# Patient Record
Sex: FEMALE | Race: WHITE | NOT HISPANIC OR LATINO | Employment: OTHER | URBAN - METROPOLITAN AREA
[De-identification: names, ages, dates, MRNs, and addresses within clinical notes are randomized per-mention and may not be internally consistent; named-entity substitution may affect disease eponyms.]

---

## 2017-02-16 ENCOUNTER — OFFICE VISIT (OUTPATIENT)
Dept: FAMILY MEDICINE | Facility: CLINIC | Age: 62
End: 2017-02-16
Payer: COMMERCIAL

## 2017-02-16 VITALS
TEMPERATURE: 97.3 F | DIASTOLIC BLOOD PRESSURE: 86 MMHG | SYSTOLIC BLOOD PRESSURE: 122 MMHG | HEIGHT: 66 IN | WEIGHT: 207 LBS | HEART RATE: 80 BPM | BODY MASS INDEX: 33.27 KG/M2 | OXYGEN SATURATION: 92 %

## 2017-02-16 DIAGNOSIS — I10 BENIGN ESSENTIAL HYPERTENSION: ICD-10-CM

## 2017-02-16 DIAGNOSIS — J20.9 ACUTE BRONCHITIS, UNSPECIFIED ORGANISM: Primary | ICD-10-CM

## 2017-02-16 PROCEDURE — 99213 OFFICE O/P EST LOW 20 MIN: CPT | Performed by: NURSE PRACTITIONER

## 2017-02-16 RX ORDER — LISINOPRIL 10 MG/1
10 TABLET ORAL DAILY
Qty: 90 TABLET | Refills: 3 | Status: CANCELLED | OUTPATIENT
Start: 2017-02-16

## 2017-02-16 RX ORDER — OXYBUTYNIN CHLORIDE 10 MG/1
10 TABLET, EXTENDED RELEASE ORAL DAILY
Qty: 90 TABLET | Refills: 3 | Status: CANCELLED | OUTPATIENT
Start: 2017-02-16

## 2017-02-16 NOTE — NURSING NOTE
"Chief Complaint   Patient presents with     Cough     x 7 days       Initial /80 (BP Location: Right arm, Cuff Size: Adult Large)  Pulse 80  Temp 97.3  F (36.3  C) (Oral)  Ht 5' 6.42\" (1.687 m)  Wt 207 lb (93.9 kg)  SpO2 92%  BMI 32.99 kg/m2 Estimated body mass index is 32.99 kg/(m^2) as calculated from the following:    Height as of this encounter: 5' 6.42\" (1.687 m).    Weight as of this encounter: 207 lb (93.9 kg).  Medication Reconciliation: complete     An YANELI Bunn    "

## 2017-02-16 NOTE — PROGRESS NOTES
"  SUBJECTIVE:                                                    Georgie Patino is a 61 year old female who presents to clinic today for the following health issues:      RESPIRATORY SYMPTOMS      Duration: 7 days    Description  nasal congestion, cough, wheezing, ear pain right, headache and hoarse voice    Severity: moderate    Accompanying signs and symptoms: None    History (predisposing factors):  none    Precipitating or alleviating factors: None    Therapies tried and outcome:  rest and fluids     Had a cough late December, resolved for 2 weeks.  Afebrile but had some achyness the first few days, now resolved.  Right ear pain.  Taking Delsym- BP elevated today.      Problem list and histories reviewed & adjusted, as indicated.  Additional history: as documented    Problem list, Medication list, Allergies, and Medical/Social/Surgical histories reviewed in EPIC and updated as appropriate.    ROS:  Constitutional, HEENT, cardiovascular, pulmonary systems are negative, except as otherwise noted.    OBJECTIVE:                                                    /86  Pulse 80  Temp 97.3  F (36.3  C) (Oral)  Ht 5' 6.42\" (1.687 m)  Wt 207 lb (93.9 kg)  SpO2 92%  BMI 32.99 kg/m2  Body mass index is 32.99 kg/(m^2).  GENERAL: healthy, alert and no distress  EYES: Eyes grossly normal to inspection, PERRL and conjunctivae and sclerae normal  HENT: ear canals and TM's normal, nose and mouth without ulcers or lesions  NECK: no adenopathy, no asymmetry, masses, or scars and thyroid normal to palpation  RESP: lungs clear to auscultation - no rales, rhonchi or wheezes  CV: regular rates and rhythm, normal S1 S2, no S3 or S4 and no murmur, click or rub    Diagnostic Test Results:  none      ASSESSMENT/PLAN:                                                        1. Acute bronchitis, unspecified organism  Supportive cares- push fluids, rest, Corticidin HBP if desired for cough. Avoid other over the counter decongestants as " this will raise BP.     2. Benign essential hypertension  As above      Follow up as needed    TESHA Ann CNP  [facility]

## 2017-02-16 NOTE — PATIENT INSTRUCTIONS
Coricidin HBP is safe to take for a cough or congestion with your high blood pressure.    [facility]    If you have any questions regarding to your visit please contact your care team:       Team Red:   Clinic Hours Telephone Number   Dr. Kimberly Bustillo, NP   7am-7pm  Monday - Thursday   7am-5pm  Fridays  [phone]  (Appointment scheduling available 24/7)    Questions about your visit?   Team Line  [phone]   Urgent Care - [place] - 11am-9pm Monday-Friday Saturday-Sunday- 9am-5pm   [place] - 5pm-9pm Monday-Friday Saturday-Sunday- 9am-5pm  [phone] - [facility]  [phone] - [place]       What options do I have for visits at the clinic other than the traditional office visit?  To expand how we care for you, many of our providers are utilizing electronic visits (e-visits) and telephone visits, when medically appropriate, for interactions with their patients rather than a visit in the clinic.   We also offer nurse visits for many medical concerns. Just like any other service, we will bill your insurance company for this type of visit based on time spent on the phone with your provider. Not all insurance companies cover these visits. Please check with your medical insurance if this type of visit is covered. You will be responsible for any charges that are not paid by your insurance.      E-visits via [employer]:  generally incur a $35.00 fee.  Telephone visits:  Time spent on the phone: *charged based on time that is spent on the phone in increments of 10 minutes. Estimated cost:   5-10 mins $30.00   11-20 mins. $59.00   21-30 mins. $85.00     Use [employer] (secure email communication and access to your chart) to send your primary care provider a message or make an appointment. Ask someone on your Team how to sign up for [employer].  For a Price Quote for your services, please call our Consumer Price Line at  [phone].      As always, Thank you for trusting us with your health care needs!  Nely BLACK MA

## 2017-02-16 NOTE — MR AVS SNAPSHOT
After Visit Summary   2/16/2017    Georgie Patino    MRN: [id number]           Patient Information     Date Of Birth          1955        Visit Information        Provider Department      2/16/2017 8:40 AM Ginger Bustillo APRN [facility]        Today's Diagnoses     Acute bronchitis, unspecified organism    -  1    Benign essential hypertension          Care Instructions    Coricidin HBP is safe to take for a cough or congestion with your high blood pressure.    [facility]    If you have any questions regarding to your visit please contact your care team:       Team Red:   Clinic Hours Telephone Number   Dr. Kimberly Bustillo, NP   7am-7pm  Monday - Thursday   7am-5pm  Fridays  [phone]  (Appointment scheduling available 24/7)    Questions about your visit?   Team Line  [phone]   Urgent Care - [place] - 11am-9pm Monday-Friday Saturday-Sunday- 9am-5pm   [place] - 5pm-9pm Monday-Friday Saturday-Sunday- 9am-5pm  [phone] - [facility]  [phone] - [place]       What options do I have for visits at the clinic other than the traditional office visit?  To expand how we care for you, many of our providers are utilizing electronic visits (e-visits) and telephone visits, when medically appropriate, for interactions with their patients rather than a visit in the clinic.   We also offer nurse visits for many medical concerns. Just like any other service, we will bill your insurance company for this type of visit based on time spent on the phone with your provider. Not all insurance companies cover these visits. Please check with your medical insurance if this type of visit is covered. You will be responsible for any charges that are not paid by your insurance.      E-visits via [employer]:  generally incur a $35.00 fee.  Telephone visits:  Time spent on the phone: *charged  "based on time that is spent on the phone in increments of 10 minutes. Estimated cost:   5-10 mins $30.00   11-20 mins. $59.00   21-30 mins. $85.00     Use [employer] (secure email communication and access to your chart) to send your primary care provider a message or make an appointment. Ask someone on your Team how to sign up for [employer].  For a Price Quote for your services, please call our [employer] Price Line at [phone].      As always, Thank you for trusting us with your health care needs!  Nely BLACK MA          Follow-ups after your visit        Your next 10 appointments already scheduled     Feb 24, 2017   Procedure with William Charles Duane, MD   [facility] (--)    [address]  [facility] [address] [phone]              Who to contact     If you have questions or need follow up information about today's clinic visit or your schedule please contact [facility] directly at [phone].  Normal or non-critical lab and imaging results will be communicated to you by [employer], letter or phone within 4 business days after the clinic has received the results. If you do not hear from us within 7 days, please contact the clinic through [employer] or phone. If you have a critical or abnormal lab result, we will notify you by phone as soon as possible.  Submit refill requests through [employer] or call your pharmacy and they will forward the refill request to us. Please allow 3 business days for your refill to be completed.          Additional Information About Your Visit        [employer] Information     [employer] lets you send messages to your doctor, view your test results, renew your prescriptions, schedule appointments and more. To sign up, go to [email or web address] . Click on \"Log in\" on the left side of the screen, which will take you to the Welcome page. Then click on \"Sign up Now\" on the right side of the page.     You will be asked to enter the access code listed " "below, as well as some personal information. Please follow the directions to create your username and password.     Your access code is: [email or web address]  Expires: 2017  9:10 AM     Your access code will  in 90 days. If you need help or a new code, please call your [facility] or [phone].        Care EveryWhere ID     This is your Care EveryWhere ID. This could be used by other organizations to access your [place] medical records  [id number]        Your Vitals Were     Pulse Temperature Height Pulse Oximetry BMI (Body Mass Index)       80 97.3  F (36.3  C) (Oral) 5' 6.42\" (1.687 m) 92% 32.99 kg/m2        Blood Pressure from Last 3 Encounters:   17 122/86   16 126/84   16 144/84    Weight from Last 3 Encounters:   17 207 lb (93.9 kg)   16 203 lb (92.1 kg)   16 204 lb (92.5 kg)              Today, you had the following     No orders found for display       Primary Care Provider Office Phone # Fax #    Marlys TESHA Nguyen [facility] [phone]       [address] [address]        Thank you!     Thank you for choosing [facility]  for your care. Our goal is always to provide you with excellent care. Hearing back from our patients is one way we can continue to improve our services. Please take a few minutes to complete the written survey that you may receive in the mail after your visit with us. Thank you!             Your Updated Medication List - Protect others around you: Learn how to safely use, store and throw away your medicines at www.disposemymeds.org.          This list is accurate as of: 17  9:10 AM.  Always use your most recent med list.                   Brand Name Dispense Instructions for use    lisinopril 10 MG tablet    PRINIVIL/ZESTRIL    90 tablet    Take 1 tablet (10 mg) by mouth daily       nicotine polacrilex 2 MG gum    NICORETTE    100 tablet    Place 1 each (2 mg) " inside cheek as needed for smoking cessation Place 1 each inside cheek as needed for smoking cessation       oxybutynin 10 MG 24 hr tablet    DITROPAN-XL    90 tablet    Take 1 tablet (10 mg) by mouth daily       ZANTAC PO      Take 150 mg by mouth daily

## 2017-03-15 ENCOUNTER — HOSPITAL ENCOUNTER (OUTPATIENT)
Facility: AMBULATORY SURGERY CENTER | Age: 62
Discharge: HOME | End: 2017-03-15
Attending: INTERNAL MEDICINE | Admitting: INTERNAL MEDICINE
Payer: COMMERCIAL

## 2017-03-15 ENCOUNTER — SURGERY (OUTPATIENT)
Age: 62
End: 2017-03-15

## 2017-03-15 VITALS
RESPIRATION RATE: 16 BRPM | WEIGHT: 200 LBS | BODY MASS INDEX: 32.14 KG/M2 | TEMPERATURE: 98.3 F | SYSTOLIC BLOOD PRESSURE: 161 MMHG | DIASTOLIC BLOOD PRESSURE: 77 MMHG | HEIGHT: 66 IN | OXYGEN SATURATION: 97 %

## 2017-03-15 LAB — COLONOSCOPY: NORMAL

## 2017-03-15 PROCEDURE — G8918 PT W/O PREOP ORDER IV AB PRO: HCPCS

## 2017-03-15 PROCEDURE — G8907 PT DOC NO EVENTS ON DISCHARG: HCPCS

## 2017-03-15 PROCEDURE — 45385 COLONOSCOPY W/LESION REMOVAL: CPT

## 2017-03-15 PROCEDURE — 88305 TISSUE EXAM BY PATHOLOGIST: CPT | Performed by: INTERNAL MEDICINE

## 2017-03-15 RX ORDER — LIDOCAINE 40 MG/G
CREAM TOPICAL
Status: DISCONTINUED | OUTPATIENT
Start: 2017-03-15 | End: 2017-03-16 | Stop reason: HOSPADM

## 2017-03-15 RX ORDER — ONDANSETRON 2 MG/ML
4 INJECTION INTRAMUSCULAR; INTRAVENOUS
Status: DISCONTINUED | OUTPATIENT
Start: 2017-03-15 | End: 2017-03-16 | Stop reason: HOSPADM

## 2017-03-15 RX ORDER — FENTANYL CITRATE 50 UG/ML
INJECTION, SOLUTION INTRAMUSCULAR; INTRAVENOUS PRN
Status: DISCONTINUED | OUTPATIENT
Start: 2017-03-15 | End: 2017-03-15 | Stop reason: HOSPADM

## 2017-03-15 RX ADMIN — FENTANYL CITRATE 100 MCG: 50 INJECTION, SOLUTION INTRAMUSCULAR; INTRAVENOUS at 09:10

## 2017-03-16 DIAGNOSIS — I10 BENIGN ESSENTIAL HYPERTENSION: ICD-10-CM

## 2017-03-16 LAB — COPATH REPORT: NORMAL

## 2017-03-16 NOTE — TELEPHONE ENCOUNTER
lisinopril (PRINIVIL,ZESTRIL) 10 MG tablet      Last Written Prescription Date: 3/14/16  Last Fill Quantity: 90, # refills: 3  Last Office Visit with G, P or [facility] prescribing provider: 2/16/17       Potassium   Date Value Ref Range Status   01/27/2016 4.4 3.4 - 5.3 mmol/L Final     Creatinine   Date Value Ref Range Status   01/27/2016 0.55 0.52 - 1.04 mg/dL Final     BP Readings from Last 3 Encounters:   03/15/17 161/77   02/16/17 122/86   03/14/16 126/84

## 2017-03-17 RX ORDER — LISINOPRIL 10 MG/1
TABLET ORAL
Qty: 30 TABLET | Refills: 2 | Status: SHIPPED | OUTPATIENT
Start: 2017-03-17 | End: 2017-06-13

## 2017-03-17 NOTE — TELEPHONE ENCOUNTER
Prescription approved per [facility] Refill Protocol.    Christie Dean, RN - BC

## 2017-06-13 DIAGNOSIS — I10 BENIGN ESSENTIAL HYPERTENSION: ICD-10-CM

## 2017-06-13 RX ORDER — LISINOPRIL 10 MG/1
TABLET ORAL
Qty: 30 TABLET | Refills: 0 | Status: SHIPPED | OUTPATIENT
Start: 2017-06-13 | End: 2017-08-04

## 2017-06-13 NOTE — TELEPHONE ENCOUNTER
lisinopril (PRINIVIL/ZESTRIL) 10 MG tablet      Last Written Prescription Date: 3/17/17  Last Fill Quantity: 30, # refills: 2  Last Office Visit with G, P or [facility] prescribing provider: 2/16/17       Potassium   Date Value Ref Range Status   01/27/2016 4.4 3.4 - 5.3 mmol/L Final     Creatinine   Date Value Ref Range Status   01/27/2016 0.55 0.52 - 1.04 mg/dL Final     BP Readings from Last 3 Encounters:   03/15/17 161/77   02/16/17 122/86   03/14/16 126/84

## 2017-06-13 NOTE — TELEPHONE ENCOUNTER
Prescription approved per [facility] Refill Protocol.  Patient due for labs for further refills.  Christie Dean RN - BC

## 2017-07-30 ENCOUNTER — TELEPHONE (OUTPATIENT)
Dept: FAMILY MEDICINE | Facility: CLINIC | Age: 62
End: 2017-07-30

## 2017-07-30 DIAGNOSIS — I10 BENIGN ESSENTIAL HYPERTENSION: ICD-10-CM

## 2017-07-31 RX ORDER — LISINOPRIL 10 MG/1
TABLET ORAL
Qty: 30 TABLET | Refills: 0
Start: 2017-07-31

## 2017-07-31 NOTE — TELEPHONE ENCOUNTER
I have not seen patient in over a year.  Please schedule patient for follow-up and give a refill once follow-up scheduled.  She will need labs at her appointment.    Marlys Wade, CNP

## 2017-07-31 NOTE — TELEPHONE ENCOUNTER
lisinopril (PRINIVIL/ZESTRIL) 10 MG tablet      Last Written Prescription Date: 6/13/2017  Last Fill Quantity: 30, # refills: 0  Last Office Visit with G, P or [facility] prescribing provider: 2/16/2017       Potassium   Date Value Ref Range Status   01/27/2016 4.4 3.4 - 5.3 mmol/L Final     Creatinine   Date Value Ref Range Status   01/27/2016 0.55 0.52 - 1.04 mg/dL Final     BP Readings from Last 3 Encounters:   03/15/17 161/77   02/16/17 122/86   03/14/16 126/84

## 2017-07-31 NOTE — TELEPHONE ENCOUNTER
Routing refill request to provider for review/approval because:  Labs not current:  Amador DICKEY RN

## 2017-07-31 NOTE — TELEPHONE ENCOUNTER
Please notify patient of the below and schedule.  Route back to RN triage pool if temporary supply of med is needed     Olga Lidia Marquez RN

## 2017-08-04 RX ORDER — LISINOPRIL 10 MG/1
10 TABLET ORAL DAILY
Qty: 30 TABLET | Refills: 0 | Status: SHIPPED | OUTPATIENT
Start: 2017-08-04 | End: 2017-08-15

## 2017-08-15 ENCOUNTER — OFFICE VISIT (OUTPATIENT)
Dept: INTERNAL MEDICINE | Facility: CLINIC | Age: 62
End: 2017-08-15
Payer: COMMERCIAL

## 2017-08-15 VITALS
HEART RATE: 80 BPM | WEIGHT: 205 LBS | BODY MASS INDEX: 35 KG/M2 | DIASTOLIC BLOOD PRESSURE: 90 MMHG | OXYGEN SATURATION: 97 % | TEMPERATURE: 97 F | HEIGHT: 64 IN | SYSTOLIC BLOOD PRESSURE: 140 MMHG

## 2017-08-15 DIAGNOSIS — Z11.59 NEED FOR HEPATITIS C SCREENING TEST: ICD-10-CM

## 2017-08-15 DIAGNOSIS — Z82.41 FAMILY HISTORY OF SUDDEN CARDIAC DEATH: ICD-10-CM

## 2017-08-15 DIAGNOSIS — Z12.31 ENCOUNTER FOR SCREENING MAMMOGRAM FOR BREAST CANCER: ICD-10-CM

## 2017-08-15 DIAGNOSIS — R00.2 PALPITATIONS: ICD-10-CM

## 2017-08-15 DIAGNOSIS — I10 BENIGN ESSENTIAL HYPERTENSION: ICD-10-CM

## 2017-08-15 DIAGNOSIS — Z00.00 ROUTINE HISTORY AND PHYSICAL EXAMINATION OF ADULT: Primary | ICD-10-CM

## 2017-08-15 DIAGNOSIS — D58.2 ELEVATED HEMOGLOBIN (H): ICD-10-CM

## 2017-08-15 DIAGNOSIS — R94.31 ABNORMAL ELECTROCARDIOGRAM: ICD-10-CM

## 2017-08-15 DIAGNOSIS — Z13.6 CARDIOVASCULAR SCREENING; LDL GOAL LESS THAN 160: ICD-10-CM

## 2017-08-15 DIAGNOSIS — Z72.0 TOBACCO ABUSE: ICD-10-CM

## 2017-08-15 LAB
BASOPHILS # BLD AUTO: 0 10E9/L (ref 0–0.2)
BASOPHILS NFR BLD AUTO: 0.6 %
DIFFERENTIAL METHOD BLD: ABNORMAL
EOSINOPHIL # BLD AUTO: 0.2 10E9/L (ref 0–0.7)
EOSINOPHIL NFR BLD AUTO: 2.9 %
ERYTHROCYTE [DISTWIDTH] IN BLOOD BY AUTOMATED COUNT: 13.7 % (ref 10–15)
HCT VFR BLD AUTO: 47.8 % (ref 35–47)
HGB BLD-MCNC: 15.9 G/DL (ref 11.7–15.7)
LYMPHOCYTES # BLD AUTO: 3.2 10E9/L (ref 0.8–5.3)
LYMPHOCYTES NFR BLD AUTO: 45.4 %
MCH RBC QN AUTO: 29.1 PG (ref 26.5–33)
MCHC RBC AUTO-ENTMCNC: 33.3 G/DL (ref 31.5–36.5)
MCV RBC AUTO: 88 FL (ref 78–100)
MONOCYTES # BLD AUTO: 0.4 10E9/L (ref 0–1.3)
MONOCYTES NFR BLD AUTO: 6 %
NEUTROPHILS # BLD AUTO: 3.1 10E9/L (ref 1.6–8.3)
NEUTROPHILS NFR BLD AUTO: 45.1 %
PLATELET # BLD AUTO: 188 10E9/L (ref 150–450)
RBC # BLD AUTO: 5.46 10E12/L (ref 3.8–5.2)
WBC # BLD AUTO: 7 10E9/L (ref 4–11)

## 2017-08-15 PROCEDURE — 85025 COMPLETE CBC W/AUTO DIFF WBC: CPT | Performed by: NURSE PRACTITIONER

## 2017-08-15 PROCEDURE — 99396 PREV VISIT EST AGE 40-64: CPT | Performed by: NURSE PRACTITIONER

## 2017-08-15 PROCEDURE — 0298T ZZC EXT ECG > 48HR TO 21 DAY REVIEW AND INTERPRETATN: CPT | Performed by: INTERNAL MEDICINE

## 2017-08-15 PROCEDURE — 84443 ASSAY THYROID STIM HORMONE: CPT | Performed by: NURSE PRACTITIONER

## 2017-08-15 PROCEDURE — 93000 ELECTROCARDIOGRAM COMPLETE: CPT | Performed by: NURSE PRACTITIONER

## 2017-08-15 PROCEDURE — 80048 BASIC METABOLIC PNL TOTAL CA: CPT | Performed by: NURSE PRACTITIONER

## 2017-08-15 PROCEDURE — 86803 HEPATITIS C AB TEST: CPT | Performed by: NURSE PRACTITIONER

## 2017-08-15 PROCEDURE — 99213 OFFICE O/P EST LOW 20 MIN: CPT | Mod: 25 | Performed by: NURSE PRACTITIONER

## 2017-08-15 PROCEDURE — 36415 COLL VENOUS BLD VENIPUNCTURE: CPT | Performed by: NURSE PRACTITIONER

## 2017-08-15 PROCEDURE — 80061 LIPID PANEL: CPT | Performed by: NURSE PRACTITIONER

## 2017-08-15 RX ORDER — LISINOPRIL 10 MG/1
10 TABLET ORAL DAILY
Qty: 90 TABLET | Refills: 3 | Status: SHIPPED | OUTPATIENT
Start: 2017-08-15 | End: 2017-09-01

## 2017-08-15 ASSESSMENT — PAIN SCALES - GENERAL: PAINLEVEL: NO PAIN (0)

## 2017-08-15 NOTE — PROGRESS NOTES
SUBJECTIVE:   CC: Georgie Patino is an 62 year old woman who presents for preventive health visit.     Healthy Habits:    Do you get at least three servings of calcium containing foods daily (dairy, green leafy vegetables, etc.)? yes    Amount of exercise or daily activities, outside of work: 3 day(s) per week    Problems taking medications regularly No    Medication side effects: Yes Cough    Have you had an eye exam in the past two years? yes    Do you see a dentist twice per year? yes    Do you have sleep apnea, excessive snoring or daytime drowsiness?no          Hypertension Follow-up      Outpatient blood pressures are not being checked.    Low Salt Diet: low salt    Patient notes occasional racing heart beat.  Patient notes shortness of breath, dizziness with palpitations.  Patient denies chest pain with palpitations.  She notes them more at night and after dinner.  Patient's brother just  from a sudden heart attack at age 54.        Today's PHQ-2 Score: PHQ-2 (  [employer]) 8/15/2017 3/14/2016   Q1: Little interest or pleasure in doing things 0 0   Q2: Feeling down, depressed or hopeless 0 0   PHQ-2 Score 0 0         Abuse: Current or Past(Physical, Sexual or Emotional)- No  Do you feel safe in your environment - Yes  Social History   Substance Use Topics     Smoking status: Current Every Day Smoker     Packs/day: 0.05     Years: 40.00     Types: Cigarettes     Smokeless tobacco: Current User     Alcohol use 0.5 - 1.0 oz/week     1 - 2 Standard drinks or equivalent per week      Comment: 3-4 drinks per week      The patient does not drink >3 drinks per day nor >7 drinks per week.  ALAINA/MA    Reviewed orders with patient.  Reviewed health maintenance and updated orders accordingly - Yes  Labs reviewed in EPIC  BP Readings from Last 3 Encounters:   08/15/17 140/90   03/15/17 161/77   17 122/86    Wt Readings from Last 3 Encounters:   08/15/17 205 lb (93 kg)   17 200 lb (90.7 kg)    02/16/17 207 lb (93.9 kg)                  Patient Active Problem List   Diagnosis     CARDIOVASCULAR SCREENING; LDL GOAL LESS THAN 160     Smoker     Obesity     Benign essential hypertension     Past Surgical History:   Procedure Laterality Date     COLONOSCOPY WITH CO2 INSUFFLATION N/A 3/15/2017    Procedure: COLONOSCOPY WITH CO2 INSUFFLATION;  Surgeon: Duane, William Charles, MD;  Location:  OR       Social History   Substance Use Topics     Smoking status: Current Every Day Smoker     Packs/day: 0.05     Years: 40.00     Types: Cigarettes     Smokeless tobacco: Current User     Alcohol use 0.5 - 1.0 oz/week     1 - 2 Standard drinks or equivalent per week      Comment: 3-4 drinks per week      Family History   Problem Relation Age of Onset     Respiratory Mother      copd     CANCER Maternal Grandmother      Leg     Alzheimer Disease Maternal Grandfather      HEART DISEASE Paternal Grandfather      HEART DISEASE Brother 54     Heart Attack      CANCER Sister      Lung cancer age 55-smoker d age 57         Current Outpatient Prescriptions   Medication Sig Dispense Refill     lisinopril (PRINIVIL/ZESTRIL) 10 MG tablet Take 1 tablet (10 mg) by mouth daily 30 tablet 0     nicotine polacrilex (NICORETTE) 2 MG gum Place 1 each (2 mg) inside cheek as needed for smoking cessation Place 1 each inside cheek as needed for smoking cessation 100 tablet 5     No Known Allergies  Recent Labs   Lab Test  01/27/16   1057  01/11/16   1631  05/30/12   1156   LDL   --    --   148*   HDL   --    --   50   TRIG   --    --   124   CR  0.55  0.56   --    GFRESTIMATED  >90  Non  GFR Calc    >90  Non  GFR Calc     --    GFRESTBLACK  >90   GFR Calc    >90   GFR Calc     --    POTASSIUM  4.4  3.9   --               Patient over age 50, mutual decision to screen reflected in health maintenance.      Pertinent mammograms are reviewed under the imaging tab.  History of  "abnormal Pap smear:   Last 3 Pap Results:   PAP (no units)   Date Value   03/14/2016 NIL   05/30/2012 NIL       Reviewed and updated as needed this visit by clinical staffTobacco  Allergies  Meds  Med Hx  Surg Hx  Fam Hx  Soc Hx        Reviewed and updated as needed this visit by Provider              ROS:  C: NEGATIVE for fever, chills, change in weight  I: NEGATIVE for worrisome rashes, moles or lesions  E: NEGATIVE for vision changes or irritation  ENT: NEGATIVE for ear, mouth and throat problems  R: NEGATIVE for significant cough or SOB  B: NEGATIVE for masses, tenderness or discharge  CV: POSITIVE for palpitations  GI: NEGATIVE for nausea, abdominal pain, heartburn, or change in bowel habits  : NEGATIVE for unusual urinary or vaginal symptoms. No vaginal bleeding.  M: NEGATIVE for significant arthralgias or myalgia  N: NEGATIVE for weakness, dizziness or paresthesias  P: NEGATIVE for changes in mood or affect     OBJECTIVE:   /90  Pulse 80  Temp 97  F (36.1  C) (Oral)  Ht 5' 4.4\" (1.636 m)  Wt 205 lb (93 kg)  SpO2 97%  Breastfeeding? No  BMI 34.75 kg/m2  EXAM:  GENERAL: healthy, alert and no distress  EYES: Eyes grossly normal to inspection, PERRL and conjunctivae and sclerae normal  HENT: ear canals and TM's normal, nose and mouth without ulcers or lesions  NECK: no adenopathy, no asymmetry, masses, or scars and thyroid normal to palpation  RESP: lungs clear to auscultation - no rales, rhonchi or wheezes  BREAST: normal without masses, tenderness or nipple discharge and no palpable axillary masses or adenopathy  CV: regular rate and rhythm, normal S1 S2, no S3 or S4, no murmur, click or rub, no peripheral edema and peripheral pulses strong  ABDOMEN: soft, nontender, no hepatosplenomegaly, no masses and bowel sounds normal  MS: no gross musculoskeletal defects noted, no edema  PSYCH: mentation appears normal, affect normal/bright    ASSESSMENT/PLAN:   1. Routine history and physical " "examination of adult      2. Benign essential hypertension  Stable.  Continue current treatment plan and medications.    - Basic metabolic panel  - lisinopril (PRINIVIL/ZESTRIL) 10 MG tablet; Take 1 tablet (10 mg) by mouth daily  Dispense: 90 tablet; Refill: 3    3. CARDIOVASCULAR SCREENING; LDL GOAL LESS THAN 160    - Lipid panel reflex to direct LDL    4. Need for hepatitis C screening test    - Hepatitis C Screen Reflex to HCV RNA Quant and Genotype    5. Palpitations    - CBC with platelets differential  - TSH with free T4 reflex  - EKG 12-lead complete w/read - Clinics  - XR Chest 2 Views; Future  - Zio Patch Holter; Future    6. Tobacco abuse  Patient declines additional assistance.    7. Encounter for screening mammogram for breast cancer    - *MA Screening Digital Bilateral; Future    8. Abnormal electrocardiogram  Reviewed results with Helen Mcclendon MD.  Patient to undergo CXR and follow-up with cardiology to evaluate further.  Patient advised to seek emergency care for chest pain, increased palpitations.  Patient verbalized understanding.   - XR Chest 2 Views; Future  - CARDIOLOGY EVAL ADULT REFERRAL    9. Family history of sudden cardiac death  As above.   - CARDIOLOGY EVAL ADULT REFERRAL    COUNSELING:   Reviewed preventive health counseling, as reflected in patient instructions       Regular exercise       Healthy diet/nutrition       Consider Hep C screening for patients born between 1945 and 1965         reports that she has been smoking Cigarettes.  She has a 2.00 pack-year smoking history. She uses smokeless tobacco.  Tobacco Cessation Action Plan: Pharmacotherapies : other Nicotine replacement  Estimated body mass index is 34.75 kg/(m^2) as calculated from the following:    Height as of this encounter: 5' 4.4\" (1.636 m).    Weight as of this encounter: 205 lb (93 kg).   Weight management plan: Discussed healthy diet and exercise guidelines and patient will follow up in 12 months in clinic to " re-evaluate.    Counseling Resources:  ATP IV Guidelines  Pooled Cohorts Equation Calculator  Breast Cancer Risk Calculator  FRAX Risk Assessment  ICSI Preventive Guidelines  Dietary Guidelines for Americans, 2010  USDA's MyPlate  ASA Prophylaxis  Lung CA Screening    TESHA Chanel CNP  [facility]

## 2017-08-15 NOTE — NURSING NOTE
"Chief Complaint   Patient presents with     Physical       Initial /90  Pulse 80  Temp 97  F (36.1  C) (Oral)  Ht 5' 4.4\" (1.636 m)  Wt 205 lb (93 kg)  SpO2 97%  Breastfeeding? No  BMI 34.75 kg/m2 Estimated body mass index is 34.75 kg/(m^2) as calculated from the following:    Height as of this encounter: 5' 4.4\" (1.636 m).    Weight as of this encounter: 205 lb (93 kg).  Medication Reconciliation: complete   ALAINA/MA      "

## 2017-08-15 NOTE — MR AVS SNAPSHOT
After Visit Summary   8/15/2017    Georgie Patino    MRN: [id number]           Patient Information     Date Of Birth          1955        Visit Information        Provider Department      8/15/2017 3:20 PM Marlys Wade APRN [facility] [place]        Today's Diagnoses     Routine history and physical examination of adult    -  1    Benign essential hypertension        CARDIOVASCULAR SCREENING; LDL GOAL LESS THAN 160        Need for hepatitis C screening test        Palpitations        Tobacco abuse        Encounter for screening mammogram for breast cancer        Abnormal electrocardiogram        Family history of sudden cardiac death          Care Instructions      Preventive Health Recommendations  Female Ages 50 - 64    Yearly exam: See your health care provider every year in order to  o Review health changes.   o Discuss preventive care.    o Review your medicines if your doctor has prescribed any.      Get a Pap test every three years (unless you have an abnormal result and your provider advises testing more often).    If you get Pap tests with HPV test, you only need to test every 5 years, unless you have an abnormal result.     You do not need a Pap test if your uterus was removed (hysterectomy) and you have not had cancer.    You should be tested each year for STDs (sexually transmitted diseases) if you're at risk.     Have a mammogram every 1 to 2 years.    Have a colonoscopy at age 50, or have a yearly FIT test (stool test). These exams screen for colon cancer.      Have a cholesterol test every 5 years, or more often if advised.    Have a diabetes test (fasting glucose) every three years. If you are at risk for diabetes, you should have this test more often.     If you are at risk for osteoporosis (brittle bone disease), think about having a bone density scan (DEXA).    Shots: Get a flu shot each year. Get a tetanus shot every 10 years.    Nutrition:     Eat at  least 5 servings of fruits and vegetables each day.    Eat whole-grain bread, whole-wheat pasta and brown rice instead of white grains and rice.    Talk to your provider about Calcium and Vitamin D.     Lifestyle    Exercise at least 150 minutes a week (30 minutes a day, 5 days a week). This will help you control your weight and prevent disease.    Limit alcohol to one drink per day.    No smoking.     Wear sunscreen to prevent skin cancer.     See your dentist every six months for an exam and cleaning.    See your eye doctor every 1 to 2 years.      [facility]    If you have any questions regarding to your visit please contact your care team:     Team Pink:   Clinic Hours Telephone Number   Internal Medicine:  Dr. Helen Wade, NP       7am-7pm  Monday - Thursday   7am-5pm  Fridays  [phone]  (Appointment scheduling available 24/7)    Questions about your visit?  Team Line  [phone]   Urgent Care - [place] - 11am-9pm Monday-Friday Saturday-Sunday- 9am-5pm   [place] - 5pm-9pm Monday-Friday Saturday-Sunday- 9am-5pm  [phone] - [place]   [phone] - [place]       What options do I have for visits at the clinic other than the traditional office visit?  To expand how we care for you, many of our providers are utilizing electronic visits (e-visits) and telephone visits, when medically appropriate, for interactions with their patients rather than a visit in the clinic.   We also offer nurse visits for many medical concerns. Just like any other service, we will bill your insurance company for this type of visit based on time spent on the phone with your provider. Not all insurance companies cover these visits. Please check with your medical insurance if this type of visit is covered. You will be responsible for any charges that are not paid by your insurance.      E-visits via KVK TEAM:  generally incur a $35.00  fee.  Telephone visits:  Time spent on the phone: *charged based on time that is spent on the phone in increments of 10 minutes. Estimated cost:   5-10 mins $30.00   11-20 mins. $59.00   21-30 mins. $85.00   Use 72xuanhart (secure email communication and access to your chart) to send your primary care provider a message or make an appointment. Ask someone on your Team how to sign up for [employer].    For a Price Quote for your services, please call our [place]ala Line at [phone].    As always, Thank you for trusting us with your health care needs!    ALAINA/MA              Follow-ups after your visit        Additional Services     CARDIOLOGY EVAL ADULT REFERRAL       Your provider has referred you to:  [facility]MAIDEN: Jo [facility] Rosen [phone]   [email or web address]    Please be aware that coverage of these services is subject to the terms and limitations of your health insurance plan.  Call member services at your health plan with any benefit or coverage questions.      Type of Referral:  New Cardiology Consult    Timeframe requested:  Less than 1 week    Please bring the following to your appointment:  >>   Any x-rays, CTs or MRIs which have been performed.  Contact the facility where they were done to arrange for  prior to your scheduled appointment.    >>   List of current medications  >>   This referral request   >>   Any documents/labs given to you for this referral                  Future tests that were ordered for you today     Open Future Orders        Priority Expected Expires Ordered    XR Chest 2 Views Routine 8/15/2017 8/15/2018 8/15/2017    Zio Patch Holter Routine  9/29/2017 8/15/2017    *MA Screening Digital Bilateral Routine  8/15/2018 8/15/2017            Who to contact     If you have questions or need follow up information about today's clinic visit or your schedule please contact JO ANN ROSEN directly at  "[phone].  Normal or non-critical lab and imaging results will be communicated to you by MyChart, letter or phone within 4 business days after the clinic has received the results. If you do not hear from us within 7 days, please contact the clinic through Poachablehart or phone. If you have a critical or abnormal lab result, we will notify you by phone as soon as possible.  Submit refill requests through [employer] or call your pharmacy and they will forward the refill request to us. Please allow 3 business days for your refill to be completed.          Additional Information About Your Visit        [employer] Delta Information     [employer] lets you send messages to your doctor, view your test results, renew your prescriptions, schedule appointments and more. To sign up, go to [email or web address] . Click on \"Log in\" on the left side of the screen, which will take you to the Welcome page. Then click on \"Sign up Now\" on the right side of the page.     You will be asked to enter the access code listed below, as well as some personal information. Please follow the directions to create your username and password.     Your access code is: [email or web address]  Expires: 2017  4:05 PM     Your access code will  in 90 days. If you need help or a new code, please call your [facility] clinic or [phone].        Care EveryWhere ID     This is your Care EveryWhere ID. This could be used by other organizations to access your [facility] medical records  [id number]        Your Vitals Were     Pulse Temperature Height Pulse Oximetry Breastfeeding? BMI (Body Mass Index)    80 97  F (36.1  C) (Oral) 5' 4.4\" (1.636 m) 97% No 34.75 kg/m2       Blood Pressure from Last 3 Encounters:   08/15/17 140/90   03/15/17 161/77   17 122/86    Weight from Last 3 Encounters:   08/15/17 205 lb (93 kg)   17 200 lb (90.7 kg)   17 207 lb (93.9 kg)              We Performed the Following     Basic metabolic panel     CARDIOLOGY EVAL ADULT " REFERRAL     CBC with platelets differential     EKG 12-lead complete w/read - Clinics     Hepatitis C Screen Reflex to HCV RNA Quant and Genotype     Lipid panel reflex to direct LDL     TSH with free T4 reflex          Where to get your medicines      These medications were sent to [id number] - [place], [place] - [address]  [address] [place] [address]     Phone:  [phone]     lisinopril 10 MG tablet          Primary Care Provider Office Phone # Fax #    Marlys Wade, APRN [facility] [phone]       [address] [place] [address]  [place] [address]        Equal Access to Services     [facility]: Hadii aad ku hadasho Soomaali, waaxda luqadaha, qaybta kaalmada adeegyada, waxkaleb cox hayaan scott crenshaw . So [facility] [phone].    ATENCIÓN: Si habla español, tiene a thomas disposición servicios gratuitos de asistencia lingüística. Llame al [phone].    We comply with applicable federal civil rights laws and [place] laws. We do not discriminate on the basis of race, color, national origin, age, disability sex, sexual orientation or gender identity.            Thank you!     Thank you for choosing [facility]  for your care. Our goal is always to provide you with excellent care. Hearing back from our patients is one way we can continue to improve our services. Please take a few minutes to complete the written survey that you may receive in the mail after your visit with us. Thank you!             Your Updated Medication List - Protect others around you: Learn how to safely use, store and throw away your medicines at www.disposemymeds.org.          This list is accurate as of: 8/15/17  4:53 PM.  Always use your most recent med list.                   Brand Name Dispense Instructions for use Diagnosis    lisinopril 10 MG tablet    PRINIVIL/ZESTRIL    90 tablet    Take 1 tablet (10 mg) by mouth daily    Benign essential hypertension       nicotine polacrilex 2  MG gum    NICORETTE    100 tablet    Place 1 each (2 mg) inside cheek as needed for smoking cessation Place 1 each inside cheek as needed for smoking cessation    History of tobacco use

## 2017-08-15 NOTE — PATIENT INSTRUCTIONS
Preventive Health Recommendations  Female Ages 50 - 64    Yearly exam: See your health care provider every year in order to  o Review health changes.   o Discuss preventive care.    o Review your medicines if your doctor has prescribed any.      Get a Pap test every three years (unless you have an abnormal result and your provider advises testing more often).    If you get Pap tests with HPV test, you only need to test every 5 years, unless you have an abnormal result.     You do not need a Pap test if your uterus was removed (hysterectomy) and you have not had cancer.    You should be tested each year for STDs (sexually transmitted diseases) if you're at risk.     Have a mammogram every 1 to 2 years.    Have a colonoscopy at age 50, or have a yearly FIT test (stool test). These exams screen for colon cancer.      Have a cholesterol test every 5 years, or more often if advised.    Have a diabetes test (fasting glucose) every three years. If you are at risk for diabetes, you should have this test more often.     If you are at risk for osteoporosis (brittle bone disease), think about having a bone density scan (DEXA).    Shots: Get a flu shot each year. Get a tetanus shot every 10 years.    Nutrition:     Eat at least 5 servings of fruits and vegetables each day.    Eat whole-grain bread, whole-wheat pasta and brown rice instead of white grains and rice.    Talk to your provider about Calcium and Vitamin D.     Lifestyle    Exercise at least 150 minutes a week (30 minutes a day, 5 days a week). This will help you control your weight and prevent disease.    Limit alcohol to one drink per day.    No smoking.     Wear sunscreen to prevent skin cancer.     See your dentist every six months for an exam and cleaning.    See your eye doctor every 1 to 2 years.      [facility]    If you have any questions regarding to your visit please contact your care team:     Team Pink:   Clinic Hours Telephone Number    Internal Medicine:  Dr. Helen Wade, NP       7am-7pm  Monday - Thursday   7am-5pm  Fridays  [phone]  (Appointment scheduling available 24/7)    Questions about your visit?  Team Line  [phone]   Urgent Care - [place] [facility] - 11am-9pm Monday-Friday Saturday-Sunday- 9am-5pm   [place] - 5pm-9pm Monday-Friday Saturday-Sunday- 9am-5pm  [phone] - [facility]   [phone] - [place]       What options do I have for visits at the clinic other than the traditional office visit?  To expand how we care for you, many of our providers are utilizing electronic visits (e-visits) and telephone visits, when medically appropriate, for interactions with their patients rather than a visit in the clinic.   We also offer nurse visits for many medical concerns. Just like any other service, we will bill your insurance company for this type of visit based on time spent on the phone with your provider. Not all insurance companies cover these visits. Please check with your medical insurance if this type of visit is covered. You will be responsible for any charges that are not paid by your insurance.      E-visits via Atooma:  generally incur a $35.00 fee.  Telephone visits:  Time spent on the phone: *charged based on time that is spent on the phone in increments of 10 minutes. Estimated cost:   5-10 mins $30.00   11-20 mins. $59.00   21-30 mins. $85.00   Use Atooma (secure email communication and access to your chart) to send your primary care provider a message or make an appointment. Ask someone on your Team how to sign up for Atooma.    For a Price Quote for your services, please call our Consumer Price Line at [phone].    As always, Thank you for trusting us with your health care needs!    ALAINA/MA

## 2017-08-16 LAB
ANION GAP SERPL CALCULATED.3IONS-SCNC: 7 MMOL/L (ref 3–14)
BUN SERPL-MCNC: 15 MG/DL (ref 7–30)
CALCIUM SERPL-MCNC: 9.5 MG/DL (ref 8.5–10.1)
CHLORIDE SERPL-SCNC: 104 MMOL/L (ref 94–109)
CHOLEST SERPL-MCNC: 239 MG/DL
CO2 SERPL-SCNC: 27 MMOL/L (ref 20–32)
CREAT SERPL-MCNC: 0.57 MG/DL (ref 0.52–1.04)
GFR SERPL CREATININE-BSD FRML MDRD: >90 ML/MIN/1.7M2
GLUCOSE SERPL-MCNC: 84 MG/DL (ref 70–99)
HCV AB SERPL QL IA: NONREACTIVE
HDLC SERPL-MCNC: 72 MG/DL
LDLC SERPL CALC-MCNC: 148 MG/DL
NONHDLC SERPL-MCNC: 167 MG/DL
POTASSIUM SERPL-SCNC: 4.3 MMOL/L (ref 3.4–5.3)
SODIUM SERPL-SCNC: 138 MMOL/L (ref 133–144)
TRIGL SERPL-MCNC: 96 MG/DL
TSH SERPL DL<=0.005 MIU/L-ACNC: 0.73 MU/L (ref 0.4–4)

## 2017-08-17 RX ORDER — ATORVASTATIN CALCIUM 40 MG/1
40 TABLET, FILM COATED ORAL DAILY
Qty: 90 TABLET | Refills: 3 | Status: SHIPPED | OUTPATIENT
Start: 2017-08-17 | End: 2018-09-11

## 2017-08-17 NOTE — PROGRESS NOTES
Please call patient-    Her cholesterol is elevated and I would recommend that she start a statin medication to help lower her cardiac risk.  I would like her to start atorvastatin 40 mg daily and recheck a fasting lipid panel in 3 months (indication hyperlipidemia, #90, 3 RF)  Her hemoglobin is also mildly elevated.  I would like to recheck a hgb again in 2 weeks (indication elevated hemoglobin).  Her other labs are normal.    Thanks,  Marlys Wade, CNP

## 2017-08-25 ENCOUNTER — PRE VISIT (OUTPATIENT)
Dept: CARDIOLOGY | Facility: CLINIC | Age: 62
End: 2017-08-25

## 2017-08-25 NOTE — TELEPHONE ENCOUNTER
HPI:  CC: Georgie Patino is an 62 year old woman who presents for preventive health visit.      Healthy Habits:    Do you get at least three servings of calcium containing foods daily (dairy, green leafy vegetables, etc.)? yes    Amount of exercise or daily activities, outside of work: 3 day(s) per week    Problems taking medications regularly No    Medication side effects: Yes Cough    Have you had an eye exam in the past two years? yes    Do you see a dentist twice per year? yes    Do you have sleep apnea, excessive snoring or daytime drowsiness?no            Hypertension Follow-up       Outpatient blood pressures are not being checked.    Low Salt Diet: low salt     Patient notes occasional racing heart beat.  Patient notes shortness of breath, dizziness with palpitations.  Patient denies chest pain with palpitations.  She notes them more at night and after dinner.  Patient's brother just  from a sudden heart attack at age 54.      ASSESSMENT & PLAN:  1. Routine history and physical examination of adult        2. Benign essential hypertension  Stable.  Continue current treatment plan and medications.     - Basic metabolic panel  - lisinopril (PRINIVIL/ZESTRIL) 10 MG tablet; Take 1 tablet (10 mg) by mouth daily  Dispense: 90 tablet; Refill: 3     3. CARDIOVASCULAR SCREENING; LDL GOAL LESS THAN 160     - Lipid panel reflex to direct LDL     4. Need for hepatitis C screening test     - Hepatitis C Screen Reflex to HCV RNA Quant and Genotype     5. Palpitations     - CBC with platelets differential  - TSH with free T4 reflex  - EKG 12-lead complete w/read - Clinics  - XR Chest 2 Views; Future  - Zio Patch Holter; Future     6. Tobacco abuse  Patient declines additional assistance.     7. Encounter for screening mammogram for breast cancer     - *MA Screening Digital Bilateral; Future     8. Abnormal electrocardiogram  Reviewed results with Helen Mcclendon MD.  Patient to undergo CXR and follow-up with cardiology  to evaluate further.  Patient advised to seek emergency care for chest pain, increased palpitations.  Patient verbalized understanding.   - XR Chest 2 Views; Future  - CARDIOLOGY EVAL ADULT REFERRAL     9. Family history of sudden cardiac death  As above.   - CARDIOLOGY EVAL ADULT REFERRAL    Last EC/15/2017  Atrial  Rhythm   P:QRS - 1:1, Abnormal P axis, H Rate 69  -Left axis -anterior fascicular block.    -  T-abnormality  - Anterior ischemia.     ABNORMAL

## 2017-08-30 DIAGNOSIS — R00.2 PALPITATIONS: ICD-10-CM

## 2017-08-30 PROCEDURE — 0296T ZIO PATCH HOLTER: CPT | Performed by: NURSE PRACTITIONER

## 2017-08-31 DIAGNOSIS — R00.2 PALPITATIONS: Primary | ICD-10-CM

## 2017-09-01 ENCOUNTER — RADIANT APPOINTMENT (OUTPATIENT)
Dept: CARDIOLOGY | Facility: CLINIC | Age: 62
End: 2017-09-01
Attending: INTERNAL MEDICINE
Payer: COMMERCIAL

## 2017-09-01 ENCOUNTER — OFFICE VISIT (OUTPATIENT)
Dept: CARDIOLOGY | Facility: CLINIC | Age: 62
End: 2017-09-01
Payer: COMMERCIAL

## 2017-09-01 VITALS
SYSTOLIC BLOOD PRESSURE: 157 MMHG | WEIGHT: 205 LBS | OXYGEN SATURATION: 96 % | BODY MASS INDEX: 34.75 KG/M2 | DIASTOLIC BLOOD PRESSURE: 90 MMHG | HEART RATE: 62 BPM

## 2017-09-01 DIAGNOSIS — R00.2 PALPITATIONS: ICD-10-CM

## 2017-09-01 DIAGNOSIS — E78.2 MIXED HYPERLIPIDEMIA: ICD-10-CM

## 2017-09-01 DIAGNOSIS — I10 BENIGN ESSENTIAL HYPERTENSION: ICD-10-CM

## 2017-09-01 DIAGNOSIS — R00.2 PALPITATIONS: Primary | ICD-10-CM

## 2017-09-01 DIAGNOSIS — I71.21 ASCENDING AORTIC ANEURYSM (H): ICD-10-CM

## 2017-09-01 PROCEDURE — 99204 OFFICE O/P NEW MOD 45 MIN: CPT | Mod: 25 | Performed by: INTERNAL MEDICINE

## 2017-09-01 PROCEDURE — 40000264 ZZHC STATISTIC IV PUSH SINGLE INITIAL SUBSTANCE: Performed by: INTERNAL MEDICINE

## 2017-09-01 PROCEDURE — 93000 ELECTROCARDIOGRAM COMPLETE: CPT | Performed by: INTERNAL MEDICINE

## 2017-09-01 PROCEDURE — 93306 TTE W/DOPPLER COMPLETE: CPT | Mod: GC | Performed by: INTERNAL MEDICINE

## 2017-09-01 RX ORDER — METOPROLOL TARTRATE 25 MG/1
25 TABLET, FILM COATED ORAL 2 TIMES DAILY
Qty: 60 TABLET | Refills: 3 | Status: SHIPPED | OUTPATIENT
Start: 2017-09-01 | End: 2018-01-08

## 2017-09-01 RX ORDER — LISINOPRIL 10 MG/1
20 TABLET ORAL DAILY
Qty: 90 TABLET | Refills: 3 | Status: SHIPPED | OUTPATIENT
Start: 2017-09-01 | End: 2017-09-22

## 2017-09-01 RX ADMIN — Medication 3 ML: at 15:00

## 2017-09-01 NOTE — PATIENT INSTRUCTIONS
Thank you for coming to the [facility] [place]; please note the following instructions:    1.  Dr. Jitendra Fernandez has requested you to have a Cardiac CT Angiogram.  This has been scheduled at the [facility] on Tuesday, 09/05/2017 at 10:00 AM; please arrive to the [facility] WAITING AREA 1 HOUR PRIOR (9:00 AM).     Please follow these INSTRUCTIONS for PREP of your CT SCAN:  1.  PLEASE DO NOT EAT OR DRINK 3 HOURS PRIOR TO PROCEDURE  2.  PLEASE DO NOT USE ALCOHOL, CAFFEINE OR TOBACCO 12 HOURS PRIOR TO THE PROCEDURE  3.  PLEASE DRINK MORE WATER ON THE DAY PRIOR      2.  Dr. Fernandez has referred you to see a cardio-thoracic surgeon; we will contact you next week regarding an appointment day and time.            If you have any questions regarding your visit please contact your care team:     Cardiology  Telephone Number   Jocelin MELTON, ALICIA BLACK, ALICIA LUKE, GOPAL LOPES, [facility]   [phone]    *After hours: [phone]   For scheduling appts:     [phone] or    [phone] (select option 1)    *After hours: [phone]     For the Device Clinic (Pacemakers and ICD's)  RN's :  Marisabel Kaiser   During business hours: [phone]    *After business hours:  [phone] (select option 4)      Normal test result notifications will be released via Cenoplex or mailed within 7 business days.  All other test results, will be communicated via telephone once reviewed by your cardiologist.    If you need a medication refill please contact your pharmacy.  Please allow 3 business days for your refill to be completed.    As always, thank you for trusting us with your health care needs!

## 2017-09-01 NOTE — LETTER
"9/1/2017      RE: Georgie Patino  [address] [place] [id number]       Dear Colleague,    Thank you for the opportunity to participate in the care of your patient, Georgie Patino, at the [facility] HEART AT [facility]. Please see a copy of my visit note below.    September 1, 2017     Dear Dr. Wade,  I had the pleasure of seeing Georgie Patino  in the [facility] for further evaluation and management of her palpitations, abnormal ECG. She does have a history of HTN, but no other cardiac problems and never had a cardiac evaluation in the past other than a holter recently.  Today she tells me that she developed brief episodes of palpitations, first noticed about 6 months ago. These last for a few second and resolve spontaneously. Not associated with any activity that she recalls. Never had such episodes prior to this. Importantly, denies any other complaints today, no chest pain, no shortness of breath, no dizziness or lightheadedness  Associated with the palpitations. Never had syncope or fall. No LE edema, no orthopnea or PND. No other cardiac issues. Takes all medications as prescribed.   Denies etoh or illicit use. Notes that she continues to smoke about 1/2 ppd.  Also, tells me his brother recently passed away in [facility] on vacation after having ha da major \"heart attack:\", but details are unclear. Her mother also had a major surgery, she believes it was aortic valve surgery but unclear details.     PAST MEDICAL HISTORY:  Past Medical History:   Diagnosis Date     Blood transfusions age 19    due to menorhagia     Hypertension      FAMILY HISTORY:  Family History   Problem Relation Age of Onset     Respiratory Mother      copd     CANCER Maternal Grandmother      Leg     Alzheimer Disease Maternal Grandfather      HEART DISEASE Paternal Grandfather      HEART DISEASE Brother 54     Heart " Attack      CANCER Sister      Lung cancer age 55-smoker d age 57      SOCIAL HISTORY:  Social History     Social History     Marital status:      Spouse name: N/A     Number of children: 3     Years of education: N/A     Occupational History     Day care      Social History Main Topics     Smoking status: Current Every Day Smoker     Packs/day: 0.05     Years: 40.00     Types: Cigarettes     Smokeless tobacco: Current User     Alcohol use 0.5 - 1.0 oz/week     1 - 2 Standard drinks or equivalent per week      Comment: 3-4 drinks per week      Drug use: No     Sexual activity: Yes     Partners: Male     Birth control/ protection: Surgical     CURRENT MEDICATIONS:  Current Outpatient Prescriptions   Medication     atorvastatin (LIPITOR) 40 MG tablet     lisinopril (PRINIVIL/ZESTRIL) 10 MG tablet     nicotine polacrilex (NICORETTE) 2 MG gum     No current facility-administered medications for this visit.       ROS:   Constitutional: No fever, chills, or sweats.  ENT: No visual disturbance, ear ache, epistaxis, sore throat.   Allergies/Immunologic: Negative.   Respiratory: No cough, hemoptysis.   Cardiovascular: As per HPI.   GI: No nausea, vomiting, hematemesis, melena, or hematochezia.   : No urinary frequency, dysuria, or hematuria.   Integument: Negative.   Psychiatric: depressed about his health after discussion of aneurism finding  Neuro: normal  Endocrinology: Negative.   Musculoskeletal: As per HPI.      EXAM:  /90 (BP Location: Right arm, Patient Position: Chair, Cuff Size: Adult Large)  Pulse 62  Wt 93 kg (205 lb)  SpO2 96%  BMI 34.75 kg/m2  General: appears comfortable, alert and oriented  Head: normocephalic, atraumatic  Eyes: anicteric sclera, EOMI , PERRL  Neck: no adenopathy  Orophyarynx: moist mucosa, no lesions noted  Heart: regular, S1/S2,  3/6 systolic murmur over the right 2nd intercostal space with no radiation, no rubs or gallop. Estimated JVP at 5 cmH2O  Lungs: CTAB, No  wheezing.   Abdomen: soft, non-tender, bowel sounds present, no hepatosplenomegaly  Extremities: No LE edema today  Skin: no open lesions noted  Neuro: grossly non-focal     Labs:  Lab Results   Component Value Date    WBC 7.0 08/15/2017    HGB 15.9 (H) 08/15/2017    HCT 47.8 (H) 08/15/2017     08/15/2017     08/15/2017    POTASSIUM 4.3 08/15/2017    CHLORIDE 104 08/15/2017    CO2 27 08/15/2017    BUN 15 08/15/2017    CR 0.57 08/15/2017    GLC 84 08/15/2017     ZioPatch: reviewed with patient results today. This showed a few episodes of non-sustained SVT, most likely atrial tachycardia that appeared to correspond with patients symptoms.     TTE: performed today and reviewed in person. Cardiac function is normal, mildly dilated LA. However, the ascending aorta was significantly dilated at 6.2 cm. I do not see evidence for dissection, there is no pericardial effusion. Mild AI. I do not think the aortic valve is bicuspid, but somewhat hard to evaluate    ASSESSMENT AND PLAN:  In summary, patient is a 62 year old lady with no past cardiac history referred to cardiology for palpitations.    1) palpitations: ECG unremarkable today. ZioPatch revealed non-sustained episodes of SVT, likely atrial tachycardia that corresponded with her symptoms.  - TTE as above, reviewed  - Will start metoprolol 25mg BID. NOte heart rate in the low 60  - no need for anticoagulation    2) incidental findings of sever ascending aorta dilation: I am concerned especially in the setting of recent death of brother and possibly aortic surgery of mother. Unclear how long this has been present, no prior imaging available. She is chest pain free, never had pain in the past. There is no evidence for dissection on TTE but sensitivity is low  - Will get CTA urgently  - will start metoprolol 25mg BID  - Discussed the importance of BP control. Will increase lisinopril to 20mg daily  - Discussed extensively the importance of smoking cessation,  starting today  - Avoid strenuous activity for now  - Scheduled CT urgently on the upcoming Tuesday. Will review results and will refer patient to CT surgery for possible surgical evaluation  - Discussed when to present to the ER immediately, including chest pain, shortness of breath, dizziness or syncope    3) HTN: as above    4) smoking cessation: discussed in detail as above    Follow up:   - 6 months, will review and discuss results with her as soon as available.    I appreciate the opportunity to participate in the care of Georgie Patino . Please do not hesitate to contact me with any further questions.    Sincerely,    Jitendra Fernandez MD     [facility] Division of Cardiology

## 2017-09-01 NOTE — MR AVS SNAPSHOT
After Visit Summary   9/1/2017    Georgie Patino    MRN: [id number]           Patient Information     Date Of Birth          1955        Visit Information        Provider Department      9/1/2017 3:00 PM Jitendra Fernandez MD [facility] PHYSICIANS HEART AT [facility]        Today's Diagnoses     Palpitations    -  1    Ascending aortic aneurysm (H)        Benign essential hypertension        Mixed hyperlipidemia          Care Instructions    Thank you for coming to the [facility] Heart @ [facility]; please note the following instructions:    1.  Dr. Jitendra Fernandez has requested you to have a Cardiac CT Angiogram.  This has been scheduled at the [facility] on Tuesday, 09/05/2017 at 10:00 AM; please arrive to the [facility] WAITING AREA 1 HOUR PRIOR (9:00 AM).     Please follow these INSTRUCTIONS for PREP of your CT SCAN:  1.  PLEASE DO NOT EAT OR DRINK 3 HOURS PRIOR TO PROCEDURE  2.  PLEASE DO NOT USE ALCOHOL, CAFFEINE OR TOBACCO 12 HOURS PRIOR TO THE PROCEDURE  3.  PLEASE DRINK MORE WATER ON THE DAY PRIOR      2.  Dr. Fernandez has referred you to see a cardio-thoracic surgeon; we will contact you next week regarding an appointment day and time.            If you have any questions regarding your visit please contact your care team:     Cardiology  Telephone Number   Jocelin MELTON, ALICIA BLACK, ALICIA LUKE, DAVID LOPES, [facility]   [phone]    *After hours: [phone]   For scheduling appts:     [phone] or    [phone] (select option 1)    *After hours: [phone]     For the Device Clinic (Pacemakers and ICD's)  RN's :  Marisabel Kaiser   During business hours: [phone]    *After business hours:  [phone] (select option 4)      Normal test result notifications will be released via [employer] or mailed within 7 business days.  All other test results, will be communicated via telephone once reviewed by your  cardiologist.    If you need a medication refill please contact your pharmacy.  Please allow 3 business days for your refill to be completed.    As always, thank you for trusting us with your health care needs!              Follow-ups after your visit        Your next 10 appointments already scheduled     Sep 05, 2017 10:00 AM CDT   CT CHEST ABDOMEN ANGIO W ADV PROC with UUCT1   [facility], [place], [place] ([facility], [facility])    [address] [address] [phone]           Please bring any scans or X-rays taken at other hospitals, if similar tests were done. Also bring a list of your medicines, including vitamins, minerals and over-the-counter drugs. It is safest to leave personal items at home.  Be sure to tell your doctor:   If you have any allergies.   If there s any chance you are pregnant.   If you are breastfeeding.   If you have any special needs.  You will have contrast for this exam. To prepare:   Do not eat or drink for 2 hours before your exam. If you need to take medicine, you may take it with small sips of water. (We may ask you to take liquid medicine as well.)   The day before your exam, drink extra fluids at least six 8-ounce glasses (unless your doctor tells you to restrict your fluids).  Patients over 70 or patients with diabetes or kidney problems:   If you haven t had a blood test (creatinine test) within the last 30 days, go to your clinic or Diagnostic Imaging Department for this test.  If you have diabetes:   If your kidney function is normal, continue taking your metformin (Avandamet, Glucophage, Glucovance, Metaglip) on the day of your exam.   If your kidney function is abnormal, wait 48 hours before restarting this medicine.  Please wear loose clothing, such as a sweat suit or jogging clothes. Avoid snaps, zippers and other metal. We may ask you to undress and put on a hospital gown.  If you have any questions, please call the Imaging  Department where you will have your exam.            Sep 05, 2017  3:15 PM CDT   MA SCREENING DIGITAL BILATERAL with FKMA1   [facility]    [address] [place] [address]   [phone]           Do not use any powder, lotion or deodorant under your arms or on your breast. If you do, we will ask you to remove it before your exam.  Wear comfortable, two-piece clothing.  If you have any allergies, tell your care team.  Bring any previous mammograms from other facilities or have them mailed to the breast center.            Sep 05, 2017  3:30 PM CDT   LAB with FK LAB   [facility]    [address] [place] [address]   [phone]           Patient must bring picture ID. Patient should be prepared to give a urine specimen  Please do not eat 10-12 hours before your appointment if you are coming in fasting for labs on lipids, cholesterol, or glucose (sugar). Pregnant women should follow their Care Team instructions. Water with medications is okay. Do not drink coffee or other fluids. If you have concerns about taking  your medications, please ask at office or if scheduling via [employer], send a message by clicking on Secure Messaging, Message Your Care Team.            Nov 13, 2017  2:45 PM CST   LAB with FZ LAB   [facility] ([facility])    [address] [address]   [phone]           Patient must bring picture ID. Patient should be prepared to give a urine specimen  Please do not eat 10-12 hours before your appointment if you are coming in fasting for labs on lipids, cholesterol, or glucose (sugar). Pregnant women should follow their Care Team instructions. Water with medications is okay. Do not drink coffee or other fluids. If you have concerns about taking  your medications, please ask at office or if scheduling via [employer], send a message by clicking on Secure  "Messaging, Message Your Care Team.              Future tests that were ordered for you today     Open Future Orders        Priority Expected Expires Ordered    CTA Angiogram Chest Abdomen Routine  2018            Who to contact     If you have questions or need follow up information about today's clinic visit or your schedule please contact [facility] PHYSICIANS HEART AT [employer] directly at [phone].  Normal or non-critical lab and imaging results will be communicated to you by MyChart, letter or phone within 4 business days after the clinic has received the results. If you do not hear from us within 7 days, please contact the clinic through [employer] or phone. If you have a critical or abnormal lab result, we will notify you by phone as soon as possible.  Submit refill requests through [employer] or call your pharmacy and they will forward the refill request to us. Please allow 3 business days for your refill to be completed.          Additional Information About Your Visit        [employer] Information     [employer] lets you send messages to your doctor, view your test results, renew your prescriptions, schedule appointments and more. To sign up, go to [email or web address] . Click on \"Log in\" on the left side of the screen, which will take you to the Welcome page. Then click on \"Sign up Now\" on the right side of the page.     You will be asked to enter the access code listed below, as well as some personal information. Please follow the directions to create your username and password.     Your access code is: [email or web address]  Expires: 2017  4:05 PM     Your access code will  in 90 days. If you need help or a new code, please call your [facility] clinic or [phone].        Care EveryWhere ID     This is your Care EveryWhere ID. This could be used by other organizations to access your [facility] medical records  [id number]        Your Vitals Were     Pulse Pulse Oximetry BMI " (Body Mass Index)             62 96% 34.75 kg/m2          Blood Pressure from Last 3 Encounters:   09/01/17 157/90   08/15/17 140/90   03/15/17 161/77    Weight from Last 3 Encounters:   09/01/17 93 kg (205 lb)   08/15/17 93 kg (205 lb)   02/21/17 90.7 kg (200 lb)                 Today's Medication Changes          These changes are accurate as of: 9/1/17  3:48 PM.  If you have any questions, ask your nurse or doctor.               Start taking these medicines.        Dose/Directions    metoprolol 25 MG tablet   Commonly known as:  LOPRESSOR   Used for:  Palpitations   Started by:  Jitendra Fernandez MD        Dose:  25 mg   Take 1 tablet (25 mg) by mouth 2 times daily   Quantity:  60 tablet   Refills:  3         These medicines have changed or have updated prescriptions.        Dose/Directions    lisinopril 10 MG tablet   Commonly known as:  PRINIVIL/ZESTRIL   This may have changed:  how much to take   Used for:  Benign essential hypertension   Changed by:  Jitendra Fernandez MD        Dose:  20 mg   Take 2 tablets (20 mg) by mouth daily   Quantity:  90 tablet   Refills:  3            Where to get your medicines      These medications were sent to [id number] - [place], [place] - [address] [place] [address], [place] [address]     Phone:  [phone]     lisinopril 10 MG tablet    metoprolol 25 MG tablet                Primary Care Provider Office Phone # Fax #    Marlys Harrington Jesusita Tapia, APRN [facility] [phone]       [address] [place] [address]  [place] [address]        Equal Access to Services     [facility]: Hadii ana ku hadasho Soomaali, waaxda luqadaha, qaybta kaalmada adeegyada, waxay idiin hayedna crenshaw . So [facility] [phone].    ATENCIÓN: Si habla español, tiene a thomas disposición servicios gratuitos de asistencia lingüística. Llame al [phone].    We comply with applicable federal civil rights laws and [place] laws. We do not discriminate on the basis of race, color, national  origin, age, disability sex, sexual orientation or gender identity.            Thank you!     Thank you for choosing [facility] PHYSICIANS HEART AT [facility]  for your care. Our goal is always to provide you with excellent care. Hearing back from our patients is one way we can continue to improve our services. Please take a few minutes to complete the written survey that you may receive in the mail after your visit with us. Thank you!             Your Updated Medication List - Protect others around you: Learn how to safely use, store and throw away your medicines at www.disposemymeds.org.          This list is accurate as of: 9/1/17  3:48 PM.  Always use your most recent med list.                   Brand Name Dispense Instructions for use Diagnosis    atorvastatin 40 MG tablet    LIPITOR    90 tablet    Take 1 tablet (40 mg) by mouth daily    CARDIOVASCULAR SCREENING; LDL GOAL LESS THAN 160       lisinopril 10 MG tablet    PRINIVIL/ZESTRIL    90 tablet    Take 2 tablets (20 mg) by mouth daily    Benign essential hypertension       metoprolol 25 MG tablet    LOPRESSOR    60 tablet    Take 1 tablet (25 mg) by mouth 2 times daily    Palpitations       nicotine polacrilex 2 MG gum    NICORETTE    100 tablet    Place 1 each (2 mg) inside cheek as needed for smoking cessation Place 1 each inside cheek as needed for smoking cessation    History of tobacco use

## 2017-09-01 NOTE — NURSING NOTE
"Chief Complaint   Patient presents with     Abnormal Ekg     cardiac consultation referral from PCP for abnormal ECG and Family history of sudden cardiac death; patient notes RAMON, heart palpitations and dizziness at times.       Initial /90 (BP Location: Right arm, Patient Position: Chair, Cuff Size: Adult Large)  Pulse 62  Wt 93 kg (205 lb)  SpO2 96%  BMI 34.75 kg/m2 Estimated body mass index is 34.75 kg/(m^2) as calculated from the following:    Height as of 8/15/17: 1.636 m (5' 4.4\").    Weight as of this encounter: 93 kg (205 lb)..  BP completed using cuff size: CAITLYN Kiran.        "

## 2017-09-01 NOTE — PROGRESS NOTES
"September 1, 2017     Dear Dr. Wade,  I had the pleasure of seeing Georgie Patino  in the [facility] for further evaluation and management of her palpitations, abnormal ECG. She does have a history of HTN, but no other cardiac problems and never had a cardiac evaluation in the past other than a holter recently.  Today she tells me that she developed brief episodes of palpitations, first noticed about 6 months ago. These last for a few second and resolve spontaneously. Not associated with any activity that she recalls. Never had such episodes prior to this. Importantly, denies any other complaints today, no chest pain, no shortness of breath, no dizziness or lightheadedness  Associated with the palpitations. Never had syncope or fall. No LE edema, no orthopnea or PND. No other cardiac issues. Takes all medications as prescribed.   Denies etoh or illicit use. Notes that she continues to smoke about 1/2 ppd.  Also, tells me his brother recently passed away in [facility] on vacation after having ha da major \"heart attack:\", but details are unclear. Her mother also had a major surgery, she believes it was aortic valve surgery but unclear details.     PAST MEDICAL HISTORY:  Past Medical History:   Diagnosis Date     Blood transfusions age 19    due to menorhagia     Hypertension      FAMILY HISTORY:  Family History   Problem Relation Age of Onset     Respiratory Mother      copd     CANCER Maternal Grandmother      Leg     Alzheimer Disease Maternal Grandfather      HEART DISEASE Paternal Grandfather      HEART DISEASE Brother 54     Heart Attack      CANCER Sister      Lung cancer age 55-smoker d age 57      SOCIAL HISTORY:  Social History     Social History     Marital status:      Spouse name: N/A     Number of children: 3     Years of education: N/A     Occupational History     Day care      Social History Main Topics     Smoking status: Current Every Day Smoker     Packs/day: 0.05 "     Years: 40.00     Types: Cigarettes     Smokeless tobacco: Current User     Alcohol use 0.5 - 1.0 oz/week     1 - 2 Standard drinks or equivalent per week      Comment: 3-4 drinks per week      Drug use: No     Sexual activity: Yes     Partners: Male     Birth control/ protection: Surgical     CURRENT MEDICATIONS:  Current Outpatient Prescriptions   Medication     atorvastatin (LIPITOR) 40 MG tablet     lisinopril (PRINIVIL/ZESTRIL) 10 MG tablet     nicotine polacrilex (NICORETTE) 2 MG gum     No current facility-administered medications for this visit.       ROS:   Constitutional: No fever, chills, or sweats.  ENT: No visual disturbance, ear ache, epistaxis, sore throat.   Allergies/Immunologic: Negative.   Respiratory: No cough, hemoptysis.   Cardiovascular: As per HPI.   GI: No nausea, vomiting, hematemesis, melena, or hematochezia.   : No urinary frequency, dysuria, or hematuria.   Integument: Negative.   Psychiatric: depressed about his health after discussion of aneurism finding  Neuro: normal  Endocrinology: Negative.   Musculoskeletal: As per HPI.      EXAM:  /90 (BP Location: Right arm, Patient Position: Chair, Cuff Size: Adult Large)  Pulse 62  Wt 93 kg (205 lb)  SpO2 96%  BMI 34.75 kg/m2  General: appears comfortable, alert and oriented  Head: normocephalic, atraumatic  Eyes: anicteric sclera, EOMI , PERRL  Neck: no adenopathy  Orophyarynx: moist mucosa, no lesions noted  Heart: regular, S1/S2, 3/6 systolic murmur over the right 2nd intercostal space with no radiation, no rubs or gallop. Estimated JVP at 5 cmH2O  Lungs: CTAB, No wheezing.   Abdomen: soft, non-tender, bowel sounds present, no hepatosplenomegaly  Extremities: No LE edema today  Skin: no open lesions noted  Neuro: grossly non-focal     Labs:  Lab Results   Component Value Date    WBC 7.0 08/15/2017    HGB 15.9 (H) 08/15/2017    HCT 47.8 (H) 08/15/2017     08/15/2017     08/15/2017    POTASSIUM 4.3 08/15/2017     CHLORIDE 104 08/15/2017    CO2 27 08/15/2017    BUN 15 08/15/2017    CR 0.57 08/15/2017    GLC 84 08/15/2017     ZioPatch: reviewed with patient results today. This showed a few episodes of non-sustained SVT, most likely atrial tachycardia that appeared to correspond with patients symptoms.     TTE: performed today and reviewed in person. Cardiac function is normal, mildly dilated LA. However, the ascending aorta was significantly dilated at 6.2 cm. I do not see evidence for dissection, there is no pericardial effusion. Mild AI. I do not think the aortic valve is bicuspid, but somewhat hard to evaluate    ASSESSMENT AND PLAN:  In summary, patient is a 62 year old lady with no past cardiac history referred to cardiology for palpitations.    1) palpitations: ECG unremarkable today. ZioPatch revealed non-sustained episodes of SVT, likely atrial tachycardia that corresponded with her symptoms.  - TTE as above, reviewed  - Will start metoprolol 25mg BID. NOte heart rate in the low 60  - no need for anticoagulation    2) incidental findings of sever ascending aorta dilation: I am concerned especially in the setting of recent death of brother and possibly aortic surgery of mother. Unclear how long this has been present, no prior imaging available. She is chest pain free, never had pain in the past. There is no evidence for dissection on TTE but sensitivity is low  - Will get CTA urgently  - will start metoprolol 25mg BID  - Discussed the importance of BP control. Will increase lisinopril to 20mg daily  - Discussed extensively the importance of smoking cessation, starting today  - Avoid strenuous activity for now  - Scheduled CT urgently on the upcoming Tuesday. Will review results and will refer patient to CT surgery for possible surgical evaluation  - Discussed when to present to the ER immediately, including chest pain, shortness of breath, dizziness or syncope    3) HTN: as above    4) smoking cessation: discussed in  detail as above    Follow up:   - 6 months, will review and discuss results with her as soon as available.    I appreciate the opportunity to participate in the care of Georgie Patino . Please do not hesitate to contact me with any further questions.    Sincerely,    Jitendra Fernandez MD     [facility] Division of Cardiology

## 2017-09-01 NOTE — NURSING NOTE
Patient presents today for resting echo ordered by MD.     Echo Tech provided patient education about resting echo. IV started in LAC.   IV start documented in  [employer].  Echo technician completed resting echo. Patient monitored according to Optison protocol. Data transferred to [facility] for final interpretation through [employer].     Optison medication:  Amount used 3ml Optison mixed with 6ml Saline - [id number].  6ml Wasted.   After completion of resting echo, IV removed.    Patient education provided about cardiology interpretation and primary provider will be notified of results.    Heather Howard RDCS

## 2017-09-01 NOTE — NURSING NOTE
Cardiac Testing: Patient given instructions regarding CTA. Discussed purpose, preparation, procedure and when to expect results reported back to the patient. Patient demonstrated understanding of this information and agreed to call with further questions or concerns.    Med Reconcile: Reviewed and verified all current medications with the patient. The updated medication list was printed and given to the patient.    Pt needs referral to Thoracic Surgery.    Return Appointment: Patient given instructions regarding scheduling next clinic visit, depending on results of CT. Patient demonstrated understanding of this information and agreed to call with further questions or concerns.    Patient stated she understood all health information given and agreed to call with further questions or concerns.    Vianca Medrano RN

## 2017-09-01 NOTE — NURSING NOTE
ECG completed on patient; ECG sent to abstracting to be scanned/filed in patient's chart.  CAITLYN Huerta.

## 2017-09-05 ENCOUNTER — HOSPITAL ENCOUNTER (OUTPATIENT)
Dept: CT IMAGING | Facility: CLINIC | Age: 62
Discharge: HOME | End: 2017-09-05
Attending: INTERNAL MEDICINE | Admitting: INTERNAL MEDICINE
Payer: COMMERCIAL

## 2017-09-05 DIAGNOSIS — I71.21 ASCENDING AORTIC ANEURYSM (H): Primary | ICD-10-CM

## 2017-09-05 DIAGNOSIS — I71.21 ASCENDING AORTIC ANEURYSM (H): ICD-10-CM

## 2017-09-05 PROCEDURE — 71275 CT ANGIOGRAPHY CHEST: CPT

## 2017-09-05 PROCEDURE — 25000128 H RX IP 250 OP 636: Performed by: RADIOLOGY

## 2017-09-05 RX ORDER — IOPAMIDOL 755 MG/ML
100 INJECTION, SOLUTION INTRAVASCULAR ONCE
Status: COMPLETED | OUTPATIENT
Start: 2017-09-05 | End: 2017-09-05

## 2017-09-05 RX ADMIN — IOPAMIDOL 100 ML: 755 INJECTION, SOLUTION INTRAVENOUS at 08:49

## 2017-09-12 ENCOUNTER — RADIANT APPOINTMENT (OUTPATIENT)
Dept: MAMMOGRAPHY | Facility: CLINIC | Age: 62
End: 2017-09-12
Attending: NURSE PRACTITIONER
Payer: COMMERCIAL

## 2017-09-12 DIAGNOSIS — Z13.6 CARDIOVASCULAR SCREENING; LDL GOAL LESS THAN 160: ICD-10-CM

## 2017-09-12 DIAGNOSIS — D58.2 ELEVATED HEMOGLOBIN (H): ICD-10-CM

## 2017-09-12 DIAGNOSIS — Z12.31 VISIT FOR SCREENING MAMMOGRAM: ICD-10-CM

## 2017-09-12 LAB — HGB BLD-MCNC: 14.9 G/DL (ref 11.7–15.7)

## 2017-09-12 PROCEDURE — 36415 COLL VENOUS BLD VENIPUNCTURE: CPT | Performed by: NURSE PRACTITIONER

## 2017-09-12 PROCEDURE — 85018 HEMOGLOBIN: CPT | Performed by: NURSE PRACTITIONER

## 2017-09-12 PROCEDURE — 80061 LIPID PANEL: CPT | Performed by: NURSE PRACTITIONER

## 2017-09-12 PROCEDURE — G0202 SCR MAMMO BI INCL CAD: HCPCS | Mod: TC

## 2017-09-12 NOTE — LETTER
[address] [place] [address]    September 13, 2017    Georgie Patino  [address] [facility] [id number]      Dear Ivania Jacques in cholesterol!   Normal hemoglobin.     Enclosed is a copy of your results.   Results for orders placed or performed in visit on 09/12/17   **Hemoglobin FUTURE anytime   Result Value Ref Range    Hemoglobin 14.9 11.7 - 15.7 g/dL   **Lipid panel reflex to direct LDL FUTURE anytime   Result Value Ref Range    Cholesterol 122 <200 mg/dL    Triglycerides 77 <150 mg/dL    HDL Cholesterol 60 >49 mg/dL    LDL Cholesterol Calculated 47 <100 mg/dL    Non HDL Cholesterol 62 <130 mg/dL       If you have any questions or concerns, please call myself or my nurse at [phone].    Sincerely,    Marlys Wade CNP/nehemiah

## 2017-09-13 ENCOUNTER — PRE VISIT (OUTPATIENT)
Dept: CARDIOLOGY | Facility: CLINIC | Age: 62
End: 2017-09-13

## 2017-09-13 LAB
CHOLEST SERPL-MCNC: 122 MG/DL
HDLC SERPL-MCNC: 60 MG/DL
LDLC SERPL CALC-MCNC: 47 MG/DL
NONHDLC SERPL-MCNC: 62 MG/DL
TRIGL SERPL-MCNC: 77 MG/DL

## 2017-09-13 NOTE — PROGRESS NOTES
Dear Georgie,    Your recent test results are attached.      Great improvement in cholesterol!  Normal hemoglobin.    If you have any questions please feel free to contact [phone] or myself via [employer].    Sincerely,  Marlys Wade, CNP

## 2017-09-13 NOTE — TELEPHONE ENCOUNTER
1.  Date/reason for appt:  9/21/17   Ascending Aorta Aneurism    2.  Referring provider:  Dr Fernandez, Internal    3.  Call to patient (Yes / No - short description):  No referred    Records reviewed.  All records are in [facility] and imaging is in PACS.

## 2017-09-20 ENCOUNTER — PRE VISIT (OUTPATIENT)
Dept: CARDIOLOGY | Facility: CLINIC | Age: 62
End: 2017-09-20

## 2017-09-20 NOTE — TELEPHONE ENCOUNTER
ASKED BY REFERRING PHYSIAICIAN: Dr Jitendra Fernandez    CHIEF COMPLAINT: Pre Visit Planning - Done (New Consult for ascending aortic aneurysm)      HPI: Georgie is a 62 year old female who presents with new diagnosis of ascending aortic aneurysm. Patient originally present to cardiology with complaint of palpitations. Patient's does have a history of HTN, but no other cardiac problems and has never had a cardiac evaluation in the past other than a holter recently.  Patient noted palpitation started approximately about 6 months ago and last only for a few second and resolve spontaneously. They are not  associated with any activity that she recalls. Patient denies no chest pain, shortness of breath, dizziness or lightheadedness associated with the palpitations. Patient has never had syncope or fall. No LE edema, no orthopnea or PND. No other cardiac issues. Takes all medications as prescribed. Patient states she does not drink or use illicit drugs, but does smoke about a half pack of cigarettes a day.  Patient underwent a CT and Echocardiogram that showed a 6.1-6.2 cm ascending aortic aneurysm.    PAST MEDICAL HISTORY:  Past Medical History:   Diagnosis Date     Blood transfusions age 19    due to menorhagia     Hypertension        PAST SURGICAL HISTORY:  Past Surgical History:   Procedure Laterality Date     COLONOSCOPY WITH CO2 INSUFFLATION N/A 3/15/2017    Procedure: COLONOSCOPY WITH CO2 INSUFFLATION;  Surgeon: Duane, William Charles, MD;  Location:  OR       FAMILY HISTORY:   Family History   Problem Relation Age of Onset     Respiratory Mother      copd     CANCER Maternal Grandmother      Leg     Alzheimer Disease Maternal Grandfather      HEART DISEASE Paternal Grandfather      HEART DISEASE Brother 54     Heart Attack      CANCER Sister      Lung cancer age 55-smoker d age 57       SOCIAL HISTORY:  Social History     Social History     Marital status:      Spouse name: N/A     Number of children: 3      Years of education: N/A     Occupational History     Day care      Social History Main Topics     Smoking status: Current Every Day Smoker     Packs/day: 0.05     Years: 40.00     Types: Cigarettes     Smokeless tobacco: Current User     Alcohol use 0.5 - 1.0 oz/week     1 - 2 Standard drinks or equivalent per week      Comment: 3-4 drinks per week      Drug use: No     Sexual activity: Yes     Partners: Male     Birth control/ protection: Surgical     Other Topics Concern     Parent/Sibling W/ Cabg, Mi Or Angioplasty Before 65f 55m? Yes     brother w/ sudden cardiac arrest @ age 54     Social History Narrative        ALLERGIES:   No Known Allergies    CURRENT MEDICATIONS:   @2CODE Online@    REVIEW OF SYSTEMS:   Gen: denies frequent headaches, double/blurry vision, insomnia, fatigue, unexplained weight loss/gain. No previous anesthesia reactions.  CV: denies chest pain, shortness of breath, palpitations, peripheral edema.   Pulm: denies shortness of breath, asthma or wheezing  GI/: denies liver or kidney problems, blood in stool or BRBPR, difficulty urinating  Endo: denies thyroid problems or Diabetes  Heme/Onc: denies bleeding or clotting disorders, no family problems with bleeding/clotting diorders  MS: no weakness, tremors or gait instability  Neuro: denies depression, memory problems, no dysesthesias, no previous strokes, no migraines, no dysphagia  Skin: No petechiae, purpura or rash.    PHYSICAL EXAMINATION:   There were no vitals taken for this visit.  General: alert and oriented x 3, pleasant, no acute distress  CV: S1 S2, no murmurs, rubs or gallops, regular rate and rhythm, no peripheral edema, no carotid or abdomenal bruits, pulses in upper and lower extremities palpable  Pulm: bilateral breath sounds, clear to auscultation, easy work of breathing  GI: (+) bowel sounds, soft non-tender and non-distended  : voiding without problems  MS: moves all extremities x 4,  5+/5+ equal strength bilaterally  Neuro:  pupils equal round and reactive to light, cranial nerves, II-XII grossly intact, no gross neurologic deficits noted    LABS:  BMP RESULTS:  Lab Results   Component Value Date     08/15/2017    POTASSIUM 4.3 08/15/2017    CHLORIDE 104 08/15/2017    CO2 27 08/15/2017    ANIONGAP 7 08/15/2017    GLC 84 08/15/2017    BUN 15 08/15/2017    CR 0.57 08/15/2017    GFRESTIMATED >90 08/15/2017    GFRESTBLACK >90 08/15/2017    QASIM 9.5 08/15/2017        CBC RESULTS:  Lab Results   Component Value Date    WBC 7.0 08/15/2017    RBC 5.46 (H) 08/15/2017    HGB 14.9 09/12/2017    HCT 47.8 (H) 08/15/2017    MCV 88 08/15/2017    MCH 29.1 08/15/2017    MCHC 33.3 08/15/2017    RDW 13.7 08/15/2017     08/15/2017       No results found for: INR  No results found for: PTT  No results found for: UA  No results found for: A1C    PROCEDURES/IMAGING:    CT OF THE CHEST W/WO CONTRAST: 9/5/2017      Clinical information: Thoracic aortic aneurysm, without rupture     Aneurysmal fusiform dilatation involving the ascending thoracic aorta, with involvement of the innominate artery, the left common carotid artery, with aneurysm ending at the level of the left subclavian artery. The aneurysm begins at the sinus of Valsalva and extends to the level of the proximal aortic arch. Additionally the the descending thoracic aorta is mildly ectatic. There is mild atherosclerotic plaque, primarily at the aortic arch.     Thoracic aortic diameters:     Aortic annulus: 3.6 x 3.8 x 3.9 cm.   Sinuses of Valsalva: 4.9 x 4.8 cm.   Ascending aorta: 6.1 x 6.2 cm.   Aortic arch: 3.6 x 3.6 cm.   Proximal thoracic aorta: 3.6 x 3.8 cm.   Mid thoracic aorta: 3.4 x 3.3 cm.   Descending thoracic aorta: 3.5 x 3.6 cm.      There is normal branching pattern of the great vessels. Origins of the brachiocephalic artery, left common carotid artery and left subclavian artery are patent. There is mild atherosclerotic plaque at their origin.     The proximal pulmonary  vasculature  appears normal.      Partially visualized upper abdomen demonstrates mild bilateral atherosclerotic plaque of the renal arteries. The celiac artery appears patent. The SMA is also patent.     Chest:   Heart size is mildly enlarged. No pericardial effusion. Mediastinal lymph nodes measuring within upper limits of normal size, 7 mm in short axis, likely reactive. Central tracheobronchial tree is patent. No pleural effusion or pneumothorax. Partially visualized subcentimeter enhancing left thyroid nodule and probable hypodense nodule of the isthmus. Large benign calcified granuloma of the left lung apex with scattered smaller benign calcified granulomas. Minimal bibasilar atelectasis. Mild centrilobular and paraseptal upper lobe predominant emphysematous changes. Mild biapical scarring.      Impression:     1. Fusiform aneurysm of the ascending thoracic aorta measuring up to 6.2 cm. Additional measurements are provided above.  2. Cholelithiasis.  3. Sequela of prior chronic granulomatous disease.  4. Partially visualized subcentimeter enhancing left thyroid nodule. Recommend ultrasound for further evaluation.  5. Mild emphysematous changes of the lungs.    ECHO: 09/01/17  Interpretation Summary     Global and regional left ventricular function is normal with an EF of 55-60%.  The right ventricle is normal size. Global right ventricular function is normal.  Severe dilatation of the ascending aorta (6.2 cm at the mid ascending aorta).  The sinotubular ridge is effaced, however there is no evidence of dissection.  The aortic arch and descending thoracic aorta appear normal in caliber.  Mild aortic regurgitation.  Right ventricular systolic pressure is 26mmHg above the right atrial pressure.  The inferior vena cava was normal in size with preserved respiratory  variability. Estimated mean right atrial pressure is 3 mmHg.  No pericardial effusion is present.  Previous study not available for  comparison.     Left Ventricle Left ventricular size is normal. Global and regional left ventricular function is normal with an EF of  55-60%. Mild concentric wall thickening consistent with left ventricular hypertrophy is present. Left ventricular diastolic function is indeterminate.     Right Ventricle  The right ventricle is normal size. Global right ventricular function is normal.     Atria  Mild left atrial enlargement is present. Left atrial volume index is 36 mL/m2 (normal <34 mL/m2). Mild right atrial enlargement is present.     Mitral Valve  Mild mitral annular calcification is present. Trace mitral insufficiency is present.        Aortic Valve  It is difficult to assess individual cusps due to poor acoustic windows.  Structure appear normal. Mild aortic insufficiency is present. PHT is 621 ms.     Tricuspid Valve  The right ventricular systolic pressure is approximated at 25.7 mmHg plus the right atrial pressure. Right ventricular systolic pressure is 26mmHg above the right atrial pressure. Trace tricuspid insufficiency is present.     Pulmonic Valve  The pulmonic valve cannot be assessed. Doppler interrogation is normal.     Vessels  The inferior vena cava was normal in size with preserved respiratory variability. Severe dilatation of the ascending aorta (6.2 cm at the mid ascending aorta). There is no evidence of dissection. The sinotubular ridge is effaced. The aortic arch measures 2.5 cm and the descending thoracic aorta  measures 2.3 cm. Estimated mean right atrial pressure is 3 mmHg.     Pericardium  No pericardial effusion is present.     MMode/2D Measurements & Calculations  IVSd: 1.2 cm  LVIDd: 5.3 cm  LVIDs: 2.9 cm  LVPWd: 1.2 cm  FS: 45.7 %  EDV(Teich): 133.6 ml  ESV(Teich): 31.1 ml  LV mass(C)d: 257.2 grams  LV mass(C)dI: 130.1 grams/m2  Ao root diam: 3.9 cm  LA dimension: 3.8 cm  asc Aorta Diam: 6.2 cm  desc Ao Diam: 2.1 cm  LA/Ao: 0.97  LVOT diam: 2.2 cm  LVOT area: 3.8 cm2  LA Volume (BP):  71.0 ml  LA Volume Index (BP): 35.9 ml/m2    Doppler Measurements & Calculations  MV E max tono: 69.4 cm/sec  MV A max tono: 89.5 cm/sec  MV E/A: 0.78  MV dec time: 0.18 sec  AI P1/2t: 622.2 msec  TR max tono: 253.7 cm/sec  TR max P.7 mmHg  Lateral E/e': 9.4  Medial E/e': 15.7    ASSESSMENT AND PLAN: Georgie is a 62 year old woman who         Risks and benefits of surgery were discussed with patient (and all present) including risk of death, stroke, bleeding, cardiac ischemia, wound infection, renal failure, arrhythmias and possible pacemaker implantation. Patient verbalized understanding and accepts these risks.     Approximately 50 minutes spent with this case including review of the clinical history and data; discussion with the patient and his family and coordination of the care    Thank you for including me in the care of this kind patient. Do not hesitate to contact me with any questions.    Dr. Rubio Piña     Cardiothoracic Surgery  [facility]  Patient Care Team:  Marlys Wade, TESHA CNP as PCP - General (Nurse Practitioner - Family)

## 2017-09-21 ENCOUNTER — TELEPHONE (OUTPATIENT)
Dept: CARDIOLOGY | Facility: CLINIC | Age: 62
End: 2017-09-21

## 2017-09-21 ENCOUNTER — OFFICE VISIT (OUTPATIENT)
Dept: CARDIOLOGY | Facility: CLINIC | Age: 62
End: 2017-09-21
Attending: THORACIC SURGERY (CARDIOTHORACIC VASCULAR SURGERY)
Payer: COMMERCIAL

## 2017-09-21 VITALS
OXYGEN SATURATION: 96 % | BODY MASS INDEX: 33.32 KG/M2 | DIASTOLIC BLOOD PRESSURE: 77 MMHG | SYSTOLIC BLOOD PRESSURE: 154 MMHG | WEIGHT: 207.3 LBS | HEART RATE: 53 BPM | HEIGHT: 66 IN

## 2017-09-21 DIAGNOSIS — I10 BENIGN ESSENTIAL HYPERTENSION: ICD-10-CM

## 2017-09-21 DIAGNOSIS — I71.21 ASCENDING AORTIC ANEURYSM (H): Primary | ICD-10-CM

## 2017-09-21 DIAGNOSIS — R93.1 ABNORMAL FINDINGS ON DIAGNOSTIC IMAGING OF HEART AND CORONARY CIRCULATION: ICD-10-CM

## 2017-09-21 DIAGNOSIS — Z01.810 PRE-OPERATIVE CARDIOVASCULAR EXAMINATION: ICD-10-CM

## 2017-09-21 DIAGNOSIS — Z72.0 TOBACCO ABUSE: ICD-10-CM

## 2017-09-21 PROCEDURE — 99213 OFFICE O/P EST LOW 20 MIN: CPT | Mod: ZF

## 2017-09-21 ASSESSMENT — PAIN SCALES - GENERAL: PAINLEVEL: NO PAIN (0)

## 2017-09-21 NOTE — LETTER
2017      RE: Georgie Patino  [address] [place] [id number]       Dear Colleague,    Thank you for the opportunity to participate in the care of your patient, Georgie Patino, at the [facility]. Please see a copy of my visit note below.    ASKED BY REFERRING PHYSIAICIAN: Dr Jitendra Fernandez to see patient regarding surgical treatment of ascending aortic aneurysm.     CHIEF COMPLAINT: incidental finding of ascending aortic aneurysm     HPI:   Georgie is a 62 year old female who presents with new diagnosis of ascending aortic aneurysm. Patient originally present to cardiology with complaint of palpitations. Patient's does have a history of HTN, but no other cardiac problems and has never had a cardiac evaluation in the past other than a holter recently.  Patient noted palpitation started approximately about 6 months ago and last only for a few second and resolve spontaneously. They are not  associated with any activity that she recalls. Patient denies no chest pain, shortness of breath, dizziness or lightheadedness associated with the palpitations. Patient has never had syncope or fall. No LE edema, no orthopnea or PND. No other cardiac issues. Takes all medications as prescribed. Patient states she does not drink or use illicit drugs, but does smoke about a half pack of cigarettes a day.      Patient underwent a CT and Echocardiogram that showed a 6.1-6.2 cm ascending aortic aneurysm.    Patient's brother  suddenly during vacation.    PAST MEDICAL HISTORY:  Past Medical History:   Diagnosis Date     Ascending aortic aneurysm (H) 2017     Blood transfusions age 19    due to menorhagia     Hypertension 2017       PAST SURGICAL HISTORY:  Past Surgical History:   Procedure Laterality Date     COLONOSCOPY WITH CO2 INSUFFLATION N/A 3/15/2017    Procedure: COLONOSCOPY WITH CO2 INSUFFLATION;  Surgeon: Duane, William Charles, MD;   Location: [facility] OR       FAMILY HISTORY:   Family History   Problem Relation Age of Onset     Respiratory Mother      copd     CANCER Maternal Grandmother      Leg     Alzheimer Disease Maternal Grandfather      HEART DISEASE Paternal Grandfather      HEART DISEASE Brother 54     Heart Attack      CANCER Sister      Lung cancer age 55-smoker d age 57       SOCIAL HISTORY:  Social History     Social History     Marital status:      Spouse name: N/A     Number of children: 3     Years of education: N/A     Occupational History     Day care      Social History Main Topics     Smoking status: Current Every Day Smoker     Packs/day: 0.05     Years: 40.00     Types: Cigarettes     Smokeless tobacco: Current User     Alcohol use 0.5 - 1.0 oz/week     1 - 2 Standard drinks or equivalent per week      Comment: 3-4 drinks per week      Drug use: No     Sexual activity: Yes     Partners: Male     Birth control/ protection: Surgical     Other Topics Concern     Parent/Sibling W/ Cabg, Mi Or Angioplasty Before 65f 55m? Yes     brother w/ sudden cardiac arrest @ age 54     Social History Narrative        ALLERGIES:   No Known Allergies    CURRENT MEDICATIONS:   Prescription Medications as of 9/22/2017             lisinopril (PRINIVIL/ZESTRIL) 10 MG tablet Take 4 tablets (40 mg) by mouth daily    metoprolol (LOPRESSOR) 25 MG tablet Take 1 tablet (25 mg) by mouth 2 times daily    atorvastatin (LIPITOR) 40 MG tablet Take 1 tablet (40 mg) by mouth daily    nicotine polacrilex (NICORETTE) 2 MG gum Place 1 each (2 mg) inside cheek as needed for smoking cessation Place 1 each inside cheek as needed for smoking cessation          REVIEW OF SYSTEMS:   Gen: denies frequent headaches, double/blurry vision, insomnia, fatigue, unexplained weight loss/gain. No previous anesthesia reactions.  CV: denies chest pain, shortness of breath, palpitations, peripheral edema.    Pulm: denies shortness of breath, asthma or wheezing  GI/: denies  "liver or kidney problems, blood in stool or BRBPR, difficulty urinating  Endo: denies thyroid problems or Diabetes  Heme/Onc: denies bleeding or clotting disorders, no family problems with bleeding/clotting diorders  MS: no weakness, tremors or gait instability  Neuro: denies depression, memory problems, no dysesthesias, no previous strokes, no migraines, no dysphagia  Skin: No petechiae, purpura or rash.    PHYSICAL EXAMINATION:   /77 (BP Location: Left arm, Patient Position: Chair, Cuff Size: Adult Large)  Pulse 53  Ht 1.676 m (5' 6\")  Wt 94 kg (207 lb 4.8 oz)  SpO2 96%  BMI 33.46 kg/m2  General: alert and oriented x 3, pleasant, no acute distress  CV: S1 S2, no murmurs, rubs or gallops, regular rate and rhythm, no peripheral edema, no carotid or abdomenal bruits, pulses in upper and lower extremities palpable  Pulm: bilateral breath sounds, clear to auscultation, easy work of breathing  GI: (+) bowel sounds, soft non-tender and non-distended  : voiding without problems  MS: moves all extremities x 4,  5+/5+ equal strength bilaterally  Neuro: pupils equal round and reactive to light, cranial nerves, II-XII grossly intact, no gross neurologic deficits noted    LABS:  BMP RESULTS:  Lab Results   Component Value Date     08/15/2017    POTASSIUM 4.3 08/15/2017    CHLORIDE 104 08/15/2017    CO2 27 08/15/2017    ANIONGAP 7 08/15/2017    GLC 84 08/15/2017    BUN 15 08/15/2017    CR 0.57 08/15/2017    GFRESTIMATED >90 08/15/2017    GFRESTBLACK >90 08/15/2017    QASIM 9.5 08/15/2017        CBC RESULTS:  Lab Results   Component Value Date    WBC 7.0 08/15/2017    RBC 5.46 (H) 08/15/2017    HGB 14.9 09/12/2017    HCT 47.8 (H) 08/15/2017    MCV 88 08/15/2017    MCH 29.1 08/15/2017    MCHC 33.3 08/15/2017    RDW 13.7 08/15/2017     08/15/2017       No results found for: INR  No results found for: PTT  No results found for: UA  No results found for: A1C    PROCEDURES/IMAGING:  CT OF THE CHEST W/WO " CONTRAST: 9/5/2017       Clinical information: Thoracic aortic aneurysm, without rupture      Aneurysmal fusiform dilatation involving the ascending thoracic aorta, with involvement of the innominate artery, the left common carotid artery, with aneurysm ending at the level of the left subclavian artery. The aneurysm begins at the sinus of Valsalva and extends to the level of the proximal aortic arch. Additionally the the descending thoracic aorta is mildly ectatic. There is mild atherosclerotic plaque, primarily at the aortic arch.      Thoracic aortic diameters:      Aortic annulus: 3.6 x 3.8 x 3.9 cm.   Sinuses of Valsalva: 4.9 x 4.8 cm.   Ascending aorta: 6.1 x 6.2 cm.   Aortic arch: 3.6 x 3.6 cm.   Proximal thoracic aorta: 3.6 x 3.8 cm.   Mid thoracic aorta: 3.4 x 3.3 cm.   Descending thoracic aorta: 3.5 x 3.6 cm.       There is normal branching pattern of the great vessels. Origins of the brachiocephalic artery, left common carotid artery and left subclavian artery are patent. There is mild atherosclerotic plaque at their origin.      The proximal pulmonary vasculature  appears normal.       Partially visualized upper abdomen demonstrates mild bilateral atherosclerotic plaque of the renal arteries. The celiac artery appears patent. The SMA is also patent.     Chest:   Heart size is mildly enlarged. No pericardial effusion. Mediastinal lymph nodes measuring within upper limits of normal size, 7 mm in short axis, likely reactive. Central tracheobronchial tree is patent. No pleural effusion or pneumothorax. Partially visualized subcentimeter enhancing left thyroid nodule and probable hypodense nodule of the isthmus. Large benign calcified granuloma of the left lung apex with scattered smaller benign calcified granulomas. Minimal bibasilar atelectasis. Mild centrilobular and paraseptal upper lobe predominant emphysematous changes. Mild biapical scarring.       Impression:      1. Fusiform aneurysm of the ascending  thoracic aorta measuring up to 6.2 cm. Additional measurements are provided above.  2. Cholelithiasis.  3. Sequela of prior chronic granulomatous disease.  4. Partially visualized subcentimeter enhancing left thyroid nodule. Recommend ultrasound for further evaluation.  5. Mild emphysematous changes of the lungs.     ECHO: 09/01/17  Interpretation Summary     Global and regional left ventricular function is normal with an EF of 55-60%.  The right ventricle is normal size. Global right ventricular function is normal.  Severe dilatation of the ascending aorta (6.2 cm at the mid ascending aorta).  The sinotubular ridge is effaced, however there is no evidence of dissection.  The aortic arch and descending thoracic aorta appear normal in caliber.  Mild aortic regurgitation.  Right ventricular systolic pressure is 26mmHg above the right atrial pressure.  The inferior vena cava was normal in size with preserved respiratory  variability. Estimated mean right atrial pressure is 3 mmHg.  No pericardial effusion is present.  Previous study not available for comparison.     Left Ventricle Left ventricular size is normal. Global and regional left ventricular function is normal with an EF of  55-60%. Mild concentric wall thickening consistent with left ventricular hypertrophy is present. Left ventricular diastolic function is indeterminate.     Right Ventricle  The right ventricle is normal size. Global right ventricular function is normal.     Atria  Mild left atrial enlargement is present. Left atrial volume index is 36 mL/m2 (normal <34 mL/m2). Mild right atrial enlargement is present.     Mitral Valve  Mild mitral annular calcification is present. Trace mitral insufficiency is present.        Aortic Valve  It is difficult to assess individual cusps due to poor acoustic windows.  Structure appear normal. Mild aortic insufficiency is present. PHT is 621 ms.     Tricuspid Valve  The right ventricular systolic pressure is  "approximated at 25.7 mmHg plus the right atrial pressure. Right ventricular systolic pressure is 26mmHg above the right atrial pressure. Trace tricuspid insufficiency is present.     Pulmonic Valve  The pulmonic valve cannot be assessed. Doppler interrogation is normal.     Vessels  The inferior vena cava was normal in size with preserved respiratory variability. Severe dilatation of the ascending aorta (6.2 cm at the mid ascending aorta). There is no evidence of dissection. The sinotubular ridge is effaced. The aortic arch measures 2.5 cm and the descending thoracic aorta  measures 2.3 cm. Estimated mean right atrial pressure is 3 mmHg.     Pericardium  No pericardial effusion is present.     MMode/2D Measurements & Calculations  IVSd: 1.2 cm  LVIDd: 5.3 cm  LVIDs: 2.9 cm  LVPWd: 1.2 cm  FS: 45.7 %  EDV(Teich): 133.6 ml  ESV(Teich): 31.1 ml  LV mass(C)d: 257.2 grams  LV mass(C)dI: 130.1 grams/m2  Ao root diam: 3.9 cm  LA dimension: 3.8 cm  asc Aorta Diam: 6.2 cm  desc Ao Diam: 2.1 cm  LA/Ao: 0.97  LVOT diam: 2.2 cm  LVOT area: 3.8 cm2  LA Volume (BP): 71.0 ml  LA Volume Index (BP): 35.9 ml/m2    Doppler Measurements & Calculations  MV E max tono: 69.4 cm/sec  MV A max tono: 89.5 cm/sec  MV E/A: 0.78  MV dec time: 0.18 sec  AI P1/2t: 622.2 msec  TR max tono: 253.7 cm/sec  TR max P.7 mmHg  Lateral E/e': 9.4  Medial E/e': 15.7    ASSESSMENT AND PLAN:     Georgie is a 62 year old woman who was found to have a large ascending aortic aneurysm and aortic root aneurysm. Also had a family history of \"sudden death (brother)\".    Recommend: Surgical repair of ascending aortic aneurysm (including root).    1. Preop Coronary angiogram  2. SHIVANI to assess aortic valve anatomy for possible valve sparing aortic root aneurysm repair.         Risks and benefits of surgery were discussed with patient (and all present) including risk of death, stroke, bleeding, cardiac ischemia, wound infection, renal failure, arrhythmias and " possible pacemaker implantation. Patient verbalized understanding and accepts these risks.      Approximately 50 minutes spent with this case including review of the clinical history and data; discussion with the patient and his family and coordination of the care     Thank you for including me in the care of this kind patient. Do not hesitate to contact me with any questions.     Dr. Rubio Piña     Cardiothoracic Surgery  [facility]  Patient Care Team:  Marlys Wade APRN CNP as PCP - General (Nurse Practitioner - Family)  JOHNY LANDIS

## 2017-09-21 NOTE — TELEPHONE ENCOUNTER
Spoke to pt who is wondering about her BP needing to be lower and what medications she needs to change to help that.     Will review with Dr. Fernandez tomorrow in clinic and will call pt back after. Verbalized understanding.    Vianca Medrano RN

## 2017-09-21 NOTE — NURSING NOTE
Chief Complaint   Patient presents with     New Patient     New Consult for ascending aortic aneurysm     Vitals were taken and medications were reconciled.    Juany Fofana, MIGUEL     9:41 AM

## 2017-09-21 NOTE — PROGRESS NOTES
ASKED BY REFERRING PHYSIAICIAN: Dr Jitendra Fernandez to see patient regarding surgical treatment of ascending aortic aneurysm.     CHIEF COMPLAINT: incidental finding of ascending aortic aneurysm     HPI:   Georgie is a 62 year old female who presents with new diagnosis of ascending aortic aneurysm. Patient originally present to cardiology with complaint of palpitations. Patient's does have a history of HTN, but no other cardiac problems and has never had a cardiac evaluation in the past other than a holter recently.  Patient noted palpitation started approximately about 6 months ago and last only for a few second and resolve spontaneously. They are not  associated with any activity that she recalls. Patient denies no chest pain, shortness of breath, dizziness or lightheadedness associated with the palpitations. Patient has never had syncope or fall. No LE edema, no orthopnea or PND. No other cardiac issues. Takes all medications as prescribed. Patient states she does not drink or use illicit drugs, but does smoke about a half pack of cigarettes a day.      Patient underwent a CT and Echocardiogram that showed a 6.1-6.2 cm ascending aortic aneurysm.    Patient's brother  suddenly during vacation.    PAST MEDICAL HISTORY:  Past Medical History:   Diagnosis Date     Ascending aortic aneurysm (H) 2017     Blood transfusions age 19    due to menorhagia     Hypertension 2017       PAST SURGICAL HISTORY:  Past Surgical History:   Procedure Laterality Date     COLONOSCOPY WITH CO2 INSUFFLATION N/A 3/15/2017    Procedure: COLONOSCOPY WITH CO2 INSUFFLATION;  Surgeon: Duane, William Charles, MD;  Location:  OR       FAMILY HISTORY:   Family History   Problem Relation Age of Onset     Respiratory Mother      copd     CANCER Maternal Grandmother      Leg     Alzheimer Disease Maternal Grandfather      HEART DISEASE Paternal Grandfather      HEART DISEASE Brother 54     Heart Attack      CANCER Sister      Lung  cancer age 55-smoker d age 57       SOCIAL HISTORY:  Social History     Social History     Marital status:      Spouse name: N/A     Number of children: 3     Years of education: N/A     Occupational History     Day care      Social History Main Topics     Smoking status: Current Every Day Smoker     Packs/day: 0.05     Years: 40.00     Types: Cigarettes     Smokeless tobacco: Current User     Alcohol use 0.5 - 1.0 oz/week     1 - 2 Standard drinks or equivalent per week      Comment: 3-4 drinks per week      Drug use: No     Sexual activity: Yes     Partners: Male     Birth control/ protection: Surgical     Other Topics Concern     Parent/Sibling W/ Cabg, Mi Or Angioplasty Before 65f 55m? Yes     brother w/ sudden cardiac arrest @ age 54     Social History Narrative        ALLERGIES:   No Known Allergies    CURRENT MEDICATIONS:   Prescription Medications as of 9/22/2017             lisinopril (PRINIVIL/ZESTRIL) 10 MG tablet Take 4 tablets (40 mg) by mouth daily    metoprolol (LOPRESSOR) 25 MG tablet Take 1 tablet (25 mg) by mouth 2 times daily    atorvastatin (LIPITOR) 40 MG tablet Take 1 tablet (40 mg) by mouth daily    nicotine polacrilex (NICORETTE) 2 MG gum Place 1 each (2 mg) inside cheek as needed for smoking cessation Place 1 each inside cheek as needed for smoking cessation          REVIEW OF SYSTEMS:   Gen: denies frequent headaches, double/blurry vision, insomnia, fatigue, unexplained weight loss/gain. No previous anesthesia reactions.  CV: denies chest pain, shortness of breath, palpitations, peripheral edema.    Pulm: denies shortness of breath, asthma or wheezing  GI/: denies liver or kidney problems, blood in stool or BRBPR, difficulty urinating  Endo: denies thyroid problems or Diabetes  Heme/Onc: denies bleeding or clotting disorders, no family problems with bleeding/clotting diorders  MS: no weakness, tremors or gait instability  Neuro: denies depression, memory problems, no dysesthesias,  "no previous strokes, no migraines, no dysphagia  Skin: No petechiae, purpura or rash.    PHYSICAL EXAMINATION:   /77 (BP Location: Left arm, Patient Position: Chair, Cuff Size: Adult Large)  Pulse 53  Ht 1.676 m (5' 6\")  Wt 94 kg (207 lb 4.8 oz)  SpO2 96%  BMI 33.46 kg/m2  General: alert and oriented x 3, pleasant, no acute distress  CV: S1 S2, no murmurs, rubs or gallops, regular rate and rhythm, no peripheral edema, no carotid or abdomenal bruits, pulses in upper and lower extremities palpable  Pulm: bilateral breath sounds, clear to auscultation, easy work of breathing  GI: (+) bowel sounds, soft non-tender and non-distended  : voiding without problems  MS: moves all extremities x 4,  5+/5+ equal strength bilaterally  Neuro: pupils equal round and reactive to light, cranial nerves, II-XII grossly intact, no gross neurologic deficits noted    LABS:  BMP RESULTS:  Lab Results   Component Value Date     08/15/2017    POTASSIUM 4.3 08/15/2017    CHLORIDE 104 08/15/2017    CO2 27 08/15/2017    ANIONGAP 7 08/15/2017    GLC 84 08/15/2017    BUN 15 08/15/2017    CR 0.57 08/15/2017    GFRESTIMATED >90 08/15/2017    GFRESTBLACK >90 08/15/2017    QASIM 9.5 08/15/2017        CBC RESULTS:  Lab Results   Component Value Date    WBC 7.0 08/15/2017    RBC 5.46 (H) 08/15/2017    HGB 14.9 09/12/2017    HCT 47.8 (H) 08/15/2017    MCV 88 08/15/2017    MCH 29.1 08/15/2017    MCHC 33.3 08/15/2017    RDW 13.7 08/15/2017     08/15/2017       No results found for: INR  No results found for: PTT  No results found for: UA  No results found for: A1C    PROCEDURES/IMAGING:  CT OF THE CHEST W/WO CONTRAST: 9/5/2017       Clinical information: Thoracic aortic aneurysm, without rupture      Aneurysmal fusiform dilatation involving the ascending thoracic aorta, with involvement of the innominate artery, the left common carotid artery, with aneurysm ending at the level of the left subclavian artery. The aneurysm begins at " the sinus of Valsalva and extends to the level of the proximal aortic arch. Additionally the the descending thoracic aorta is mildly ectatic. There is mild atherosclerotic plaque, primarily at the aortic arch.      Thoracic aortic diameters:      Aortic annulus: 3.6 x 3.8 x 3.9 cm.   Sinuses of Valsalva: 4.9 x 4.8 cm.   Ascending aorta: 6.1 x 6.2 cm.   Aortic arch: 3.6 x 3.6 cm.   Proximal thoracic aorta: 3.6 x 3.8 cm.   Mid thoracic aorta: 3.4 x 3.3 cm.   Descending thoracic aorta: 3.5 x 3.6 cm.       There is normal branching pattern of the great vessels. Origins of the brachiocephalic artery, left common carotid artery and left subclavian artery are patent. There is mild atherosclerotic plaque at their origin.      The proximal pulmonary vasculature  appears normal.       Partially visualized upper abdomen demonstrates mild bilateral atherosclerotic plaque of the renal arteries. The celiac artery appears patent. The SMA is also patent.     Chest:   Heart size is mildly enlarged. No pericardial effusion. Mediastinal lymph nodes measuring within upper limits of normal size, 7 mm in short axis, likely reactive. Central tracheobronchial tree is patent. No pleural effusion or pneumothorax. Partially visualized subcentimeter enhancing left thyroid nodule and probable hypodense nodule of the isthmus. Large benign calcified granuloma of the left lung apex with scattered smaller benign calcified granulomas. Minimal bibasilar atelectasis. Mild centrilobular and paraseptal upper lobe predominant emphysematous changes. Mild biapical scarring.       Impression:      1. Fusiform aneurysm of the ascending thoracic aorta measuring up to 6.2 cm. Additional measurements are provided above.  2. Cholelithiasis.  3. Sequela of prior chronic granulomatous disease.  4. Partially visualized subcentimeter enhancing left thyroid nodule. Recommend ultrasound for further evaluation.  5. Mild emphysematous changes of the lungs.     ECHO:  09/01/17  Interpretation Summary     Global and regional left ventricular function is normal with an EF of 55-60%.  The right ventricle is normal size. Global right ventricular function is normal.  Severe dilatation of the ascending aorta (6.2 cm at the mid ascending aorta).  The sinotubular ridge is effaced, however there is no evidence of dissection.  The aortic arch and descending thoracic aorta appear normal in caliber.  Mild aortic regurgitation.  Right ventricular systolic pressure is 26mmHg above the right atrial pressure.  The inferior vena cava was normal in size with preserved respiratory  variability. Estimated mean right atrial pressure is 3 mmHg.  No pericardial effusion is present.  Previous study not available for comparison.     Left Ventricle Left ventricular size is normal. Global and regional left ventricular function is normal with an EF of  55-60%. Mild concentric wall thickening consistent with left ventricular hypertrophy is present. Left ventricular diastolic function is indeterminate.     Right Ventricle  The right ventricle is normal size. Global right ventricular function is normal.     Atria  Mild left atrial enlargement is present. Left atrial volume index is 36 mL/m2 (normal <34 mL/m2). Mild right atrial enlargement is present.     Mitral Valve  Mild mitral annular calcification is present. Trace mitral insufficiency is present.        Aortic Valve  It is difficult to assess individual cusps due to poor acoustic windows.  Structure appear normal. Mild aortic insufficiency is present. PHT is 621 ms.     Tricuspid Valve  The right ventricular systolic pressure is approximated at 25.7 mmHg plus the right atrial pressure. Right ventricular systolic pressure is 26mmHg above the right atrial pressure. Trace tricuspid insufficiency is present.     Pulmonic Valve  The pulmonic valve cannot be assessed. Doppler interrogation is normal.     Vessels  The inferior vena cava was normal in size with  "preserved respiratory variability. Severe dilatation of the ascending aorta (6.2 cm at the mid ascending aorta). There is no evidence of dissection. The sinotubular ridge is effaced. The aortic arch measures 2.5 cm and the descending thoracic aorta  measures 2.3 cm. Estimated mean right atrial pressure is 3 mmHg.     Pericardium  No pericardial effusion is present.     MMode/2D Measurements & Calculations  IVSd: 1.2 cm  LVIDd: 5.3 cm  LVIDs: 2.9 cm  LVPWd: 1.2 cm  FS: 45.7 %  EDV(Teich): 133.6 ml  ESV(Teich): 31.1 ml  LV mass(C)d: 257.2 grams  LV mass(C)dI: 130.1 grams/m2  Ao root diam: 3.9 cm  LA dimension: 3.8 cm  asc Aorta Diam: 6.2 cm  desc Ao Diam: 2.1 cm  LA/Ao: 0.97  LVOT diam: 2.2 cm  LVOT area: 3.8 cm2  LA Volume (BP): 71.0 ml  LA Volume Index (BP): 35.9 ml/m2    Doppler Measurements & Calculations  MV E max tono: 69.4 cm/sec  MV A max tono: 89.5 cm/sec  MV E/A: 0.78  MV dec time: 0.18 sec  AI P1/2t: 622.2 msec  TR max tono: 253.7 cm/sec  TR max P.7 mmHg  Lateral E/e': 9.4  Medial E/e': 15.7    ASSESSMENT AND PLAN:     Georgie is a 62 year old woman who was found to have a large ascending aortic aneurysm and aortic root aneurysm. Also had a family history of \"sudden death (brother)\".    Recommend: Surgical repair of ascending aortic aneurysm (including root).    1. Preop Coronary angiogram  2. SHIVANI to assess aortic valve anatomy for possible valve sparing aortic root aneurysm repair.         Risks and benefits of surgery were discussed with patient (and all present) including risk of death, stroke, bleeding, cardiac ischemia, wound infection, renal failure, arrhythmias and possible pacemaker implantation. Patient verbalized understanding and accepts these risks.      Approximately 50 minutes spent with this case including review of the clinical history and data; discussion with the patient and his family and coordination of the care     Thank you for including me in the care of this kind patient. Do not " hesitate to contact me with any questions.     Dr. Rubio Piña     Cardiothoracic Surgery  [facility]  Patient Care Team:  Marlys Wade APRN CNP as PCP - General (Nurse Practitioner - Family)  JOHNY LANDIS

## 2017-09-21 NOTE — MR AVS SNAPSHOT
After Visit Summary   9/21/2017    Georgie Patino    MRN: [id number]           Patient Information     Date Of Birth          1955        Visit Information        Provider Department      9/21/2017 9:30 AM Rubio Piña MD [facility]        Today's Diagnoses     Ascending aortic aneurysm (H)    -  1    Abnormal findings on diagnostic imaging of heart and coronary circulation        Pre-operative cardiovascular examination        Tobacco abuse           Follow-ups after your visit        Your next 10 appointments already scheduled     Oct 16, 2017 10:00 AM CDT   (Arrive by 9:45 AM)   PAC EVALUATION with  Pac Stephanie 1   [facility] ([facility])    [address] [place] [address]  4th [facility] [address]   [phone]            Oct 16, 2017 11:20 AM CDT   (Arrive by 11:05 AM)   PAC Anesthesia Consult with  Pac Anesthesiologist   [facility] ([facility])    [address] [place] [id number]   [phone]            Oct 16, 2017 11:30 AM CDT   (Arrive by 11:15 AM)   PAC RN ASSESSMENT with  Pac Rn   [facility] ([facility])    [address] [place] [id number]   [phone]            Oct 16, 2017 12:00 PM CDT   LAB with  LAB   [facility] ([facility])    [address] [place] [address]   [phone]           Patient must bring picture ID. Patient should be prepared to give a urine specimen  Please do not eat 10-12 hours before your appointment if you are coming in fasting for labs on lipids, cholesterol, or glucose (sugar). Pregnant women should follow their Care Team instructions. Water with medications is okay. Do not drink coffee or other fluids. If you have concerns about taking  your medications, please ask  at office or if scheduling via [employer], send a message by clicking on Secure Messaging, Message Your Care Team.            Oct 16, 2017  1:00 PM CDT   US CAROTID BILATERAL with UCUSV2   [facility] US ([facility])    [address] [facility]  [address]st [facility] [id number] [phone]           Please bring a list of your medicines (including vitamins, minerals and over-the-counter drugs). Also, tell your doctor about any allergies you may have. Wear comfortable clothes and leave your valuables at home.  You do not need to do anything special to prepare for your exam.  Please call the Imaging Department at your exam site with any questions.            Oct 16, 2017  2:15 PM CDT   (Arrive by 2:00 PM)   XR CHEST 2 VIEWS with XR1   [facility] Xray ([facility])    [address] [place] [id number] [phone]           Please bring a list of your current medicines to your exam. (Include vitamins, minerals and over-thecounter medicines.) Leave your valuables at home.  Tell your doctor if there is a chance you may be pregnant.  You do not need to do anything special for this exam.            Oct 16, 2017  2:30 PM CDT   FULL PULMONARY FUNCTION with  PFL C   [facility] Pulmonary Function Testing ([facility])    [address] [facility]  [address] [facility] [id number] [phone]            Oct 27, 2017   Procedure with Rubio Piña MD   [facility] [place], Same Day Surgery (--)    [address]  [place] [address] [phone]            Nov 13, 2017  2:45 PM CST   LAB with FZ LAB   [facility] ([facility])    [address] [facility] [id number] [phone]           Patient must bring picture ID. Patient should be prepared to give a urine specimen  Please do not eat 10-12 hours before your appointment if you are coming in fasting for labs  "on lipids, cholesterol, or glucose (sugar). Pregnant women should follow their Care Team instructions. Water with medications is okay. Do not drink coffee or other fluids. If you have concerns about taking  your medications, please ask at office or if scheduling via [employer], send a message by clicking on Secure Messaging, Message Your Care Team.              Who to contact     If you have questions or need follow up information about today's clinic visit or your schedule please contact [facility] directly at [phone].  Normal or non-critical lab and imaging results will be communicated to you by [employer], letter or phone within 4 business days after the clinic has received the results. If you do not hear from us within 7 days, please contact the clinic through [employer] or phone. If you have a critical or abnormal lab result, we will notify you by phone as soon as possible.  Submit refill requests through [employer] or call your pharmacy and they will forward the refill request to us. Please allow 3 business days for your refill to be completed.          Additional Information About Your Visit        [employer] Information     [employer] lets you send messages to your doctor, view your test results, renew your prescriptions, schedule appointments and more. To sign up, go to [email or web address] . Click on \"Log in\" on the left side of the screen, which will take you to the Welcome page. Then click on \"Sign up Now\" on the right side of the page.     You will be asked to enter the access code listed below, as well as some personal information. Please follow the directions to create your username and password.     Your access code is: [email or web address]  Expires: 2017  4:05 PM     Your access code will  in 90 days. If you need help or a new code, please call your [facility] clinic or [phone].        Care EveryWhere ID     This is your Care EveryWhere ID. This could be used by other organizations to access " "your [facility] medical records  [id number]        Your Vitals Were     Pulse Height Pulse Oximetry BMI (Body Mass Index)          53 1.676 m (5' 6\") 96% 33.46 kg/m2         Blood Pressure from Last 3 Encounters:   09/29/17 146/78   09/25/17 154/70   09/21/17 154/77    Weight from Last 3 Encounters:   09/25/17 95 kg (209 lb 7 oz)   09/21/17 94 kg (207 lb 4.8 oz)   09/01/17 93 kg (205 lb)                 Today's Medication Changes          These changes are accurate as of: 9/21/17 11:59 PM.  If you have any questions, ask your nurse or doctor.               These medicines have changed or have updated prescriptions.        Dose/Directions    lisinopril 10 MG tablet   Commonly known as:  PRINIVIL/ZESTRIL   This may have changed:  how much to take   Used for:  Benign essential hypertension   Changed by:  Jitendra Fernandez MD        Dose:  40 mg   Take 4 tablets (40 mg) by mouth daily   Quantity:  90 tablet   Refills:  3            Where to get your medicines      These medications were sent to [id number] - [place], [place] - [address] [place] [address]     Phone:  [phone]     lisinopril 10 MG tablet                Primary Care Provider Office Phone # Fax #    Marlys Wade, APRN [facility] [phone]       [address] [facility] [address]        Equal Access to Services     [facility]: Hadii ana ku hadasho Soomaali, waaxda luqadaha, qaybta kaalmada adeegyada, waxkaleb kenny jama adecarlos enrique crenshaw . So [facility] [phone].    ATENCIÓN: Si habla español, tiene a thomas disposición servicios gratuitos de asistencia lingüística. Llame al [phone].    We comply with applicable federal civil rights laws and [place] laws. We do not discriminate on the basis of race, color, national origin, age, disability, sex, sexual orientation, or gender identity.            Thank you!     Thank you for choosing [facility]  for your care. Our goal is always to " provide you with excellent care. Hearing back from our patients is one way we can continue to improve our services. Please take a few minutes to complete the written survey that you may receive in the mail after your visit with us. Thank you!             Your Updated Medication List - Protect others around you: Learn how to safely use, store and throw away your medicines at www.disposemymeds.org.          This list is accurate as of: 9/21/17 11:59 PM.  Always use your most recent med list.                   Brand Name Dispense Instructions for use Diagnosis    atorvastatin 40 MG tablet    LIPITOR    90 tablet    Take 1 tablet (40 mg) by mouth daily    CARDIOVASCULAR SCREENING; LDL GOAL LESS THAN 160       lisinopril 10 MG tablet    PRINIVIL/ZESTRIL    90 tablet    Take 4 tablets (40 mg) by mouth daily    Benign essential hypertension       metoprolol 25 MG tablet    LOPRESSOR    60 tablet    Take 1 tablet (25 mg) by mouth 2 times daily    Palpitations       nicotine polacrilex 2 MG gum    NICORETTE    100 tablet    Place 1 each (2 mg) inside cheek as needed for smoking cessation Place 1 each inside cheek as needed for smoking cessation    History of tobacco use

## 2017-09-21 NOTE — LETTER
2017      RE: Georgie Patino  [address] [place] [id number]       Dear Colleague,    Thank you for the opportunity to participate in the care of your patient, Georgie Patino, at the [facility]. Please see a copy of my visit note below.    ASKED BY REFERRING PHYSIAICIAN: Dr Jitendra Fernandez to see patient regarding surgical treatment of ascending aortic aneurysm.     CHIEF COMPLAINT: incidental finding of ascending aortic aneurysm     HPI:   Georgie is a 62 year old female who presents with new diagnosis of ascending aortic aneurysm. Patient originally present to cardiology with complaint of palpitations. Patient's does have a history of HTN, but no other cardiac problems and has never had a cardiac evaluation in the past other than a holter recently.  Patient noted palpitation started approximately about 6 months ago and last only for a few second and resolve spontaneously. They are not  associated with any activity that she recalls. Patient denies no chest pain, shortness of breath, dizziness or lightheadedness associated with the palpitations. Patient has never had syncope or fall. No LE edema, no orthopnea or PND. No other cardiac issues. Takes all medications as prescribed. Patient states she does not drink or use illicit drugs, but does smoke about a half pack of cigarettes a day.      Patient underwent a CT and Echocardiogram that showed a 6.1-6.2 cm ascending aortic aneurysm.    Patient's brother  suddenly during vacation.    PAST MEDICAL HISTORY:  Past Medical History:   Diagnosis Date     Ascending aortic aneurysm (H) 2017     Blood transfusions age 19    due to menorhagia     Hypertension 2017       PAST SURGICAL HISTORY:  Past Surgical History:   Procedure Laterality Date     COLONOSCOPY WITH CO2 INSUFFLATION N/A 3/15/2017    Procedure: COLONOSCOPY WITH CO2 INSUFFLATION;  Surgeon: Duane, William Charles, MD;   Location: [facility] OR       FAMILY HISTORY:   Family History   Problem Relation Age of Onset     Respiratory Mother      copd     CANCER Maternal Grandmother      Leg     Alzheimer Disease Maternal Grandfather      HEART DISEASE Paternal Grandfather      HEART DISEASE Brother 54     Heart Attack      CANCER Sister      Lung cancer age 55-smoker d age 57       SOCIAL HISTORY:  Social History     Social History     Marital status:      Spouse name: N/A     Number of children: 3     Years of education: N/A     Occupational History     Day care      Social History Main Topics     Smoking status: Current Every Day Smoker     Packs/day: 0.05     Years: 40.00     Types: Cigarettes     Smokeless tobacco: Current User     Alcohol use 0.5 - 1.0 oz/week     1 - 2 Standard drinks or equivalent per week      Comment: 3-4 drinks per week      Drug use: No     Sexual activity: Yes     Partners: Male     Birth control/ protection: Surgical     Other Topics Concern     Parent/Sibling W/ Cabg, Mi Or Angioplasty Before 65f 55m? Yes     brother w/ sudden cardiac arrest @ age 54     Social History Narrative        ALLERGIES:   No Known Allergies    CURRENT MEDICATIONS:   Prescription Medications as of 9/22/2017             lisinopril (PRINIVIL/ZESTRIL) 10 MG tablet Take 4 tablets (40 mg) by mouth daily    metoprolol (LOPRESSOR) 25 MG tablet Take 1 tablet (25 mg) by mouth 2 times daily    atorvastatin (LIPITOR) 40 MG tablet Take 1 tablet (40 mg) by mouth daily    nicotine polacrilex (NICORETTE) 2 MG gum Place 1 each (2 mg) inside cheek as needed for smoking cessation Place 1 each inside cheek as needed for smoking cessation          REVIEW OF SYSTEMS:   Gen: denies frequent headaches, double/blurry vision, insomnia, fatigue, unexplained weight loss/gain. No previous anesthesia reactions.  CV: denies chest pain, shortness of breath, palpitations, peripheral edema.    Pulm: denies shortness of breath, asthma or wheezing  GI/: denies  "liver or kidney problems, blood in stool or BRBPR, difficulty urinating  Endo: denies thyroid problems or Diabetes  Heme/Onc: denies bleeding or clotting disorders, no family problems with bleeding/clotting diorders  MS: no weakness, tremors or gait instability  Neuro: denies depression, memory problems, no dysesthesias, no previous strokes, no migraines, no dysphagia  Skin: No petechiae, purpura or rash.    PHYSICAL EXAMINATION:   /77 (BP Location: Left arm, Patient Position: Chair, Cuff Size: Adult Large)  Pulse 53  Ht 1.676 m (5' 6\")  Wt 94 kg (207 lb 4.8 oz)  SpO2 96%  BMI 33.46 kg/m2  General: alert and oriented x 3, pleasant, no acute distress  CV: S1 S2, no murmurs, rubs or gallops, regular rate and rhythm, no peripheral edema, no carotid or abdomenal bruits, pulses in upper and lower extremities palpable  Pulm: bilateral breath sounds, clear to auscultation, easy work of breathing  GI: (+) bowel sounds, soft non-tender and non-distended  : voiding without problems  MS: moves all extremities x 4,  5+/5+ equal strength bilaterally  Neuro: pupils equal round and reactive to light, cranial nerves, II-XII grossly intact, no gross neurologic deficits noted    LABS:  BMP RESULTS:  Lab Results   Component Value Date     08/15/2017    POTASSIUM 4.3 08/15/2017    CHLORIDE 104 08/15/2017    CO2 27 08/15/2017    ANIONGAP 7 08/15/2017    GLC 84 08/15/2017    BUN 15 08/15/2017    CR 0.57 08/15/2017    GFRESTIMATED >90 08/15/2017    GFRESTBLACK >90 08/15/2017    QASIM 9.5 08/15/2017        CBC RESULTS:  Lab Results   Component Value Date    WBC 7.0 08/15/2017    RBC 5.46 (H) 08/15/2017    HGB 14.9 09/12/2017    HCT 47.8 (H) 08/15/2017    MCV 88 08/15/2017    MCH 29.1 08/15/2017    MCHC 33.3 08/15/2017    RDW 13.7 08/15/2017     08/15/2017       No results found for: INR  No results found for: PTT  No results found for: UA  No results found for: A1C    PROCEDURES/IMAGING:  CT OF THE CHEST W/WO " CONTRAST: 9/5/2017       Clinical information: Thoracic aortic aneurysm, without rupture      Aneurysmal fusiform dilatation involving the ascending thoracic aorta, with involvement of the innominate artery, the left common carotid artery, with aneurysm ending at the level of the left subclavian artery. The aneurysm begins at the sinus of Valsalva and extends to the level of the proximal aortic arch. Additionally the the descending thoracic aorta is mildly ectatic. There is mild atherosclerotic plaque, primarily at the aortic arch.      Thoracic aortic diameters:      Aortic annulus: 3.6 x 3.8 x 3.9 cm.   Sinuses of Valsalva: 4.9 x 4.8 cm.   Ascending aorta: 6.1 x 6.2 cm.   Aortic arch: 3.6 x 3.6 cm.   Proximal thoracic aorta: 3.6 x 3.8 cm.   Mid thoracic aorta: 3.4 x 3.3 cm.   Descending thoracic aorta: 3.5 x 3.6 cm.       There is normal branching pattern of the great vessels. Origins of the brachiocephalic artery, left common carotid artery and left subclavian artery are patent. There is mild atherosclerotic plaque at their origin.      The proximal pulmonary vasculature  appears normal.       Partially visualized upper abdomen demonstrates mild bilateral atherosclerotic plaque of the renal arteries. The celiac artery appears patent. The SMA is also patent.     Chest:   Heart size is mildly enlarged. No pericardial effusion. Mediastinal lymph nodes measuring within upper limits of normal size, 7 mm in short axis, likely reactive. Central tracheobronchial tree is patent. No pleural effusion or pneumothorax. Partially visualized subcentimeter enhancing left thyroid nodule and probable hypodense nodule of the isthmus. Large benign calcified granuloma of the left lung apex with scattered smaller benign calcified granulomas. Minimal bibasilar atelectasis. Mild centrilobular and paraseptal upper lobe predominant emphysematous changes. Mild biapical scarring.       Impression:      1. Fusiform aneurysm of the ascending  thoracic aorta measuring up to 6.2 cm. Additional measurements are provided above.  2. Cholelithiasis.  3. Sequela of prior chronic granulomatous disease.  4. Partially visualized subcentimeter enhancing left thyroid nodule. Recommend ultrasound for further evaluation.  5. Mild emphysematous changes of the lungs.     ECHO: 09/01/17  Interpretation Summary     Global and regional left ventricular function is normal with an EF of 55-60%.  The right ventricle is normal size. Global right ventricular function is normal.  Severe dilatation of the ascending aorta (6.2 cm at the mid ascending aorta).  The sinotubular ridge is effaced, however there is no evidence of dissection.  The aortic arch and descending thoracic aorta appear normal in caliber.  Mild aortic regurgitation.  Right ventricular systolic pressure is 26mmHg above the right atrial pressure.  The inferior vena cava was normal in size with preserved respiratory  variability. Estimated mean right atrial pressure is 3 mmHg.  No pericardial effusion is present.  Previous study not available for comparison.     Left Ventricle Left ventricular size is normal. Global and regional left ventricular function is normal with an EF of  55-60%. Mild concentric wall thickening consistent with left ventricular hypertrophy is present. Left ventricular diastolic function is indeterminate.     Right Ventricle  The right ventricle is normal size. Global right ventricular function is normal.     Atria  Mild left atrial enlargement is present. Left atrial volume index is 36 mL/m2 (normal <34 mL/m2). Mild right atrial enlargement is present.     Mitral Valve  Mild mitral annular calcification is present. Trace mitral insufficiency is present.        Aortic Valve  It is difficult to assess individual cusps due to poor acoustic windows.  Structure appear normal. Mild aortic insufficiency is present. PHT is 621 ms.     Tricuspid Valve  The right ventricular systolic pressure is  "approximated at 25.7 mmHg plus the right atrial pressure. Right ventricular systolic pressure is 26mmHg above the right atrial pressure. Trace tricuspid insufficiency is present.     Pulmonic Valve  The pulmonic valve cannot be assessed. Doppler interrogation is normal.     Vessels  The inferior vena cava was normal in size with preserved respiratory variability. Severe dilatation of the ascending aorta (6.2 cm at the mid ascending aorta). There is no evidence of dissection. The sinotubular ridge is effaced. The aortic arch measures 2.5 cm and the descending thoracic aorta  measures 2.3 cm. Estimated mean right atrial pressure is 3 mmHg.     Pericardium  No pericardial effusion is present.     MMode/2D Measurements & Calculations  IVSd: 1.2 cm  LVIDd: 5.3 cm  LVIDs: 2.9 cm  LVPWd: 1.2 cm  FS: 45.7 %  EDV(Teich): 133.6 ml  ESV(Teich): 31.1 ml  LV mass(C)d: 257.2 grams  LV mass(C)dI: 130.1 grams/m2  Ao root diam: 3.9 cm  LA dimension: 3.8 cm  asc Aorta Diam: 6.2 cm  desc Ao Diam: 2.1 cm  LA/Ao: 0.97  LVOT diam: 2.2 cm  LVOT area: 3.8 cm2  LA Volume (BP): 71.0 ml  LA Volume Index (BP): 35.9 ml/m2    Doppler Measurements & Calculations  MV E max tono: 69.4 cm/sec  MV A max tono: 89.5 cm/sec  MV E/A: 0.78  MV dec time: 0.18 sec  AI P1/2t: 622.2 msec  TR max tono: 253.7 cm/sec  TR max P.7 mmHg  Lateral E/e': 9.4  Medial E/e': 15.7    ASSESSMENT AND PLAN:     Georgie is a 62 year old woman who was found to have a large ascending aortic aneurysm and aortic root aneurysm. Also had a family history of \"sudden death (brother)\".    Recommend: Surgical repair of ascending aortic aneurysm (including root).    1. Preop Coronary angiogram  2. SHIVANI to assess aortic valve anatomy for possible valve sparing aortic root aneurysm repair.         Risks and benefits of surgery were discussed with patient (and all present) including risk of death, stroke, bleeding, cardiac ischemia, wound infection, renal failure, arrhythmias and " possible pacemaker implantation. Patient verbalized understanding and accepts these risks.      Approximately 50 minutes spent with this case including review of the clinical history and data; discussion with the patient and his family and coordination of the care     Thank you for including me in the care of this kind patient. Do not hesitate to contact me with any questions.     Dr. Rubio Piña     Cardiothoracic Surgery  [facility]  Patient Care Team:  Marlys Wade APRN CNP as PCP - General (Nurse Practitioner - Family)  JOHNY LANDIS    Please do not hesitate to contact me if you have any questions/concerns.     Sincerely,     Rubio Piña MD

## 2017-09-21 NOTE — TELEPHONE ENCOUNTER
"Reason for Call:  Other prescription    Detailed comments: Patient states she was seen today with Dr. Piña, her BP was 154/70. Dr. Piña would like her BP to be in the 120\"s systolic. Patient asking for a stronger dose of her Blood pressure medication. Please advise.    Phone Number Patient can be reached at: Home number on file [phone] (home)    Best Time: any time    Can we leave a detailed message on this number? YES    Call taken on 9/21/2017 at 12:23 PM by Katie Salazar      "

## 2017-09-22 PROBLEM — I10 HYPERTENSION: Status: ACTIVE | Noted: 2017-09-21

## 2017-09-22 PROBLEM — I71.21 ASCENDING AORTIC ANEURYSM (H): Status: ACTIVE | Noted: 2017-09-21

## 2017-09-22 RX ORDER — LISINOPRIL 10 MG/1
40 TABLET ORAL DAILY
Qty: 90 TABLET | Refills: 3 | Status: ON HOLD | OUTPATIENT
Start: 2017-09-22 | End: 2017-11-01

## 2017-09-22 RX ORDER — LIDOCAINE 40 MG/G
CREAM TOPICAL
Status: CANCELLED | OUTPATIENT
Start: 2017-09-22

## 2017-09-22 RX ORDER — SODIUM CHLORIDE 9 MG/ML
INJECTION, SOLUTION INTRAVENOUS CONTINUOUS
Status: CANCELLED | OUTPATIENT
Start: 2017-09-22

## 2017-09-22 NOTE — NURSING NOTE
Patient seen today for consultation for [facility] aortic aneurysm    Surgery procedure explained to patient and spouse and all questions and concerns were answered and addressed.     Valve choices were discussed with the patient, mechanical and bioprosthetic. Risks and benefits of both explained.     Risks and benefits of surgery were discussed with patient (and all present) including risk of death, stroke, bleeding, cardiac ischemia, wound infection, renal failure, arrhythmias and possible pacemaker implantation. Patient accepts these risks and is willing to proceed with surgery.     Reviewed pre surgery tests and procedures needed and will call patient to schedule.      Pre surgery preparation folder with instructions for surgery preparation and recovery will be mailed to the patient.     Instructed patient on preparation for angiogram and SHIVANI    Patient and spouse verbalized understanding of all instructions and will call with any questions or concerns.

## 2017-09-22 NOTE — TELEPHONE ENCOUNTER
Patient with ongoing elevated BP yesterday in [facility] visit with Dr. Piña. Her heart rate is in the 50s after starting metoprolol, asymptomatic. However, given aortic aneurism, will need better BP control. At this time she will increase her lisinopril from 20mg daily to 40mg and get back to me if BP remains elevated a few days after the change or if any new symptoms she develops.    Jitendra Fernandez MD

## 2017-09-25 ENCOUNTER — HOSPITAL ENCOUNTER (OUTPATIENT)
Facility: CLINIC | Age: 62
Discharge: HOME | End: 2017-09-25
Attending: THORACIC SURGERY (CARDIOTHORACIC VASCULAR SURGERY) | Admitting: INTERNAL MEDICINE
Payer: COMMERCIAL

## 2017-09-25 ENCOUNTER — APPOINTMENT (OUTPATIENT)
Dept: MEDSURG UNIT | Facility: CLINIC | Age: 62
End: 2017-09-25
Attending: THORACIC SURGERY (CARDIOTHORACIC VASCULAR SURGERY)
Payer: COMMERCIAL

## 2017-09-25 ENCOUNTER — APPOINTMENT (OUTPATIENT)
Dept: CARDIOLOGY | Facility: CLINIC | Age: 62
End: 2017-09-25
Attending: THORACIC SURGERY (CARDIOTHORACIC VASCULAR SURGERY)
Payer: COMMERCIAL

## 2017-09-25 VITALS
HEIGHT: 66 IN | HEART RATE: 50 BPM | TEMPERATURE: 98.2 F | WEIGHT: 209.44 LBS | RESPIRATION RATE: 16 BRPM | SYSTOLIC BLOOD PRESSURE: 154 MMHG | BODY MASS INDEX: 33.66 KG/M2 | OXYGEN SATURATION: 96 % | DIASTOLIC BLOOD PRESSURE: 70 MMHG

## 2017-09-25 DIAGNOSIS — Z98.890 STATUS POST CORONARY ANGIOGRAM: Primary | ICD-10-CM

## 2017-09-25 DIAGNOSIS — R93.1 ABNORMAL FINDINGS ON DIAGNOSTIC IMAGING OF HEART AND CORONARY CIRCULATION: ICD-10-CM

## 2017-09-25 LAB
ANION GAP SERPL CALCULATED.3IONS-SCNC: 6 MMOL/L (ref 3–14)
BUN SERPL-MCNC: 13 MG/DL (ref 7–30)
CALCIUM SERPL-MCNC: 9.1 MG/DL (ref 8.5–10.1)
CHLORIDE SERPL-SCNC: 111 MMOL/L (ref 94–109)
CO2 SERPL-SCNC: 25 MMOL/L (ref 20–32)
CREAT SERPL-MCNC: 0.56 MG/DL (ref 0.52–1.04)
ERYTHROCYTE [DISTWIDTH] IN BLOOD BY AUTOMATED COUNT: 13.2 % (ref 10–15)
GFR SERPL CREATININE-BSD FRML MDRD: >90 ML/MIN/1.7M2
GLUCOSE SERPL-MCNC: 111 MG/DL (ref 70–99)
HCT VFR BLD AUTO: 45.7 % (ref 35–47)
HGB BLD-MCNC: 15.2 G/DL (ref 11.7–15.7)
MCH RBC QN AUTO: 29.5 PG (ref 26.5–33)
MCHC RBC AUTO-ENTMCNC: 33.3 G/DL (ref 31.5–36.5)
MCV RBC AUTO: 89 FL (ref 78–100)
PLATELET # BLD AUTO: 157 10E9/L (ref 150–450)
POTASSIUM SERPL-SCNC: 4.2 MMOL/L (ref 3.4–5.3)
RBC # BLD AUTO: 5.16 10E12/L (ref 3.8–5.2)
SODIUM SERPL-SCNC: 142 MMOL/L (ref 133–144)
WBC # BLD AUTO: 7 10E9/L (ref 4–11)

## 2017-09-25 PROCEDURE — 27211089 ZZH KIT ACIST INJECTOR CR3

## 2017-09-25 PROCEDURE — 27210742 ZZH CATH CR1

## 2017-09-25 PROCEDURE — 40000172 ZZH STATISTIC PROCEDURE PREP ONLY

## 2017-09-25 PROCEDURE — 93005 ELECTROCARDIOGRAM TRACING: CPT

## 2017-09-25 PROCEDURE — 80048 BASIC METABOLIC PNL TOTAL CA: CPT | Performed by: THORACIC SURGERY (CARDIOTHORACIC VASCULAR SURGERY)

## 2017-09-25 PROCEDURE — C1894 INTRO/SHEATH, NON-LASER: HCPCS

## 2017-09-25 PROCEDURE — 93454 CORONARY ARTERY ANGIO S&I: CPT | Mod: 26 | Performed by: INTERNAL MEDICINE

## 2017-09-25 PROCEDURE — 27210946 ZZH KIT HC TOTES DISP CR8

## 2017-09-25 PROCEDURE — 85027 COMPLETE CBC AUTOMATED: CPT | Performed by: THORACIC SURGERY (CARDIOTHORACIC VASCULAR SURGERY)

## 2017-09-25 PROCEDURE — 25000128 H RX IP 250 OP 636: Performed by: THORACIC SURGERY (CARDIOTHORACIC VASCULAR SURGERY)

## 2017-09-25 PROCEDURE — 25000128 H RX IP 250 OP 636: Performed by: INTERNAL MEDICINE

## 2017-09-25 PROCEDURE — 93454 CORONARY ARTERY ANGIO S&I: CPT

## 2017-09-25 PROCEDURE — 27210787 ZZH MANIFOLD CR2

## 2017-09-25 PROCEDURE — 40000065 ZZH STATISTIC EKG NON-CHARGEABLE

## 2017-09-25 PROCEDURE — B2111ZZ FLUOROSCOPY OF MULTIPLE CORONARY ARTERIES USING LOW OSMOLAR CONTRAST: ICD-10-PCS | Performed by: INTERNAL MEDICINE

## 2017-09-25 PROCEDURE — 99152 MOD SED SAME PHYS/QHP 5/>YRS: CPT

## 2017-09-25 RX ORDER — NICARDIPINE HYDROCHLORIDE 2.5 MG/ML
100 INJECTION INTRAVENOUS
Status: DISCONTINUED | OUTPATIENT
Start: 2017-09-25 | End: 2017-09-25 | Stop reason: HOSPADM

## 2017-09-25 RX ORDER — PRASUGREL 10 MG/1
10-60 TABLET, FILM COATED ORAL
Status: DISCONTINUED | OUTPATIENT
Start: 2017-09-25 | End: 2017-09-25 | Stop reason: HOSPADM

## 2017-09-25 RX ORDER — ENALAPRILAT 1.25 MG/ML
1.25-2.5 INJECTION INTRAVENOUS
Status: DISCONTINUED | OUTPATIENT
Start: 2017-09-25 | End: 2017-09-25 | Stop reason: HOSPADM

## 2017-09-25 RX ORDER — HEPARIN SODIUM 1000 [USP'U]/ML
1000-10000 INJECTION, SOLUTION INTRAVENOUS; SUBCUTANEOUS EVERY 5 MIN PRN
Status: DISCONTINUED | OUTPATIENT
Start: 2017-09-25 | End: 2017-09-25 | Stop reason: HOSPADM

## 2017-09-25 RX ORDER — ATROPINE SULFATE 0.1 MG/ML
0.5 INJECTION INTRAVENOUS EVERY 5 MIN PRN
Status: DISCONTINUED | OUTPATIENT
Start: 2017-09-25 | End: 2017-09-25 | Stop reason: HOSPADM

## 2017-09-25 RX ORDER — VERAPAMIL HYDROCHLORIDE 2.5 MG/ML
1-5 INJECTION, SOLUTION INTRAVENOUS
Status: DISCONTINUED | OUTPATIENT
Start: 2017-09-25 | End: 2017-09-25 | Stop reason: HOSPADM

## 2017-09-25 RX ORDER — LIDOCAINE HYDROCHLORIDE 10 MG/ML
30 INJECTION, SOLUTION EPIDURAL; INFILTRATION; INTRACAUDAL; PERINEURAL
Status: DISCONTINUED | OUTPATIENT
Start: 2017-09-25 | End: 2017-09-25 | Stop reason: HOSPADM

## 2017-09-25 RX ORDER — PROMETHAZINE HYDROCHLORIDE 25 MG/ML
6.25-25 INJECTION, SOLUTION INTRAMUSCULAR; INTRAVENOUS EVERY 4 HOURS PRN
Status: DISCONTINUED | OUTPATIENT
Start: 2017-09-25 | End: 2017-09-25 | Stop reason: HOSPADM

## 2017-09-25 RX ORDER — DEXTROSE MONOHYDRATE 25 G/50ML
12.5-5 INJECTION, SOLUTION INTRAVENOUS EVERY 30 MIN PRN
Status: DISCONTINUED | OUTPATIENT
Start: 2017-09-25 | End: 2017-09-25 | Stop reason: HOSPADM

## 2017-09-25 RX ORDER — NALOXONE HYDROCHLORIDE 0.4 MG/ML
.1-.4 INJECTION, SOLUTION INTRAMUSCULAR; INTRAVENOUS; SUBCUTANEOUS
Status: DISCONTINUED | OUTPATIENT
Start: 2017-09-25 | End: 2017-09-25 | Stop reason: HOSPADM

## 2017-09-25 RX ORDER — MAGNESIUM HYDROXIDE 1200 MG/15ML
1000 LIQUID ORAL CONTINUOUS PRN
Status: DISCONTINUED | OUTPATIENT
Start: 2017-09-25 | End: 2017-09-25 | Stop reason: HOSPADM

## 2017-09-25 RX ORDER — ARGATROBAN 1 MG/ML
350 INJECTION, SOLUTION INTRAVENOUS
Status: DISCONTINUED | OUTPATIENT
Start: 2017-09-25 | End: 2017-09-25 | Stop reason: HOSPADM

## 2017-09-25 RX ORDER — DOPAMINE HYDROCHLORIDE 160 MG/100ML
2-20 INJECTION, SOLUTION INTRAVENOUS CONTINUOUS PRN
Status: DISCONTINUED | OUTPATIENT
Start: 2017-09-25 | End: 2017-09-25 | Stop reason: HOSPADM

## 2017-09-25 RX ORDER — FENTANYL CITRATE 50 UG/ML
25-50 INJECTION, SOLUTION INTRAMUSCULAR; INTRAVENOUS
Status: DISCONTINUED | OUTPATIENT
Start: 2017-09-25 | End: 2017-09-25 | Stop reason: HOSPADM

## 2017-09-25 RX ORDER — ASPIRIN 325 MG
325 TABLET ORAL
Status: DISCONTINUED | OUTPATIENT
Start: 2017-09-25 | End: 2017-09-25 | Stop reason: HOSPADM

## 2017-09-25 RX ORDER — NITROGLYCERIN 5 MG/ML
100-500 VIAL (ML) INTRAVENOUS
Status: DISCONTINUED | OUTPATIENT
Start: 2017-09-25 | End: 2017-09-25 | Stop reason: HOSPADM

## 2017-09-25 RX ORDER — PROTAMINE SULFATE 10 MG/ML
1-5 INJECTION, SOLUTION INTRAVENOUS
Status: DISCONTINUED | OUTPATIENT
Start: 2017-09-25 | End: 2017-09-25 | Stop reason: HOSPADM

## 2017-09-25 RX ORDER — NALOXONE HYDROCHLORIDE 0.4 MG/ML
0.4 INJECTION, SOLUTION INTRAMUSCULAR; INTRAVENOUS; SUBCUTANEOUS EVERY 5 MIN PRN
Status: DISCONTINUED | OUTPATIENT
Start: 2017-09-25 | End: 2017-09-25 | Stop reason: HOSPADM

## 2017-09-25 RX ORDER — ASPIRIN 81 MG/1
81-324 TABLET, CHEWABLE ORAL
Status: DISCONTINUED | OUTPATIENT
Start: 2017-09-25 | End: 2017-09-25 | Stop reason: HOSPADM

## 2017-09-25 RX ORDER — NITROGLYCERIN 0.4 MG/1
0.4 TABLET SUBLINGUAL EVERY 5 MIN PRN
Status: DISCONTINUED | OUTPATIENT
Start: 2017-09-25 | End: 2017-09-25 | Stop reason: HOSPADM

## 2017-09-25 RX ORDER — ARGATROBAN 1 MG/ML
150 INJECTION, SOLUTION INTRAVENOUS
Status: DISCONTINUED | OUTPATIENT
Start: 2017-09-25 | End: 2017-09-25 | Stop reason: HOSPADM

## 2017-09-25 RX ORDER — CLOPIDOGREL BISULFATE 75 MG/1
300-600 TABLET ORAL
Status: DISCONTINUED | OUTPATIENT
Start: 2017-09-25 | End: 2017-09-25 | Stop reason: HOSPADM

## 2017-09-25 RX ORDER — LIDOCAINE 40 MG/G
CREAM TOPICAL
Status: DISCONTINUED | OUTPATIENT
Start: 2017-09-25 | End: 2017-09-25 | Stop reason: HOSPADM

## 2017-09-25 RX ORDER — SODIUM NITROPRUSSIDE 25 MG/ML
100-200 INJECTION INTRAVENOUS
Status: DISCONTINUED | OUTPATIENT
Start: 2017-09-25 | End: 2017-09-25 | Stop reason: HOSPADM

## 2017-09-25 RX ORDER — POTASSIUM CHLORIDE 7.45 MG/ML
10 INJECTION INTRAVENOUS
Status: DISCONTINUED | OUTPATIENT
Start: 2017-09-25 | End: 2017-09-25 | Stop reason: HOSPADM

## 2017-09-25 RX ORDER — SODIUM CHLORIDE 9 MG/ML
INJECTION, SOLUTION INTRAVENOUS CONTINUOUS
Status: DISCONTINUED | OUTPATIENT
Start: 2017-09-25 | End: 2017-09-25 | Stop reason: HOSPADM

## 2017-09-25 RX ORDER — ATROPINE SULFATE 0.1 MG/ML
.5-1 INJECTION INTRAVENOUS
Status: DISCONTINUED | OUTPATIENT
Start: 2017-09-25 | End: 2017-09-25 | Stop reason: HOSPADM

## 2017-09-25 RX ORDER — DOBUTAMINE HYDROCHLORIDE 200 MG/100ML
2-20 INJECTION INTRAVENOUS CONTINUOUS PRN
Status: DISCONTINUED | OUTPATIENT
Start: 2017-09-25 | End: 2017-09-25 | Stop reason: HOSPADM

## 2017-09-25 RX ORDER — FUROSEMIDE 10 MG/ML
20-100 INJECTION INTRAMUSCULAR; INTRAVENOUS
Status: DISCONTINUED | OUTPATIENT
Start: 2017-09-25 | End: 2017-09-25 | Stop reason: HOSPADM

## 2017-09-25 RX ORDER — ACETAMINOPHEN 325 MG/1
325-650 TABLET ORAL EVERY 4 HOURS PRN
Status: DISCONTINUED | OUTPATIENT
Start: 2017-09-25 | End: 2017-09-25 | Stop reason: HOSPADM

## 2017-09-25 RX ORDER — HYDRALAZINE HYDROCHLORIDE 20 MG/ML
10-20 INJECTION INTRAMUSCULAR; INTRAVENOUS
Status: DISCONTINUED | OUTPATIENT
Start: 2017-09-25 | End: 2017-09-25 | Stop reason: HOSPADM

## 2017-09-25 RX ORDER — ATROPINE SULFATE 0.1 MG/ML
INJECTION INTRAVENOUS
Status: DISCONTINUED
Start: 2017-09-25 | End: 2017-09-25 | Stop reason: WASHOUT

## 2017-09-25 RX ORDER — LORAZEPAM 2 MG/ML
.5-2 INJECTION INTRAMUSCULAR EVERY 4 HOURS PRN
Status: DISCONTINUED | OUTPATIENT
Start: 2017-09-25 | End: 2017-09-25 | Stop reason: HOSPADM

## 2017-09-25 RX ORDER — ONDANSETRON 2 MG/ML
4 INJECTION INTRAMUSCULAR; INTRAVENOUS EVERY 4 HOURS PRN
Status: DISCONTINUED | OUTPATIENT
Start: 2017-09-25 | End: 2017-09-25 | Stop reason: HOSPADM

## 2017-09-25 RX ORDER — NITROGLYCERIN 5 MG/ML
100-200 VIAL (ML) INTRAVENOUS
Status: DISCONTINUED | OUTPATIENT
Start: 2017-09-25 | End: 2017-09-25 | Stop reason: HOSPADM

## 2017-09-25 RX ORDER — NALOXONE HYDROCHLORIDE 0.4 MG/ML
.2-.4 INJECTION, SOLUTION INTRAMUSCULAR; INTRAVENOUS; SUBCUTANEOUS
Status: DISCONTINUED | OUTPATIENT
Start: 2017-09-25 | End: 2017-09-25 | Stop reason: HOSPADM

## 2017-09-25 RX ORDER — FLUMAZENIL 0.1 MG/ML
0.2 INJECTION, SOLUTION INTRAVENOUS
Status: DISCONTINUED | OUTPATIENT
Start: 2017-09-25 | End: 2017-09-25 | Stop reason: HOSPADM

## 2017-09-25 RX ORDER — METOPROLOL TARTRATE 1 MG/ML
5 INJECTION, SOLUTION INTRAVENOUS EVERY 5 MIN PRN
Status: DISCONTINUED | OUTPATIENT
Start: 2017-09-25 | End: 2017-09-25 | Stop reason: HOSPADM

## 2017-09-25 RX ORDER — IOPAMIDOL 755 MG/ML
35 INJECTION, SOLUTION INTRAVASCULAR ONCE
Status: DISCONTINUED | OUTPATIENT
Start: 2017-09-25 | End: 2017-09-25 | Stop reason: HOSPADM

## 2017-09-25 RX ORDER — PHENYLEPHRINE HCL IN 0.9% NACL 1 MG/10 ML
20-100 SYRINGE (ML) INTRAVENOUS
Status: DISCONTINUED | OUTPATIENT
Start: 2017-09-25 | End: 2017-09-25 | Stop reason: HOSPADM

## 2017-09-25 RX ORDER — PROTAMINE SULFATE 10 MG/ML
25-100 INJECTION, SOLUTION INTRAVENOUS EVERY 5 MIN PRN
Status: DISCONTINUED | OUTPATIENT
Start: 2017-09-25 | End: 2017-09-25 | Stop reason: HOSPADM

## 2017-09-25 RX ORDER — FENTANYL CITRATE 50 UG/ML
INJECTION, SOLUTION INTRAMUSCULAR; INTRAVENOUS
Status: DISCONTINUED
Start: 2017-09-25 | End: 2017-09-25 | Stop reason: WASHOUT

## 2017-09-25 RX ORDER — ADENOSINE 3 MG/ML
12-12000 INJECTION, SOLUTION INTRAVENOUS
Status: DISCONTINUED | OUTPATIENT
Start: 2017-09-25 | End: 2017-09-25 | Stop reason: HOSPADM

## 2017-09-25 RX ORDER — METHYLPREDNISOLONE SODIUM SUCCINATE 125 MG/2ML
125 INJECTION, POWDER, LYOPHILIZED, FOR SOLUTION INTRAMUSCULAR; INTRAVENOUS
Status: DISCONTINUED | OUTPATIENT
Start: 2017-09-25 | End: 2017-09-25 | Stop reason: HOSPADM

## 2017-09-25 RX ORDER — DIPHENHYDRAMINE HYDROCHLORIDE 50 MG/ML
25-50 INJECTION INTRAMUSCULAR; INTRAVENOUS
Status: DISCONTINUED | OUTPATIENT
Start: 2017-09-25 | End: 2017-09-25 | Stop reason: HOSPADM

## 2017-09-25 RX ORDER — EPTIFIBATIDE 2 MG/ML
180 INJECTION, SOLUTION INTRAVENOUS EVERY 10 MIN PRN
Status: DISCONTINUED | OUTPATIENT
Start: 2017-09-25 | End: 2017-09-25 | Stop reason: HOSPADM

## 2017-09-25 RX ORDER — NITROGLYCERIN 20 MG/100ML
.07-2 INJECTION INTRAVENOUS CONTINUOUS PRN
Status: DISCONTINUED | OUTPATIENT
Start: 2017-09-25 | End: 2017-09-25 | Stop reason: HOSPADM

## 2017-09-25 RX ORDER — POTASSIUM CHLORIDE 29.8 MG/ML
20 INJECTION INTRAVENOUS
Status: DISCONTINUED | OUTPATIENT
Start: 2017-09-25 | End: 2017-09-25 | Stop reason: HOSPADM

## 2017-09-25 RX ORDER — CLOPIDOGREL BISULFATE 75 MG/1
75 TABLET ORAL
Status: DISCONTINUED | OUTPATIENT
Start: 2017-09-25 | End: 2017-09-25 | Stop reason: HOSPADM

## 2017-09-25 RX ORDER — NIFEDIPINE 10 MG/1
10 CAPSULE ORAL
Status: DISCONTINUED | OUTPATIENT
Start: 2017-09-25 | End: 2017-09-25 | Stop reason: HOSPADM

## 2017-09-25 RX ADMIN — SODIUM CHLORIDE: 9 INJECTION, SOLUTION INTRAVENOUS at 10:15

## 2017-09-25 RX ADMIN — SODIUM CHLORIDE 500 ML: 9 INJECTION, SOLUTION INTRAVENOUS at 10:13

## 2017-09-25 RX ADMIN — MIDAZOLAM HYDROCHLORIDE 2 MG: 1 INJECTION, SOLUTION INTRAMUSCULAR; INTRAVENOUS at 11:19

## 2017-09-25 RX ADMIN — FENTANYL CITRATE 100 MCG: 50 INJECTION, SOLUTION INTRAMUSCULAR; INTRAVENOUS at 11:18

## 2017-09-25 NOTE — IP AVS SNAPSHOT
MRN:[id number]                      After Visit Summary   9/25/2017    Georgie Patino    MRN: [id number]           Thank you!     Thank you for choosing [facility] for your care. Our goal is always to provide you with excellent care. Hearing back from our patients is one way we can continue to improve our services. Please take a few minutes to complete the written survey that you may receive in the mail after you visit with us. Thank you!        Patient Information     Date Of Birth          1955        About your hospital stay     You were admitted on:  September 25, 2017 You last received care in the:  Unit 6D Observation [facility]    You were discharged on:  September 25, 2017       Who to Call     For medical emergencies, please call 911.  For non-urgent questions about your medical care, please call your primary care provider or clinic, [phone]          Attending Provider     Provider Rubio Hightower MD Thoracic Diseases    Berry Mancilla MD Cardiology       Primary Care Provider Office Phone # Fax #    Marlys TESHA Nguyen [facility] [phone]      After Care Instructions     Discharge Instructions - IF on Metformin (Glucophage or Glucovance) or Metformin containing medications       IF on Metformin (Glucophage or Glucovance) or Metformin containing medications , schedule a Basic Metabolic Panel at [facility] Primary Clinic in 48 - 72 hours post procedure and PRIOR TO resuming the Metformin or Metformin containing medications.  Hold Metformin (Glucophage or Glucovance) or Metformin containing medications until after the Basic Metabolic Panel on the 2nd or 3rd day following the procedure.  May resume after blood draw is complete.                  Your next 10 appointments already scheduled     Sep 29, 2017 10:00 AM CDT   [facility] Jo[id number]   [facility] ([facility]  [place]    [address] [address]   [phone]           1.  Please bring or wear a comfortable two-piece outfit. 2.  Arrival time: -   [facility]:  arrive 75 minutes prior to examination time. -   [facility]:  arrive 90 minutes prior to examination time. -   [facility]:   arrive 15 minutes prior to examination time. 3.  Plan to have someone here to drive you home after the test. -   Someone should stay with you for 6 hours after your test. 4.  No food or drink: -   6 hours before the test 5.  If you take antacids or water pills (diuretics): Do not take them until after your test. You may take blood pressure medicine with a few sips of water. 6.  If you have diabetes: -   Morning slots preferred -   If you take insulin, call your diabetes care team. Ask if you should take a   dose the morning of your test. -   If you take diabetes medicine by mouth, don't take it on the morning of your test. Bring it with you to take after the test. (If you have questions, call your diabetes care team.) 7.  Bring a list of any medicines you are taking. 8.  Do not drive for 24 hours after the test. 9.   A responsible adult must stay with you for 24 hours after the test.  10.  For any questions that cannot be answered, please contact the ordering physician            Oct 16, 2017 10:00 AM CDT   (Arrive by 9:45 AM)   PAC EVALUATION with  Pac Stephanie 1   [facility] ([facility])    [address] [place] [address]   [phone]            Oct 16, 2017 11:20 AM CDT   (Arrive by 11:05 AM)   PAC Anesthesia Consult with Xiang Pac Anesthesiologist   [facility] [place] ([facility])    [address] [place] [address]   [phone]            Oct 16, 2017 11:30 AM CDT   (Arrive by 11:15 AM)   PAC RN ASSESSMENT with Xiang Pac Rn   [facility] [place] ([facility])    [address] [place] [id number] [phone]            Oct 16, 2017 12:00 PM CDT   LAB with  LAB   [facility] Lab ([facility])    [address] [place] [id number] [phone]           Patient must bring picture ID. Patient should be prepared to give a urine specimen  Please do not eat 10-12 hours before your appointment if you are coming in fasting for labs on lipids, cholesterol, or glucose (sugar). Pregnant women should follow their Care Team instructions. Water with medications is okay. Do not drink coffee or other fluids. If you have concerns about taking  your medications, please ask at office or if scheduling via [employer], send a message by clicking on Secure Messaging, Message Your Care Team.            Oct 16, 2017  1:00 PM CDT   US CAROTID BILATERAL with [email or web address]   [facility] US ([facility])    [address] [phone] [phone]           Please bring a list of your medicines (including vitamins, minerals and over-the-counter drugs). Also, tell your doctor about any allergies you may have. Wear comfortable clothes and leave your valuables at home.  You do not need to do anything special to prepare for your exam.  Please call the Imaging Department at your exam site with any questions.            Oct 16, 2017  2:15 PM CDT   (Arrive by 2:00 PM)   XR CHEST 2 VIEWS with UCXR1   [facility] Xray ([facility])    [address] [place] [phone] [phone]           Please bring a list of your current medicines to your exam. (Include vitamins, minerals and over-thecounter medicines.) Leave your valuables at home.  Tell your doctor if there is a chance you may be pregnant.  You do not need to do anything special for this exam.            Oct 16, 2017  2:30 PM CDT    FULL PULMONARY FUNCTION with  PFL C   [facility] Pulmonary Function Testing ([facility])    [address]  3rd Floor  [facility] [phone]            Oct 27, 2017   Procedure with Rubio Piña MD   [facility] [place], Same Day Surgery (--)    [address]  [place] [address]   [phone]            Nov 13, 2017  2:45 PM CST   LAB with FZ LAB   [facility] ([facility])    [address] [address] [phone]           Patient must bring picture ID. Patient should be prepared to give a urine specimen  Please do not eat 10-12 hours before your appointment if you are coming in fasting for labs on lipids, cholesterol, or glucose (sugar). Pregnant women should follow their Care Team instructions. Water with medications is okay. Do not drink coffee or other fluids. If you have concerns about taking  your medications, please ask at office or if scheduling via [employer], send a message by clicking on Secure Messaging, Message Your Care Team.              Further instructions from your care team       Care of groin site:         Remove the Band-Aid after 24 hours. If there is minor oozing, apply another Band-aid and remove it after 12 hours.          Do NOT take a bath, or use a hot tub or pool for at least 3 days. You may shower.          It is normal to have a small bruise or lump at the site.         Do not scrub the site.         Do not use lotion or powder near the puncture site for 3 days.         For the first 2 days: Do not stoop or squat. When you cough, sneeze or move your bowels, hold your hand over the puncture site and press gently.         Do not lift more than 10 pounds for at least 3 to 5 days.         For 2 days, do NOT have sex or do any heavy exercise.     If you start bleeding from the site in your groin:  Lie down flat and press firmly on the site.  Call your physician immediately, or, come to  "the emergency room.      Pending Results     Date and Time Order Name Status Description    2017 0937 EKG 12-lead, tracing only Preliminary             Admission Information     Date & Time Provider Department Dept. Phone    2017 Berry Mancilla MD Unit 6D Observation [facility] [phone]      Your Vitals Were     Blood Pressure Pulse Temperature Respirations Height Weight    154/70 50 98.2  F (36.8  C) (Oral) 16 1.676 m (5' 6\") 95 kg (209 lb 7 oz)    Pulse Oximetry BMI (Body Mass Index)                96% 33.8 kg/m2          MyChart Information     [employer] lets you send messages to your doctor, view your test results, renew your prescriptions, schedule appointments and more. To sign up, go to [email or web address] . Click on \"Log in\" on the left side of the screen, which will take you to the Welcome page. Then click on \"Sign up Now\" on the right side of the page.     You will be asked to enter the access code listed below, as well as some personal information. Please follow the directions to create your username and password.     Your access code is: [email or web address]  Expires: 2017  4:05 PM     Your access code will  in 90 days. If you need help or a new code, please call your [facility] clinic or [phone].        Care EveryWhere ID     This is your Care EveryWhere ID. This could be used by other organizations to access your [facility] medical records  [id number]        Equal Access to Services     [facility] AH: Hadii aad ku hadasho Soomaali, waaxda luqadaha, qaybta kaalmada adeegyada, waxay kenny crenshaw . So [facility] [phone].    ATENCIÓN: Si habla español, tiene a thomas disposición servicios gratuitos de asistencia lingüística. Llame al [phone].    We comply with applicable federal civil rights laws and [place] laws. We do not discriminate on the basis of race, color, national origin, age, disability sex, sexual orientation or gender identity.       "         Review of your medicines      CONTINUE these medicines which have NOT CHANGED        Dose / Directions    atorvastatin 40 MG tablet   Commonly known as:  LIPITOR   Used for:  CARDIOVASCULAR SCREENING; LDL GOAL LESS THAN 160        Dose:  40 mg   Take 1 tablet (40 mg) by mouth daily   Quantity:  90 tablet   Refills:  3       lisinopril 10 MG tablet   Commonly known as:  PRINIVIL/ZESTRIL   Used for:  Benign essential hypertension        Dose:  40 mg   Take 4 tablets (40 mg) by mouth daily   Quantity:  90 tablet   Refills:  3       metoprolol 25 MG tablet   Commonly known as:  LOPRESSOR   Used for:  Palpitations        Dose:  25 mg   Take 1 tablet (25 mg) by mouth 2 times daily   Quantity:  60 tablet   Refills:  3       nicotine polacrilex 2 MG gum   Commonly known as:  NICORETTE   Used for:  History of tobacco use        Dose:  2 mg   Place 1 each (2 mg) inside cheek as needed for smoking cessation Place 1 each inside cheek as needed for smoking cessation   Quantity:  100 tablet   Refills:  5                Protect others around you: Learn how to safely use, store and throw away your medicines at www.disposemymeds.org.             Medication List: This is a list of all your medications and when to take them. Check marks below indicate your daily home schedule. Keep this list as a reference.      Medications           Morning Afternoon Evening Bedtime As Needed    atorvastatin 40 MG tablet   Commonly known as:  LIPITOR   Take 1 tablet (40 mg) by mouth daily                                lisinopril 10 MG tablet   Commonly known as:  PRINIVIL/ZESTRIL   Take 4 tablets (40 mg) by mouth daily                                metoprolol 25 MG tablet   Commonly known as:  LOPRESSOR   Take 1 tablet (25 mg) by mouth 2 times daily                                nicotine polacrilex 2 MG gum   Commonly known as:  NICORETTE   Place 1 each (2 mg) inside cheek as needed for smoking cessation Place 1 each inside cheek as needed  for smoking cessation

## 2017-09-25 NOTE — IP AVS SNAPSHOT
Unit 6D Observation [address] [address]    Phone:  [phone]    Fax:  [phone]                                       After Visit Summary   9/25/2017    Georgie Patino    MRN: [id number]           After Visit Summary Signature Page     I have received my discharge instructions, and my questions have been answered. I have discussed any challenges I see with this plan with the nurse or doctor.    ..........................................................................................................................................  Patient/Patient Representative Signature      ..........................................................................................................................................  Patient Representative Print Name and Relationship to Patient    ..................................................               ................................................  Date                                            Time    ..........................................................................................................................................  Reviewed by Signature/Title    ...................................................              ..............................................  Date                                                            Time

## 2017-09-25 NOTE — DISCHARGE INSTRUCTIONS
Care of groin site:         Remove the Band-Aid after 24 hours. If there is minor oozing, apply another Band-aid and remove it after 12 hours.          Do NOT take a bath, or use a hot tub or pool for at least 3 days. You may shower.          It is normal to have a small bruise or lump at the site.         Do not scrub the site.         Do not use lotion or powder near the puncture site for 3 days.         For the first 2 days: Do not stoop or squat. When you cough, sneeze or move your bowels, hold your hand over the puncture site and press gently.         Do not lift more than 10 pounds for at least 3 to 5 days.         For 2 days, do NOT have sex or do any heavy exercise.     If you start bleeding from the site in your groin:  Lie down flat and press firmly on the site.  Call your physician immediately, or, come to the emergency room.

## 2017-09-25 NOTE — PROGRESS NOTES
Patient completed bedrest, groin site is soft and non tender. No evidence of hematoma. Patient has eaten, voided and ambulated at baseline.     Discharge instructions reviewed with patient, patient verbalized understanding. Questions answered. PIV was removed. Patient transported to front door.

## 2017-09-25 NOTE — PROGRESS NOTES
Manual pressure held for 10 minutes to right groin site.  Sheath, size 4,  pulled by MESHA Muñiz.  Site CDI, no hematoma.  Stasis achieved at 1208. Off bedrest @ 1408.

## 2017-09-26 LAB — INTERPRETATION ECG - MUSE: NORMAL

## 2017-09-29 ENCOUNTER — HOSPITAL ENCOUNTER (OUTPATIENT)
Dept: CARDIOLOGY | Facility: CLINIC | Age: 62
Discharge: HOME | End: 2017-09-29
Attending: THORACIC SURGERY (CARDIOTHORACIC VASCULAR SURGERY) | Admitting: THORACIC SURGERY (CARDIOTHORACIC VASCULAR SURGERY)
Payer: COMMERCIAL

## 2017-09-29 VITALS
HEART RATE: 61 BPM | DIASTOLIC BLOOD PRESSURE: 78 MMHG | RESPIRATION RATE: 16 BRPM | SYSTOLIC BLOOD PRESSURE: 146 MMHG | OXYGEN SATURATION: 95 %

## 2017-09-29 DIAGNOSIS — I71.21 ASCENDING AORTIC ANEURYSM (H): ICD-10-CM

## 2017-09-29 PROCEDURE — 99152 MOD SED SAME PHYS/QHP 5/>YRS: CPT

## 2017-09-29 PROCEDURE — 93312 ECHO TRANSESOPHAGEAL: CPT | Mod: 26 | Performed by: INTERNAL MEDICINE

## 2017-09-29 PROCEDURE — 93320 DOPPLER ECHO COMPLETE: CPT

## 2017-09-29 PROCEDURE — 93320 DOPPLER ECHO COMPLETE: CPT | Mod: 26 | Performed by: INTERNAL MEDICINE

## 2017-09-29 PROCEDURE — 93325 DOPPLER ECHO COLOR FLOW MAPG: CPT | Mod: 26 | Performed by: INTERNAL MEDICINE

## 2017-09-29 PROCEDURE — 99153 MOD SED SAME PHYS/QHP EA: CPT

## 2017-09-29 PROCEDURE — 25000125 ZZHC RX 250: Performed by: INTERNAL MEDICINE

## 2017-09-29 PROCEDURE — 25000128 H RX IP 250 OP 636: Performed by: INTERNAL MEDICINE

## 2017-09-29 RX ORDER — FENTANYL CITRATE 50 UG/ML
50-100 INJECTION, SOLUTION INTRAMUSCULAR; INTRAVENOUS
Status: DISCONTINUED | OUTPATIENT
Start: 2017-09-29 | End: 2017-09-30 | Stop reason: HOSPADM

## 2017-09-29 RX ORDER — FENTANYL CITRATE 50 UG/ML
25-50 INJECTION, SOLUTION INTRAMUSCULAR; INTRAVENOUS
Status: DISCONTINUED | OUTPATIENT
Start: 2017-09-29 | End: 2017-09-30 | Stop reason: HOSPADM

## 2017-09-29 RX ORDER — FLUMAZENIL 0.1 MG/ML
0.2 INJECTION, SOLUTION INTRAVENOUS
Status: DISCONTINUED | OUTPATIENT
Start: 2017-09-29 | End: 2017-09-30 | Stop reason: HOSPADM

## 2017-09-29 RX ORDER — NALOXONE HYDROCHLORIDE 0.4 MG/ML
.1-.4 INJECTION, SOLUTION INTRAMUSCULAR; INTRAVENOUS; SUBCUTANEOUS
Status: DISCONTINUED | OUTPATIENT
Start: 2017-09-29 | End: 2017-09-30 | Stop reason: HOSPADM

## 2017-09-29 RX ORDER — LIDOCAINE 40 MG/G
CREAM TOPICAL
Status: DISCONTINUED | OUTPATIENT
Start: 2017-09-29 | End: 2017-09-30 | Stop reason: HOSPADM

## 2017-09-29 RX ORDER — SODIUM CHLORIDE 9 MG/ML
INJECTION, SOLUTION INTRAVENOUS CONTINUOUS PRN
Status: DISCONTINUED | OUTPATIENT
Start: 2017-09-29 | End: 2017-09-30 | Stop reason: HOSPADM

## 2017-09-29 RX ADMIN — FENTANYL CITRATE 75 MCG: 50 INJECTION, SOLUTION INTRAMUSCULAR; INTRAVENOUS at 10:51

## 2017-09-29 RX ADMIN — MIDAZOLAM 4 MG: 1 INJECTION INTRAMUSCULAR; INTRAVENOUS at 10:55

## 2017-09-29 RX ADMIN — SODIUM CHLORIDE 200 ML: 9 INJECTION, SOLUTION INTRAVENOUS at 11:27

## 2017-09-29 RX ADMIN — TOPICAL ANESTHETIC 1 SPRAY: 200 SPRAY DENTAL; PERIODONTAL at 10:41

## 2017-09-29 RX ADMIN — LIDOCAINE HYDROCHLORIDE 30 ML: 20 SOLUTION ORAL; TOPICAL at 10:40

## 2017-09-29 NOTE — PROGRESS NOTES
Pt arrived in ECHO department for scheduled SHIVANI.   Procedure explained, questions answered and consent signed. Discharge instructions discussed with patient and given written copy.  Pt's throat sprayed at 10:30, therefore pt will not be able to eat or drink until 2 hours after at 12:30. Informed pt of this time and encouraged to start with warm fluids and soft foods.    Pt tolerated procedure well, and was given a total of 75 mcg IV fentanyl and 4 mg IV versed for conscious sedation. Vital signs stable throughout procedure. Pt denied throat or chest pain after SHIVANI complete.   SHIVANI probe # 2 used for procedure and inserted without difficulty.  Pt denied chest or throat pain after procedure and was monitored until awake. Escorted out to [facility] waiting room to meet pt's ride home, spouse, Noel.

## 2017-10-16 ENCOUNTER — OFFICE VISIT (OUTPATIENT)
Dept: SURGERY | Facility: CLINIC | Age: 62
End: 2017-10-16

## 2017-10-16 ENCOUNTER — ALLIED HEALTH/NURSE VISIT (OUTPATIENT)
Dept: SURGERY | Facility: CLINIC | Age: 62
End: 2017-10-16

## 2017-10-16 ENCOUNTER — TELEPHONE (OUTPATIENT)
Dept: INTERNAL MEDICINE | Facility: CLINIC | Age: 62
End: 2017-10-16

## 2017-10-16 ENCOUNTER — ANESTHESIA EVENT (OUTPATIENT)
Dept: SURGERY | Facility: CLINIC | Age: 62
DRG: 220 | End: 2017-10-16
Payer: COMMERCIAL

## 2017-10-16 VITALS
OXYGEN SATURATION: 96 % | DIASTOLIC BLOOD PRESSURE: 83 MMHG | BODY MASS INDEX: 33.01 KG/M2 | TEMPERATURE: 98 F | SYSTOLIC BLOOD PRESSURE: 145 MMHG | HEIGHT: 66 IN | RESPIRATION RATE: 16 BRPM | HEART RATE: 59 BPM | WEIGHT: 205.4 LBS

## 2017-10-16 DIAGNOSIS — F41.9 ANXIETY: Primary | ICD-10-CM

## 2017-10-16 DIAGNOSIS — Z01.810 PRE-OPERATIVE CARDIOVASCULAR EXAMINATION: ICD-10-CM

## 2017-10-16 DIAGNOSIS — Z01.818 PREOP EXAMINATION: Primary | ICD-10-CM

## 2017-10-16 DIAGNOSIS — I71.21 ASCENDING AORTIC ANEURYSM (H): ICD-10-CM

## 2017-10-16 DIAGNOSIS — Z72.0 TOBACCO ABUSE: ICD-10-CM

## 2017-10-16 LAB
ABO + RH BLD: ABNORMAL
ABO + RH BLD: ABNORMAL
ALBUMIN UR-MCNC: NEGATIVE MG/DL
ANION GAP SERPL CALCULATED.3IONS-SCNC: 4 MMOL/L (ref 3–14)
APPEARANCE UR: ABNORMAL
BACTERIA #/AREA URNS HPF: ABNORMAL /HPF
BASOPHILS # BLD AUTO: 0.1 10E9/L (ref 0–0.2)
BASOPHILS NFR BLD AUTO: 1 %
BILIRUB UR QL STRIP: NEGATIVE
BLD GP AB INVEST PLASRBC-IMP: ABNORMAL
BLD GP AB SCN SERPL QL: ABNORMAL
BLOOD BANK CMNT PATIENT-IMP: ABNORMAL
BUN SERPL-MCNC: 15 MG/DL (ref 7–30)
CALCIUM SERPL-MCNC: 9.1 MG/DL (ref 8.5–10.1)
CHLORIDE SERPL-SCNC: 107 MMOL/L (ref 94–109)
CO2 SERPL-SCNC: 29 MMOL/L (ref 20–32)
COLOR UR AUTO: YELLOW
CREAT SERPL-MCNC: 0.53 MG/DL (ref 0.52–1.04)
DIFFERENTIAL METHOD BLD: ABNORMAL
EOSINOPHIL # BLD AUTO: 0.2 10E9/L (ref 0–0.7)
EOSINOPHIL NFR BLD AUTO: 2.8 %
ERYTHROCYTE [DISTWIDTH] IN BLOOD BY AUTOMATED COUNT: 12.4 % (ref 10–15)
GFR SERPL CREATININE-BSD FRML MDRD: >90 ML/MIN/1.7M2
GLUCOSE SERPL-MCNC: 105 MG/DL (ref 70–99)
GLUCOSE UR STRIP-MCNC: NEGATIVE MG/DL
HCT VFR BLD AUTO: 46.7 % (ref 35–47)
HGB BLD-MCNC: 15.2 G/DL (ref 11.7–15.7)
HGB UR QL STRIP: ABNORMAL
IMM GRANULOCYTES # BLD: 0 10E9/L (ref 0–0.4)
IMM GRANULOCYTES NFR BLD: 0.3 %
KETONES UR STRIP-MCNC: NEGATIVE MG/DL
LEUKOCYTE ESTERASE UR QL STRIP: ABNORMAL
LYMPHOCYTES # BLD AUTO: 3.2 10E9/L (ref 0.8–5.3)
LYMPHOCYTES NFR BLD AUTO: 40.6 %
MCH RBC QN AUTO: 28.6 PG (ref 26.5–33)
MCHC RBC AUTO-ENTMCNC: 32.5 G/DL (ref 31.5–36.5)
MCV RBC AUTO: 88 FL (ref 78–100)
MONOCYTES # BLD AUTO: 0.4 10E9/L (ref 0–1.3)
MONOCYTES NFR BLD AUTO: 4.9 %
MUCOUS THREADS #/AREA URNS LPF: PRESENT /LPF
NEUTROPHILS # BLD AUTO: 4 10E9/L (ref 1.6–8.3)
NEUTROPHILS NFR BLD AUTO: 50.4 %
NITRATE UR QL: NEGATIVE
NRBC # BLD AUTO: 0 10*3/UL
NRBC BLD AUTO-RTO: 0 /100
PH UR STRIP: 7 PH (ref 5–7)
PLATELET # BLD AUTO: 185 10E9/L (ref 150–450)
POTASSIUM SERPL-SCNC: 4.2 MMOL/L (ref 3.4–5.3)
RBC # BLD AUTO: 5.31 10E12/L (ref 3.8–5.2)
RBC #/AREA URNS AUTO: 94 /HPF (ref 0–2)
SODIUM SERPL-SCNC: 140 MMOL/L (ref 133–144)
SOURCE: ABNORMAL
SP GR UR STRIP: 1.01 (ref 1–1.03)
SPECIMEN EXP DATE BLD: ABNORMAL
SQUAMOUS #/AREA URNS AUTO: 3 /HPF (ref 0–1)
TRANS CELLS #/AREA URNS HPF: <1 /HPF
UROBILINOGEN UR STRIP-MCNC: 0 MG/DL (ref 0–2)
WBC # BLD AUTO: 8 10E9/L (ref 4–11)
WBC #/AREA URNS AUTO: 17 /HPF (ref 0–2)

## 2017-10-16 ASSESSMENT — LIFESTYLE VARIABLES: TOBACCO_USE: 1

## 2017-10-16 NOTE — TELEPHONE ENCOUNTER
Reason for Call:  Other prescription    Detailed comments: Patient states she had her pre op physical today and she is having anxiety. Please call.    Phone Number Patient can be reached at: Home number on file [phone] (home)    Best Time: any    Can we leave a detailed message on this number? YES    Call taken on 10/16/2017 at 3:57 PM by Teagan Mccabe

## 2017-10-16 NOTE — ANESTHESIA PREPROCEDURE EVALUATION
Anesthesia Evaluation     . Pt has had prior anesthetic. Type: General and MAC    No history of anesthetic complications          ROS/MED HX    ENT/Pulmonary: Comment: 1/2 PPD X 40 years. Trying to quit.    (+)tobacco use, Current use 2 cigs daily packs/day  , . .    Neurologic:  - neg neurologic ROS     Cardiovascular: Comment: Ascending aortic aneurysm    (+) Dyslipidemia, hypertension-range: 140/70, Peripheral Vascular Disease-- Other, --. : . . . :. . Previous cardiac testing [place]ch[place]:9/29/17results:SHIVANI Interpretation Summary   Global and regional left ventricular function is normal with an EF of 55-60%. Global right ventricular function is normal. The aortic valve is tricuspid with mild central aortic insufficiency. Severe dilatation of the ascending aorta (6.1 cm at the ascending aorta). There is no evidence of dissection. The sinotubular ridge is effaced.  A small patent foramen ovale is present as noted by color Doppler and agitated saline contrast study. No pericardial effusion is present.  TTE 9/1/2017  Interpretation Summary  Global and regional left ventricular function is normal with an EF of 55-60%. The right ventricle is normal size. Global right ventricular function is normal. Severe dilatation of the ascending aorta (6.2 cmat the mid ascending aorta). The sinotubular ridge is effaced, however there is no evidence of dissection. The aortic arch and descending thoracic aorta appear normal in caliber. Mild aortic regurgitation. Right ventricular systolic pressure is 26mmHg above the right atrial pressure.  The inferior vena cava was normal in size with preserved respiratory variability. Estimated mean right atrial pressure is 3 mmHg. No pericardial effusion is present.  Previous study not available for comparison.  date: results:ECG reviewed date:9/25/17 results:Sinus bradycardia  Left axis deviation  Septal infarct , age undetermined  Poor anterior R-wave progression  Possible old anterior  Possible  old anterior and lateral MI  Cath date: 9/25/17  results:Angiogram  Diffuse coronary atherosclerosis without significant focal stenosis           (-) taking anticoagulants/antiplatelets   METS/Exercise Tolerance:  4 - Raking leaves, gardening   Hematologic:     (+) History of Transfusion no previous transfusion reaction Other Hematologic Disorder-blood antibody      Musculoskeletal:   (+) arthritis, , , other musculoskeletal- Bilateral knee pain      GI/Hepatic:     (+) GERD Asymptomatic on medication,       Renal/Genitourinary:  - ROS Renal section negative       Endo:     (+) Obesity, .      Psychiatric:  - neg psychiatric ROS       Infectious Disease:  - neg infectious disease ROS       Malignancy:      - no malignancy   Other:    (+) C-spine cleared: N/A, no H/O Chronic Pain,no other significant disability                    Physical Exam      Airway   Mallampati: II  TM distance: >3 FB  Neck ROM: full    Dental   (+) partials  Comment: Upper/lower partials    Cardiovascular   Rhythm and rate: regular and normal      Pulmonary    breath sounds clear to auscultation    Other findings: For further details of assessment, testing, and physical exam please see H and P completed on same date.           PAC Discussion and Assessment    ASA Classification: 3  Case is suitable for: [place]  Anesthetic techniques and relevant risks discussed: GA  Invasive monitoring and risk discussed: Yes  Types: possible CVC, arterial line  Possibility and Risk of blood transfusion discussed: Yes  NPO instructions given:   Additional anesthetic preparation and risks discussed:   Needs early admission to pre-op area:   Other:     PAC Resident/NP Anesthesia Assessment:  Georgie Patino is a 62 year old female scheduled to undergo repair of ascending aortic aneurysm by Dr. Piña on 10/27/17. She has the following specific operative considerations:   - RCRI : 0.9% risk of major adverse cardiac event.   - VTE risk: 0.5%  - SUNNI # of risks  3/8 = Intermediate risk  - Risk of PONV score = 2.  If > 2, anti-emetic intervention recommended.    Last procedures for angiogram and colonoscopy. No history of problems with anesthesia.     --Ascending aortic aneurysm 6.1-6.2 cm ascending  recently discovered during work up for palpitations. Above procedure now planned.   --HLD. Atorvastatin. HTN. Will take Metoprolol on DOS but will hold Lisinopril. Average 145/70.  Recent cath showed no focal significant lesions. Good exercise tolerance.   --Current smoker 2 cigs daily. History of 1/2 PPD since age 19. Denies pulmonary symptoms. No recent hospitalizations for pulmonary reasons. Diminished BS. Imaging noting mild emphysematous changes. PFTs today.   --History of blood transfusions years ago. Type and screen drawn today by service. Blood antibody positive. Patient to be called to return for repeat type and screen 1-3 days prior to procedure.                --Patient concerned about prolonged positioning due to her painful knees.    Patient was discussed with Dr Moctezuma.                  Reviewed and Signed by PAC Mid-Level Provider/Resident  Mid-Level Provider/Resident: TESHA Olivo, CNS  Date: 10/16/17  Time: 10:37am    Attending Anesthesiologist Anesthesia Assessment:  Patient seen, records reviewed and case discussed with JOSE; details as above.  61 yo with asymptomatic aortic aneurysm for repair.  Noted during workup for palpitations which have since diminished after she was started on metoprolol;  normal ventricular function on echo.  H/o tobacco use (~20 pack years) and hypertension controlled on meds (145/70 range).    Exam is unremarkable.    Her co-morbidities are stable and she appears to be in reasonable condition for proposed procedure without further evaluation or change in medical management.    Tiffany Moctezuma MD  October 16, 2017      Anesthesiologist:   Date:   Time:   Pass/Fail:   Disposition:     PAC Pharmacist Assessment:        Pharmacist:    Date:   Time:      Anesthesia Plan      History & Physical Review  History and physical reviewed and following examination; no interval change.    ASA Status:  4 .    NPO Status:  > 8 hours    Plan for General and ETT with Intravenous induction.     Additional equipment: PA Catheter, CVP, SHIVANI, 2nd IV, Arterial Line and Central Line      Postoperative Care  Postoperative pain management:  IV analgesics.      Consents  Anesthetic plan, risks, benefits and alternatives discussed with:  Patient..          Procedure:  Procedure(s):  Ascending Aorta Aneurysm Repair  - Wound Class: I-Clean    Patient Active Problem List   Diagnosis     CARDIOVASCULAR SCREENING; LDL GOAL LESS THAN 160     Smoker     Obesity     Benign essential hypertension     Family history of sudden cardiac death     Ascending aortic aneurysm (H)     Hypertension     Status post coronary angiogram     Anxiety       Past Medical History:   Diagnosis Date     Ascending aortic aneurysm (H) 09/21/2017     Blood transfusions age 19    due to menorhagia     Hypertension 09/21/2017     Tobacco abuse        Past Surgical History:   Procedure Laterality Date     COLONOSCOPY WITH CO2 INSUFFLATION N/A 3/15/2017    Procedure: COLONOSCOPY WITH CO2 INSUFFLATION;  Surgeon: Duane, William Charles, MD;  Location: [facility] OR     DILATION AND CURETTAGE  age 20     [place] teeth removed           No current facility-administered medications on file prior to encounter.   Current Outpatient Prescriptions on File Prior to Encounter:  lisinopril (PRINIVIL/ZESTRIL) 10 MG tablet Take 4 tablets (40 mg) by mouth daily (Patient taking differently: Take 40 mg by mouth every morning )   metoprolol (LOPRESSOR) 25 MG tablet Take 1 tablet (25 mg) by mouth 2 times daily   atorvastatin (LIPITOR) 40 MG tablet Take 1 tablet (40 mg) by mouth daily (Patient taking differently: Take 40 mg by mouth every morning )   nicotine polacrilex (NICORETTE) 2 MG gum Place 1 each (2 mg) inside cheek as needed  for smoking cessation Place 1 each inside cheek as needed for smoking cessation       Allergies   Allergen Reactions     Blood Transfusion Related (Informational Only) Other (See Comments)     Patient has a history of a clinically significant antibody against RBC antigens.  A delay in compatible RBCs may occur.       Recent Labs   Lab Test  10/16/17   1132   HGB  15.2     Recent Labs   Lab Test  10/16/17   1132   POTASSIUM  4.2     Recent Labs   Lab Test  10/16/17   1132   PLT  185     Recent Labs   Lab Test  10/27/17   0544   INR  0.91       Recent Results (from the past 4320 hour(s))   ECHO COMPLETE WITH OPTISON    Narrative    [id number]  ECH73  [id number]  [id number]^BOBY^JOHNY^           [facility], Echocardiography Laboratory  [address] [place] [address]        Name: MINDA MEDEIROS  MRN: [id number]  : 1955  Study Date: 2017 02:01 PM  Age: 62 yrs  Gender: Female  Patient Location: [facility]  Reason For Study: Palpitations  Ordering Physician: JOHNY LANDIS  Referring Physician: JOHNY LANDIS  Performed By: Heather Howard JEANNINE     BSA: 2.0 m2  Height: 64 in  Weight: 205 lb  BP: 140/90 mmHg  _____________________________________________________________________________  __        Procedure  Echocardiogram with two-dimensional, color and spectral Doppler performed.  Optison (NDC #4417-9354-53) given intravenously. Patient was given 3.0 ml  mixture of 3 ml Optison and 6 ml saline. 6.0 ml wasted. IV start location  LAC .  _____________________________________________________________________________  __        Interpretation Summary     Global and regional left ventricular function is normal with an EF of 55-60%.  The right ventricle is normal size. Global right ventricular function is  normal.  Severe dilatation of the ascending aorta (6.2 cm at the mid ascending aorta).  The sinotubular ridge is effaced, however there is no evidence of dissection.  The aortic arch and  descending thoracic aorta appear normal in caliber.  Mild aortic regurgitation.  Right ventricular systolic pressure is 26mmHg above the right atrial pressure.  The inferior vena cava was normal in size with preserved respiratory  variability. Estimated mean right atrial pressure is 3 mmHg.  No pericardial effusion is present.  Previous study not available for comparison.     _____________________________________________________________________________  __        Left Ventricle  Left ventricular size is normal. Global and regional left ventricular function  is normal with an EF of 55-60%. Mild concentric wall thickening consistent  with left ventricular hypertrophy is present. Left ventricular diastolic  function is indeterminate.     Right Ventricle  The right ventricle is normal size. Global right ventricular function is  normal.     Atria  Mild left atrial enlargement is present. Left atrial volume index is 36 mL/m2  (normal <34 mL/m2). Mild right atrial enlargement is present.     Mitral Valve  Mild mitral annular calcification is present. Trace mitral insufficiency is  present.        Aortic Valve  It is difficult to assess individual cusps due to poor acoustic windows.  Structure appear normal. Mild aortic insufficiency is present. PHT is 621 ms.     Tricuspid Valve  The right ventricular systolic pressure is approximated at 25.7 mmHg plus the  right atrial pressure. Right ventricular systolic pressure is 26mmHg above the  right atrial pressure. Trace tricuspid insufficiency is present.     Pulmonic Valve  The pulmonic valve cannot be assessed. Doppler interrogation is normal.     Vessels  The inferior vena cava was normal in size with preserved respiratory  variability. Severe dilatation of the ascending aorta (6.2 cm at the mid  ascending aorta). There is no evidence of dissection. The sinotubular ridge is  effaced. The aortic arch measures 2.5 cm and the descending thoracic aorta  measures 2.3 cm. Estimated  mean right atrial pressure is 3 mmHg.     Pericardium  No pericardial effusion is present.        Compared to Previous Study  Previous study not available for comparison.     Attestation  I have personally viewed the imaging and agree with the interpretation and  report as documented by the fellow, Kizzy Posey, and/or edited by me.  _____________________________________________________________________________  __     MMode/2D Measurements & Calculations  IVSd: 1.2 cm  LVIDd: 5.3 cm  LVIDs: 2.9 cm  LVPWd: 1.2 cm  FS: 45.7 %  EDV(Teich): 133.6 ml  ESV(Teich): 31.1 ml  LV mass(C)d: 257.2 grams  LV mass(C)dI: 130.1 grams/m2  Ao root diam: 3.9 cm  LA dimension: 3.8 cm  asc Aorta Diam: 6.2 cm  desc Ao Diam: 2.1 cm  LA/Ao: 0.97  LVOT diam: 2.2 cm  LVOT area: 3.8 cm2  LA Volume (BP): 71.0 ml     LA Volume Index (BP): 35.9 ml/m2        Doppler Measurements & Calculations  MV E max tono: 69.4 cm/sec  MV A max tono: 89.5 cm/sec  MV E/A: 0.78  MV dec time: 0.18 sec  AI P1/2t: 622.2 msec  TR max tono: 253.7 cm/sec  TR max P.7 mmHg  Lateral E/e': 9.4  Medial E/e': 15.7              _____________________________________________________________________________  __        Report approved by: Deanne JASON 2017 03:35 PM            Attending Anesthesiologist    Luis Toledo MD    [phone]     Procedure:  Procedure(s):  Median Sternotomy, Cardiopulmonary bypass, Ascending Aorta Aneurysm Repair using Hemashield Platinum Woven Double Velour Graft 34cm X 30cm. - Wound Class: I-Clean    Patient Active Problem List   Diagnosis     CARDIOVASCULAR SCREENING; LDL GOAL LESS THAN 160     Smoker     Obesity     Benign essential hypertension     Family history of sudden cardiac death     Ascending aortic aneurysm (H)     Hypertension     Status post coronary angiogram     Anxiety     Aortic aneurysm (H)       Past Medical History:   Diagnosis Date     Ascending aortic aneurysm (H) 2017     Blood transfusions age 19    due  to menorhagia     Hypertension 2017     Tobacco abuse        Past Surgical History:   Procedure Laterality Date     COLONOSCOPY WITH CO2 INSUFFLATION N/A 3/15/2017    Procedure: COLONOSCOPY WITH CO2 INSUFFLATION;  Surgeon: Duane, William Charles, MD;  Location:  OR     DILATION AND CURETTAGE  age 20     [place] teeth removed           No current facility-administered medications on file prior to encounter.   Current Outpatient Prescriptions on File Prior to Encounter:  lisinopril (PRINIVIL/ZESTRIL) 10 MG tablet Take 4 tablets (40 mg) by mouth daily (Patient taking differently: Take 40 mg by mouth every morning )   metoprolol (LOPRESSOR) 25 MG tablet Take 1 tablet (25 mg) by mouth 2 times daily   atorvastatin (LIPITOR) 40 MG tablet Take 1 tablet (40 mg) by mouth daily (Patient taking differently: Take 40 mg by mouth every morning )   nicotine polacrilex (NICORETTE) 2 MG gum Place 1 each (2 mg) inside cheek as needed for smoking cessation Place 1 each inside cheek as needed for smoking cessation       Allergies   Allergen Reactions     Blood Transfusion Related (Informational Only) Other (See Comments)     Patient has a history of a clinically significant antibody against RBC antigens.  A delay in compatible RBCs may occur.       Recent Labs   Lab Test  10/27/17   1306   HGB  12.6     Recent Labs   Lab Test  10/27/17   1306   POTASSIUM  3.7     Recent Labs   Lab Test  10/27/17   1235   PLT  109*     Recent Labs   Lab Test  10/27/17   1235   INR  1.29*       Recent Results (from the past 4320 hour(s))   ECHO COMPLETE WITH OPTISON    Narrative    [id number]  ECH73  [id number]  [id number]^BOBY^JOHNY^           [facility], Echocardiography Laboratory  [address] [place] [address]        Name: MINDA MEDEIROS  MRN: [id number]  : 1955  Study Date: 2017 02:01 PM  Age: 62 yrs  Gender: Female  Patient Location: [facility]  Reason For Study: Palpitations  Ordering Physician: BOBY  JOHNY  Referring Physician: JOHNY LANDIS  Performed By: Heather Howard JEANNINE     BSA: 2.0 m2  Height: 64 in  Weight: 205 lb  BP: 140/90 mmHg  _____________________________________________________________________________  __        Procedure  Echocardiogram with two-dimensional, color and spectral Doppler performed.  Optison (NDC #4393-5820-02) given intravenously. Patient was given 3.0 ml  mixture of 3 ml Optison and 6 ml saline. 6.0 ml wasted. IV start location  LAC .  _____________________________________________________________________________  __        Interpretation Summary     Global and regional left ventricular function is normal with an EF of 55-60%.  The right ventricle is normal size. Global right ventricular function is  normal.  Severe dilatation of the ascending aorta (6.2 cm at the mid ascending aorta).  The sinotubular ridge is effaced, however there is no evidence of dissection.  The aortic arch and descending thoracic aorta appear normal in caliber.  Mild aortic regurgitation.  Right ventricular systolic pressure is 26mmHg above the right atrial pressure.  The inferior vena cava was normal in size with preserved respiratory  variability. Estimated mean right atrial pressure is 3 mmHg.  No pericardial effusion is present.  Previous study not available for comparison.     _____________________________________________________________________________  __        Left Ventricle  Left ventricular size is normal. Global and regional left ventricular function  is normal with an EF of 55-60%. Mild concentric wall thickening consistent  with left ventricular hypertrophy is present. Left ventricular diastolic  function is indeterminate.     Right Ventricle  The right ventricle is normal size. Global right ventricular function is  normal.     Atria  Mild left atrial enlargement is present. Left atrial volume index is 36 mL/m2  (normal <34 mL/m2). Mild right atrial enlargement is present.     Mitral  Valve  Mild mitral annular calcification is present. Trace mitral insufficiency is  present.        Aortic Valve  It is difficult to assess individual cusps due to poor acoustic windows.  Structure appear normal. Mild aortic insufficiency is present. PHT is 621 ms.     Tricuspid Valve  The right ventricular systolic pressure is approximated at 25.7 mmHg plus the  right atrial pressure. Right ventricular systolic pressure is 26mmHg above the  right atrial pressure. Trace tricuspid insufficiency is present.     Pulmonic Valve  The pulmonic valve cannot be assessed. Doppler interrogation is normal.     Vessels  The inferior vena cava was normal in size with preserved respiratory  variability. Severe dilatation of the ascending aorta (6.2 cm at the mid  ascending aorta). There is no evidence of dissection. The sinotubular ridge is  effaced. The aortic arch measures 2.5 cm and the descending thoracic aorta  measures 2.3 cm. Estimated mean right atrial pressure is 3 mmHg.     Pericardium  No pericardial effusion is present.        Compared to Previous Study  Previous study not available for comparison.     Attestation  I have personally viewed the imaging and agree with the interpretation and  report as documented by the fellow, Kizzy Posey, and/or edited by me.  _____________________________________________________________________________  __     MMode/2D Measurements & Calculations  IVSd: 1.2 cm  LVIDd: 5.3 cm  LVIDs: 2.9 cm  LVPWd: 1.2 cm  FS: 45.7 %  EDV(Teich): 133.6 ml  ESV(Teich): 31.1 ml  LV mass(C)d: 257.2 grams  LV mass(C)dI: 130.1 grams/m2  Ao root diam: 3.9 cm  LA dimension: 3.8 cm  asc Aorta Diam: 6.2 cm  desc Ao Diam: 2.1 cm  LA/Ao: 0.97  LVOT diam: 2.2 cm  LVOT area: 3.8 cm2  LA Volume (BP): 71.0 ml     LA Volume Index (BP): 35.9 ml/m2        Doppler Measurements & Calculations  MV E max tono: 69.4 cm/sec  MV A max tono: 89.5 cm/sec  MV E/A: 0.78  MV dec time: 0.18 sec  AI P1/2t: 622.2 msec  TR max tono:  253.7 cm/sec  TR max P.7 mmHg  Lateral E/e': 9.4  Medial E/e': 15.7              _____________________________________________________________________________  __        Report approved by: Deanne JASON 2017 03:35 PM            Attending Anesthesiologist    Luis Toledo MD    *[phone]

## 2017-10-16 NOTE — MR AVS SNAPSHOT
After Visit Summary   10/16/2017    Georgie Patino    MRN: [id number]           Patient Information     Date Of Birth          1955        Visit Information        Provider Department      10/16/2017 11:30 AM Rn, [facility]        Care Instructions    Preparing for Your Surgery      Name:  Georgie Patino   MRN:  [id number]   :  1955   Today's Date:  10/16/2017     Arriving for surgery:  Surgery date: 10/27/17    Surgery time:  7:00 am  Arrival time:  5:00 am  Please come to:       [facility] [address] [place]  [address]    -   parking is available in front of the hospital from 5:15 am to 8:00 pm    -  Stop at the Information Desk in the lobby    -   Inform the information person that you are here for surgery. An escort to 3c will be provided. If you would not like an escort, please proceed to 3C on the 3rd floor. [phone]     What can I eat or drink?  -  You may have solid food or milk products until 8 hours prior to your surgery. No food/milk products, gum or mints after 11:00 pm.  -  You may have water or 7up/Sprite until 2 hours prior to your surgery-until 5:00 am    Which medicines can I take? No Aspirin, vitamins or supplements for 7 days before surgery. Stop Ibuprofen products. Tylenol is okay.  -  Do NOT take these medications the day of surgery:  Lisinopril, Nicorette gum.    -  It is OKAY to take these medications on the morning of surgery: Metoprolol, Lipitor, Zantac  How do I prepare myself?  -  Take two showers: one the night before surgery; and one the morning of surgery.         Use Scrubcare or Hibiclens to wash from neck down.  You may use your own shampoo and conditioner. No other hair products.   -  Do NOT use lotion, powder, deodorant, or antiperspirant the day of your surgery.  -  Do NOT wear any makeup, fingernail polish or jewelry.  -  Begin using Incentive  Spirometer 1 week prior to surgery.  Use 4 times per day, up to 5-10 breaths each time.  Bring Incentive Spirometer to hospital.  -Do not bring your own medications to the hospital..  -  Bring your ID and insurance card.    Questions or Concerns:  If you have questions or concerns, please call the  [facility] Center, Monday-Friday 7AM-7PM:  [phone]    Using an Incentive Spirometer    An incentive spirometer is a device that helps you do deep breathing exercises. These exercises expand your lungs, aid in circulation, and help prevent pneumonia. Deep breathing exercises also help you breathe better and improve the function of your lungs by:    Keeping your lungs clear    Strengthening your breathing muscles    Helping prevent respiratory complications or problems  The incentive spirometer gives you a way to take an active part in recover. A nurse or therapist will teach you breathing exercises. To do these exercises, you will breathe in through your mouth and not your nose. The incentive spirometer only works correctly if you breathe in through your mouth.  Steps to clear lungs  Step 1. Exhale normally. Then, inhale normally.    Relax and breathe out.  Step 2. Place your lips tightly around the mouthpiece.    Make sure the device is upright and not tilted.  Step 3. Inhale as much air as you can through the mouthpiece (don't breath through your nose).    Inhale slowly and deeply.    Hold your breath long enough to keep the balls or disk raised for at least 3 to 5 seconds, or as instructed by your healthcare provider.    Some spirometers have an indicator to let you know that you are breathing in too fast. If the indicator goes off, breathe in more slowly.  Step 4. Repeat the exercise regularly.    Do this exercise every hour while you're awake, or as instructed by your healthcare provider.    If you were taught deep breathing and coughing exercises, do them regularly as instructed by your healthcare  provider.   Follow-up care  Make a follow up appointment, or as directed. Also, follow up with your healthcare provider as advised or if your symptoms do not improve or continue to get worse.  When to call your healthcare provider  Call your healthcare provider right away if you have any of the following:    Fever 100.4  (38 C) or higher, or as directed by your healthcare provider    Brownish, bloody, or smelly sputum  Call 911  Call 911 if any of these occur:     Shortness of breath that doesn't get better after taking your medicine    Cool, moist, pale or blue skin    Trouble breathing or swallowing, wheezing    Fainting or loss of consciousness    Feeling of fizziness or weakness or a sudden drop in blood pressure    Feeling of doom or lightheaded    Chest pain or rapid heart rate  Date Last Reviewed: 1/1/2017 2000-2017 [employer]. [address] [place] [address]. All rights reserved. This information is not intended as a substitute for professional medical care. Always follow your healthcare professional's instructions.      AFTER YOUR SURGERY  Breathing exercises   Breathing exercises help you recover faster. Take deep breaths and let the air out slowly. This will:     Help you wake up after surgery.    Help prevent complications like pneumonia.  Preventing complications will help you go home sooner.   We may give you a breathing device (incentive spirometer) to encourage you to breathe deeply.   Nausea and vomiting   You may feel sick to your stomach after surgery; if so, let your nurse know.    Pain control:  After surgery, you may have pain. Our goal is to help you manage your pain. Pain medicine will help you feel comfortable enough to do activities that will help you heal.  These activities may include breathing exercises, walking and physical therapy.   To help your health care team treat your pain we will ask: 1) If you have pain  2) where it is located 3) describe your pain  in your words  Methods of pain control include medications given by mouth, vein or by nerve block for some surgeries.  We may give you a pain control pump that will:  1) Deliver the medicine through a tube placed in your vein  2) Control the amount of medicine you receive  3) Allow you to push a button to deliver a dose of pain medicine  Sequential Compression Device (SCD) or Pneumo Boots:  You may need to wear SCD S on your legs or feet. These are wraps connected to a machine that pumps in air and releases it. The repeated pumping helps prevent blood clots from forming.                     Follow-ups after your visit        Your next 10 appointments already scheduled     Oct 16, 2017 11:20 AM CDT   (Arrive by 11:05 AM)   PAC Anesthesia Consult with  Pac Anesthesiologist   [facility] ([facility])    [address] [place] [address]   [phone]            Oct 16, 2017 11:30 AM CDT   (Arrive by 11:15 AM)   PAC RN ASSESSMENT with  Pac Rn   [facility] ([facility])    [address] [place] [address]   [phone]            Oct 16, 2017 12:00 PM CDT   LAB with  LAB   [facility])    [address] [place] [address]   [phone]           Patient must bring picture ID. Patient should be prepared to give a urine specimen  Please do not eat 10-12 hours before your appointment if you are coming in fasting for labs on lipids, cholesterol, or glucose (sugar). Pregnant women should follow their Care Team instructions. Water with medications is okay. Do not drink coffee or other fluids. If you have concerns about taking  your medications, please ask at office or if scheduling via Petsy, send a message by clicking on Secure Messaging, Message Your Care Team.            Oct 16, 2017  1:00 PM CDT    US CAROTID BILATERAL with UCUSV2   [facility] US ([facility])    [address]  [address] [facility] [id number] [phone]           Please bring a list of your medicines (including vitamins, minerals and over-the-counter drugs). Also, tell your doctor about any allergies you may have. Wear comfortable clothes and leave your valuables at home.  You do not need to do anything special to prepare for your exam.  Please call the Imaging Department at your exam site with any questions.            Oct 16, 2017  2:15 PM CDT   (Arrive by 2:00 PM)   XR CHEST 2 VIEWS with XR1   [facility] ([facility])    [address]  [address] [facility] [id number] [phone]           Please bring a list of your current medicines to your exam. (Include vitamins, minerals and over-thecounter medicines.) Leave your valuables at home.  Tell your doctor if there is a chance you may be pregnant.  You do not need to do anything special for this exam.            Oct 16, 2017  2:30 PM CDT   FULL PULMONARY FUNCTION with  PFL C   [facility]monary Function Testing ([facility])    [address]  3rd Floor  [facility] [id number] [phone]            Oct 27, 2017   Procedure with Rubio Piña MD   [facility] [place], Same Day Surgery (--)    [address]  [place] [address]   [phone]            Nov 13, 2017  2:45 PM CST   LAB with FZ LAB   [facility] ([facility])    [address] [facility] [address] [phone]           Patient must bring picture ID. Patient should be prepared to give a urine specimen  Please do not eat 10-12 hours before your appointment if you are coming in fasting for labs on lipids, cholesterol, or glucose (sugar). Pregnant women should follow their Care Team instructions. Water with medications is okay. Do not drink  coffee or other fluids. If you have concerns about taking  your medications, please ask at office or if scheduling via Snaptu, send a message by clicking on Secure Messaging, Message Your Care Team.              Who to contact     Please call your clinic at [phone] to:    Ask questions about your health    Make or cancel appointments    Discuss your medicines    Learn about your test results    Speak to your doctor   If you have compliments or concerns about an experience at your clinic, or if you wish to file a complaint, please contact [facility] Patient Relations at [phone] or email us at [email or web address]         Additional Information About Your Visit        Snaptu Information     Snaptu is an electronic gateway that provides easy, online access to your medical records. With Snaptu, you can request a clinic appointment, read your test results, renew a prescription or communicate with your care team.     To sign up for Snaptu visit the website at [email or web address]   You will be asked to enter the access code listed below, as well as some personal information. Please follow the directions to create your username and password.     Your access code is: [email or web address]  Expires: 2017  4:05 PM     Your access code will  in 90 days. If you need help or a new code, please contact your [facility] or call [phone] for assistance.        Care EveryWhere ID     This is your Care EveryWhere ID. This could be used by other organizations to access your [facility] medical records  [id number]         Blood Pressure from Last 3 Encounters:   10/16/17 145/83   17 146/78   17 154/70    Weight from Last 3 Encounters:   10/16/17 93.2 kg (205 lb 6.4 oz)   17 95 kg (209 lb 7 oz)   17 94 kg (207 lb 4.8 oz)              Today, you had the following     No orders found for display         Today's  Medication Changes          These changes are accurate as of: 10/16/17 10:36 AM.  If you have any questions, ask your nurse or doctor.               These medicines have changed or have updated prescriptions.        Dose/Directions    atorvastatin 40 MG tablet   Commonly known as:  LIPITOR   This may have changed:  when to take this   Used for:  CARDIOVASCULAR SCREENING; LDL GOAL LESS THAN 160        Dose:  40 mg   Take 1 tablet (40 mg) by mouth daily   Quantity:  90 tablet   Refills:  3       lisinopril 10 MG tablet   Commonly known as:  PRINIVIL/ZESTRIL   This may have changed:  when to take this   Used for:  Benign essential hypertension        Dose:  40 mg   Take 4 tablets (40 mg) by mouth daily   Quantity:  90 tablet   Refills:  3                Primary Care Provider Office Phone # Fax #    Marlys Harrington Jesusita Tapia, TESHA [facility] [phone] [address] [facility] [address]        Equal Access to Services     [facility] : Hadii ana sage hadasho Soomaali, waaxda luqadaha, qaybta kaalmada adeegyada, humza cox hayedna crenshaw . So [facility] [phone].    ATENCIÓN: Si habla español, tiene a thomas disposición servicios gratuitos de asistencia lingüística. Llame al [phone].    We comply with applicable federal civil rights laws and [place] laws. We do not discriminate on the basis of race, color, national origin, age, disability, sex, sexual orientation, or gender identity.            Thank you!     Thank you for choosing [facility]  for your care. Our goal is always to provide you with excellent care. Hearing back from our patients is one way we can continue to improve our services. Please take a few minutes to complete the written survey that you may receive in the mail after your visit with us. Thank you!             Your Updated Medication List - Protect others around you: Learn how to safely use, store and throw away your medicines at  www.disposemymeds.org.          This list is accurate as of: 10/16/17 10:36 AM.  Always use your most recent med list.                   Brand Name Dispense Instructions for use Diagnosis    ADVIL PO      Take 400 mg by mouth every morning        atorvastatin 40 MG tablet    LIPITOR    90 tablet    Take 1 tablet (40 mg) by mouth daily    CARDIOVASCULAR SCREENING; LDL GOAL LESS THAN 160       lisinopril 10 MG tablet    PRINIVIL/ZESTRIL    90 tablet    Take 4 tablets (40 mg) by mouth daily    Benign essential hypertension       metoprolol 25 MG tablet    LOPRESSOR    60 tablet    Take 1 tablet (25 mg) by mouth 2 times daily    Palpitations       nicotine polacrilex 2 MG gum    NICORETTE    100 tablet    Place 1 each (2 mg) inside cheek as needed for smoking cessation Place 1 each inside cheek as needed for smoking cessation    History of tobacco use       ZANTAC PO      Take 150 mg by mouth every morning

## 2017-10-16 NOTE — H&P
Pre-Operative H & P     CC:  Preoperative exam to assess for increased cardiopulmonary risk while undergoing surgery and anesthesia.    Date of Encounter: 10/16/2017  Primary Care Physician:  Marlys Wade  Georgie Patino is a 62 year old female who presents for pre-operative H & P in preparation for repair ascending aortic aneurysm with Dr. Piña on 10/27/17 at [facility]. History is obtained from the patient.     Patient who was recently evaluated for palpitations with echo revealing a 6.1-6.2 cm ascending aortic aneurysm. Consult with Dr. Piña with above procedure now planned.  Past Medical History  Past Medical History:   Diagnosis Date     Ascending aortic aneurysm (H) 09/21/2017     Blood transfusions age 19    due to menorhagia     Hypertension 09/21/2017     Tobacco abuse        Past Surgical History  Past Surgical History:   Procedure Laterality Date     COLONOSCOPY WITH CO2 INSUFFLATION N/A 3/15/2017    Procedure: COLONOSCOPY WITH CO2 INSUFFLATION;  Surgeon: Duane, William Charles, MD;  Location: [facility] OR     DILATION AND CURETTAGE  age 20     [place] teeth removed         Hx of Blood transfusions/reactions: Yes, age 20 for heavy periods. No known reactions.     Hx of abnormal bleeding or anti-platelet use: Denies.      Menstrual history: No LMP recorded. Patient is postmenopausal.    Steroid use in the last year: Denies.     Personal or FH with difficulty with Anesthesia:  Denies.     Prior to Admission Medications  Current Outpatient Prescriptions   Medication Sig Dispense Refill     RaNITidine HCl (ZANTAC PO) Take 150 mg by mouth every morning       Ibuprofen (ADVIL PO) Take 400 mg by mouth every morning       lisinopril (PRINIVIL/ZESTRIL) 10 MG tablet Take 4 tablets (40 mg) by mouth daily (Patient taking differently: Take 40 mg by mouth every morning ) 90 tablet 3     metoprolol (LOPRESSOR) 25 MG tablet Take 1 tablet (25 mg) by mouth 2 times  daily 60 tablet 3     atorvastatin (LIPITOR) 40 MG tablet Take 1 tablet (40 mg) by mouth daily (Patient taking differently: Take 40 mg by mouth every morning ) 90 tablet 3     nicotine polacrilex (NICORETTE) 2 MG gum Place 1 each (2 mg) inside cheek as needed for smoking cessation Place 1 each inside cheek as needed for smoking cessation 100 tablet 5       Allergies  No Known Allergies    Social History  Social History     Social History     Marital status:      Spouse name: N/A     Number of children: 3     Years of education: N/A     Occupational History     Day care      Social History Main Topics     Smoking status: Current Every Day Smoker     Packs/day: 0.05     Years: 40.00     Types: Cigarettes     Smokeless tobacco: Never Used      Comment: 2 cigs daily     Alcohol use 0.5 - 1.0 oz/week     1 - 2 Standard drinks or equivalent per week      Comment: 3-4 drinks per week      Drug use: No     Sexual activity: Yes     Partners: Male     Birth control/ protection: Surgical     Other Topics Concern     Parent/Sibling W/ Cabg, Mi Or Angioplasty Before 65f 55m? Yes     brother w/ sudden cardiac arrest @ age 54     Social History Narrative       Family History  Family History   Problem Relation Age of Onset     Respiratory Mother      copd     CANCER Maternal Grandmother      Leg     Alzheimer Disease Maternal Grandfather      HEART DISEASE Paternal Grandfather      HEART DISEASE Brother 54     Heart Attack      CANCER Sister      Lung cancer age 55-smoker d age 57       Review of Systems  The complete review of systems is negative other than noted in the HPI or here.   Constitutional: Denies fever, chills, weight loss.  HEENT: Wears glasses for vision. No swallowing difficulty.  Respiratory: Denies cough or shortness of breath. Continues to try to stop smoking, now 2 cigs daily. Denies concern for SUNNI.  CV: Denies chest pain or irregular HR. Good exercise tolerance. BP average 145/70 after med increase.  GI:  "Recent abdominal pain and some constipation. GERD treated with Zantac.  : Urinary frequency.  M/S: Severe knee pain, right greater than left. Became very sore after angiogram.    Temp: 98  F (36.7  C) Temp src: Oral BP: 145/83 Pulse: 59   Resp: 16 SpO2: 96 %         205 lbs 6.4 oz  5' 6\"   Body mass index is 33.15 kg/(m^2).       Physical Exam  Constitutional: Awake, alert, cooperative, no apparent distress, and appears stated age.  Eyes: Pupils equal, round and reactive to light, extra ocular muscles intact, sclera clear, conjunctiva normal. Glasses on.  HENT: Normocephalic, oral pharynx with moist mucus membranes, good dentition. No goiter appreciated.   Respiratory: Clear to auscultation bilaterally, no crackles or wheezing. No cough or obvious dyspnea.  Cardiovascular: Regular rate and rhythm, normal S1 and S2, and no murmur noted.  Carotids, no bruits. No edema. Palpable pulses to radial  DP and PT arteries.   GI: Normal bowel sounds, soft, non-distended, non-tender, no masses palpated, no hepatosplenomegaly.    Lymph/Hematologic: No cervical lymphadenopathy and no supraclavicular lymphadenopathy.  Genitourinary: Deferred.  Skin: Warm and dry.  No rashes at anticipated surgical site.   Musculoskeletal: Full ROM of neck. There is no redness, warmth, or swelling of the joints. Gross motor strength is normal.    Neurologic: Awake, alert, oriented to name, place and time. Cranial nerves II-XII are grossly intact. Gait is normal.   Neuropsychiatric: Mildly anxious, cooperative. Normal affect.     Labs: (personally reviewed)  Lab Results   Component Value Date    WBC 8.0 10/16/2017     Lab Results   Component Value Date    RBC 5.31 10/16/2017     Lab Results   Component Value Date    HGB 15.2 10/16/2017     Lab Results   Component Value Date    HCT 46.7 10/16/2017     Lab Results   Component Value Date    MCV 88 10/16/2017     Lab Results   Component Value Date    MCH 28.6 10/16/2017     Lab Results   Component " Value Date    MCHC 32.5 10/16/2017     Lab Results   Component Value Date    RDW 12.4 10/16/2017     Lab Results   Component Value Date     10/16/2017     Last Basic Metabolic Panel:  Lab Results   Component Value Date     10/16/2017      Lab Results   Component Value Date    POTASSIUM 4.2 10/16/2017     Lab Results   Component Value Date    CHLORIDE 107 10/16/2017     Lab Results   Component Value Date    QASIM 9.1 10/16/2017     Lab Results   Component Value Date    CO2 29 10/16/2017     Lab Results   Component Value Date    BUN 15 10/16/2017     Lab Results   Component Value Date    CR 0.53 10/16/2017     Lab Results   Component Value Date     10/16/2017     EKG: Personally reviewed  9/25/17  Sinus bradycardia  Left axis deviation  Septal infarct , age undetermined  Poor anterior R-wave progression  Possible old anterior  Possible old anterior and lateral MI  Cardiac echo: SHIVANI Interpretation Summary 9/29/17  Global and regional left ventricular function is normal with an EF of 55-60%.  Global right ventricular function is normal.  The aortic valve is tricuspid with mild central aortic insufficiency.  Severe dilatation of the ascending aorta (6.1 cm at the ascending aorta).  There is no evidence of dissection. The sinotubular ridge is effaced.  A small patent foramen ovale is present as noted by color Doppler and agitated  saline contrast study.  No pericardial effusion is present.  TTE  9/1/2017  Interpretation Summary   Global and regional left ventricular function is normal with an EF of 55-60%.  The right ventricle is normal size. Global right ventricular function is  Normal. Severe dilatation of the ascending aorta (6.2 cm at the mid ascending aorta).  The sinotubular ridge is effaced, however there is no evidence of dissection.  The aortic arch and descending thoracic aorta appear normal in caliber.  Mild aortic regurgitation. Right ventricular systolic pressure is 26mmHg above the right  atrial pressure.  The inferior vena cava was normal in size with preserved respiratory  variability. Estimated mean right atrial pressure is 3 mmHg.  No pericardial effusion is present.  Previous study not available for comparison.  9/25/17 Angiogram  Diffuse coronary atherosclerosis without significant focal stenosis  CTA 9/5/17  Impression:  1. Fusiform aneurysm of the ascending thoracic aorta measuring up to  6.2 cm. Additional measurements are provided above.  2. Cholelithiasis.  3. Sequela of prior chronic granulomatous disease.  4. Partially visualized subcentimeter enhancing left thyroid nodule.  Recommend ultrasound for further evaluation.  5. Mild emphysematous changes of the lungs.  Carotid US 10/16/17 (prelim)  Impression:     1. Right side: No stenosis.     2. Left side: No stenosis.    10/16/17 CHEST X-RAY      Impression:   1. Mediastinal widening correlating with known aneurysm of the  ascending thoracic aorta.  2. No acute cardiopulmonary abnormality.     Most Recent [place]ze Pulmonary Function Testing 10/16/17    FVC-Pred   Date Value Ref Range Status   10/16/2017 3.39 L      FVC-Pre   Date Value Ref Range Status   10/16/2017 2.01 L      FVC-%Pred-Pre   Date Value Ref Range Status   10/16/2017 59 %      FEV1-Pre   Date Value Ref Range Status   10/16/2017 1.19 L      FEV1-%Pred-Pre   Date Value Ref Range Status   10/16/2017 44 %      FEV1FVC-Pred   Date Value Ref Range Status   10/16/2017 79 %      FEV1FVC-Pre   Date Value Ref Range Status   10/16/2017 59 %        FEFMax-Pred   Date Value Ref Range Status   10/16/2017 6.59 L/sec      FEFMax-Pre   Date Value Ref Range Status   10/16/2017 3.71 L/sec      FEFMax-%Pred-Pre   Date Value Ref Range Status   10/16/2017 56 %      ExpTime-Pre   Date Value Ref Range Status   10/16/2017 7.96 sec      FIFMax-Pre   Date Value Ref Range Status   10/16/2017 4.18 L/sec      FEV1FEV6-Pred   Date Value Ref Range Status   10/16/2017 81 %      FEV1FEV6-Pre   Date Value Ref  Range Status   10/16/2017 61 %        Outside records reviewed from: Care Everywhere    ASSESSMENT and PLAN  Georgie Patino is a 62 year old female scheduled to undergo repair of ascending aortic aneurysm by Dr. Piña on 10/27/17. She has the following specific operative considerations:   - RCRI : 0.9% risk of major adverse cardiac event.   - Anesthesia considerations:  Refer to PAC assessment in anesthesia records  - VTE risk: 0.5%  - SUNNI # of risks 3/8 = Intermediate risk  - Risk of PONV score = 2.  If > 2, anti-emetic intervention recommended.     --Ascending aortic aneurysm 6.1-6.2 cm ascending  recently discovered during work up for palpitations. Above procedure now planned.   --HLD. Atorvastatin. HTN. Will take Metoprolol on DOS but will hold Lisinopril. Average 145/70.  Recent cath showed no focal significant lesions. Good exercise tolerance.   --Current smoker 2 cigs daily. History of 1/2 PPD since age 19. Denies pulmonary symptoms. No recent hospitalizations for pulmonary reasons. Diminished BS. Imaging noting mild emphysematous changes. PFTs above.    --History of blood transfusions years ago. Type and screen drawn today by service.  Blood antibody positive. Patient to be called to return for repeat type and screen 1-3 days prior to procedure.    --Patient concerned about positioning due to painful knees.  Arrival time, NPO, shower and medication instructions provided by nursing staff today. Preparing For Your Surgery handout given.  Patient was discussed with Dr Moctezuma.    TESHA Marquez CNS  Preoperative Assessment Center  [facility]  Clinic and Surgery Center  Phone: [phone]  Fax: [phone]

## 2017-10-16 NOTE — PATIENT INSTRUCTIONS
Preparing for Your Surgery      Name:  Georgie Patino   MRN:  [id number]   :  1955   Today's Date:  10/16/2017     Arriving for surgery:  Surgery date: 10/27/17    Surgery time:  7:00 am  Arrival time:  5:00 am  Please come to:       [facility] [address]  [address] [place]  [address]    -   parking is available in front of the hospital from 5:15 am to 8:00 pm    -  Stop at the Information Desk in the lobby    -   Inform the information person that you are here for surgery. An escort to 3c will be provided. If you would not like an escort, please proceed to 3C on the 3rd floor. [phone]     What can I eat or drink?  -  You may have solid food or milk products until 8 hours prior to your surgery. No food/milk products, gum or mints after 11:00 pm.  -  You may have water or 7up/Sprite until 2 hours prior to your surgery-until 5:00 am    Which medicines can I take? No Aspirin, vitamins or supplements for 7 days before surgery. Stop Ibuprofen products. Tylenol is okay.  -  Do NOT take these medications the day of surgery:  Lisinopril, Nicorette gum.    -  It is OKAY to take these medications on the morning of surgery: Metoprolol, Lipitor, Zantac  How do I prepare myself?  -  Take two showers: one the night before surgery; and one the morning of surgery.         Use Scrubcare or Hibiclens to wash from neck down.  You may use your own shampoo and conditioner. No other hair products.   -  Do NOT use lotion, powder, deodorant, or antiperspirant the day of your surgery.  -  Do NOT wear any makeup, fingernail polish or jewelry.  -  Begin using Incentive Spirometer 1 week prior to surgery.  Use 4 times per day, up to 5-10 breaths each time.  Bring Incentive Spirometer to hospital.  -Do not bring your own medications to the hospital..  -  Bring your ID and insurance card.    Questions or Concerns:  If you have questions or concerns, please call the  Preoperative  [facility], Monday-Friday 7AM-7PM:  [phone]    Using an Incentive Spirometer    An incentive spirometer is a device that helps you do deep breathing exercises. These exercises expand your lungs, aid in circulation, and help prevent pneumonia. Deep breathing exercises also help you breathe better and improve the function of your lungs by:    Keeping your lungs clear    Strengthening your breathing muscles    Helping prevent respiratory complications or problems  The incentive spirometer gives you a way to take an active part in recover. A nurse or therapist will teach you breathing exercises. To do these exercises, you will breathe in through your mouth and not your nose. The incentive spirometer only works correctly if you breathe in through your mouth.  Steps to clear lungs  Step 1. Exhale normally. Then, inhale normally.    Relax and breathe out.  Step 2. Place your lips tightly around the mouthpiece.    Make sure the device is upright and not tilted.  Step 3. Inhale as much air as you can through the mouthpiece (don't breath through your nose).    Inhale slowly and deeply.    Hold your breath long enough to keep the balls or disk raised for at least 3 to 5 seconds, or as instructed by your healthcare provider.    Some spirometers have an indicator to let you know that you are breathing in too fast. If the indicator goes off, breathe in more slowly.  Step 4. Repeat the exercise regularly.    Do this exercise every hour while you're awake, or as instructed by your healthcare provider.    If you were taught deep breathing and coughing exercises, do them regularly as instructed by your healthcare provider.   Follow-up care  Make a follow up appointment, or as directed. Also, follow up with your healthcare provider as advised or if your symptoms do not improve or continue to get worse.  When to call your healthcare provider  Call your healthcare provider right away if you have any of the  following:    Fever 100.4  (38 C) or higher, or as directed by your healthcare provider    Brownish, bloody, or smelly sputum  Call 911  Call 911 if any of these occur:     Shortness of breath that doesn't get better after taking your medicine    Cool, moist, pale or blue skin    Trouble breathing or swallowing, wheezing    Fainting or loss of consciousness    Feeling of fizziness or weakness or a sudden drop in blood pressure    Feeling of doom or lightheaded    Chest pain or rapid heart rate  Date Last Reviewed: 1/1/2017 2000-2017 [employer]. [address] [place] [address]. All rights reserved. This information is not intended as a substitute for professional medical care. Always follow your healthcare professional's instructions.      AFTER YOUR SURGERY  Breathing exercises   Breathing exercises help you recover faster. Take deep breaths and let the air out slowly. This will:     Help you wake up after surgery.    Help prevent complications like pneumonia.  Preventing complications will help you go home sooner.   We may give you a breathing device (incentive spirometer) to encourage you to breathe deeply.   Nausea and vomiting   You may feel sick to your stomach after surgery; if so, let your nurse know.    Pain control:  After surgery, you may have pain. Our goal is to help you manage your pain. Pain medicine will help you feel comfortable enough to do activities that will help you heal.  These activities may include breathing exercises, walking and physical therapy.   To help your health care team treat your pain we will ask: 1) If you have pain  2) where it is located 3) describe your pain in your words  Methods of pain control include medications given by mouth, vein or by nerve block for some surgeries.  We may give you a pain control pump that will:  1) Deliver the medicine through a tube placed in your vein  2) Control the amount of medicine you receive  3) Allow you to push a  button to deliver a dose of pain medicine  Sequential Compression Device (SCD) or Pneumo Boots:  You may need to wear SCD S on your legs or feet. These are wraps connected to a machine that pumps in air and releases it. The repeated pumping helps prevent blood clots from forming.

## 2017-10-16 NOTE — TELEPHONE ENCOUNTER
Per patient, she has surgery scheduled on 10/27/17 for her REPAIR ANEURYSM ASCENDING AORTA  She has been having increased anxiety as it is getting closer to her surgery date.  She updated the provider at her pre-op today and was advised to contact PCP to see if she can get something prescribed for anxiety    Please advise    Olga Lidia Marquez RN

## 2017-10-17 PROBLEM — F41.9 ANXIETY: Status: ACTIVE | Noted: 2017-10-17

## 2017-10-17 LAB
BACTERIA SPEC CULT: NORMAL
Lab: NORMAL
SPECIMEN SOURCE: NORMAL

## 2017-10-17 RX ORDER — ALPRAZOLAM 0.25 MG
0.25 TABLET ORAL 2 TIMES DAILY PRN
Qty: 30 TABLET | Refills: 0 | Status: SHIPPED | OUTPATIENT
Start: 2017-10-17 | End: 2017-11-20

## 2017-10-17 NOTE — TELEPHONE ENCOUNTER
Patient notified of Provider's message as written.  Patient verbalized understanding.    Please fax prescription to [facility] in [place] Mar[place] RN

## 2017-10-17 NOTE — TELEPHONE ENCOUNTER
Script for prn xanax written for patient.  She should not drive and take the medication at the same time.    Marlys Wade, CNP

## 2017-10-18 ENCOUNTER — TELEPHONE (OUTPATIENT)
Dept: INTERNAL MEDICINE | Facility: CLINIC | Age: 62
End: 2017-10-18

## 2017-10-18 ENCOUNTER — CARE COORDINATION (OUTPATIENT)
Dept: CARDIOLOGY | Facility: CLINIC | Age: 62
End: 2017-10-18

## 2017-10-18 NOTE — TELEPHONE ENCOUNTER
Called patient to inform of local print and patient stated that prescription was called in by RN. This writer then talked to RN and was informed that prescription was faxed over to pharmacy. RN then called pharmacy to confirm they had received fax. Pharmacy confirmed they do have prescription. This writer then called patient to inform that prescription is at pharmacy. No further questions. Mabel Briscoe MA

## 2017-10-18 NOTE — PROGRESS NOTES
Patient called and asked to have her ABO redrawn 1-3 days prior to surgery as she has antibodies in her blood.  Patient agreed and will come in on 10/24 for ABO.

## 2017-10-18 NOTE — ADDENDUM NOTE
Addendum  created 10/18/17 0747 by Mary Simeon, TESHA CNS    Diagnosis association updated, Order sets accessed, Visit diagnoses modified

## 2017-10-18 NOTE — TELEPHONE ENCOUNTER
Reason for Call:  Other prescription    Detailed comments:  Patient calling. A rx of alprazolam has not been received by the pharmacy. Can it be resent. Please call and let patient know when done.     Phone Number Patient can be reached at: Home number on file [phone] (home)    Best Time:  Any     Can we leave a detailed message on this number? YES    Call taken on 10/18/2017 at 9:36 AM by Charmaine Fermin

## 2017-10-24 DIAGNOSIS — I71.21 ASCENDING AORTIC ANEURYSM (H): ICD-10-CM

## 2017-10-25 DIAGNOSIS — I71.21 ASCENDING AORTIC ANEURYSM (H): Primary | ICD-10-CM

## 2017-10-26 LAB
ABO + RH BLD: ABNORMAL
ABO + RH BLD: ABNORMAL
BLD GP AB SCN SERPL QL: ABNORMAL
BLD PROD TYP BPU: ABNORMAL
BLOOD BANK CMNT PATIENT-IMP: ABNORMAL
BLOOD BANK CMNT PATIENT-IMP: ABNORMAL
DLCOCOR-%PRED-PRE: 87 %
DLCOCOR-PRE: 19.71 ML/MIN/MMHG
DLCOUNC-%PRED-PRE: 92 %
DLCOUNC-PRE: 20.72 ML/MIN/MMHG
DLCOUNC-PRED: 22.41 ML/MIN/MMHG
ERV-%PRED-PRE: 53 %
ERV-PRE: 0.31 L
ERV-PRED: 0.57 L
EXPTIME-PRE: 7.96 SEC
FEF2575-%PRED-PRE: 22 %
FEF2575-PRE: 0.53 L/SEC
FEF2575-PRED: 2.36 L/SEC
FEFMAX-%PRED-PRE: 56 %
FEFMAX-PRE: 3.71 L/SEC
FEFMAX-PRED: 6.59 L/SEC
FEV1-%PRED-PRE: 44 %
FEV1-PRE: 1.19 L
FEV1FEV6-PRE: 61 %
FEV1FEV6-PRED: 81 %
FEV1FVC-PRE: 59 %
FEV1FVC-PRED: 79 %
FEV1SVC-PRE: 56 %
FEV1SVC-PRED: 76 %
FIFMAX-PRE: 4.18 L/SEC
FRCPLETH-%PRED-PRE: 131 %
FRCPLETH-PRE: 3.7 L
FRCPLETH-PRED: 2.82 L
FVC-%PRED-PRE: 59 %
FVC-PRE: 2.01 L
FVC-PRED: 3.39 L
IC-%PRED-PRE: 61 %
IC-PRE: 1.81 L
IC-PRED: 2.95 L
NUM BPU REQUESTED: 6
RVPLETH-%PRED-PRE: 169 %
RVPLETH-PRE: 3.4 L
RVPLETH-PRED: 2 L
SPECIMEN EXP DATE BLD: ABNORMAL
TLCPLETH-%PRED-PRE: 104 %
TLCPLETH-PRE: 5.51 L
TLCPLETH-PRED: 5.27 L
VA-%PRED-PRE: 82 %
VA-PRE: 4.44 L
VC-%PRED-PRE: 60 %
VC-PRE: 2.12 L
VC-PRED: 3.52 L

## 2017-10-27 ENCOUNTER — APPOINTMENT (OUTPATIENT)
Dept: GENERAL RADIOLOGY | Facility: CLINIC | Age: 62
DRG: 220 | End: 2017-10-27
Attending: THORACIC SURGERY (CARDIOTHORACIC VASCULAR SURGERY)
Payer: COMMERCIAL

## 2017-10-27 ENCOUNTER — ANESTHESIA (OUTPATIENT)
Dept: SURGERY | Facility: CLINIC | Age: 62
DRG: 220 | End: 2017-10-27
Payer: COMMERCIAL

## 2017-10-27 ENCOUNTER — HOSPITAL ENCOUNTER (INPATIENT)
Facility: CLINIC | Age: 62
LOS: 5 days | Discharge: HOME | DRG: 220 | End: 2017-11-01
Attending: THORACIC SURGERY (CARDIOTHORACIC VASCULAR SURGERY) | Admitting: THORACIC SURGERY (CARDIOTHORACIC VASCULAR SURGERY)
Payer: COMMERCIAL

## 2017-10-27 DIAGNOSIS — I71.21 ASCENDING AORTIC ANEURYSM (H): ICD-10-CM

## 2017-10-27 DIAGNOSIS — Z98.890 S/P ASCENDING AORTIC ANEURYSM REPAIR: Primary | ICD-10-CM

## 2017-10-27 DIAGNOSIS — Z86.79 S/P ASCENDING AORTIC ANEURYSM REPAIR: Primary | ICD-10-CM

## 2017-10-27 DIAGNOSIS — G89.18 ACUTE POST-OPERATIVE PAIN: ICD-10-CM

## 2017-10-27 PROBLEM — I71.9 AORTIC ANEURYSM (H): Status: ACTIVE | Noted: 2017-10-27

## 2017-10-27 LAB
ANION GAP SERPL CALCULATED.3IONS-SCNC: 9 MMOL/L (ref 3–14)
APTT PPP: 32 SEC (ref 22–37)
APTT PPP: 35 SEC (ref 22–37)
BASE DEFICIT BLDA-SCNC: 1.4 MMOL/L
BASE DEFICIT BLDA-SCNC: 1.7 MMOL/L
BASE DEFICIT BLDA-SCNC: 2 MMOL/L
BASE DEFICIT BLDA-SCNC: 2.8 MMOL/L
BASE DEFICIT BLDA-SCNC: 3 MMOL/L
BASE DEFICIT BLDA-SCNC: 3.3 MMOL/L
BASE DEFICIT BLDA-SCNC: 3.6 MMOL/L
BASE DEFICIT BLDA-SCNC: 3.8 MMOL/L
BASE DEFICIT BLDA-SCNC: 4.3 MMOL/L
BASE DEFICIT BLDA-SCNC: 4.7 MMOL/L
BASE DEFICIT BLDV-SCNC: 0.9 MMOL/L
BASE DEFICIT BLDV-SCNC: 4.1 MMOL/L
BASE EXCESS BLDA CALC-SCNC: 1.2 MMOL/L
BLD PROD TYP BPU: NORMAL
BLD UNIT ID BPU: 0
BLOOD PRODUCT CODE: NORMAL
BPU ID: NORMAL
BUN SERPL-MCNC: 18 MG/DL (ref 7–30)
CA-I BLD-MCNC: 4.1 MG/DL (ref 4.4–5.2)
CA-I BLD-MCNC: 4.2 MG/DL (ref 4.4–5.2)
CA-I BLD-MCNC: 4.3 MG/DL (ref 4.4–5.2)
CA-I BLD-MCNC: 4.4 MG/DL (ref 4.4–5.2)
CA-I BLD-MCNC: 4.8 MG/DL (ref 4.4–5.2)
CA-I BLD-MCNC: 5.3 MG/DL (ref 4.4–5.2)
CA-I BLD-MCNC: 5.3 MG/DL (ref 4.4–5.2)
CA-I BLD-MCNC: 5.6 MG/DL (ref 4.4–5.2)
CA-I BLD-MCNC: 5.6 MG/DL (ref 4.4–5.2)
CA-I BLD-MCNC: 5.8 MG/DL (ref 4.4–5.2)
CALCIUM SERPL-MCNC: 9.1 MG/DL (ref 8.5–10.1)
CHLORIDE SERPL-SCNC: 114 MMOL/L (ref 94–109)
CO2 SERPL-SCNC: 24 MMOL/L (ref 20–32)
CREAT SERPL-MCNC: 0.63 MG/DL (ref 0.52–1.04)
ERYTHROCYTE [DISTWIDTH] IN BLOOD BY AUTOMATED COUNT: 13.3 % (ref 10–15)
FIBRINOGEN PPP-MCNC: 257 MG/DL (ref 200–420)
GFR SERPL CREATININE-BSD FRML MDRD: >90 ML/MIN/1.7M2
GLUCOSE BLD-MCNC: 121 MG/DL (ref 70–99)
GLUCOSE BLD-MCNC: 143 MG/DL (ref 70–99)
GLUCOSE BLD-MCNC: 154 MG/DL (ref 70–99)
GLUCOSE BLD-MCNC: 192 MG/DL (ref 70–99)
GLUCOSE BLD-MCNC: 194 MG/DL (ref 70–99)
GLUCOSE BLD-MCNC: 194 MG/DL (ref 70–99)
GLUCOSE BLD-MCNC: 196 MG/DL (ref 70–99)
GLUCOSE BLD-MCNC: 211 MG/DL (ref 70–99)
GLUCOSE BLD-MCNC: 229 MG/DL (ref 70–99)
GLUCOSE BLDC GLUCOMTR-MCNC: 110 MG/DL (ref 70–99)
GLUCOSE BLDC GLUCOMTR-MCNC: 131 MG/DL (ref 70–99)
GLUCOSE BLDC GLUCOMTR-MCNC: 143 MG/DL (ref 70–99)
GLUCOSE BLDC GLUCOMTR-MCNC: 157 MG/DL (ref 70–99)
GLUCOSE BLDC GLUCOMTR-MCNC: 160 MG/DL (ref 70–99)
GLUCOSE BLDC GLUCOMTR-MCNC: 161 MG/DL (ref 70–99)
GLUCOSE BLDC GLUCOMTR-MCNC: 188 MG/DL (ref 70–99)
GLUCOSE SERPL-MCNC: 203 MG/DL (ref 70–99)
HCO3 BLD-SCNC: 19 MMOL/L (ref 21–28)
HCO3 BLD-SCNC: 23 MMOL/L (ref 21–28)
HCO3 BLD-SCNC: 24 MMOL/L (ref 21–28)
HCO3 BLD-SCNC: 24 MMOL/L (ref 21–28)
HCO3 BLD-SCNC: 25 MMOL/L (ref 21–28)
HCO3 BLD-SCNC: 28 MMOL/L (ref 21–28)
HCO3 BLDV-SCNC: 24 MMOL/L (ref 21–28)
HCO3 BLDV-SCNC: 25 MMOL/L (ref 21–28)
HCT VFR BLD AUTO: 42.3 % (ref 35–47)
HGB BLD-MCNC: 10.5 G/DL (ref 11.7–15.7)
HGB BLD-MCNC: 10.6 G/DL (ref 11.7–15.7)
HGB BLD-MCNC: 10.9 G/DL (ref 11.7–15.7)
HGB BLD-MCNC: 11 G/DL (ref 11.7–15.7)
HGB BLD-MCNC: 11 G/DL (ref 11.7–15.7)
HGB BLD-MCNC: 11.1 G/DL (ref 11.7–15.7)
HGB BLD-MCNC: 11.7 G/DL (ref 11.7–15.7)
HGB BLD-MCNC: 12.6 G/DL (ref 11.7–15.7)
HGB BLD-MCNC: 13.6 G/DL (ref 11.7–15.7)
HGB BLD-MCNC: 13.7 G/DL (ref 11.7–15.7)
INR PPP: 0.91 (ref 0.86–1.14)
INR PPP: 1.23 (ref 0.86–1.14)
INR PPP: 1.29 (ref 0.86–1.14)
LACTATE BLD-SCNC: 1 MMOL/L (ref 0.7–2)
LACTATE BLD-SCNC: 1.1 MMOL/L (ref 0.7–2)
LACTATE BLD-SCNC: 1.1 MMOL/L (ref 0.7–2)
LACTATE BLD-SCNC: 2.1 MMOL/L (ref 0.7–2)
LACTATE BLD-SCNC: 2.8 MMOL/L (ref 0.7–2)
LACTATE BLD-SCNC: 3 MMOL/L (ref 0.7–2)
LACTATE BLD-SCNC: 3.1 MMOL/L (ref 0.7–2)
LACTATE BLD-SCNC: 3.1 MMOL/L (ref 0.7–2)
MAGNESIUM SERPL-MCNC: 2.4 MG/DL (ref 1.6–2.3)
MAGNESIUM SERPL-MCNC: 2.6 MG/DL (ref 1.6–2.3)
MCH RBC QN AUTO: 29.1 PG (ref 26.5–33)
MCHC RBC AUTO-ENTMCNC: 32.2 G/DL (ref 31.5–36.5)
MCV RBC AUTO: 90 FL (ref 78–100)
NUM BPU REQUESTED: 2
O2/TOTAL GAS SETTING VFR VENT: 100 %
O2/TOTAL GAS SETTING VFR VENT: 100 %
O2/TOTAL GAS SETTING VFR VENT: 40 %
O2/TOTAL GAS SETTING VFR VENT: 40 %
O2/TOTAL GAS SETTING VFR VENT: 50 %
O2/TOTAL GAS SETTING VFR VENT: 50 %
O2/TOTAL GAS SETTING VFR VENT: 60 %
O2/TOTAL GAS SETTING VFR VENT: 70 %
O2/TOTAL GAS SETTING VFR VENT: 98 %
O2/TOTAL GAS SETTING VFR VENT: ABNORMAL %
OXYHGB MFR BLD: 91 % (ref 92–100)
OXYHGB MFR BLD: 93 % (ref 92–100)
OXYHGB MFR BLD: 93 % (ref 92–100)
OXYHGB MFR BLDV: 64 %
PCO2 BLD: 29 MM HG (ref 35–45)
PCO2 BLD: 37 MM HG (ref 35–45)
PCO2 BLD: 38 MM HG (ref 35–45)
PCO2 BLD: 48 MM HG (ref 35–45)
PCO2 BLD: 51 MM HG (ref 35–45)
PCO2 BLD: 52 MM HG (ref 35–45)
PCO2 BLD: 58 MM HG (ref 35–45)
PCO2 BLD: 60 MM HG (ref 35–45)
PCO2 BLD: 62 MM HG (ref 35–45)
PCO2 BLDV: 47 MM HG (ref 40–50)
PCO2 BLDV: 56 MM HG (ref 40–50)
PH BLD: 7.22 PH (ref 7.35–7.45)
PH BLD: 7.23 PH (ref 7.35–7.45)
PH BLD: 7.24 PH (ref 7.35–7.45)
PH BLD: 7.27 PH (ref 7.35–7.45)
PH BLD: 7.27 PH (ref 7.35–7.45)
PH BLD: 7.29 PH (ref 7.35–7.45)
PH BLD: 7.3 PH (ref 7.35–7.45)
PH BLD: 7.34 PH (ref 7.35–7.45)
PH BLD: 7.39 PH (ref 7.35–7.45)
PH BLD: 7.4 PH (ref 7.35–7.45)
PH BLD: 7.42 PH (ref 7.35–7.45)
PH BLDV: 7.24 PH (ref 7.32–7.43)
PH BLDV: 7.34 PH (ref 7.32–7.43)
PHOSPHATE SERPL-MCNC: 4.7 MG/DL (ref 2.5–4.5)
PLATELET # BLD AUTO: 109 10E9/L (ref 150–450)
PLATELET # BLD AUTO: 95 10E9/L (ref 150–450)
PO2 BLD: 107 MM HG (ref 80–105)
PO2 BLD: 262 MM HG (ref 80–105)
PO2 BLD: 353 MM HG (ref 80–105)
PO2 BLD: 361 MM HG (ref 80–105)
PO2 BLD: 399 MM HG (ref 80–105)
PO2 BLD: 400 MM HG (ref 80–105)
PO2 BLD: 409 MM HG (ref 80–105)
PO2 BLD: 66 MM HG (ref 80–105)
PO2 BLD: 73 MM HG (ref 80–105)
PO2 BLD: 78 MM HG (ref 80–105)
PO2 BLD: 81 MM HG (ref 80–105)
PO2 BLDV: 39 MM HG (ref 25–47)
PO2 BLDV: 59 MM HG (ref 25–47)
POTASSIUM BLD-SCNC: 3.7 MMOL/L (ref 3.4–5.3)
POTASSIUM BLD-SCNC: 4.2 MMOL/L (ref 3.4–5.3)
POTASSIUM BLD-SCNC: 4.5 MMOL/L (ref 3.4–5.3)
POTASSIUM BLD-SCNC: 4.9 MMOL/L (ref 3.4–5.3)
POTASSIUM BLD-SCNC: 5 MMOL/L (ref 3.4–5.3)
POTASSIUM SERPL-SCNC: 3.8 MMOL/L (ref 3.4–5.3)
POTASSIUM SERPL-SCNC: 4.6 MMOL/L (ref 3.4–5.3)
RBC # BLD AUTO: 4.68 10E12/L (ref 3.8–5.2)
SODIUM BLD-SCNC: 139 MMOL/L (ref 133–144)
SODIUM BLD-SCNC: 140 MMOL/L (ref 133–144)
SODIUM BLD-SCNC: 140 MMOL/L (ref 133–144)
SODIUM BLD-SCNC: 141 MMOL/L (ref 133–144)
SODIUM BLD-SCNC: 141 MMOL/L (ref 133–144)
SODIUM BLD-SCNC: 143 MMOL/L (ref 133–144)
SODIUM BLD-SCNC: 146 MMOL/L (ref 133–144)
SODIUM SERPL-SCNC: 147 MMOL/L (ref 133–144)
TRANSFUSION STATUS PATIENT QL: NORMAL
WBC # BLD AUTO: 18.1 10E9/L (ref 4–11)

## 2017-10-27 PROCEDURE — 25000125 ZZHC RX 250: Performed by: THORACIC SURGERY (CARDIOTHORACIC VASCULAR SURGERY)

## 2017-10-27 PROCEDURE — 37000009 ZZH ANESTHESIA TECHNICAL FEE, EACH ADDTL 15 MIN: Performed by: THORACIC SURGERY (CARDIOTHORACIC VASCULAR SURGERY)

## 2017-10-27 PROCEDURE — P9059 PLASMA, FRZ BETWEEN 8-24HOUR: HCPCS | Performed by: THORACIC SURGERY (CARDIOTHORACIC VASCULAR SURGERY)

## 2017-10-27 PROCEDURE — 25000128 H RX IP 250 OP 636: Performed by: NURSE ANESTHETIST, CERTIFIED REGISTERED

## 2017-10-27 PROCEDURE — 27210995 ZZH RX 272: Performed by: THORACIC SURGERY (CARDIOTHORACIC VASCULAR SURGERY)

## 2017-10-27 PROCEDURE — 4A1239Z MONITORING OF CARDIAC OUTPUT, PERCUTANEOUS APPROACH: ICD-10-PCS | Performed by: ANESTHESIOLOGY

## 2017-10-27 PROCEDURE — 82330 ASSAY OF CALCIUM: CPT | Performed by: THORACIC SURGERY (CARDIOTHORACIC VASCULAR SURGERY)

## 2017-10-27 PROCEDURE — 84132 ASSAY OF SERUM POTASSIUM: CPT | Performed by: STUDENT IN AN ORGANIZED HEALTH CARE EDUCATION/TRAINING PROGRAM

## 2017-10-27 PROCEDURE — P9016 RBC LEUKOCYTES REDUCED: HCPCS | Performed by: CLINICAL NURSE SPECIALIST

## 2017-10-27 PROCEDURE — 82805 BLOOD GASES W/O2 SATURATION: CPT | Performed by: STUDENT IN AN ORGANIZED HEALTH CARE EDUCATION/TRAINING PROGRAM

## 2017-10-27 PROCEDURE — 85610 PROTHROMBIN TIME: CPT | Performed by: ANESTHESIOLOGY

## 2017-10-27 PROCEDURE — 25000132 ZZH RX MED GY IP 250 OP 250 PS 637: Performed by: THORACIC SURGERY (CARDIOTHORACIC VASCULAR SURGERY)

## 2017-10-27 PROCEDURE — 83605 ASSAY OF LACTIC ACID: CPT | Performed by: THORACIC SURGERY (CARDIOTHORACIC VASCULAR SURGERY)

## 2017-10-27 PROCEDURE — 82947 ASSAY GLUCOSE BLOOD QUANT: CPT | Performed by: THORACIC SURGERY (CARDIOTHORACIC VASCULAR SURGERY)

## 2017-10-27 PROCEDURE — 85049 AUTOMATED PLATELET COUNT: CPT | Performed by: THORACIC SURGERY (CARDIOTHORACIC VASCULAR SURGERY)

## 2017-10-27 PROCEDURE — 02Q50ZZ REPAIR ATRIAL SEPTUM, OPEN APPROACH: ICD-10-PCS | Performed by: THORACIC SURGERY (CARDIOTHORACIC VASCULAR SURGERY)

## 2017-10-27 PROCEDURE — 80048 BASIC METABOLIC PNL TOTAL CA: CPT | Performed by: STUDENT IN AN ORGANIZED HEALTH CARE EDUCATION/TRAINING PROGRAM

## 2017-10-27 PROCEDURE — 25000128 H RX IP 250 OP 636: Performed by: THORACIC SURGERY (CARDIOTHORACIC VASCULAR SURGERY)

## 2017-10-27 PROCEDURE — 25000125 ZZHC RX 250: Performed by: NURSE ANESTHETIST, CERTIFIED REGISTERED

## 2017-10-27 PROCEDURE — 85730 THROMBOPLASTIN TIME PARTIAL: CPT | Performed by: STUDENT IN AN ORGANIZED HEALTH CARE EDUCATION/TRAINING PROGRAM

## 2017-10-27 PROCEDURE — 88304 TISSUE EXAM BY PATHOLOGIST: CPT | Performed by: THORACIC SURGERY (CARDIOTHORACIC VASCULAR SURGERY)

## 2017-10-27 PROCEDURE — 4A133B3 MONITORING OF ARTERIAL PRESSURE, PULMONARY, PERCUTANEOUS APPROACH: ICD-10-PCS | Performed by: ANESTHESIOLOGY

## 2017-10-27 PROCEDURE — 00000146 ZZHCL STATISTIC GLUCOSE BY METER IP

## 2017-10-27 PROCEDURE — 83735 ASSAY OF MAGNESIUM: CPT | Performed by: STUDENT IN AN ORGANIZED HEALTH CARE EDUCATION/TRAINING PROGRAM

## 2017-10-27 PROCEDURE — 25000128 H RX IP 250 OP 636: Performed by: STUDENT IN AN ORGANIZED HEALTH CARE EDUCATION/TRAINING PROGRAM

## 2017-10-27 PROCEDURE — 25000132 ZZH RX MED GY IP 250 OP 250 PS 637: Performed by: NURSE ANESTHETIST, CERTIFIED REGISTERED

## 2017-10-27 PROCEDURE — 41000019 ZZH PERA-PERFUSION EACH ADDTL 15 MIN: Performed by: THORACIC SURGERY (CARDIOTHORACIC VASCULAR SURGERY)

## 2017-10-27 PROCEDURE — 85610 PROTHROMBIN TIME: CPT | Performed by: STUDENT IN AN ORGANIZED HEALTH CARE EDUCATION/TRAINING PROGRAM

## 2017-10-27 PROCEDURE — 82803 BLOOD GASES ANY COMBINATION: CPT | Performed by: THORACIC SURGERY (CARDIOTHORACIC VASCULAR SURGERY)

## 2017-10-27 PROCEDURE — 3E043XZ INTRODUCTION OF VASOPRESSOR INTO CENTRAL VEIN, PERCUTANEOUS APPROACH: ICD-10-PCS | Performed by: THORACIC SURGERY (CARDIOTHORACIC VASCULAR SURGERY)

## 2017-10-27 PROCEDURE — 25000128 H RX IP 250 OP 636: Performed by: SURGERY

## 2017-10-27 PROCEDURE — 02HP32Z INSERTION OF MONITORING DEVICE INTO PULMONARY TRUNK, PERCUTANEOUS APPROACH: ICD-10-PCS | Performed by: ANESTHESIOLOGY

## 2017-10-27 PROCEDURE — 25000566 ZZH SEVOFLURANE, EA 15 MIN: Performed by: THORACIC SURGERY (CARDIOTHORACIC VASCULAR SURGERY)

## 2017-10-27 PROCEDURE — 40000275 ZZH STATISTIC RCP TIME EA 10 MIN

## 2017-10-27 PROCEDURE — 99291 CRITICAL CARE FIRST HOUR: CPT | Mod: GC | Performed by: ANESTHESIOLOGY

## 2017-10-27 PROCEDURE — 25800025 ZZH RX 258: Performed by: STUDENT IN AN ORGANIZED HEALTH CARE EDUCATION/TRAINING PROGRAM

## 2017-10-27 PROCEDURE — 27210460 ZZH PUMP APP ADULT PERFUSION: Performed by: THORACIC SURGERY (CARDIOTHORACIC VASCULAR SURGERY)

## 2017-10-27 PROCEDURE — 93503 INSERT/PLACE HEART CATHETER: CPT

## 2017-10-27 PROCEDURE — C9248 INJ, CLEVIDIPINE BUTYRATE: HCPCS | Performed by: THORACIC SURGERY (CARDIOTHORACIC VASCULAR SURGERY)

## 2017-10-27 PROCEDURE — 5A1221Z PERFORMANCE OF CARDIAC OUTPUT, CONTINUOUS: ICD-10-PCS | Performed by: THORACIC SURGERY (CARDIOTHORACIC VASCULAR SURGERY)

## 2017-10-27 PROCEDURE — 02RX0JZ REPLACEMENT OF THORACIC AORTA, ASCENDING/ARCH WITH SYNTHETIC SUBSTITUTE, OPEN APPROACH: ICD-10-PCS | Performed by: THORACIC SURGERY (CARDIOTHORACIC VASCULAR SURGERY)

## 2017-10-27 PROCEDURE — 40000014 ZZH STATISTIC ARTERIAL MONITORING DAILY

## 2017-10-27 PROCEDURE — 83605 ASSAY OF LACTIC ACID: CPT | Performed by: STUDENT IN AN ORGANIZED HEALTH CARE EDUCATION/TRAINING PROGRAM

## 2017-10-27 PROCEDURE — 25000125 ZZHC RX 250

## 2017-10-27 PROCEDURE — 84295 ASSAY OF SERUM SODIUM: CPT | Performed by: THORACIC SURGERY (CARDIOTHORACIC VASCULAR SURGERY)

## 2017-10-27 PROCEDURE — 40000048 ZZH STATISTIC DAILY SWAN MONITORING

## 2017-10-27 PROCEDURE — 84132 ASSAY OF SERUM POTASSIUM: CPT | Performed by: THORACIC SURGERY (CARDIOTHORACIC VASCULAR SURGERY)

## 2017-10-27 PROCEDURE — 41000018 ZZH PER-PERFUSION 1ST 30 MIN: Performed by: THORACIC SURGERY (CARDIOTHORACIC VASCULAR SURGERY)

## 2017-10-27 PROCEDURE — 85730 THROMBOPLASTIN TIME PARTIAL: CPT | Performed by: THORACIC SURGERY (CARDIOTHORACIC VASCULAR SURGERY)

## 2017-10-27 PROCEDURE — 27210794 ZZH OR GENERAL SUPPLY STERILE: Performed by: THORACIC SURGERY (CARDIOTHORACIC VASCULAR SURGERY)

## 2017-10-27 PROCEDURE — 84100 ASSAY OF PHOSPHORUS: CPT | Performed by: STUDENT IN AN ORGANIZED HEALTH CARE EDUCATION/TRAINING PROGRAM

## 2017-10-27 PROCEDURE — 40000170 ZZH STATISTIC PRE-PROCEDURE ASSESSMENT II: Performed by: THORACIC SURGERY (CARDIOTHORACIC VASCULAR SURGERY)

## 2017-10-27 PROCEDURE — C1781 MESH (IMPLANTABLE): HCPCS | Performed by: THORACIC SURGERY (CARDIOTHORACIC VASCULAR SURGERY)

## 2017-10-27 PROCEDURE — 36000074 ZZH SURGERY LEVEL 6 1ST 30 MIN - UMMC: Performed by: THORACIC SURGERY (CARDIOTHORACIC VASCULAR SURGERY)

## 2017-10-27 PROCEDURE — 85027 COMPLETE CBC AUTOMATED: CPT | Performed by: STUDENT IN AN ORGANIZED HEALTH CARE EDUCATION/TRAINING PROGRAM

## 2017-10-27 PROCEDURE — 40000196 ZZH STATISTIC RAPCV CVP MONITORING

## 2017-10-27 PROCEDURE — 25000125 ZZHC RX 250: Performed by: STUDENT IN AN ORGANIZED HEALTH CARE EDUCATION/TRAINING PROGRAM

## 2017-10-27 PROCEDURE — 71010 XR CHEST PORT 1 VW: CPT

## 2017-10-27 PROCEDURE — 85610 PROTHROMBIN TIME: CPT | Performed by: THORACIC SURGERY (CARDIOTHORACIC VASCULAR SURGERY)

## 2017-10-27 PROCEDURE — 85384 FIBRINOGEN ACTIVITY: CPT | Performed by: THORACIC SURGERY (CARDIOTHORACIC VASCULAR SURGERY)

## 2017-10-27 PROCEDURE — C9248 INJ, CLEVIDIPINE BUTYRATE: HCPCS | Performed by: NURSE ANESTHETIST, CERTIFIED REGISTERED

## 2017-10-27 PROCEDURE — 93010 ELECTROCARDIOGRAM REPORT: CPT | Performed by: INTERNAL MEDICINE

## 2017-10-27 PROCEDURE — 36415 COLL VENOUS BLD VENIPUNCTURE: CPT | Performed by: ANESTHESIOLOGY

## 2017-10-27 PROCEDURE — C1768 GRAFT, VASCULAR: HCPCS | Performed by: THORACIC SURGERY (CARDIOTHORACIC VASCULAR SURGERY)

## 2017-10-27 PROCEDURE — 27210447 ZZH PACK CELL SAVER CSP: Performed by: THORACIC SURGERY (CARDIOTHORACIC VASCULAR SURGERY)

## 2017-10-27 PROCEDURE — 37000008 ZZH ANESTHESIA TECHNICAL FEE, 1ST 30 MIN: Performed by: THORACIC SURGERY (CARDIOTHORACIC VASCULAR SURGERY)

## 2017-10-27 PROCEDURE — 93005 ELECTROCARDIOGRAM TRACING: CPT

## 2017-10-27 PROCEDURE — 36000076 ZZH SURGERY LEVEL 6 EA 15 ADDTL MIN - UMMC: Performed by: THORACIC SURGERY (CARDIOTHORACIC VASCULAR SURGERY)

## 2017-10-27 PROCEDURE — P9041 ALBUMIN (HUMAN),5%, 50ML: HCPCS

## 2017-10-27 PROCEDURE — 25000128 H RX IP 250 OP 636

## 2017-10-27 PROCEDURE — 40000344 ZZHCL STATISTIC THAWING COMPONENT: Performed by: THORACIC SURGERY (CARDIOTHORACIC VASCULAR SURGERY)

## 2017-10-27 PROCEDURE — 20000004 ZZH R&B ICU UMMC

## 2017-10-27 PROCEDURE — P9041 ALBUMIN (HUMAN),5%, 50ML: HCPCS | Performed by: STUDENT IN AN ORGANIZED HEALTH CARE EDUCATION/TRAINING PROGRAM

## 2017-10-27 PROCEDURE — 82330 ASSAY OF CALCIUM: CPT | Performed by: STUDENT IN AN ORGANIZED HEALTH CARE EDUCATION/TRAINING PROGRAM

## 2017-10-27 DEVICE — GRAFT PTFE FELT 6X6" 007837: Type: IMPLANTABLE DEVICE | Site: AORTA | Status: FUNCTIONAL

## 2017-10-27 DEVICE — IMPLANTABLE DEVICE: Type: IMPLANTABLE DEVICE | Site: AORTA | Status: FUNCTIONAL

## 2017-10-27 RX ORDER — ALBUMIN, HUMAN INJ 5% 5 %
500-1000 SOLUTION INTRAVENOUS
Status: COMPLETED | OUTPATIENT
Start: 2017-10-27 | End: 2017-10-27

## 2017-10-27 RX ORDER — HYDRALAZINE HYDROCHLORIDE 20 MG/ML
10 INJECTION INTRAMUSCULAR; INTRAVENOUS EVERY 30 MIN PRN
Status: DISCONTINUED | OUTPATIENT
Start: 2017-10-27 | End: 2017-11-01 | Stop reason: HOSPADM

## 2017-10-27 RX ORDER — ALBUTEROL SULFATE 0.83 MG/ML
2.5 SOLUTION RESPIRATORY (INHALATION) EVERY 4 HOURS
Status: DISCONTINUED | OUTPATIENT
Start: 2017-10-27 | End: 2017-10-28

## 2017-10-27 RX ORDER — ASPIRIN 81 MG/1
81 TABLET ORAL DAILY
Status: DISCONTINUED | OUTPATIENT
Start: 2017-10-28 | End: 2017-11-01 | Stop reason: HOSPADM

## 2017-10-27 RX ORDER — POTASSIUM CHLORIDE 750 MG/1
20-40 TABLET, EXTENDED RELEASE ORAL
Status: DISCONTINUED | OUTPATIENT
Start: 2017-10-27 | End: 2017-11-01 | Stop reason: HOSPADM

## 2017-10-27 RX ORDER — AMOXICILLIN 250 MG
1-2 CAPSULE ORAL 2 TIMES DAILY
Status: DISCONTINUED | OUTPATIENT
Start: 2017-10-27 | End: 2017-11-01 | Stop reason: HOSPADM

## 2017-10-27 RX ORDER — PROPOFOL 10 MG/ML
INJECTION, EMULSION INTRAVENOUS PRN
Status: DISCONTINUED | OUTPATIENT
Start: 2017-10-27 | End: 2017-10-27

## 2017-10-27 RX ORDER — POTASSIUM CHLORIDE 29.8 MG/ML
20 INJECTION INTRAVENOUS
Status: DISCONTINUED | OUTPATIENT
Start: 2017-10-27 | End: 2017-11-01 | Stop reason: HOSPADM

## 2017-10-27 RX ORDER — PROPOFOL 10 MG/ML
10-20 INJECTION, EMULSION INTRAVENOUS EVERY 30 MIN PRN
Status: DISCONTINUED | OUTPATIENT
Start: 2017-10-27 | End: 2017-10-27 | Stop reason: CLARIF

## 2017-10-27 RX ORDER — DEXTROSE MONOHYDRATE, SODIUM CHLORIDE, AND POTASSIUM CHLORIDE 50; 1.49; 4.5 G/1000ML; G/1000ML; G/1000ML
INJECTION, SOLUTION INTRAVENOUS CONTINUOUS
Status: DISCONTINUED | OUTPATIENT
Start: 2017-10-27 | End: 2017-10-29

## 2017-10-27 RX ORDER — NALOXONE HYDROCHLORIDE 0.4 MG/ML
.1-.4 INJECTION, SOLUTION INTRAMUSCULAR; INTRAVENOUS; SUBCUTANEOUS
Status: DISCONTINUED | OUTPATIENT
Start: 2017-10-27 | End: 2017-10-27

## 2017-10-27 RX ORDER — POTASSIUM CHLORIDE 1.5 G/1.58G
20-40 POWDER, FOR SOLUTION ORAL
Status: DISCONTINUED | OUTPATIENT
Start: 2017-10-27 | End: 2017-11-01 | Stop reason: HOSPADM

## 2017-10-27 RX ORDER — MEPERIDINE HYDROCHLORIDE 50 MG/ML
12.5-25 INJECTION INTRAMUSCULAR; INTRAVENOUS; SUBCUTANEOUS
Status: DISCONTINUED | OUTPATIENT
Start: 2017-10-27 | End: 2017-10-27 | Stop reason: CLARIF

## 2017-10-27 RX ORDER — SODIUM CHLORIDE 9 MG/ML
INJECTION, SOLUTION INTRAVENOUS CONTINUOUS PRN
Status: DISCONTINUED | OUTPATIENT
Start: 2017-10-27 | End: 2017-10-27

## 2017-10-27 RX ORDER — ONDANSETRON 4 MG/1
4 TABLET, ORALLY DISINTEGRATING ORAL EVERY 6 HOURS PRN
Status: DISCONTINUED | OUTPATIENT
Start: 2017-10-27 | End: 2017-11-01 | Stop reason: HOSPADM

## 2017-10-27 RX ORDER — SODIUM CHLORIDE, SODIUM LACTATE, POTASSIUM CHLORIDE, CALCIUM CHLORIDE 600; 310; 30; 20 MG/100ML; MG/100ML; MG/100ML; MG/100ML
INJECTION, SOLUTION INTRAVENOUS CONTINUOUS
Status: DISCONTINUED | OUTPATIENT
Start: 2017-10-27 | End: 2017-10-27 | Stop reason: HOSPADM

## 2017-10-27 RX ORDER — LIDOCAINE 40 MG/G
CREAM TOPICAL
Status: DISCONTINUED | OUTPATIENT
Start: 2017-10-27 | End: 2017-10-27 | Stop reason: HOSPADM

## 2017-10-27 RX ORDER — CEFAZOLIN SODIUM 1 G/3ML
1 INJECTION, POWDER, FOR SOLUTION INTRAMUSCULAR; INTRAVENOUS SEE ADMIN INSTRUCTIONS
Status: DISCONTINUED | OUTPATIENT
Start: 2017-10-27 | End: 2017-10-27 | Stop reason: HOSPADM

## 2017-10-27 RX ORDER — ONDANSETRON 2 MG/ML
4 INJECTION INTRAMUSCULAR; INTRAVENOUS EVERY 6 HOURS PRN
Status: DISCONTINUED | OUTPATIENT
Start: 2017-10-27 | End: 2017-11-01 | Stop reason: HOSPADM

## 2017-10-27 RX ORDER — CEFAZOLIN SODIUM 2 G/100ML
2 INJECTION, SOLUTION INTRAVENOUS
Status: COMPLETED | OUTPATIENT
Start: 2017-10-27 | End: 2017-10-27

## 2017-10-27 RX ORDER — NICOTINE POLACRILEX 4 MG
15-30 LOZENGE BUCCAL
Status: DISCONTINUED | OUTPATIENT
Start: 2017-10-27 | End: 2017-11-01 | Stop reason: HOSPADM

## 2017-10-27 RX ORDER — SODIUM CHLORIDE, SODIUM LACTATE, POTASSIUM CHLORIDE, CALCIUM CHLORIDE 600; 310; 30; 20 MG/100ML; MG/100ML; MG/100ML; MG/100ML
INJECTION, SOLUTION INTRAVENOUS CONTINUOUS PRN
Status: DISCONTINUED | OUTPATIENT
Start: 2017-10-27 | End: 2017-10-27

## 2017-10-27 RX ORDER — PROPOFOL 10 MG/ML
INJECTION, EMULSION INTRAVENOUS CONTINUOUS PRN
Status: DISCONTINUED | OUTPATIENT
Start: 2017-10-27 | End: 2017-10-27

## 2017-10-27 RX ORDER — FENTANYL CITRATE 50 UG/ML
INJECTION, SOLUTION INTRAMUSCULAR; INTRAVENOUS PRN
Status: DISCONTINUED | OUTPATIENT
Start: 2017-10-27 | End: 2017-10-27

## 2017-10-27 RX ORDER — LIDOCAINE 40 MG/G
CREAM TOPICAL
Status: DISCONTINUED | OUTPATIENT
Start: 2017-10-27 | End: 2017-11-01 | Stop reason: HOSPADM

## 2017-10-27 RX ORDER — PROPOFOL 10 MG/ML
5-75 INJECTION, EMULSION INTRAVENOUS CONTINUOUS
Status: DISCONTINUED | OUTPATIENT
Start: 2017-10-27 | End: 2017-10-27 | Stop reason: CLARIF

## 2017-10-27 RX ORDER — LIDOCAINE HYDROCHLORIDE 20 MG/ML
INJECTION, SOLUTION INFILTRATION; PERINEURAL PRN
Status: DISCONTINUED | OUTPATIENT
Start: 2017-10-27 | End: 2017-10-27

## 2017-10-27 RX ORDER — EPHEDRINE SULFATE 50 MG/ML
INJECTION, SOLUTION INTRAMUSCULAR; INTRAVENOUS; SUBCUTANEOUS PRN
Status: DISCONTINUED | OUTPATIENT
Start: 2017-10-27 | End: 2017-10-27

## 2017-10-27 RX ORDER — CEFAZOLIN SODIUM 2 G/100ML
2 INJECTION, SOLUTION INTRAVENOUS EVERY 8 HOURS
Status: COMPLETED | OUTPATIENT
Start: 2017-10-27 | End: 2017-10-28

## 2017-10-27 RX ORDER — DEXTROSE MONOHYDRATE 25 G/50ML
25-50 INJECTION, SOLUTION INTRAVENOUS
Status: DISCONTINUED | OUTPATIENT
Start: 2017-10-27 | End: 2017-11-01 | Stop reason: HOSPADM

## 2017-10-27 RX ORDER — ACETAMINOPHEN 325 MG/1
975 TABLET ORAL EVERY 8 HOURS
Status: DISCONTINUED | OUTPATIENT
Start: 2017-10-27 | End: 2017-10-30

## 2017-10-27 RX ORDER — NALOXONE HYDROCHLORIDE 0.4 MG/ML
.1-.4 INJECTION, SOLUTION INTRAMUSCULAR; INTRAVENOUS; SUBCUTANEOUS
Status: DISCONTINUED | OUTPATIENT
Start: 2017-10-27 | End: 2017-11-01 | Stop reason: HOSPADM

## 2017-10-27 RX ORDER — ALBUTEROL SULFATE 90 UG/1
AEROSOL, METERED RESPIRATORY (INHALATION) PRN
Status: DISCONTINUED | OUTPATIENT
Start: 2017-10-27 | End: 2017-10-27

## 2017-10-27 RX ORDER — PROTAMINE SULFATE 10 MG/ML
INJECTION, SOLUTION INTRAVENOUS PRN
Status: DISCONTINUED | OUTPATIENT
Start: 2017-10-27 | End: 2017-10-27

## 2017-10-27 RX ORDER — HEPARIN SODIUM 1000 [USP'U]/ML
INJECTION, SOLUTION INTRAVENOUS; SUBCUTANEOUS PRN
Status: DISCONTINUED | OUTPATIENT
Start: 2017-10-27 | End: 2017-10-27

## 2017-10-27 RX ORDER — MAGNESIUM SULFATE HEPTAHYDRATE 40 MG/ML
4 INJECTION, SOLUTION INTRAVENOUS EVERY 4 HOURS PRN
Status: DISCONTINUED | OUTPATIENT
Start: 2017-10-27 | End: 2017-11-01 | Stop reason: HOSPADM

## 2017-10-27 RX ORDER — ACETAMINOPHEN 325 MG/1
650 TABLET ORAL EVERY 4 HOURS PRN
Status: DISCONTINUED | OUTPATIENT
Start: 2017-10-30 | End: 2017-11-01 | Stop reason: HOSPADM

## 2017-10-27 RX ORDER — HYDROMORPHONE HYDROCHLORIDE 1 MG/ML
.3-.5 INJECTION, SOLUTION INTRAMUSCULAR; INTRAVENOUS; SUBCUTANEOUS
Status: DISCONTINUED | OUTPATIENT
Start: 2017-10-27 | End: 2017-11-01 | Stop reason: HOSPADM

## 2017-10-27 RX ORDER — MUPIROCIN 20 MG/G
0.5 OINTMENT TOPICAL 2 TIMES DAILY
Status: DISCONTINUED | OUTPATIENT
Start: 2017-10-27 | End: 2017-10-28

## 2017-10-27 RX ORDER — POTASSIUM CL/LIDO/0.9 % NACL 10MEQ/0.1L
10 INTRAVENOUS SOLUTION, PIGGYBACK (ML) INTRAVENOUS
Status: DISCONTINUED | OUTPATIENT
Start: 2017-10-27 | End: 2017-11-01 | Stop reason: HOSPADM

## 2017-10-27 RX ORDER — OXYCODONE HYDROCHLORIDE 5 MG/1
5-10 TABLET ORAL
Status: DISCONTINUED | OUTPATIENT
Start: 2017-10-27 | End: 2017-10-28

## 2017-10-27 RX ORDER — POTASSIUM CHLORIDE 7.45 MG/ML
10 INJECTION INTRAVENOUS
Status: DISCONTINUED | OUTPATIENT
Start: 2017-10-27 | End: 2017-11-01 | Stop reason: HOSPADM

## 2017-10-27 RX ADMIN — PROTAMINE SULFATE 230 MG: 10 INJECTION, SOLUTION INTRAVENOUS at 11:55

## 2017-10-27 RX ADMIN — ROCURONIUM BROMIDE 20 MG: 10 INJECTION INTRAVENOUS at 12:10

## 2017-10-27 RX ADMIN — FENTANYL CITRATE 250 MCG: 50 INJECTION, SOLUTION INTRAMUSCULAR; INTRAVENOUS at 08:43

## 2017-10-27 RX ADMIN — Medication 1.5 G: at 08:19

## 2017-10-27 RX ADMIN — PROPOFOL 50 MG: 10 INJECTION, EMULSION INTRAVENOUS at 08:40

## 2017-10-27 RX ADMIN — CLEVIDIPINE 0.25 MG: 0.5 EMULSION INTRAVENOUS at 08:37

## 2017-10-27 RX ADMIN — CLEVIDIPINE 0.25 MG: 0.5 EMULSION INTRAVENOUS at 09:00

## 2017-10-27 RX ADMIN — CLEVIDIPINE 0.25 MG: 0.5 EMULSION INTRAVENOUS at 08:43

## 2017-10-27 RX ADMIN — IPRATROPIUM BROMIDE 0.5 MG: 0.5 SOLUTION RESPIRATORY (INHALATION) at 20:00

## 2017-10-27 RX ADMIN — FENTANYL CITRATE 50 MCG: 50 INJECTION, SOLUTION INTRAMUSCULAR; INTRAVENOUS at 12:53

## 2017-10-27 RX ADMIN — ROCURONIUM BROMIDE 20 MG: 10 INJECTION INTRAVENOUS at 13:09

## 2017-10-27 RX ADMIN — ROCURONIUM BROMIDE 50 MG: 10 INJECTION INTRAVENOUS at 08:50

## 2017-10-27 RX ADMIN — CEFAZOLIN SODIUM 1 G: 2 INJECTION, SOLUTION INTRAVENOUS at 10:17

## 2017-10-27 RX ADMIN — MUPIROCIN CALCIUM 1 G: 20 OINTMENT TOPICAL at 05:51

## 2017-10-27 RX ADMIN — PHENYLEPHRINE HYDROCHLORIDE 50 MCG: 10 INJECTION, SOLUTION INTRAMUSCULAR; INTRAVENOUS; SUBCUTANEOUS at 12:06

## 2017-10-27 RX ADMIN — CLEVIDIPINE 0.25 MG: 0.5 EMULSION INTRAVENOUS at 09:13

## 2017-10-27 RX ADMIN — HEPARIN SODIUM 38000 UNITS: 1000 INJECTION, SOLUTION INTRAVENOUS; SUBCUTANEOUS at 09:20

## 2017-10-27 RX ADMIN — FENTANYL CITRATE 100 MCG: 50 INJECTION, SOLUTION INTRAMUSCULAR; INTRAVENOUS at 09:22

## 2017-10-27 RX ADMIN — HYDRALAZINE HYDROCHLORIDE 10 MG: 20 INJECTION INTRAMUSCULAR; INTRAVENOUS at 16:31

## 2017-10-27 RX ADMIN — FENTANYL CITRATE 50 MCG: 50 INJECTION, SOLUTION INTRAMUSCULAR; INTRAVENOUS at 13:11

## 2017-10-27 RX ADMIN — HYDROMORPHONE HYDROCHLORIDE 0.5 MG: 1 INJECTION, SOLUTION INTRAMUSCULAR; INTRAVENOUS; SUBCUTANEOUS at 14:00

## 2017-10-27 RX ADMIN — MIDAZOLAM HYDROCHLORIDE 1 MG: 1 INJECTION, SOLUTION INTRAMUSCULAR; INTRAVENOUS at 13:32

## 2017-10-27 RX ADMIN — CLEVIDIPINE 0.25 MG: 0.5 EMULSION INTRAVENOUS at 09:14

## 2017-10-27 RX ADMIN — FENTANYL CITRATE 50 MCG: 50 INJECTION, SOLUTION INTRAMUSCULAR; INTRAVENOUS at 13:40

## 2017-10-27 RX ADMIN — FENTANYL CITRATE 150 MCG: 50 INJECTION, SOLUTION INTRAMUSCULAR; INTRAVENOUS at 11:59

## 2017-10-27 RX ADMIN — PANTOPRAZOLE SODIUM 40 MG: 40 INJECTION, POWDER, FOR SOLUTION INTRAVENOUS at 15:13

## 2017-10-27 RX ADMIN — CLEVIDIPINE 0.5 MG: 0.5 EMULSION INTRAVENOUS at 09:20

## 2017-10-27 RX ADMIN — PROPOFOL 50 MCG/KG/MIN: 10 INJECTION, EMULSION INTRAVENOUS at 13:30

## 2017-10-27 RX ADMIN — EPINEPHRINE 0.03 MCG/KG/MIN: 1 INJECTION INTRAMUSCULAR; INTRAVENOUS; SUBCUTANEOUS at 10:07

## 2017-10-27 RX ADMIN — MIDAZOLAM HYDROCHLORIDE 1 MG: 1 INJECTION, SOLUTION INTRAMUSCULAR; INTRAVENOUS at 11:47

## 2017-10-27 RX ADMIN — MUPIROCIN 0.5 G: 20 OINTMENT TOPICAL at 20:28

## 2017-10-27 RX ADMIN — PROPOFOL 20 MG: 10 INJECTION, EMULSION INTRAVENOUS at 08:47

## 2017-10-27 RX ADMIN — ROCURONIUM BROMIDE 100 MG: 10 INJECTION INTRAVENOUS at 07:35

## 2017-10-27 RX ADMIN — FENTANYL CITRATE 250 MCG: 50 INJECTION, SOLUTION INTRAMUSCULAR; INTRAVENOUS at 07:33

## 2017-10-27 RX ADMIN — MIDAZOLAM HYDROCHLORIDE 1 MG: 1 INJECTION, SOLUTION INTRAMUSCULAR; INTRAVENOUS at 11:50

## 2017-10-27 RX ADMIN — PHENYLEPHRINE HYDROCHLORIDE 100 MCG: 10 INJECTION, SOLUTION INTRAMUSCULAR; INTRAVENOUS; SUBCUTANEOUS at 09:35

## 2017-10-27 RX ADMIN — SODIUM CHLORIDE: 9 INJECTION, SOLUTION INTRAVENOUS at 08:00

## 2017-10-27 RX ADMIN — POTASSIUM CHLORIDE 10 MEQ: 10 INJECTION, SOLUTION INTRAVENOUS at 15:00

## 2017-10-27 RX ADMIN — SODIUM CHLORIDE 125 MG/HR: 9 INJECTION, SOLUTION INTRAVENOUS at 08:45

## 2017-10-27 RX ADMIN — CLEVIDIPINE 3 MG/HR: 0.5 EMULSION INTRAVENOUS at 09:18

## 2017-10-27 RX ADMIN — SODIUM CHLORIDE, POTASSIUM CHLORIDE, SODIUM LACTATE AND CALCIUM CHLORIDE: 600; 310; 30; 20 INJECTION, SOLUTION INTRAVENOUS at 07:20

## 2017-10-27 RX ADMIN — CEFAZOLIN SODIUM 2 G: 2 INJECTION, SOLUTION INTRAVENOUS at 08:19

## 2017-10-27 RX ADMIN — CEFAZOLIN SODIUM 2 G: 2 INJECTION, SOLUTION INTRAVENOUS at 20:28

## 2017-10-27 RX ADMIN — LIDOCAINE HYDROCHLORIDE 100 MG: 20 INJECTION, SOLUTION INFILTRATION; PERINEURAL at 07:34

## 2017-10-27 RX ADMIN — ALBUMIN (HUMAN) 500 ML: 12.5 SOLUTION INTRAVENOUS at 20:26

## 2017-10-27 RX ADMIN — ROCURONIUM BROMIDE 30 MG: 10 INJECTION INTRAVENOUS at 11:28

## 2017-10-27 RX ADMIN — CLEVIDIPINE 0.25 MG: 0.5 EMULSION INTRAVENOUS at 08:53

## 2017-10-27 RX ADMIN — MIDAZOLAM HYDROCHLORIDE 2 MG: 1 INJECTION, SOLUTION INTRAMUSCULAR; INTRAVENOUS at 07:20

## 2017-10-27 RX ADMIN — FENTANYL CITRATE 50 MCG: 50 INJECTION, SOLUTION INTRAMUSCULAR; INTRAVENOUS at 12:47

## 2017-10-27 RX ADMIN — SODIUM CHLORIDE 1 G: 9 INJECTION, SOLUTION INTRAVENOUS at 08:22

## 2017-10-27 RX ADMIN — ONDANSETRON 4 MG: 2 INJECTION INTRAMUSCULAR; INTRAVENOUS at 17:44

## 2017-10-27 RX ADMIN — ROCURONIUM BROMIDE 50 MG: 10 INJECTION INTRAVENOUS at 09:43

## 2017-10-27 RX ADMIN — HUMAN INSULIN 1 UNITS/HR: 100 INJECTION, SOLUTION SUBCUTANEOUS at 15:20

## 2017-10-27 RX ADMIN — NOREPINEPHRINE BITARTRATE 0.02 MCG/KG/MIN: 1 INJECTION INTRAVENOUS at 09:48

## 2017-10-27 RX ADMIN — POTASSIUM CHLORIDE 10 MEQ: 10 INJECTION, SOLUTION INTRAVENOUS at 16:20

## 2017-10-27 RX ADMIN — CEFAZOLIN SODIUM 1 G: 2 INJECTION, SOLUTION INTRAVENOUS at 12:07

## 2017-10-27 RX ADMIN — ALBUTEROL SULFATE 6 PUFF: 90 AEROSOL, METERED RESPIRATORY (INHALATION) at 12:18

## 2017-10-27 RX ADMIN — PROPOFOL 100 MG: 10 INJECTION, EMULSION INTRAVENOUS at 07:34

## 2017-10-27 RX ADMIN — PROPOFOL 30 MCG/KG/MIN: 10 INJECTION, EMULSION INTRAVENOUS at 14:00

## 2017-10-27 RX ADMIN — ALBUTEROL SULFATE 2.5 MG: 2.5 SOLUTION RESPIRATORY (INHALATION) at 20:00

## 2017-10-27 RX ADMIN — PHENYLEPHRINE HYDROCHLORIDE 50 MCG: 10 INJECTION, SOLUTION INTRAMUSCULAR; INTRAVENOUS; SUBCUTANEOUS at 08:12

## 2017-10-27 RX ADMIN — FENTANYL CITRATE 150 MCG: 50 INJECTION, SOLUTION INTRAMUSCULAR; INTRAVENOUS at 08:03

## 2017-10-27 RX ADMIN — POTASSIUM CHLORIDE, DEXTROSE MONOHYDRATE AND SODIUM CHLORIDE: 150; 5; 450 INJECTION, SOLUTION INTRAVENOUS at 14:00

## 2017-10-27 RX ADMIN — FENTANYL CITRATE 100 MCG: 50 INJECTION, SOLUTION INTRAMUSCULAR; INTRAVENOUS at 08:34

## 2017-10-27 RX ADMIN — PROCHLORPERAZINE EDISYLATE 10 MG: 5 INJECTION INTRAMUSCULAR; INTRAVENOUS at 19:56

## 2017-10-27 RX ADMIN — PROPOFOL 20 MG: 10 INJECTION, EMULSION INTRAVENOUS at 07:36

## 2017-10-27 RX ADMIN — Medication 5 MG: at 08:12

## 2017-10-27 NOTE — ANESTHESIA PROCEDURE NOTES
SHIVANI Probe Insertion Note  Probe Inserted by: CANDELARIO NOVAK   Insertion method: SHIVANI probe easily inserted   Bite block used:   Yes      Probe type:  Adult 3D   Insertion complications: No complications.      Billing for SHIVANI report is being done by Anesthesiology

## 2017-10-27 NOTE — PROGRESS NOTES
CLINICAL NUTRITION SERVICES - BRIEF NOTE    Received provider consult for nutrition education with comments post op cardiovascular surgery (automatic consult on post-op order set). S/p Median Sternotomy, Cardiopulmonary bypass, Ascending Aorta Aneurysm Repair on 10/27. Nutrition education to be completed as able/appropriate (as pt s/p CABG and/or initial VAD).    RD will follow per LOS protocol or if re-consulted.     Yolanda Tabares RDN, LD  Pgr: [id number]

## 2017-10-27 NOTE — PROGRESS NOTES
Pt arrived to CVICU at 1345, vitals stable upon arrival, Epi turned off. Extubated at 1720, tolerated well. Pt now A&O x4, afebrile, denies pain. SR 70-80, BP stable without vasoactive gtts. SpO2 >95% on 5L NC. Mckenna in place, good UOP. CT x3 to -20 suction, output within normal limits. Gardner @ 52cm, see flowsheet for hemodynamics. MSI dressing CDI. K+ of 3.8 replaced, to be rechecked this evening. Titrating insulin gtt per protocol.  at bedside. Vitals stable, will monitor.

## 2017-10-27 NOTE — IP AVS SNAPSHOT
[address] [address]    Phone:  [phone]                                       After Visit Summary   10/27/2017    Georgie Patino    MRN: [id number]           After Visit Summary Signature Page     I have received my discharge instructions, and my questions have been answered. I have discussed any challenges I see with this plan with the nurse or doctor.    ..........................................................................................................................................  Patient/Patient Representative Signature      ..........................................................................................................................................  Patient Representative Print Name and Relationship to Patient    ..................................................               ................................................  Date                                            Time    ..........................................................................................................................................  Reviewed by Signature/Title    ...................................................              ..............................................  Date                                                            Time

## 2017-10-27 NOTE — ANESTHESIA PROCEDURE NOTES
Arterial Line Procedure Note  Staff:     Anesthesiologist:  CANDELARIO NOVAK    Resident/CRNA:  BRYON ANN    Arterial line performed by resident/CRNA in presence of a teaching physician    Location: In OR After Induction  Procedure Start/Stop Times:     patient identified, IV checked, site marked, risks and benefits discussed, informed consent, monitors and equipment checked, pre-op evaluation and at physician/surgeon's request      Correct Patient: Yes      Correct Position: Yes      Correct Site: Yes      Correct Procedure: Yes      Correct Laterality:  Yes    Site Marked:  Yes  Line Placement:     Procedure:  Arterial Line    Insertion Site:  Radial    Insertion laterality:  Right    Skin Prep: Chloraprep      Patient Prep: patient draped, mask and sterile gloves      Local skin infiltration:  None    Ultrasound Guided?: Yes      Artery evaluated via ultrasound confirming patency.   Using realtime imaging, the artery was punctured and the needle was observed entering the artery.      A permanent image is NOT entered into the patient's record.      Catheter size:  20 gauge, 12 cm    Cath secured with: suture      Dressing:  Tegaderm    Complications:  None obvious    Arterial waveform: Yes      IBP within 10% of NIBP: Yes

## 2017-10-27 NOTE — ANESTHESIA CARE TRANSFER NOTE
Patient: Georgie SEVERINO Peel    Procedure(s):  Median Sternotomy, Cardiopulmonary bypass, Ascending Aorta Aneurysm Repair using Hemashield Platinum Woven Double Velour Graft 34cm X 30cm. - Wound Class: I-Clean    Diagnosis: aorta ascending aneurysm   Diagnosis Additional Information: No value filed.    Anesthesia Type:   No value filed.     Note:  Airway :ETT  Patient transferred to:Medical/Surgical Unit  Comments: Vss.  Vent 450 18 50% +5Handoff Report: Identifed the Patient, Identified the Reponsible Provider, Reviewed the pertinent medical history, Discussed the surgical course, Reviewed Intra-OP anesthesia mangement and issues during anesthesia, Set expectations for post-procedure period and Allowed opportunity for questions and acknowledgement of understanding      Vitals: (Last set prior to Anesthesia Care Transfer)    CRNA VITALS  10/27/2017 1322 - 10/27/2017 1352      10/27/2017             Resp Rate (observed): (!)  1    Resp Rate (set): 10                Electronically Signed By: TESHA Britt CRNA  October 27, 2017  1:52 PM

## 2017-10-27 NOTE — IP AVS SNAPSHOT
MRN:[id number]                      After Visit Summary   10/27/2017    Georgie Patino    MRN: [id number]           Thank you!     Thank you for choosing [facility] for your care. Our goal is always to provide you with excellent care. Hearing back from our patients is one way we can continue to improve our services. Please take a few minutes to complete the written survey that you may receive in the mail after you visit with us. Thank you!        Patient Information     Date Of Birth          1955        Designated Caregiver       Most Recent Value    Caregiver    Will someone help with your care after discharge? yes    Name of designated caregiver Noel Patino    Phone number of caregiver [phone]    Caregiver address [address] [place] [address], [address]      About your hospital stay     You were admitted on:  October 27, 2017 You last received care in the:  [facility]    You were discharged on:  November 1, 2017        Reason for your hospital stay       You had your aortic aneurysm repaired                  Who to Call     For medical emergencies, please call 911.  For non-urgent questions about your medical care, please call your primary care provider or clinic, [phone]  For questions related to your surgery, please call your surgery clinic        Attending Provider     Provider Rubio Hightower MD Thoracic Diseases       Primary Care Provider Office Phone # Fax #    Marlys TESHA Nguyen [facility] [phone]       When to contact your care team       Call your primary doctor or surgery team if you have any of the following: temperature greater than 101 F,  increased shortness of breath, increased drainage, increased swelling or increased pain. Please call if you have any weight gain more than 3 pounds overnight or 5 pounds in a week.                  After Care Instructions     Activity       Activity as tolerated with sternal  precautions. No lifting more than 10 pounds for first 6 weeks, then no lifting more than 20 pounds for an additional 6 weeks. Avoid lifting arms above shoulders. After these 12 weeks, activity as tolerated with pain. Avoid strenuous activities such as bowling, vacuuming, raking, shoveling, golf or tennis for 12 weeks after your surgery.    No driving for 4 weeks. If you continue to take narcotics after 4 weeks, no driving unit you are off narcotics.            Diet       Follow this diet upon discharge: Orders Placed This Encounter      Snacks/Supplements Adult: Ensure Plus (Adult); Between Meals      Advance Diet as Tolerated: Regular Diet Adult            Monitor and record       blood pressure daily  pulse daily  weight every day            Wound care and dressings       You have dissolvable sutures under your skin that do not need to be removed.  Pat wound dressing dry after showers.  Skin glue will eventually fall off in 1-2 weeks.     Keep wound clean and dry, showers are okay after discharge, but don't let spray hit directly on incision. No baths or swimming for 1 month.  Clean wounds twice a day for 2 weeks with microklenz spray if available. Cover chest tube sites with gauze until they stop draining, then leave open.                  Follow-up Appointments     Adult [facility] Follow-up and recommended labs and tests       Follow up with your primary care provider, Marlys Wade, within 3-5 days of discharge to evaluate after surgery and for hospital follow- up. The following labs/tests are recommended: CBC, BMP.    Follow up with cardiothoracic surgery within 1-2 weeks of discharge. This follow-up appointment will be listed on your after visit summary paperwork when you leave the hospital. If you have questions, call [phone].  Follow up with your primary cardiologist in 4-6 weeks from surgery to evaluate medication change, to evaluate treatment change, to evaluate after surgery and for  hospital follow- up. The following labs/tests are recommended: echo.                  Your next 10 appointments already scheduled     Nov 13, 2017  2:45 PM CST   LAB with [facility]   [facility] [place] ([facility] [place])    [address]  [place] [address]   [phone]           Please do not eat 10-12 hours before your appointment if you are coming in fasting for labs on lipids, cholesterol, or glucose (sugar). This does not apply to pregnant women. Water, hot tea and black coffee (with nothing added) are okay. Do not drink other fluids, diet soda or chew gum.            Nov 14, 2017  2:00 PM CST   (Arrive by 1:45 PM)   Return Visit with  CVTS   [facility] ([facility]    [address]  3rd [address]  [facility] [id number] [phone]              Additional Services     CARDIAC REHAB REFERRAL       Your provider has referred you to:   [facility]  [address] [facility] [address] [place]   [place] [address]   [phone]                  Further instructions from your care team       [facility]      AFTER YOU GO HOME FROM YOUR HEART SURGERY    Avoid lifting anything greater than ten pounds for 6 weeks after surgery and then less than 20 pounds for an additional 6 weeks.    No driving for 4 weeks after surgery or while on pain medication. If you had a minimally invasive procedure, you may drive after three weeks if not taking pain medication.      Avoid strenuous activities such as bowling, vacuuming, raking, shoveling, golf or tennis for 12 weeks after your surgery. It is okay to resume sex if you feel comfortable in doing so. You may have to try different positions with your partner.     Splint your chest incision by hugging a pillow or bringing your arms across your chest when coughing or sneezing. Avoid pushing off with your arms when getting up for the first month if you have had your sternum opened.    Shower or wash your  incisions daily with soap and water (or as instructed), pat dry. Keep wound clean and dry, showers are okay after discharge, but don't let spray hit directly on incision. No baths or swimming for 1 month.  Clean wounds twice a day for 2 weeks with microklenz spray if available. Cover chest tube sites with gauze until they stop draining, then leave open. It is not abnormal for chest tube sites to drain yellowish/clear fluid for up to 2-3 weeks after surgery.   Watch for signs of infection: increased redness, tenderness, warmth or any drainage that appears infected (pus like) or is persistent.  Also a temperature > 100.5 F or chills. Call your surgeon or primary care provider's office immediately. Remove any skin glue left on incisions after 10 days. This will not affect your incision and can speed up healing.    Exercise is very important in your recovery. Please follow the guidelines set up for you in your cardiac rehab classes at the hospital. If outpatient cardiac rehab was ordered for you, we highly recommend you participate. If you have problems arranging your cardiac rehab, please call [phone].     Avoid sitting for prolonged periods of time, try to walk every hour during the day. If you have a leg incision, elevate your leg often when you are not walking.    Check your weight when you get home from the hospital and continue to check it daily through your recovery for at least a month. If you notice a weight gain of 2-3 pounds in a week, notify your primary care physician, cardiologist or surgeon.    Bowel activity may be slow after surgery. If necessary, you may take an over the counter laxative such as Milk of Magnesia or Miralax. You may have stool softeners prescribed (docusate sodium, Senokot). We recommend using stool softeners while using narcotics for pain (oxycodone/percocet, hydrocodone/vicodin).      DENTAL VISITS AFTER SURGERY  If you have had your heart valve repaired or replaced, we do not  recommend having any dental work done for 6 months and you will need to take an antibiotic prior to dental visits from now on.  Please notify your dentist before any procedure for the proper treatment needed. The antibiotic is taken by mouth one hour prior to visit. This includes routine cleanings.    DO NOT SMOKE.  IF YOU NEED HELP QUITTING, PLEASE TALK WITH YOUR CARDIOLOGIST OR PRIMARY DOCTOR.    REGARDING PRESCRIPTION REFILLS.  If you need a refill on your pain medication contact us.  All other medications will be adjusted, discontinued and re-filled by your primary care physician and/or your cardiologist as they were prior to your surgery. We have given you enough for one to three month with possibly one refill.    POST-OPERATIVE CLINIC VISITS  You have a follow up visit with CVTS Surgery Advance Care Practitioners on 11/14 at 2 pm at the [facility]. This will be listed on your discharge paperwork. Please follow-up with your PCP in 3-5 days to have labs checked (CBC).  You will then return to the care of your cardiologist.   It is important to call for an appointment to see your cardiologist in 4-6 weeks after surgery and your primary care physician in 3-5 weeks after discharge. If there is a need to return to see CT Surgery please call our  at [phone].    SURGICAL QUESTIONS  Please call Matilda Obando with any surgical questions, her phone number is listed below.  She can assist you with your needs and contact other surgery care team members as indicated.    For general questions or concerns, please call the Cardiothoracic Surgery Department at [phone] 8-4:30 M-F.   On weekends or after hours, please call [phone] and ask the  to   page the Cardiothoracic Surgery fellow on call.      Thank you,    Your Cardiothoracic Surgery Team  Matilda Obando RN Care Coordinator-  [phone]   Beatrice Collazo  "PARISA                        Pending Results     Date and Time Order Name Status Description    10/27/2017 0625 Plasma prepare order unit In process             Statement of Approval     Ordered          17 1136  I have reviewed and agree with all the recommendations and orders detailed in this document.  EFFECTIVE NOW     Approved and electronically signed by:  Mac Jett PA             Admission Information     Date & Time Provider Department Dept. Phone    10/27/2017 Rubio Piña MD [address] [facility] [phone]      Your Vitals Were     Blood Pressure Pulse Temperature Respirations Height Weight    141/79 (BP Location: Right arm) 76 97.9  F (36.6  C) (Oral) 18 1.676 m (5' 6\") 92 kg (202 lb 14.4 oz)    Pulse Oximetry BMI (Body Mass Index)                95% 32.75 kg/m2          [employer] Information     [employer] lets you send messages to your doctor, view your test results, renew your prescriptions, schedule appointments and more. To sign up, go to [email or web address] . Click on \"Log in\" on the left side of the screen, which will take you to the Welcome page. Then click on \"Sign up Now\" on the right side of the page.     You will be asked to enter the access code listed below, as well as some personal information. Please follow the directions to create your username and password.     Your access code is: [email or web address]  Expires: 2017  4:05 PM     Your access code will  in 90 days. If you need help or a new code, please call your [place] clinic or [phone].        Care EveryWhere ID     This is your Care EveryWhere ID. This could be used by other organizations to access your [facility] medical records  [id number]        Equal Access to Services     CARLOINA LOVELL : Renata Hobson, evy fernandes, edie barragan, humza garza. So [facility] [phone].    ATENCIÓN: Si habla español, tiene a thomas disposición " servicios gratuitos de asistencia lingüística. Igor brandon [phone].    We comply with applicable federal civil rights laws and [place] laws. We do not discriminate on the basis of race, color, national origin, age, disability, sex, sexual orientation, or gender identity.               Review of your medicines      START taking        Dose / Directions    acetaminophen 325 MG tablet   Commonly known as:  TYLENOL   Used for:  S/P ascending aortic aneurysm repair, Acute post-operative pain        Dose:  650 mg   Take 2 tablets (650 mg) by mouth every 4 hours as needed for other (surgical pain)   Quantity:  100 tablet   Refills:  0       aspirin 81 MG EC tablet   Used for:  S/P ascending aortic aneurysm repair        Dose:  81 mg   Take 1 tablet (81 mg) by mouth daily   Quantity:  30 tablet   Refills:  0       HYDROmorphone 2 MG tablet   Commonly known as:  DILAUDID   Used for:  S/P ascending aortic aneurysm repair, Acute post-operative pain        Dose:  2-4 mg   Take 1-2 tablets (2-4 mg) by mouth every 3 hours as needed for moderate to severe pain   Quantity:  80 tablet   Refills:  0       melatonin 3 MG tablet   Used for:  Ascending aortic aneurysm (H), S/P ascending aortic aneurysm repair        Dose:  3 mg   Take 1 tablet (3 mg) by mouth nightly as needed for sleep   Quantity:  30 tablet   Refills:  0       pantoprazole 40 MG EC tablet   Commonly known as:  PROTONIX   Used for:  S/P ascending aortic aneurysm repair        Dose:  40 mg   Take 1 tablet (40 mg) by mouth every morning   Quantity:  30 tablet   Refills:  0       polyethylene glycol Packet   Commonly known as:  MIRALAX/GLYCOLAX   Used for:  S/P ascending aortic aneurysm repair        Dose:  17 g   Take 17 g by mouth daily   Quantity:  7 packet   Refills:  0       senna-docusate 8.6-50 MG per tablet   Commonly known as:  SENOKOT-S;PERICOLACE   Used for:  Acute post-operative pain, S/P ascending aortic aneurysm repair        Dose:  1-2 tablet   Take 1-2  tablets by mouth 2 times daily as needed for constipation   Quantity:  100 tablet   Refills:  0         CONTINUE these medicines which may have CHANGED, or have new prescriptions. If we are uncertain of the size of tablets/capsules you have at home, strength may be listed as something that might have changed.        Dose / Directions    atorvastatin 40 MG tablet   Commonly known as:  LIPITOR   This may have changed:  when to take this   Used for:  CARDIOVASCULAR SCREENING; LDL GOAL LESS THAN 160        Dose:  40 mg   Take 1 tablet (40 mg) by mouth daily   Quantity:  90 tablet   Refills:  3         CONTINUE these medicines which have NOT CHANGED        Dose / Directions    ALPRAZolam 0.25 MG tablet   Commonly known as:  XANAX   Used for:  Anxiety        Dose:  0.25 mg   Take 1 tablet (0.25 mg) by mouth 2 times daily as needed for anxiety   Quantity:  30 tablet   Refills:  0       metoprolol 25 MG tablet   Commonly known as:  LOPRESSOR   Used for:  Palpitations        Dose:  25 mg   Take 1 tablet (25 mg) by mouth 2 times daily   Quantity:  60 tablet   Refills:  3       nicotine polacrilex 2 MG gum   Commonly known as:  NICORETTE   Used for:  History of tobacco use        Dose:  2 mg   Place 1 each (2 mg) inside cheek as needed for smoking cessation Place 1 each inside cheek as needed for smoking cessation   Quantity:  100 tablet   Refills:  5         STOP taking     ADVIL PO           lisinopril 10 MG tablet   Commonly known as:  PRINIVIL/ZESTRIL           ZANTAC PO                Where to get your medicines      These medications were sent to [facility] - [place], [place] - [address]  [address] [facility] [address]     Phone:  [phone]     acetaminophen 325 MG tablet    aspirin 81 MG EC tablet    melatonin 3 MG tablet    pantoprazole 40 MG EC tablet    polyethylene glycol Packet    senna-docusate 8.6-50 MG per tablet         Some of these will need a paper prescription and  others can be bought over the counter. Ask your nurse if you have questions.     Bring a paper prescription for each of these medications     HYDROmorphone 2 MG tablet                Protect others around you: Learn how to safely use, store and throw away your medicines at www.disposemymeds.org.             Medication List: This is a list of all your medications and when to take them. Check marks below indicate your daily home schedule. Keep this list as a reference.      Medications           Morning Afternoon Evening Bedtime As Needed    acetaminophen 325 MG tablet   Commonly known as:  TYLENOL   Take 2 tablets (650 mg) by mouth every 4 hours as needed for other (surgical pain)   Last time this was given:  650 mg on 11/1/2017  8:21 AM                                   ALPRAZolam 0.25 MG tablet   Commonly known as:  XANAX   Take 1 tablet (0.25 mg) by mouth 2 times daily as needed for anxiety                                   aspirin 81 MG EC tablet   Take 1 tablet (81 mg) by mouth daily   Last time this was given:  81 mg on 11/1/2017  8:21 AM                                   atorvastatin 40 MG tablet   Commonly known as:  LIPITOR   Take 1 tablet (40 mg) by mouth daily   Last time this was given:  40 mg on 11/1/2017  8:21 AM                                   HYDROmorphone 2 MG tablet   Commonly known as:  DILAUDID   Take 1-2 tablets (2-4 mg) by mouth every 3 hours as needed for moderate to severe pain   Last time this was given:  4 mg on 11/1/2017  8:21 AM                            For pain          melatonin 3 MG tablet   Take 1 tablet (3 mg) by mouth nightly as needed for sleep   Last time this was given:  3 mg on 10/29/2017  8:31 PM                                      metoprolol 25 MG tablet   Commonly known as:  LOPRESSOR   Take 1 tablet (25 mg) by mouth 2 times daily   Last time this was given:  25 mg on 11/1/2017  8:24 AM                                      nicotine polacrilex 2 MG gum   Commonly known as:   NICORETTE   Place 1 each (2 mg) inside cheek as needed for smoking cessation Place 1 each inside cheek as needed for smoking cessation                                pantoprazole 40 MG EC tablet   Commonly known as:  PROTONIX   Take 1 tablet (40 mg) by mouth every morning   Last time this was given:  40 mg on 11/1/2017  8:21 AM                                   polyethylene glycol Packet   Commonly known as:  MIRALAX/GLYCOLAX   Take 17 g by mouth daily   Last time this was given:  17 g on 10/31/2017  8:59 AM                                   senna-docusate 8.6-50 MG per tablet   Commonly known as:  SENOKOT-S;PERICOLACE   Take 1-2 tablets by mouth 2 times daily as needed for constipation   Last time this was given:  2 tablets on 11/1/2017  8:21 AM

## 2017-10-27 NOTE — ANESTHESIA POSTPROCEDURE EVALUATION
Patient: Georgie SEVERINO Peel    Procedure(s):  Median Sternotomy, Cardiopulmonary bypass, Ascending Aorta Aneurysm Repair using Hemashield Platinum Woven Double Velour Graft 34cm X 30cm. - Wound Class: I-Clean    Diagnosis:aorta ascending aneurysm   Diagnosis Additional Information: No value filed.    Anesthesia Type:  No value filed.    Note:  Anesthesia Post Evaluation    Patient location during evaluation: ICU  Patient participation: Unable to evaluate secondary to administered sedation  Level of consciousness: obtunded/minimal responses  Pain management: adequate  Airway patency: patent  Cardiovascular status: acceptable  Respiratory status: acceptable  Hydration status: acceptable  PONV: none     Anesthetic complications: None          Last vitals:  Vitals:    10/27/17 0517   BP: 134/74   Resp: 15   Temp: 36.6  C (97.9  F)   SpO2: 95%         Electronically Signed By: Luis Toledo MD  October 27, 2017  2:10 PM

## 2017-10-27 NOTE — ANESTHESIA CARE TRANSFER NOTE
Patient: Georgie SEVERINO Peel    Procedure(s):  Median Sternotomy, Cardiopulmonary bypass, Ascending Aorta Aneurysm Repair using Hemashield Platinum Woven Double Velour Graft 34cm X 30cm. - Wound Class: I-Clean    Diagnosis: aorta ascending aneurysm   Diagnosis Additional Information: No value filed.    Anesthesia Type:   No value filed.     Note:  Anesthesia Care Transfer Notewriter    Vitals: (Last set prior to Anesthesia Care Transfer)    CRNA VITALS  10/27/2017 1323 - 10/27/2017 1353      10/27/2017             Resp Rate (set): 10                Electronically Signed By: TESHA Britt CRNA  October 27, 2017  1:53 PM

## 2017-10-27 NOTE — PROGRESS NOTES
[facility], [place]  Procedure Note          Extubation:       Georgie Patino  MRN# [id number]   October 27, 2017, 5:24 PM         Patient extubated at: October 27, 2017, 5:24 PM   Supplemental Oxygen: Via nasal cannula at 4 liters per minute   Cough: The cough is good   Secretion Mode: Able to clear   Secretion Amount: Small amount, moderately thin and clear in color   Respiratory Exam:: Breath sounds: equal and clear and diminished     Location: bilaterally   Skin Exam:: Patient color: natural   Patient Status: Currently appears comfortable   Arterial Blood Gasses: pH Arterial (pH)   Date Value   10/27/2017 7.42     pO2 Arterial (mm Hg)   Date Value   10/27/2017 78 (L)     pCO2 Arterial (mm Hg)   Date Value   10/27/2017 29 (L)     Bicarbonate Arterial (mmol/L)   Date Value   10/27/2017 19 (L)              Recorded by Nely Garibay

## 2017-10-27 NOTE — ANESTHESIA PROCEDURE NOTES
Perioperative SHIVANI Report  Anesthesia Information  SHIVANI probe placed and report generated by: : CANDELARIO NVOAK MATTHEW DOUGLAS    Surgeon:  ASCENCION CANALES      Preanesthesia Checklist:  Patient identified, IV assessed, risks and benefits discussed, monitors and equipment assessed, procedure being performed at surgeon's request and anesthesia consent obtained.  General Procedure Information  Modalities:  2D, PW Doppler, Color flow mapping, 3D and CW Doppler  Diagnostic indications for SHIVANI:                       Thoracic aneurysm, unruptured      .    Echocardiographic and Doppler Measurements  Right Ventricle:  Hypertrophy not present.   Thrombus not present.    Global function normal.     Left Ventricle:  Hypertrophy not present.   Thrombus not present.   Global Function normal.       Ventricular Regional Function:  1- Basal Anteroseptal:  normal  2- Basal Anterior:  normal  3- Basal Anterolateral:  normal  4- Basal Inferolateral:  normal  5- Basal Inferior:  normal  6- Basal Inferoseptal:  normal  7- Mid Anteroseptal:  normal  8- Mid Anterior:  normal  9- Mid Anterolateral:  normal  10- Mid Inferolateral:  normal  11- Mid Inferior:  normal  12- Mid Inferoseptal:  normal  13- Apical Anterior:  normal  14- Apical Lateral:  normal  15- Apical Inferior:  normal  16- Apical Septal:  normal  17- [place]:  normal    Valves  Aortic Valve: Annulus normal.  Stenosis not present.  Regurgitation +2.  Leaflets normal.  Leaflet motions normal.    Mitral Valve: Annulus normal.  Stenosis not present.  Regurgitation +1.  Leaflets normal.  Leaflet motions normal.    Tricuspid Valve: Annulus normal.  Stenosis not present.  Regurgitation +1.  Leaflets normal.  Leaflet motions normal.    Pulmonic Valve: Annulus normal.  Stenosis not present.  Regurgitation absent.      Aorta: Ascending Aorta: Size dilated.  Diameter 5 cm.  Dissection not present.        Left Atrium: Size normal.  Left atrial appendage  normal.     Atrial Septum: Intra-atrial septal morphology contains patent foramen ovale.   Patent foramen ovale shunts left to right.     Ventricular Septum: Intra-ventricular septum morphology normal.       Other Findings:   Pericardium:  normal.  . .  .  .  .  Post Intervention Findings  Procedure(s) performed:  Ascending Aorta Repair. Global function:  Unchanged.   Regional wall motion:  Unchanged   Surgeon(s) notified of all postintervention findings:  Yes  .  .  .   .  .  Ascending Aorta:  Ascending aorta repaired with graft.  .  .  .  .        Echocardiogram Comments

## 2017-10-27 NOTE — H&P
CV ICU H&P    POD: Day of Surgery  LOS: 0    Admission History: Georgie Patino is a 62 year old female admitted to the ICU for medical management following repair of a 6.2 cm ascending aortic aneurysm and PFO closure on 10/27/17 by Dr Piña.  Patient also has a PMH of a HTN and tobacco abuse.      S/Interval History:  Patient underwent ascending aortic aneurysm & PFO repair.  EBL was 1000.  Blood products received were 625 mL cell saver.  She presented to the unit on Epi 0.05.        O:    Temp: 97.9  F (36.6  C) Temp  Min: 97.9  F (36.6  C)  Max: 97.9  F (36.6  C)  Resp: 15 Resp  Min: 15  Max: 15  SpO2: 95 % SpO2  Min: 95 %  Max: 95 %    No Data Recorded  Heart Rate: 59 Heart Rate  Min: 59  Max: 59  BP: 134/74 Systolic (24hrs), Av , Min:134 , Max:134   Diastolic (24hrs), Av, Min:74, Max:74    Resp: 15  Date 10/27/17 0700 - 10/28/17 0659   Shift 2851-7365 1165-7916 5983-8650 24 Hour Total   I  N  T  A  K  E   I.V. 100   100    Shift Total  (mL/kg) 100  (1.07)   100  (1.07)   O  U  T  P  U  T   Shift Total  (mL/kg)       Weight (kg) 93.4 93.4 93.4 93.4         Past Medical History:   Diagnosis Date     Ascending aortic aneurysm (H) 2017     Blood transfusions age 19    due to menorhagia     Hypertension 2017     Tobacco abuse        Past Surgical History:   Procedure Laterality Date     COLONOSCOPY WITH CO2 INSUFFLATION N/A 3/15/2017    Procedure: COLONOSCOPY WITH CO2 INSUFFLATION;  Surgeon: Duane, William Charles, MD;  Location: [facility] OR     DILATION AND CURETTAGE  age 20     [place] teeth removed         Physical Exam    General: Intubated, lightly sedated, in no distress   Neck: Supple, no tracheal deviation  Cardiovascular: RRR  Pumonary: Non labored breathing on MV.  CTAB   Abdomen: Soft, non distended, non tender.   Ext: W/o edema, wwp  Neuro: Non focal     Lab Results   Component Value Date    WBC 18.1 (H) 10/27/2017    HGB 13.6 10/27/2017    HCT 42.3 10/27/2017    PLT 95 (L) 10/27/2017    NA  146 (H) 10/27/2017    POTASSIUM 3.7 10/27/2017    CHLORIDE 107 10/16/2017    CO2 29 10/16/2017    BUN 15 10/16/2017    CR 0.53 10/16/2017     (H) 10/27/2017    INR 1.29 (H) 10/27/2017         lactated ringers       EPINEPHrine IV infusion ADULT       norepinephrine       tranexamic acid           Assessment and Plan: Georgie Patino is a 62 year old female admitted to the ICU for medical management following repair of a 6.2 cm ascending aortic aneurysm on 10/27/17 by Dr Piña.  Patient also has a PMH of a HTN and tobacco abuse.      Neuro:     Sedated on propofol gtt    Reverse paralysis when appropriate twitches.  Last paralysis was 20 mg Rocuronium at 1309.    Resp:     Intubated and mechanically ventilated.    Wean mechanical ventilation as tolerated.    Tobacco abuse    Daily inhalers ordered    Consider Nicoderm patch when patient awake or nicorette gum when able to take PO  CV:     6.2 cm ascending aortic aneurysm s/p repair    Bypass time 2 hours 9 mins    Cell saver 625 given.  No other blood products.    Epi 0.05 gtt.  Titrate to maintain MAP >70 per CVTS    Chronic HLD    Home Atorvastatin 40 mg on hold    Chronic HTN    Home Metoprolol 25 mg bid on hold    Home Lisinopril 40 mg daily on hold  GI/FEN:     NPO  Renal:     Monitor electrolytes, Cr, and urine output     Lytes goals K>4 and Mag>2    Strict I/O's  Endo:    q2h glucose checks, SSI to serum glucose >80, <180 mg/dL  ID:     Perioperative antibiotics    Monitor WBC daily  Heme:     Hgb 13.6 on last  Prophylaxis: SCDs, PPI  Lines:   PIV x2     CVC  Day of Surgery    [facility] Day of Surgery     Arterial line 0 Days      Dispo: Continue ICU care  Code Status: Full Code    Momo Escobar DO  Anesthesiology Resident  CVICU Service, *[phone]    Patient seen and staffed with attending.

## 2017-10-27 NOTE — ANESTHESIA PROCEDURE NOTES
Central Line Procedure Note  Staff:     Anesthesiologist:  CANDELARIO NOVAK    Resident/CRNA:  ISABEL PORTER    Central line placed by Resident/CRNA in the presence of a teaching physician    Location: In OR after induction  Procedure Start/Stop Times:     patient identified, IV checked, site marked, risks and benefits discussed, informed consent, monitors and equipment checked, pre-op evaluation and at physician/surgeon's request      Correct Patient: Yes      Correct Position: Yes      Correct Site: Yes      Correct Procedure: Yes      Correct Laterality:  Yes    Site Marked:  Yes  Line Placement:     Procedure:  Central Line    Insertion laterality:  Right    Insertion site:  Internal Jugular    Position:  Trendelenburg      Maximal Sterile Barriers: All elements of maximal sterile barrier technique followed      (Maximal sterile barriers include:   Sterile gown, Sterile Gloves, Mask, Cap, Whole body draped, hand hygiene and acceptable skin prep).Skin Prep: Chloraprep         Injection Technique:  Ultrasound guided and Seldinger Technique    Sterile Ultrasound Technique:  Sterile probe cover and Sterile gel    Vein evaluated via U/S for patency/adequacy of catheter insertion and is adequate.  Using realtime U/S imaging the vein was punctured, and needle was observed entering vein on U/S      A permanent image is NOT entered into the patient's record.      Local skin infiltration:  None    Catheter size:  9 Fr, 2 lumen 11.5 cm (MAC)    Catheter length at skin (cm):  15    Cath secured with: suture      Dressing:  Tegaderm and Biopatch    Complications:  None obvious    Blood aspirated all lumens: Yes      All Lumens Flushed: Yes      Verification method:  Placement to be verified post-op

## 2017-10-27 NOTE — OR NURSING
Advised John Chavarria 3C Charge pt delayed d/t waiting for Dr. Piña, to come to hospital to sign pt consent

## 2017-10-27 NOTE — LETTER
Transition Communication Hand-off for Care Transitions to Next Level of Care Provider    Name: Georgie Patino  MRN #: [id number]  Primary Care Provider: Marlys Wade     Primary Clinic: [address] [place] [address] [place] [address]     Reason for Hospitalization:  aorta ascending aneurysm   Aortic aneurysm (H)  Admit Date/Time: 10/27/2017  5:03 AM  Discharge Date: 11/1/17  Payor Source: Payor: ARE / Plan: UCARE MNCARE / Product Type: O   Readmission Assessment Measure (MATT) Risk Score/category: low  Reason for Communication Hand-off Referral: Multiple providers/specialties  Discharge Plan:   Concern for non-adherence with plan of care: no  Discharge Needs Assessment:  Needs       Most Recent Value    Equipment Currently Used at Home none    Transportation Available car, family or friend will provide      Follow-up specialty is recommended: Yes    Follow-up plan:  Future Appointments  Date Time Provider Department Center   11/13/2017 2:45 PM [facility]   11/14/2017 2:00 PM UC [facility]       Any outstanding tests or procedures:        Referrals     Future Labs/Procedures    CARDIAC REHAB REFERRAL     Comments:    Your provider has referred you to:   [facility]  [address] [place] [address]   [phone]            Key Recommendations:  Post hospitalization follow up.     EUNICE KIM RN CC

## 2017-10-27 NOTE — ANESTHESIA PROCEDURE NOTES
PA Catheter Insertion Note  Anesthesiologist: CANDELARIO NOVAK  Resident:  ISABEL PORTER   PA Catheter placed by resident/CRNA in the presence of a teaching physician.  Introducer: Introducer placed as part of procedure (SEE separate note)   Skin prep:  Chloraprep Cap, Full body drape, hand hygiene, Mask, Sterile gloves and Sterile gown    PA Catheter type:  CCO    Distance catheter advanced:  49 cm.    Appropriate RA, RV, PA  waveforms?: Yes    Dressing:  Biopatch    Complications:  None apparent

## 2017-10-27 NOTE — BRIEF OP NOTE
[facility], [place]    Brief Operative Note    Pre-operative diagnosis: aorta ascending aneurysm   Post-operative diagnosis * No post-op diagnosis entered *  Procedure: Procedure(s):  Median Sternotomy, Cardiopulmonary bypass, Ascending Aorta Aneurysm Repair using Hemashield Platinum Woven Double Velour Graft 34cm X 30cm. - Wound Class: I-Clean  Surgeon: Surgeon(s) and Role:     * Rubio Piña MD - Primary     * Seth Richard MD - Resident - Assisting     * Sha Cleveland MD - Assisting  Anesthesia: General   Estimated blood loss: 500 ml  Drains: Mediastinal x2, Right Pleural x1  Specimens:   ID Type Source Tests Collected by Time Destination   1 : Aorta aneurysm Tissue Other OR DOCUMENTATION ONLY Rubio Piña MD 10/27/2017  9:56 AM    A : Aorta aneurysm, stitch at lesser curvature Tissue Other SURGICAL PATHOLOGY EXAM Rubio Piña MD 10/27/2017  9:56 AM      Findings:   None.  Complications: None.  Implants: None.

## 2017-10-28 ENCOUNTER — APPOINTMENT (OUTPATIENT)
Dept: PHYSICAL THERAPY | Facility: CLINIC | Age: 62
DRG: 220 | End: 2017-10-28
Attending: THORACIC SURGERY (CARDIOTHORACIC VASCULAR SURGERY)
Payer: COMMERCIAL

## 2017-10-28 ENCOUNTER — APPOINTMENT (OUTPATIENT)
Dept: GENERAL RADIOLOGY | Facility: CLINIC | Age: 62
DRG: 220 | End: 2017-10-28
Attending: THORACIC SURGERY (CARDIOTHORACIC VASCULAR SURGERY)
Payer: COMMERCIAL

## 2017-10-28 LAB
ANION GAP SERPL CALCULATED.3IONS-SCNC: 6 MMOL/L (ref 3–14)
BASE EXCESS BLDA CALC-SCNC: 0.3 MMOL/L
BASE EXCESS BLDV CALC-SCNC: 0.8 MMOL/L
BLD PROD TYP BPU: NORMAL
BLD UNIT ID BPU: 0
BLOOD PRODUCT CODE: NORMAL
BPU ID: NORMAL
BUN SERPL-MCNC: 24 MG/DL (ref 7–30)
CA-I BLD-MCNC: 4.9 MG/DL (ref 4.4–5.2)
CALCIUM SERPL-MCNC: 8.6 MG/DL (ref 8.5–10.1)
CHLORIDE SERPL-SCNC: 117 MMOL/L (ref 94–109)
CO2 SERPL-SCNC: 24 MMOL/L (ref 20–32)
CREAT SERPL-MCNC: 0.58 MG/DL (ref 0.52–1.04)
ERYTHROCYTE [DISTWIDTH] IN BLOOD BY AUTOMATED COUNT: 13.7 % (ref 10–15)
GFR SERPL CREATININE-BSD FRML MDRD: >90 ML/MIN/1.7M2
GLUCOSE BLDC GLUCOMTR-MCNC: 108 MG/DL (ref 70–99)
GLUCOSE BLDC GLUCOMTR-MCNC: 110 MG/DL (ref 70–99)
GLUCOSE BLDC GLUCOMTR-MCNC: 114 MG/DL (ref 70–99)
GLUCOSE BLDC GLUCOMTR-MCNC: 115 MG/DL (ref 70–99)
GLUCOSE BLDC GLUCOMTR-MCNC: 115 MG/DL (ref 70–99)
GLUCOSE BLDC GLUCOMTR-MCNC: 116 MG/DL (ref 70–99)
GLUCOSE BLDC GLUCOMTR-MCNC: 124 MG/DL (ref 70–99)
GLUCOSE BLDC GLUCOMTR-MCNC: 132 MG/DL (ref 70–99)
GLUCOSE BLDC GLUCOMTR-MCNC: 95 MG/DL (ref 70–99)
GLUCOSE SERPL-MCNC: 132 MG/DL (ref 70–99)
HBA1C MFR BLD: 5.5 % (ref 4.3–6)
HCO3 BLD-SCNC: 25 MMOL/L (ref 21–28)
HCO3 BLDV-SCNC: 26 MMOL/L (ref 21–28)
HCT VFR BLD AUTO: 37.8 % (ref 35–47)
HGB BLD-MCNC: 12 G/DL (ref 11.7–15.7)
MAGNESIUM SERPL-MCNC: 2.4 MG/DL (ref 1.6–2.3)
MCH RBC QN AUTO: 28.8 PG (ref 26.5–33)
MCHC RBC AUTO-ENTMCNC: 31.7 G/DL (ref 31.5–36.5)
MCV RBC AUTO: 91 FL (ref 78–100)
MRSA DNA SPEC QL NAA+PROBE: NEGATIVE
O2/TOTAL GAS SETTING VFR VENT: ABNORMAL %
O2/TOTAL GAS SETTING VFR VENT: NORMAL %
OXYHGB MFR BLD: 93 % (ref 92–100)
OXYHGB MFR BLDV: 76 %
PCO2 BLD: 40 MM HG (ref 35–45)
PCO2 BLDV: 46 MM HG (ref 40–50)
PH BLD: 7.4 PH (ref 7.35–7.45)
PH BLDV: 7.37 PH (ref 7.32–7.43)
PHOSPHATE SERPL-MCNC: 3.5 MG/DL (ref 2.5–4.5)
PLATELET # BLD AUTO: 75 10E9/L (ref 150–450)
PO2 BLD: 66 MM HG (ref 80–105)
PO2 BLDV: 43 MM HG (ref 25–47)
POTASSIUM SERPL-SCNC: 4.4 MMOL/L (ref 3.4–5.3)
RBC # BLD AUTO: 4.17 10E12/L (ref 3.8–5.2)
SODIUM SERPL-SCNC: 147 MMOL/L (ref 133–144)
SPECIMEN SOURCE: NORMAL
TRANSFUSION STATUS PATIENT QL: NORMAL
WBC # BLD AUTO: 12.2 10E9/L (ref 4–11)

## 2017-10-28 PROCEDURE — 00000146 ZZHCL STATISTIC GLUCOSE BY METER IP

## 2017-10-28 PROCEDURE — 25000125 ZZHC RX 250: Performed by: STUDENT IN AN ORGANIZED HEALTH CARE EDUCATION/TRAINING PROGRAM

## 2017-10-28 PROCEDURE — 97161 PT EVAL LOW COMPLEX 20 MIN: CPT | Mod: GP | Performed by: PHYSICAL THERAPIST

## 2017-10-28 PROCEDURE — 87640 STAPH A DNA AMP PROBE: CPT | Performed by: THORACIC SURGERY (CARDIOTHORACIC VASCULAR SURGERY)

## 2017-10-28 PROCEDURE — 25000125 ZZHC RX 250: Performed by: THORACIC SURGERY (CARDIOTHORACIC VASCULAR SURGERY)

## 2017-10-28 PROCEDURE — 83735 ASSAY OF MAGNESIUM: CPT | Performed by: STUDENT IN AN ORGANIZED HEALTH CARE EDUCATION/TRAINING PROGRAM

## 2017-10-28 PROCEDURE — 97530 THERAPEUTIC ACTIVITIES: CPT | Mod: GP | Performed by: PHYSICAL THERAPIST

## 2017-10-28 PROCEDURE — 83036 HEMOGLOBIN GLYCOSYLATED A1C: CPT | Performed by: STUDENT IN AN ORGANIZED HEALTH CARE EDUCATION/TRAINING PROGRAM

## 2017-10-28 PROCEDURE — 93010 ELECTROCARDIOGRAM REPORT: CPT | Performed by: INTERNAL MEDICINE

## 2017-10-28 PROCEDURE — 25000132 ZZH RX MED GY IP 250 OP 250 PS 637: Performed by: STUDENT IN AN ORGANIZED HEALTH CARE EDUCATION/TRAINING PROGRAM

## 2017-10-28 PROCEDURE — 71010 XR CHEST PORT 1 VW: CPT

## 2017-10-28 PROCEDURE — 25000128 H RX IP 250 OP 636: Performed by: STUDENT IN AN ORGANIZED HEALTH CARE EDUCATION/TRAINING PROGRAM

## 2017-10-28 PROCEDURE — 21400003 ZZH R&B CCU CRITICAL UMMC

## 2017-10-28 PROCEDURE — 82330 ASSAY OF CALCIUM: CPT | Performed by: STUDENT IN AN ORGANIZED HEALTH CARE EDUCATION/TRAINING PROGRAM

## 2017-10-28 PROCEDURE — 40000275 ZZH STATISTIC RCP TIME EA 10 MIN

## 2017-10-28 PROCEDURE — 93005 ELECTROCARDIOGRAM TRACING: CPT

## 2017-10-28 PROCEDURE — 40000048 ZZH STATISTIC DAILY SWAN MONITORING

## 2017-10-28 PROCEDURE — 40000014 ZZH STATISTIC ARTERIAL MONITORING DAILY

## 2017-10-28 PROCEDURE — 80048 BASIC METABOLIC PNL TOTAL CA: CPT | Performed by: STUDENT IN AN ORGANIZED HEALTH CARE EDUCATION/TRAINING PROGRAM

## 2017-10-28 PROCEDURE — 40000193 ZZH STATISTIC PT WARD VISIT: Performed by: PHYSICAL THERAPIST

## 2017-10-28 PROCEDURE — 94640 AIRWAY INHALATION TREATMENT: CPT | Mod: 76

## 2017-10-28 PROCEDURE — 40000196 ZZH STATISTIC RAPCV CVP MONITORING

## 2017-10-28 PROCEDURE — 85027 COMPLETE CBC AUTOMATED: CPT | Performed by: STUDENT IN AN ORGANIZED HEALTH CARE EDUCATION/TRAINING PROGRAM

## 2017-10-28 PROCEDURE — 87641 MR-STAPH DNA AMP PROBE: CPT | Performed by: THORACIC SURGERY (CARDIOTHORACIC VASCULAR SURGERY)

## 2017-10-28 PROCEDURE — 84100 ASSAY OF PHOSPHORUS: CPT | Performed by: STUDENT IN AN ORGANIZED HEALTH CARE EDUCATION/TRAINING PROGRAM

## 2017-10-28 PROCEDURE — 82805 BLOOD GASES W/O2 SATURATION: CPT | Performed by: STUDENT IN AN ORGANIZED HEALTH CARE EDUCATION/TRAINING PROGRAM

## 2017-10-28 RX ORDER — HYDROMORPHONE HYDROCHLORIDE 2 MG/1
2-4 TABLET ORAL
Status: DISCONTINUED | OUTPATIENT
Start: 2017-10-28 | End: 2017-11-01 | Stop reason: HOSPADM

## 2017-10-28 RX ORDER — LANOLIN ALCOHOL/MO/W.PET/CERES
3 CREAM (GRAM) TOPICAL
Status: DISCONTINUED | OUTPATIENT
Start: 2017-10-28 | End: 2017-11-01 | Stop reason: HOSPADM

## 2017-10-28 RX ORDER — DIPHENHYDRAMINE HYDROCHLORIDE 50 MG/ML
25 INJECTION INTRAMUSCULAR; INTRAVENOUS
Status: DISCONTINUED | OUTPATIENT
Start: 2017-10-28 | End: 2017-10-28

## 2017-10-28 RX ORDER — PANTOPRAZOLE SODIUM 40 MG/1
40 TABLET, DELAYED RELEASE ORAL EVERY MORNING
Status: DISCONTINUED | OUTPATIENT
Start: 2017-10-29 | End: 2017-11-01 | Stop reason: HOSPADM

## 2017-10-28 RX ORDER — NICOTINE POLACRILEX 4 MG
15-30 LOZENGE BUCCAL
Status: DISCONTINUED | OUTPATIENT
Start: 2017-10-28 | End: 2017-10-28

## 2017-10-28 RX ORDER — DIPHENHYDRAMINE HCL 25 MG
25 CAPSULE ORAL
Status: DISCONTINUED | OUTPATIENT
Start: 2017-10-28 | End: 2017-11-01 | Stop reason: HOSPADM

## 2017-10-28 RX ORDER — DEXTROSE MONOHYDRATE 25 G/50ML
25-50 INJECTION, SOLUTION INTRAVENOUS
Status: DISCONTINUED | OUTPATIENT
Start: 2017-10-28 | End: 2017-10-28

## 2017-10-28 RX ORDER — IPRATROPIUM BROMIDE AND ALBUTEROL SULFATE 2.5; .5 MG/3ML; MG/3ML
3 SOLUTION RESPIRATORY (INHALATION)
Status: DISCONTINUED | OUTPATIENT
Start: 2017-10-28 | End: 2017-10-29

## 2017-10-28 RX ADMIN — HYDRALAZINE HYDROCHLORIDE 10 MG: 20 INJECTION INTRAMUSCULAR; INTRAVENOUS at 05:26

## 2017-10-28 RX ADMIN — IPRATROPIUM BROMIDE AND ALBUTEROL SULFATE 3 ML: .5; 3 SOLUTION RESPIRATORY (INHALATION) at 16:26

## 2017-10-28 RX ADMIN — ACETAMINOPHEN 975 MG: 325 TABLET, FILM COATED ORAL at 21:34

## 2017-10-28 RX ADMIN — ALBUTEROL SULFATE 2.5 MG: 2.5 SOLUTION RESPIRATORY (INHALATION) at 00:28

## 2017-10-28 RX ADMIN — SENNOSIDES AND DOCUSATE SODIUM 2 TABLET: 8.6; 5 TABLET ORAL at 07:58

## 2017-10-28 RX ADMIN — IPRATROPIUM BROMIDE AND ALBUTEROL SULFATE 3 ML: .5; 3 SOLUTION RESPIRATORY (INHALATION) at 19:59

## 2017-10-28 RX ADMIN — HYDROMORPHONE HYDROCHLORIDE 0.3 MG: 1 INJECTION, SOLUTION INTRAMUSCULAR; INTRAVENOUS; SUBCUTANEOUS at 00:34

## 2017-10-28 RX ADMIN — SENNOSIDES AND DOCUSATE SODIUM 2 TABLET: 8.6; 5 TABLET ORAL at 20:02

## 2017-10-28 RX ADMIN — ACETAMINOPHEN 975 MG: 325 TABLET, FILM COATED ORAL at 05:25

## 2017-10-28 RX ADMIN — CEFAZOLIN SODIUM 2 G: 2 INJECTION, SOLUTION INTRAVENOUS at 05:00

## 2017-10-28 RX ADMIN — CEFAZOLIN SODIUM 2 G: 2 INJECTION, SOLUTION INTRAVENOUS at 11:32

## 2017-10-28 RX ADMIN — PANTOPRAZOLE SODIUM 40 MG: 40 INJECTION, POWDER, FOR SOLUTION INTRAVENOUS at 07:58

## 2017-10-28 RX ADMIN — ACETAMINOPHEN 975 MG: 325 TABLET, FILM COATED ORAL at 12:52

## 2017-10-28 RX ADMIN — HYDROMORPHONE HYDROCHLORIDE 0.5 MG: 1 INJECTION, SOLUTION INTRAMUSCULAR; INTRAVENOUS; SUBCUTANEOUS at 17:19

## 2017-10-28 RX ADMIN — IPRATROPIUM BROMIDE AND ALBUTEROL SULFATE 3 ML: .5; 3 SOLUTION RESPIRATORY (INHALATION) at 12:13

## 2017-10-28 RX ADMIN — IPRATROPIUM BROMIDE AND ALBUTEROL SULFATE 3 ML: .5; 3 SOLUTION RESPIRATORY (INHALATION) at 07:45

## 2017-10-28 RX ADMIN — IPRATROPIUM BROMIDE 0.5 MG: 0.5 SOLUTION RESPIRATORY (INHALATION) at 00:28

## 2017-10-28 RX ADMIN — HYDROMORPHONE HYDROCHLORIDE 4 MG: 2 TABLET ORAL at 20:03

## 2017-10-28 RX ADMIN — HYDROMORPHONE HYDROCHLORIDE 0.3 MG: 1 INJECTION, SOLUTION INTRAMUSCULAR; INTRAVENOUS; SUBCUTANEOUS at 13:02

## 2017-10-28 RX ADMIN — MUPIROCIN 0.5 G: 20 OINTMENT TOPICAL at 08:17

## 2017-10-28 ASSESSMENT — ACTIVITIES OF DAILY LIVING (ADL)
COGNITION: 0 - NO COGNITION ISSUES REPORTED
AMBULATION: 0-->INDEPENDENT
RETIRED_EATING: 0-->INDEPENDENT
DRESS: 0-->INDEPENDENT
BATHING: 0-->INDEPENDENT
TRANSFERRING: 0-->INDEPENDENT
FALL_HISTORY_WITHIN_LAST_SIX_MONTHS: NO
SWALLOWING: 0-->SWALLOWS FOODS/LIQUIDS WITHOUT DIFFICULTY
RETIRED_COMMUNICATION: 0-->UNDERSTANDS/COMMUNICATES WITHOUT DIFFICULTY
TOILETING: 0-->INDEPENDENT

## 2017-10-28 ASSESSMENT — PAIN DESCRIPTION - DESCRIPTORS
DESCRIPTORS: ACHING
DESCRIPTORS: ACHING;DISCOMFORT
DESCRIPTORS: DISCOMFORT

## 2017-10-28 NOTE — PLAN OF CARE
Problem: Patient Care Overview  Goal: Plan of Care/Patient Progress Review  PT 4E: Evaluation completed, treatment initiated.     Discharge Planner PT   Patient plan for discharge: home  Current status: transferred OOB with min assist.  Took a few steps in room with min assist to transfer bed > chair.   Barriers to return to prior living situation: assistance level needed for safe mobility  Recommendations for discharge: home with assist  Rationale for recommendations: anticipate patient will be independent with functional mobility during expected LOS       Entered by: Jose Fenton 10/28/2017 12:12 PM

## 2017-10-28 NOTE — PROGRESS NOTES
10/28/17 0900   Quick Adds   Type of Visit Initial PT Evaluation       Present no   Living Environment   Lives With spouse;child(camila), adult;grandchild(camila)   Living Arrangements house   Home Accessibility stairs to enter home;stairs within home   Number of Stairs to Enter Home 3  (1 rail)   Number of Stairs Within Home (stairs )   Transportation Available car;family or friend will provide   Living Environment Comment  and daughter able to assist at home.    Self-Care   Usual Activity Tolerance moderate   Current Activity Tolerance fair   Regular Exercise no   Equipment Currently Used at Home none   Functional Level Prior   Ambulation 0-->independent   Transferring 0-->independent   Toileting 0-->independent   Bathing 0-->independent   Dressing 0-->independent   Eating 0-->independent   Communication 0-->understands/communicates without difficulty   Swallowing 0-->swallows foods/liquids without difficulty   Cognition 0 - no cognition issues reported   Fall history within last six months no   Prior Functional Level Comment independent with all functional mobility and ADLs, mobility limited to primarily household and short community distances   General Information   Onset of Illness/Injury or Date of Surgery - Date 10/27/17   Referring Physician Seth Richard MD   Patient/Family Goals Statement return home   Pertinent History of Current Problem (include personal factors and/or comorbidities that impact the POC) Georgie Patino is a 62 year old female admitted to the ICU for medical management following repair of a 6.2 cm ascending aortic aneurysm on 10/27/17 by Dr Piña.  Patient also has a PMH of a HTN and tobacco abuse.     Precautions/Limitations sternal precautions   Heart Disease Risk Factors Smoking;High blood pressure;Lack of physical activity;Overweight;Medical history;Age   Cognitive Status Examination   Orientation orientation to person, place and time   Level of Consciousness  "alert   Follows Commands and Answers Questions 100% of the time   Personal Safety and Judgment intact   Pain Assessment   Patient Currently in Pain Yes, see Vital Sign flowsheet   Posture    Posture Forward head position;Protracted shoulders   Range of Motion (ROM)   ROM Comment B LE grossly WNL   Strength   Strength Comments B LE grossly 5/5   Bed Mobility   Bed Mobility Comments supine > sit min assist   Transfer Skills   Transfer Comments sit > stand min assist   Gait   Gait Comments a few steps in room with min assist   Balance   Balance Comments stand without UE support   Sensory Examination   Sensory Perception no deficits were identified   General Therapy Interventions   Planned Therapy Interventions balance training;bed mobility training;gait training;transfer training;progressive activity/exercise   Clinical Impression   Criteria for Skilled Therapeutic Intervention yes, treatment indicated   PT Diagnosis impaired functional mobility s/p AAA repair   Influenced by the following impairments pain, impaired balance, decreased functional endurance   Functional limitations due to impairments impaired bed mobility, impaired transfers, impaired gait   Clinical Presentation Stable/Uncomplicated   Clinical Presentation Rationale PLOF, limited comorbidities   Clinical Decision Making (Complexity) Low complexity   Therapy Frequency` daily   Predicted Duration of Therapy Intervention (days/wks) 1 week   Anticipated Discharge Disposition Home with Outpatient Therapy   Risk & Benefits of therapy have been explained Yes   Patient, Family & other staff in agreement with plan of care Yes   [facility] AM-PAC  \"6 Clicks\" V.2 Basic Mobility Inpatient Short Form   1. Turning from your back to your side while in a flat bed without using bedrails? 3 - A Little   2. Moving from lying on your back to sitting on the side of a flat bed without using bedrails? 3 - A Little   3. Moving to and from a bed to a chair (including a " wheelchair)? 3 - A Little   4. Standing up from a chair using your arms (e.g., wheelchair, or bedside chair)? 3 - A Little   5. To walk in hospital room? 3 - A Little   6. Climbing 3-5 steps with a railing? 3 - A Little   Basic Mobility Raw Score (Score out of 24.Lower scores equate to lower levels of function) 18   Total Evaluation Time   Total Evaluation Time (Minutes) 10

## 2017-10-28 NOTE — PLAN OF CARE
Problem: Patient Care Overview  Goal: Plan of Care/Patient Progress Review  Outcome: Improving  Neurologically intact. Call light approp. T-max 99.8. See flowsheets for pain management. Dilaudid and tylenol given as indicated. SR with occasional PVCs. MAP low at beginning of shift with CVP of 4. 500 albumin given. MAPs increased. Hydralazine given as ordered for BP control later in AM. TPM with back up rate at 50. See flowsheets for hemodynamics and CT output. 5L via NC. Pulm toilet encouraged. Lungs coarse/diminished. Adequate UOP over NOC. Bedside swallow study passed. Clear liquids to time. BS hypoactive. Insulin at 3 all NOC. TKO at 10.

## 2017-10-28 NOTE — PROGRESS NOTES
Pt transferred to 6C per MD order. Transported via wheelchair with assist of Float RN. Vitals stable upon leaving unit, connected to transport monitor. Belongings sent to  by [place] [facility].

## 2017-10-29 ENCOUNTER — APPOINTMENT (OUTPATIENT)
Dept: OCCUPATIONAL THERAPY | Facility: CLINIC | Age: 62
DRG: 220 | End: 2017-10-29
Attending: THORACIC SURGERY (CARDIOTHORACIC VASCULAR SURGERY)
Payer: COMMERCIAL

## 2017-10-29 LAB
ANION GAP SERPL CALCULATED.3IONS-SCNC: 8 MMOL/L (ref 3–14)
ANION GAP SERPL CALCULATED.3IONS-SCNC: 9 MMOL/L (ref 3–14)
BUN SERPL-MCNC: 25 MG/DL (ref 7–30)
BUN SERPL-MCNC: 26 MG/DL (ref 7–30)
CALCIUM SERPL-MCNC: 8.2 MG/DL (ref 8.5–10.1)
CALCIUM SERPL-MCNC: 8.5 MG/DL (ref 8.5–10.1)
CHLORIDE SERPL-SCNC: 106 MMOL/L (ref 94–109)
CHLORIDE SERPL-SCNC: 106 MMOL/L (ref 94–109)
CO2 SERPL-SCNC: 23 MMOL/L (ref 20–32)
CO2 SERPL-SCNC: 24 MMOL/L (ref 20–32)
CREAT SERPL-MCNC: 0.47 MG/DL (ref 0.52–1.04)
CREAT SERPL-MCNC: 0.53 MG/DL (ref 0.52–1.04)
ERYTHROCYTE [DISTWIDTH] IN BLOOD BY AUTOMATED COUNT: 13.7 % (ref 10–15)
GFR SERPL CREATININE-BSD FRML MDRD: >90 ML/MIN/1.7M2
GFR SERPL CREATININE-BSD FRML MDRD: >90 ML/MIN/1.7M2
GLUCOSE BLDC GLUCOMTR-MCNC: 113 MG/DL (ref 70–99)
GLUCOSE BLDC GLUCOMTR-MCNC: 122 MG/DL (ref 70–99)
GLUCOSE BLDC GLUCOMTR-MCNC: 129 MG/DL (ref 70–99)
GLUCOSE BLDC GLUCOMTR-MCNC: 140 MG/DL (ref 70–99)
GLUCOSE SERPL-MCNC: 124 MG/DL (ref 70–99)
GLUCOSE SERPL-MCNC: 125 MG/DL (ref 70–99)
HCT VFR BLD AUTO: 35.1 % (ref 35–47)
HGB BLD-MCNC: 11.1 G/DL (ref 11.7–15.7)
INR PPP: 1.21 (ref 0.86–1.14)
MAGNESIUM SERPL-MCNC: 2.1 MG/DL (ref 1.6–2.3)
MCH RBC QN AUTO: 28.8 PG (ref 26.5–33)
MCHC RBC AUTO-ENTMCNC: 31.6 G/DL (ref 31.5–36.5)
MCV RBC AUTO: 91 FL (ref 78–100)
PHOSPHATE SERPL-MCNC: 2.4 MG/DL (ref 2.5–4.5)
PLATELET # BLD AUTO: 69 10E9/L (ref 150–450)
POTASSIUM SERPL-SCNC: 3.8 MMOL/L (ref 3.4–5.3)
POTASSIUM SERPL-SCNC: 4 MMOL/L (ref 3.4–5.3)
RBC # BLD AUTO: 3.86 10E12/L (ref 3.8–5.2)
SODIUM SERPL-SCNC: 138 MMOL/L (ref 133–144)
SODIUM SERPL-SCNC: 138 MMOL/L (ref 133–144)
WBC # BLD AUTO: 12.8 10E9/L (ref 4–11)

## 2017-10-29 PROCEDURE — 25000132 ZZH RX MED GY IP 250 OP 250 PS 637: Performed by: THORACIC SURGERY (CARDIOTHORACIC VASCULAR SURGERY)

## 2017-10-29 PROCEDURE — 40000133 ZZH STATISTIC OT WARD VISIT

## 2017-10-29 PROCEDURE — 25000132 ZZH RX MED GY IP 250 OP 250 PS 637: Performed by: STUDENT IN AN ORGANIZED HEALTH CARE EDUCATION/TRAINING PROGRAM

## 2017-10-29 PROCEDURE — 25000128 H RX IP 250 OP 636: Performed by: STUDENT IN AN ORGANIZED HEALTH CARE EDUCATION/TRAINING PROGRAM

## 2017-10-29 PROCEDURE — 80048 BASIC METABOLIC PNL TOTAL CA: CPT | Performed by: STUDENT IN AN ORGANIZED HEALTH CARE EDUCATION/TRAINING PROGRAM

## 2017-10-29 PROCEDURE — 97165 OT EVAL LOW COMPLEX 30 MIN: CPT | Mod: GO

## 2017-10-29 PROCEDURE — 84100 ASSAY OF PHOSPHORUS: CPT | Performed by: THORACIC SURGERY (CARDIOTHORACIC VASCULAR SURGERY)

## 2017-10-29 PROCEDURE — 85610 PROTHROMBIN TIME: CPT | Performed by: THORACIC SURGERY (CARDIOTHORACIC VASCULAR SURGERY)

## 2017-10-29 PROCEDURE — 21400006 ZZH R&B CCU INTERMEDIATE UMMC

## 2017-10-29 PROCEDURE — 36415 COLL VENOUS BLD VENIPUNCTURE: CPT | Performed by: THORACIC SURGERY (CARDIOTHORACIC VASCULAR SURGERY)

## 2017-10-29 PROCEDURE — 25000125 ZZHC RX 250: Performed by: STUDENT IN AN ORGANIZED HEALTH CARE EDUCATION/TRAINING PROGRAM

## 2017-10-29 PROCEDURE — 40000275 ZZH STATISTIC RCP TIME EA 10 MIN

## 2017-10-29 PROCEDURE — 00000146 ZZHCL STATISTIC GLUCOSE BY METER IP

## 2017-10-29 PROCEDURE — 97530 THERAPEUTIC ACTIVITIES: CPT | Mod: GO

## 2017-10-29 PROCEDURE — 25000125 ZZHC RX 250: Performed by: THORACIC SURGERY (CARDIOTHORACIC VASCULAR SURGERY)

## 2017-10-29 PROCEDURE — 85027 COMPLETE CBC AUTOMATED: CPT | Performed by: THORACIC SURGERY (CARDIOTHORACIC VASCULAR SURGERY)

## 2017-10-29 PROCEDURE — 83735 ASSAY OF MAGNESIUM: CPT | Performed by: THORACIC SURGERY (CARDIOTHORACIC VASCULAR SURGERY)

## 2017-10-29 PROCEDURE — 36415 COLL VENOUS BLD VENIPUNCTURE: CPT | Performed by: STUDENT IN AN ORGANIZED HEALTH CARE EDUCATION/TRAINING PROGRAM

## 2017-10-29 PROCEDURE — 80048 BASIC METABOLIC PNL TOTAL CA: CPT | Performed by: THORACIC SURGERY (CARDIOTHORACIC VASCULAR SURGERY)

## 2017-10-29 PROCEDURE — 97535 SELF CARE MNGMENT TRAINING: CPT | Mod: GO

## 2017-10-29 PROCEDURE — 94640 AIRWAY INHALATION TREATMENT: CPT | Mod: 76

## 2017-10-29 RX ORDER — POLYETHYLENE GLYCOL 3350 17 G/17G
17 POWDER, FOR SOLUTION ORAL DAILY PRN
Status: DISCONTINUED | OUTPATIENT
Start: 2017-10-29 | End: 2017-10-29

## 2017-10-29 RX ORDER — IPRATROPIUM BROMIDE AND ALBUTEROL SULFATE 2.5; .5 MG/3ML; MG/3ML
3 SOLUTION RESPIRATORY (INHALATION) EVERY 6 HOURS PRN
Status: DISCONTINUED | OUTPATIENT
Start: 2017-10-29 | End: 2017-11-01 | Stop reason: HOSPADM

## 2017-10-29 RX ORDER — FUROSEMIDE 10 MG/ML
40 INJECTION INTRAMUSCULAR; INTRAVENOUS ONCE
Status: COMPLETED | OUTPATIENT
Start: 2017-10-29 | End: 2017-10-29

## 2017-10-29 RX ORDER — POLYETHYLENE GLYCOL 3350 17 G/17G
17 POWDER, FOR SOLUTION ORAL 2 TIMES DAILY
Status: DISCONTINUED | OUTPATIENT
Start: 2017-10-29 | End: 2017-11-01 | Stop reason: HOSPADM

## 2017-10-29 RX ORDER — IPRATROPIUM BROMIDE AND ALBUTEROL SULFATE 2.5; .5 MG/3ML; MG/3ML
3 SOLUTION RESPIRATORY (INHALATION) 4 TIMES DAILY
Status: DISCONTINUED | OUTPATIENT
Start: 2017-10-29 | End: 2017-10-29

## 2017-10-29 RX ORDER — CALCIUM CARBONATE 500 MG/1
500-1000 TABLET, CHEWABLE ORAL ONCE
Status: COMPLETED | OUTPATIENT
Start: 2017-10-29 | End: 2017-10-29

## 2017-10-29 RX ORDER — HEPARIN SODIUM 5000 [USP'U]/.5ML
5000 INJECTION, SOLUTION INTRAVENOUS; SUBCUTANEOUS EVERY 8 HOURS
Status: DISCONTINUED | OUTPATIENT
Start: 2017-10-29 | End: 2017-10-29

## 2017-10-29 RX ADMIN — HYDROMORPHONE HYDROCHLORIDE 4 MG: 2 TABLET ORAL at 04:17

## 2017-10-29 RX ADMIN — IPRATROPIUM BROMIDE AND ALBUTEROL SULFATE 3 ML: .5; 3 SOLUTION RESPIRATORY (INHALATION) at 09:41

## 2017-10-29 RX ADMIN — DIPHENHYDRAMINE HYDROCHLORIDE 25 MG: 25 CAPSULE ORAL at 00:28

## 2017-10-29 RX ADMIN — POTASSIUM PHOSPHATE, MONOBASIC AND POTASSIUM PHOSPHATE, DIBASIC 15 MMOL: 224; 236 INJECTION, SOLUTION INTRAVENOUS at 21:34

## 2017-10-29 RX ADMIN — POTASSIUM CHLORIDE 20 MEQ: 750 TABLET, EXTENDED RELEASE ORAL at 09:50

## 2017-10-29 RX ADMIN — POLYETHYLENE GLYCOL 3350 17 G: 17 POWDER, FOR SOLUTION ORAL at 09:50

## 2017-10-29 RX ADMIN — MELATONIN TAB 3 MG 3 MG: 3 TAB at 20:31

## 2017-10-29 RX ADMIN — HYDROMORPHONE HYDROCHLORIDE 4 MG: 2 TABLET ORAL at 00:28

## 2017-10-29 RX ADMIN — HYDROMORPHONE HYDROCHLORIDE 4 MG: 2 TABLET ORAL at 07:56

## 2017-10-29 RX ADMIN — MAGNESIUM HYDROXIDE 30 ML: 400 SUSPENSION ORAL at 16:23

## 2017-10-29 RX ADMIN — IPRATROPIUM BROMIDE AND ALBUTEROL SULFATE 3 ML: .5; 3 SOLUTION RESPIRATORY (INHALATION) at 11:39

## 2017-10-29 RX ADMIN — HYDROMORPHONE HYDROCHLORIDE 4 MG: 2 TABLET ORAL at 16:27

## 2017-10-29 RX ADMIN — HYDROMORPHONE HYDROCHLORIDE 4 MG: 2 TABLET ORAL at 20:31

## 2017-10-29 RX ADMIN — FUROSEMIDE 40 MG: 10 INJECTION, SOLUTION INTRAVENOUS at 13:29

## 2017-10-29 RX ADMIN — PANTOPRAZOLE SODIUM 40 MG: 40 TABLET, DELAYED RELEASE ORAL at 07:51

## 2017-10-29 RX ADMIN — SENNOSIDES AND DOCUSATE SODIUM 2 TABLET: 8.6; 5 TABLET ORAL at 07:51

## 2017-10-29 RX ADMIN — ACETAMINOPHEN 975 MG: 325 TABLET, FILM COATED ORAL at 20:19

## 2017-10-29 RX ADMIN — CALCIUM CARBONATE (ANTACID) CHEW TAB 500 MG 1000 MG: 500 CHEW TAB at 13:29

## 2017-10-29 RX ADMIN — ACETAMINOPHEN 975 MG: 325 TABLET, FILM COATED ORAL at 04:17

## 2017-10-29 RX ADMIN — ACETAMINOPHEN 975 MG: 325 TABLET, FILM COATED ORAL at 11:29

## 2017-10-29 RX ADMIN — MELATONIN TAB 3 MG 3 MG: 3 TAB at 00:28

## 2017-10-29 RX ADMIN — ASPIRIN 81 MG: 81 TABLET, COATED ORAL at 07:51

## 2017-10-29 ASSESSMENT — PAIN DESCRIPTION - DESCRIPTORS
DESCRIPTORS: ACHING
DESCRIPTORS: DISCOMFORT
DESCRIPTORS: ACHING;DISCOMFORT

## 2017-10-29 ASSESSMENT — ACTIVITIES OF DAILY LIVING (ADL): PREVIOUS_RESPONSIBILITIES: MEAL PREP;HOUSEKEEPING;LAUNDRY;SHOPPING;YARDWORK;MEDICATION MANAGEMENT;FINANCES;DRIVING

## 2017-10-29 NOTE — PROGRESS NOTES
10/29/17 0940   Quick Adds   Type of Visit Initial Occupational Therapy Evaluation   Living Environment   Lives With spouse;child(camila), adult;grandchild(camila)   Living Arrangements house   Home Accessibility stairs to enter home;stairs within home   Number of Stairs to Enter Home 3   Number of Stairs Within Home 12   Transportation Available car;family or friend will provide   Living Environment Comment Pt lives in house with 10 steps to get to basement. Bedroom and bathroom are on upper level, bathroom in a tubshower with grab bars, raised toilet seat. Pt endorsed that  and daughter are available to help,  working part time and daughter at home at all times 2/2 working from home.    Self-Care   Dominant Hand right   Usual Activity Tolerance moderate   Current Activity Tolerance fair   Regular Exercise other (see comments)   Equipment Currently Used at Home none   Activity/Exercise/Self-Care Comment Pt previously IND in all I/ADLs, would become winded after going up flight of stairs.    Functional Level Prior   Ambulation 0-->independent   Transferring 0-->independent   Toileting 0-->independent   Bathing 0-->independent   Dressing 0-->independent   Eating 0-->independent   Communication 0-->understands/communicates without difficulty   Swallowing 0-->swallows foods/liquids without difficulty   Cognition 0 - no cognition issues reported   Fall history within last six months no   Which of the above functional risks had a recent onset or change? none   Prior Functional Level Comment Pt previously IND with all functional mobility and I/ADLs, pt limited to primarily household and short community distances    General Information   Onset of Illness/Injury or Date of Surgery - Date 10/27/17   Referring Physician Seth Richard MD   Patient/Family Goals Statement Return home    Additional Occupational Profile Info/Pertinent History of Current Problem Georgie Patino is a 62 year old female admitted to the ICU  for medical management following repair of a 6.2 cm ascending aortic aneurysm on 10/27/17 by Dr Piña.  Patient also has a PMH of a HTN and tobacco abuse.   Precautions/Limitations sternal precautions   Weight-Bearing Status - LUE full weight-bearing   Weight-Bearing Status - RUE full weight-bearing   Weight-Bearing Status - LLE full weight-bearing   Weight-Bearing Status - RLE full weight-bearing;other (see comments)  (Arthritis in R knee)   Heart Disease Risk Factors Medical history;Lack of physical activity   General Observations Pt with decreased IND with I/ADLs   General Info Comments Activity: Ambulate with assistance  TID and PRN   Cognitive Status Examination   Orientation orientation to person, place and time   Level of Consciousness alert   Able to Follow Commands WNL/WFL   Personal Safety (Cognitive) WNL/WFL   Memory intact   Attention No deficits were identified   Organization/Problem Solving No deficits were identified   Executive Function No deficits were identified   Visual Perception   Visual Perception Wears glasses  (for reading )   Sensory Examination   Sensory Quick Adds No deficits were identified   Pain Assessment   Patient Currently in Pain Yes, see Vital Sign flowsheet   Range of Motion (ROM)   ROM Quick Adds Other (describe)  (not formally tested, sternal precautions )   Strength   Manual Muscle Testing Quick Adds Other  (sternal precautions, not formally tested )   Hand Strength   Hand Strength Comments WFL/WNL    Muscle Tone Assessment   Muscle Tone Quick Adds No deficits were identified   Coordination   Upper Extremity Coordination No deficits were identified   Transfer Skill: Sit to Stand   Level of [place]: Sit/Stand contact guard   Physical Assist/Nonphysical Assist: Sit/Stand supervision   Transfer Skill: Sit to Stand weight-bearing as tolerated   Assistive Device for Transfer: Sit/Stand donell walker   Transfer Skill: Toilet Transfer   Level of [place]: Toilet contact guard  "  Physical Assist/Nonphysical Assist: Toilet supervision   Weight-Bearing Restrictions: Toilet full weight-bearing   Assistive Device grab bars   Toileting   Level of [place]: Toilet stand-by assist   Physical Assist/Nonphysical Assist: Toilet supervision   Instrumental Activities of Daily Living (IADL)   Previous Responsibilities meal prep;housekeeping;laundry;shopping;yardwork;medication management;finances;driving   Activities of Daily Living Analysis   Impairments Contributing to Impaired Activities of Daily Living flexibility decreased;ROM decreased;strength decreased   General Therapy Interventions   Planned Therapy Interventions ADL retraining;IADL retraining;strengthening;ROM;stretching   Clinical Impression   Criteria for Skilled Therapeutic Interventions Met yes, treatment indicated   OT Diagnosis Pt with decreased activity tolerance, and decreased IND with I/ADLs   Influenced by the following impairments decreased activity tolerance, sternal precautions, requiring O2 via nc with activity   Assessment of Occupational Performance 3-5 Performance Deficits   Identified Performance Deficits dressing, houskeeping, cleaning, functional mobility,   Clinical Decision Making (Complexity) Low complexity   Therapy Frequency daily   Predicted Duration of Therapy Intervention (days/wks) 1 week   Anticipated Equipment Needs at Discharge tub bench;shower chair   Anticipated Discharge Disposition Transitional Care Facility;Home with Outpatient Therapy   Risks and Benefits of Treatment have been explained. Yes   Patient, Family & other staff in agreement with plan of care Yes   Clinical Impression Comments Pt educated on OT role and POC, pt motivated to return to OF    [facility] AM-PAC TM \"6 Clicks\"   2016, Trustees of [facility], under license to [employer].  All rights reserved.   6 Clicks Short Forms Daily Activity Inpatient Short Form   [facility] AM-PAC  \"6 Clicks\" Daily Activity " Inpatient Short Form   1. Putting on and taking off regular lower body clothing? 3 - A Little   2. Bathing (including washing, rinsing, drying)? 2 - A Lot   3. Toileting, which includes using toilet, bedpan or urinal? 3 - A Little   4. Putting on and taking off regular upper body clothing? 2 - A Lot   5. Taking care of personal grooming such as brushing teeth? 3 - A Little   6. Eating meals? 4 - None   Daily Activity Raw Score (Score out of 24.Lower scores equate to lower levels of function) 17   Total Evaluation Time   Total Evaluation Time (Minutes) 10

## 2017-10-29 NOTE — PLAN OF CARE
Problem: Patient Care Overview  Goal: Plan of Care/Patient Progress Review  Outcome: No Change  VSS, SR.  A&Ox4, on 2 L NC.  Pt complains of R abdominal pain intermittently relieved w/ scheduled tylenol, PRN dilaudid, and repositioning.  3 CTs to suction w/ serosanguinous drainage.  Capped pacer wires.  Pt endorses having no appetite.  Eating ice chips and sipping water.  Blood sugars assessed q4h.  Mckenna catheter in place w/ good UO.  Pt up w/1.  Activity and pulmonary toileting encouraged.  Will continue to monitor and notify CVTS with any concerns or questions.

## 2017-10-29 NOTE — PLAN OF CARE
Problem: Patient Care Overview  Goal: Plan of Care/Patient Progress Review  Outcome: Improving  D: Pt s/p AAA repair and PFO closure 10/27.     I: Monitored vitals and assessed pt status.   PRN: Dilaudid PO x2, benadryl, melatonin     A: A0x4. VSS on 2LPM via NC. NSR. Afebrile. Pain controlled with sched tylenol and PRN medication. CT x3 to suction, no airleak. CT output 180cc from mediastinals, 109cc from R pleural. CT and sternal incision dressings C/D/I. Ambulated with SBA in room. Adequate UOP.  at HS.      P: Continue to monitor pt status and report changes to treatment team.

## 2017-10-29 NOTE — PROGRESS NOTES
CVTS Daily Note  10/29/2017  Attending: Rubio Piña,*    S:   No overnight events.  Pt seen at bedside resting comfortably.    Does complain of R flank pain, otherwise no acute complaints.      Denies F/C/Sweats.  No CP, SOB, or calf pain.    Tolerating diet  no BM.  + Flatus.    Ambulated well with assistance.    Pain controlled well.    O:   Vitals:    10/29/17 0745 10/29/17 0841 10/29/17 1126 10/29/17 1516   BP: 121/63  138/75 120/79   BP Location:   Right arm Right arm   Resp: 18 16 16   Temp: 98.4  F (36.9  C)  97.4  F (36.3  C) 97.9  F (36.6  C)   TempSrc: Oral  Oral Oral   SpO2: 92% 96% 93% 97%   Weight:       Height:         Vitals:    10/27/17 0517 10/28/17 0400 10/29/17 0030   Weight: 93.4 kg (205 lb 14.6 oz) 93.6 kg (206 lb 6.4 oz) 94.8 kg (208 lb 14.4 oz)     + 1.5 kg since admit      Intake/Output Summary (Last 24 hours) at 10/29/17 1850  Last data filed at 10/29/17 1800   Gross per 24 hour   Intake             1320 ml   Output             2976 ml   Net            -1656 ml       MAPs:   Gen: AAO x 3, pleasant, NAD  CV: RRR, S1S2 normal, no murmurs, rubs, or gallops.  JVD  Pulm: CTA, no rhonchi or wheezes  Abd: soft, non-tender, no guarding  Ext: peripheral edema,1 + pitting  Incision: clean, dry, intact, no erythema  Chest Tube sites: dressings clean and dry, serosanguinous, no air leak, output        Labs:  BMP  Recent Labs  Lab 10/29/17  0811 10/29/17  0655 10/28/17  0341 10/27/17  2008 10/27/17  1402    138 147*  --  147*   POTASSIUM 3.8 4.0 4.4 4.6 3.8   CHLORIDE 106 106 117*  --  114*   CO2 24 23 24  --  24   BUN 25 26 24  --  18   CR 0.47* 0.53 0.58  --  0.63   * 125* 132*  --  203*   MAG 2.1  --  2.4* 2.4* 2.6*   PHOS 2.4*  --  3.5  --  4.7*     CBC  Recent Labs  Lab 10/29/17  0811 10/28/17  0341 10/27/17  1402 10/27/17  1306 10/27/17  1235   WBC 12.8* 12.2* 18.1*  --   --    HGB 11.1* 12.0 13.6 12.6  --    PLT 69* 75* 95*  --  109*     INR/PTT  Recent Labs  Lab  10/29/17  0811 10/27/17  1402 10/27/17  1235 10/27/17  0544   INR 1.21* 1.23* 1.29* 0.91   PTT  --  35 32  --      ABG  Recent Labs  Lab 10/28/17  0341 10/27/17  2008 10/27/17  1648 10/27/17  1402   PH 7.40 7.40 7.42 7.27*   PCO2 40 37 29* 51*   PO2 66* 66* 78* 81   HCO3 25 23 19* 23     LFTNo lab results found in last 7 days.    GLUCOSE:     Recent Labs  Lab 10/29/17  1749 10/29/17  1150 10/29/17  0848 10/29/17  0811 10/29/17  0655 10/28/17  2123 10/28/17  1702 10/28/17  1516  10/28/17  0341  10/27/17  1402 10/27/17  1306 10/27/17  1234   GLC  --   --   --  124* 125*  --   --   --   --  132*  --  203* 194* 194*   * 129* 140*  --   --  110* 115* 95  < >  --   < >  --   --   --    < > = values in this interval not displayed.      Imaging:  no recent imaging      A/P:   Georgie Patino is a 62 year old female who is status post repair of a 6.2 cm ascending aortic aneurysm on 10/27/17 by Dr Piña.     Neuro:   - Tylenol, PRN oxy, dilaudid for pain    CV:   - Aspirin  - Diuresis with Lasix 40 x1 today    Pulm:   - no issues  - Chest tubes to remain due to high output    ID:   -  WBC elevated, monitor    GI / FEN:  - Regular diet     Renal / :   - Replace lytes  - Lasix today     Heme:   - Hgb downtrending, 11.1 from 12, monitor    Endo:   - SSI    PPX:   - Protonix    Anticoagulation:   - Heparin ppx    Dispo:   - 6C since 10/28      Discussed with CVTS Fellow   Staff surgeons have been informed of changes through both  verbal and written communication.        Russell Regalado   Surgery PGY3  [phone]

## 2017-10-29 NOTE — PLAN OF CARE
Problem: Patient Care Overview  Goal: Plan of Care/Patient Progress Review  Outcome: No Change  VSS, SR/ST 60s-100s.  A&Ox4, on 2 L NC.  Pt complains of persistent R lower abdominal pain intermittently relieved w/ scheduled tylenol, PRN dilaudid, and frequent repositioning.  3 CTs to suction.  Capped pacer wires.  Dressing change completed.  40 mg IV lasix given with good UO.  LBM 10/26.  Bowel meds given, activity and oral intake encouraged.  Pt up SBA to bathroom.  Potassium (3.8) replaced.  Will continue to monitor and notify CVTS with any concerns or questions.

## 2017-10-29 NOTE — PLAN OF CARE
Problem: Patient Care Overview  Goal: Plan of Care/Patient Progress Review  Discharge Planner OT   Patient plan for discharge: Home   Current status: Pt CGA sit<>stand, CGA ambulated ~15'x2 to bathroom and back with FWW 2L O2 with activity, tolerated standing at sink 15 minutes SBA oral cares.   Barriers to return to prior living situation: Decreased activity tolerance, use of O2 for stable SpO2%, sternal precautions   Recommendations for discharge: TCU - pending pt progress in therapy, anticipate pt will be able to progress to be safe to d/c home with OP CR and caregiver assist   Rationale for recommendations: Pt high PLOF, motivated in therapy sessions. Pt with decreased activity tolerance, strength, and endurance continue therapy to increase IND in I/ADLs and return to PLOF        Entered by: Tierney Pascal 10/29/2017 10:53 AM

## 2017-10-30 ENCOUNTER — APPOINTMENT (OUTPATIENT)
Dept: PHYSICAL THERAPY | Facility: CLINIC | Age: 62
DRG: 220 | End: 2017-10-30
Attending: THORACIC SURGERY (CARDIOTHORACIC VASCULAR SURGERY)
Payer: COMMERCIAL

## 2017-10-30 ENCOUNTER — APPOINTMENT (OUTPATIENT)
Dept: GENERAL RADIOLOGY | Facility: CLINIC | Age: 62
DRG: 220 | End: 2017-10-30
Attending: PHYSICIAN ASSISTANT
Payer: COMMERCIAL

## 2017-10-30 LAB
ALBUMIN UR-MCNC: NEGATIVE MG/DL
ANION GAP SERPL CALCULATED.3IONS-SCNC: 8 MMOL/L (ref 3–14)
APPEARANCE UR: CLEAR
BILIRUB UR QL STRIP: NEGATIVE
BUN SERPL-MCNC: 20 MG/DL (ref 7–30)
CALCIUM SERPL-MCNC: 8 MG/DL (ref 8.5–10.1)
CHLORIDE SERPL-SCNC: 106 MMOL/L (ref 94–109)
CO2 SERPL-SCNC: 24 MMOL/L (ref 20–32)
COLOR UR AUTO: ABNORMAL
CREAT SERPL-MCNC: 0.42 MG/DL (ref 0.52–1.04)
ERYTHROCYTE [DISTWIDTH] IN BLOOD BY AUTOMATED COUNT: 13.6 % (ref 10–15)
GFR SERPL CREATININE-BSD FRML MDRD: >90 ML/MIN/1.7M2
GLUCOSE BLDC GLUCOMTR-MCNC: 113 MG/DL (ref 70–99)
GLUCOSE BLDC GLUCOMTR-MCNC: 120 MG/DL (ref 70–99)
GLUCOSE BLDC GLUCOMTR-MCNC: 124 MG/DL (ref 70–99)
GLUCOSE BLDC GLUCOMTR-MCNC: 87 MG/DL (ref 70–99)
GLUCOSE SERPL-MCNC: 106 MG/DL (ref 70–99)
GLUCOSE UR STRIP-MCNC: NEGATIVE MG/DL
HCT VFR BLD AUTO: 33.7 % (ref 35–47)
HGB BLD-MCNC: 10.8 G/DL (ref 11.7–15.7)
HGB UR QL STRIP: ABNORMAL
HYALINE CASTS #/AREA URNS LPF: 8 /LPF (ref 0–2)
INTERPRETATION ECG - MUSE: NORMAL
INTERPRETATION ECG - MUSE: NORMAL
KETONES UR STRIP-MCNC: NEGATIVE MG/DL
LEUKOCYTE ESTERASE UR QL STRIP: NEGATIVE
MCH RBC QN AUTO: 29 PG (ref 26.5–33)
MCHC RBC AUTO-ENTMCNC: 32 G/DL (ref 31.5–36.5)
MCV RBC AUTO: 91 FL (ref 78–100)
MUCOUS THREADS #/AREA URNS LPF: PRESENT /LPF
NITRATE UR QL: NEGATIVE
PH UR STRIP: 5 PH (ref 5–7)
PHOSPHATE SERPL-MCNC: 2.5 MG/DL (ref 2.5–4.5)
PLATELET # BLD AUTO: 79 10E9/L (ref 150–450)
POTASSIUM SERPL-SCNC: 3.9 MMOL/L (ref 3.4–5.3)
RBC # BLD AUTO: 3.72 10E12/L (ref 3.8–5.2)
RBC #/AREA URNS AUTO: 11 /HPF (ref 0–2)
SODIUM SERPL-SCNC: 139 MMOL/L (ref 133–144)
SOURCE: ABNORMAL
SP GR UR STRIP: 1 (ref 1–1.03)
SQUAMOUS #/AREA URNS AUTO: 1 /HPF (ref 0–1)
UROBILINOGEN UR STRIP-MCNC: NORMAL MG/DL (ref 0–2)
WBC # BLD AUTO: 10.1 10E9/L (ref 4–11)
WBC #/AREA URNS AUTO: 1 /HPF (ref 0–2)

## 2017-10-30 PROCEDURE — 25000132 ZZH RX MED GY IP 250 OP 250 PS 637: Performed by: STUDENT IN AN ORGANIZED HEALTH CARE EDUCATION/TRAINING PROGRAM

## 2017-10-30 PROCEDURE — 97116 GAIT TRAINING THERAPY: CPT | Mod: GP | Performed by: PHYSICAL THERAPIST

## 2017-10-30 PROCEDURE — 71020 XR CHEST 2 VW: CPT

## 2017-10-30 PROCEDURE — 25000132 ZZH RX MED GY IP 250 OP 250 PS 637: Performed by: THORACIC SURGERY (CARDIOTHORACIC VASCULAR SURGERY)

## 2017-10-30 PROCEDURE — 85027 COMPLETE CBC AUTOMATED: CPT | Performed by: THORACIC SURGERY (CARDIOTHORACIC VASCULAR SURGERY)

## 2017-10-30 PROCEDURE — 84100 ASSAY OF PHOSPHORUS: CPT | Performed by: THORACIC SURGERY (CARDIOTHORACIC VASCULAR SURGERY)

## 2017-10-30 PROCEDURE — 21400006 ZZH R&B CCU INTERMEDIATE UMMC

## 2017-10-30 PROCEDURE — 00000146 ZZHCL STATISTIC GLUCOSE BY METER IP

## 2017-10-30 PROCEDURE — 80048 BASIC METABOLIC PNL TOTAL CA: CPT | Performed by: THORACIC SURGERY (CARDIOTHORACIC VASCULAR SURGERY)

## 2017-10-30 PROCEDURE — 97530 THERAPEUTIC ACTIVITIES: CPT | Mod: GP | Performed by: PHYSICAL THERAPIST

## 2017-10-30 PROCEDURE — 40000556 ZZH STATISTIC PERIPHERAL IV START W US GUIDANCE

## 2017-10-30 PROCEDURE — 36415 COLL VENOUS BLD VENIPUNCTURE: CPT | Performed by: THORACIC SURGERY (CARDIOTHORACIC VASCULAR SURGERY)

## 2017-10-30 PROCEDURE — 97110 THERAPEUTIC EXERCISES: CPT | Mod: GP | Performed by: PHYSICAL THERAPIST

## 2017-10-30 PROCEDURE — 81001 URINALYSIS AUTO W/SCOPE: CPT | Performed by: PHYSICIAN ASSISTANT

## 2017-10-30 PROCEDURE — 25000128 H RX IP 250 OP 636: Performed by: PHYSICIAN ASSISTANT

## 2017-10-30 PROCEDURE — 71010 XR CHEST PORT 1 VW: CPT

## 2017-10-30 PROCEDURE — 40000193 ZZH STATISTIC PT WARD VISIT: Performed by: PHYSICAL THERAPIST

## 2017-10-30 RX ORDER — FUROSEMIDE 10 MG/ML
40 INJECTION INTRAMUSCULAR; INTRAVENOUS ONCE
Status: COMPLETED | OUTPATIENT
Start: 2017-10-30 | End: 2017-10-30

## 2017-10-30 RX ADMIN — HYDROMORPHONE HYDROCHLORIDE 4 MG: 2 TABLET ORAL at 20:31

## 2017-10-30 RX ADMIN — HYDROMORPHONE HYDROCHLORIDE 4 MG: 2 TABLET ORAL at 08:21

## 2017-10-30 RX ADMIN — HYDROMORPHONE HYDROCHLORIDE 4 MG: 2 TABLET ORAL at 23:33

## 2017-10-30 RX ADMIN — POLYETHYLENE GLYCOL 3350 17 G: 17 POWDER, FOR SOLUTION ORAL at 08:22

## 2017-10-30 RX ADMIN — PANTOPRAZOLE SODIUM 40 MG: 40 TABLET, DELAYED RELEASE ORAL at 08:22

## 2017-10-30 RX ADMIN — FUROSEMIDE 40 MG: 10 INJECTION, SOLUTION INTRAVENOUS at 14:42

## 2017-10-30 RX ADMIN — POTASSIUM CHLORIDE 20 MEQ: 750 TABLET, EXTENDED RELEASE ORAL at 11:58

## 2017-10-30 RX ADMIN — POLYETHYLENE GLYCOL 3350 17 G: 17 POWDER, FOR SOLUTION ORAL at 20:31

## 2017-10-30 RX ADMIN — HYDROMORPHONE HYDROCHLORIDE 4 MG: 2 TABLET ORAL at 16:19

## 2017-10-30 RX ADMIN — ASPIRIN 81 MG: 81 TABLET, COATED ORAL at 08:22

## 2017-10-30 RX ADMIN — SENNOSIDES AND DOCUSATE SODIUM 2 TABLET: 8.6; 5 TABLET ORAL at 08:22

## 2017-10-30 RX ADMIN — SENNOSIDES AND DOCUSATE SODIUM 2 TABLET: 8.6; 5 TABLET ORAL at 20:31

## 2017-10-30 RX ADMIN — ACETAMINOPHEN 975 MG: 325 TABLET, FILM COATED ORAL at 03:30

## 2017-10-30 RX ADMIN — HYDROMORPHONE HYDROCHLORIDE 4 MG: 2 TABLET ORAL at 03:30

## 2017-10-30 ASSESSMENT — PAIN DESCRIPTION - DESCRIPTORS
DESCRIPTORS: ACHING;DISCOMFORT
DESCRIPTORS: ACHING;DISCOMFORT

## 2017-10-30 NOTE — PLAN OF CARE
Problem: Patient Care Overview  Goal: Plan of Care/Patient Progress Review  Outcome: No Change  D: Pt s/p AAA repair and PFO closure 10/27.     I: Monitored vitals and assessed pt status.      A: A0x4. VSS on 2LPM on NC. NSR. Afebrile. Pain controlled with sched tylenol and PRN dilaudid. Lytes replaced per protocol: Phos=2.4 (+15 mmol). No BM this shift, +flatus. Adequate UOP, up with SBA. CT x3 to suction, no airleak; mediastinals output 120cc, R pleural 104cc.  at HS. Pt slept between cares, making needs known.     P: Continue to monitor Pt status and report changes to treatment team.

## 2017-10-30 NOTE — PROGRESS NOTES
CVTS Daily Note  10/30/2017  Attending: Rubio Piña,*    S:   No overnight events. Patient seen up in chair. Patient progressing well. Continues to have right-sided flank pain. Pain well-controlled. No new chest pain, shortness of breath, calf pain. +small BM today, +flatus. Ambulating.       O:   Vitals:    10/30/17 0330 10/30/17 0348 10/30/17 0729 10/30/17 1158   BP: (!) 123/91 107/75 128/80 129/57   BP Location: Left arm Left arm Right arm Left arm   Resp: 16  15 16   Temp:   98.7  F (37.1  C) 98.2  F (36.8  C)   TempSrc:   Oral Oral   SpO2: 96%  98% 96%   Weight:       Height:         Vitals:    10/28/17 0400 10/29/17 0030 10/30/17 0105   Weight: 93.6 kg (206 lb 6.4 oz) 94.8 kg (208 lb 14.4 oz) 93.2 kg (205 lb 8 oz)     Intake/Output Summary (Last 24 hours) at 10/30/17 1257  Last data filed at 10/30/17 1252   Gross per 24 hour   Intake             1270 ml   Output             3010 ml   Net            -1740 ml       MAPs:   Gen: AAO x 3, pleasant, NAD  CV: RRR, S1S2 normal, no murmurs, rubs, or gallops.   Pulm: CTA, no rhonchi or wheezes. Limited effort.  Abd: Soft, non-distended. Mild pain with palpation of right flank. No guarding.  Ext: Peripheral edema,1 + pitting  Incision:Cclean, dry, intact, no erythema  Chest Tube sites: Mediastinal tubes and right pleural. Dressings clean and dry, serosanguinous, no air leak, output appropriate.     Labs:  [facility]    Recent Labs  Lab 10/30/17  0727 10/29/17  0811 10/29/17  0655 10/28/17  0341 10/27/17  2008 10/27/17  1402    138 138 147*  --  147*   POTASSIUM 3.9 3.8 4.0 4.4 4.6 3.8   CHLORIDE 106 106 106 117*  --  114*   CO2 24 24 23 24  --  24   BUN 20 25 26 24  --  18   CR 0.42* 0.47* 0.53 0.58  --  0.63   * 124* 125* 132*  --  203*   MAG  --  2.1  --  2.4* 2.4* 2.6*   PHOS 2.5 2.4*  --  3.5  --  4.7*     CBC    Recent Labs  Lab 10/30/17  0727 10/29/17  0811 10/28/17  0341 10/27/17  1402   WBC 10.1 12.8* 12.2* 18.1*   HGB 10.8* 11.1* 12.0 13.6    PLT 79* 69* 75* 95*     INR/PTT    Recent Labs  Lab 10/29/17  0811 10/27/17  1402 10/27/17  1235 10/27/17  0544   INR 1.21* 1.23* 1.29* 0.91   PTT  --  35 32  --      ABG    Recent Labs  Lab 10/28/17  0341 10/27/17  2008 10/27/17  1648 10/27/17  1402   PH 7.40 7.40 7.42 7.27*   PCO2 40 37 29* 51*   PO2 66* 66* 78* 81   HCO3 25 23 19* 23     LFTNo lab results found in last 7 days.    GLUCOSE:     Recent Labs  Lab 10/30/17  1200 10/30/17  0731 10/30/17  0727 10/29/17  2059 10/29/17  1749 10/29/17  1150 10/29/17  0848 10/29/17  0811 10/29/17  0655  10/28/17  0341  10/27/17  1402 10/27/17  1306   GLC  --   --  106*  --   --   --   --  124* 125*  --  132*  --  203* 194*   * 120*  --  122* 113* 129* 140*  --   --   < >  --   < >  --   --    < > = values in this interval not displayed.      Imaging:    CXR 10/30/2017  IMPRESSION:   1. Removal of right internal jugular [place]-Migue catheter. Mediastinal drains and right basilar chest tube are relatively stable in position when compared with prior exam.  3. Bibasilar opacities, atelectasis versus pulmonary edema and/or infection.      A/P:   Georgie Patino is a 62 year old female who is status post repair of a 6.2 cm ascending aortic aneurysm on 10/27/17 by Dr Piña.     Neuro:   - PRN Tylenol, oxy, dilaudid for pain    CV:   - Aspirin  - Diuresis with Lasix 40 x1 again today    Pulm:   - Wean O2 as tolerated  - Encourage pulmonary toilet  - Plan to remove mediastinal chest tubes today, right pleural tube to stay today due to high output. CXR to follow.     ID:   -  WBC downtrending    GI / FEN:  - Regular diet  - Will advance bowel regimen today.     Renal / :   - Replace lytes  - Lasix today   - UA/UC to rule out urinary source of flank pain today.     Heme:   - Hgb WNL, no bleeding concerns.    Endo:   - SSI    PPX:   - Protonix  - Ambulation    Dispo:   - 6C since 10/28      Patient discussed with staff.    ____  Jody Medrano PA-C  Transplant  Surgery  Pager: [phone]

## 2017-10-30 NOTE — PLAN OF CARE
Problem: Patient Care Overview  Goal: Plan of Care/Patient Progress Review  OT/6C: Upon attempt pt out of room at X-ray, will check back this pm as schedule permits.

## 2017-10-30 NOTE — PLAN OF CARE
Problem: Patient Care Overview  Goal: Plan of Care/Patient Progress Review  D: Pt is s/p POD # 3 of AAA repair and PFO closure.     I/A:  Pt is alert and oriented x 4. Reports incisional pain, PRN Hydromorphone 4 mg given with effect.  Up with stand by assistance. Afebrile, on room air Sp02 96%, VSS, cardiac monitor shows SR HR 60-80 bpm.  R pleural chest tube in place to water seal, output 60 ml this shift,  2 mediastinal CT removed. Chest x-ray completed post removal. 40 mg IV lasix given with good urine output noted. UA sent.  On a regular diet, no N& V, blood glucose checks with meals. Pt had small BM today, up to BR, bowel meds given. Oral intake/fluids encouraged. Potassium 3.9, replacement given.     P: Continue to monitor and notify [facility] of issues and concerns.

## 2017-10-30 NOTE — PLAN OF CARE
Problem: Patient Care Overview  Goal: Plan of Care/Patient Progress Review  PT 6C: Discharge Planner PT   Patient plan for discharge:   Current status: Pt completes sit<>stand transfers with CGA and occasional cues for sternal precautions. Initiated ambulation - pt ambulates ~80 ft x2 with fww and CGA. Tolerates well. Steady on feet. SpO2 94% on RA and HR 80-84 bpm.  Barriers to return to prior living situation: weakness, deconditioning, sternal precautions  Recommendations for discharge: Home with OP Cardiac Rehab              Rationale for recommendations: Moving well post op, anticipate will be appropriate to return home with continued therapy once medically appropriate        Entered by: Lesia Stroud 10/30/2017 3:40 PM

## 2017-10-31 ENCOUNTER — APPOINTMENT (OUTPATIENT)
Dept: GENERAL RADIOLOGY | Facility: CLINIC | Age: 62
DRG: 220 | End: 2017-10-31
Attending: PHYSICIAN ASSISTANT
Payer: COMMERCIAL

## 2017-10-31 ENCOUNTER — APPOINTMENT (OUTPATIENT)
Dept: PHYSICAL THERAPY | Facility: CLINIC | Age: 62
DRG: 220 | End: 2017-10-31
Attending: THORACIC SURGERY (CARDIOTHORACIC VASCULAR SURGERY)
Payer: COMMERCIAL

## 2017-10-31 ENCOUNTER — APPOINTMENT (OUTPATIENT)
Dept: OCCUPATIONAL THERAPY | Facility: CLINIC | Age: 62
DRG: 220 | End: 2017-10-31
Attending: THORACIC SURGERY (CARDIOTHORACIC VASCULAR SURGERY)
Payer: COMMERCIAL

## 2017-10-31 LAB
ANION GAP SERPL CALCULATED.3IONS-SCNC: 8 MMOL/L (ref 3–14)
BUN SERPL-MCNC: 18 MG/DL (ref 7–30)
CALCIUM SERPL-MCNC: 8.5 MG/DL (ref 8.5–10.1)
CHLORIDE SERPL-SCNC: 101 MMOL/L (ref 94–109)
CO2 SERPL-SCNC: 27 MMOL/L (ref 20–32)
COPATH REPORT: NORMAL
CREAT SERPL-MCNC: 0.46 MG/DL (ref 0.52–1.04)
ERYTHROCYTE [DISTWIDTH] IN BLOOD BY AUTOMATED COUNT: 13.3 % (ref 10–15)
GFR SERPL CREATININE-BSD FRML MDRD: >90 ML/MIN/1.7M2
GLUCOSE BLDC GLUCOMTR-MCNC: 104 MG/DL (ref 70–99)
GLUCOSE BLDC GLUCOMTR-MCNC: 119 MG/DL (ref 70–99)
GLUCOSE BLDC GLUCOMTR-MCNC: 120 MG/DL (ref 70–99)
GLUCOSE BLDC GLUCOMTR-MCNC: 120 MG/DL (ref 70–99)
GLUCOSE SERPL-MCNC: 110 MG/DL (ref 70–99)
HCT VFR BLD AUTO: 34.1 % (ref 35–47)
HGB BLD-MCNC: 11.1 G/DL (ref 11.7–15.7)
MCH RBC QN AUTO: 28.8 PG (ref 26.5–33)
MCHC RBC AUTO-ENTMCNC: 32.6 G/DL (ref 31.5–36.5)
MCV RBC AUTO: 88 FL (ref 78–100)
PLATELET # BLD AUTO: 116 10E9/L (ref 150–450)
POTASSIUM SERPL-SCNC: 3.7 MMOL/L (ref 3.4–5.3)
RBC # BLD AUTO: 3.86 10E12/L (ref 3.8–5.2)
SODIUM SERPL-SCNC: 136 MMOL/L (ref 133–144)
WBC # BLD AUTO: 7.9 10E9/L (ref 4–11)

## 2017-10-31 PROCEDURE — 25000132 ZZH RX MED GY IP 250 OP 250 PS 637: Performed by: THORACIC SURGERY (CARDIOTHORACIC VASCULAR SURGERY)

## 2017-10-31 PROCEDURE — 80048 BASIC METABOLIC PNL TOTAL CA: CPT | Performed by: PHYSICIAN ASSISTANT

## 2017-10-31 PROCEDURE — 97530 THERAPEUTIC ACTIVITIES: CPT | Mod: GO

## 2017-10-31 PROCEDURE — 25000132 ZZH RX MED GY IP 250 OP 250 PS 637: Performed by: STUDENT IN AN ORGANIZED HEALTH CARE EDUCATION/TRAINING PROGRAM

## 2017-10-31 PROCEDURE — 97110 THERAPEUTIC EXERCISES: CPT | Mod: GO

## 2017-10-31 PROCEDURE — 21400006 ZZH R&B CCU INTERMEDIATE UMMC

## 2017-10-31 PROCEDURE — 40000133 ZZH STATISTIC OT WARD VISIT

## 2017-10-31 PROCEDURE — 00000146 ZZHCL STATISTIC GLUCOSE BY METER IP

## 2017-10-31 PROCEDURE — 36415 COLL VENOUS BLD VENIPUNCTURE: CPT | Performed by: PHYSICIAN ASSISTANT

## 2017-10-31 PROCEDURE — 71020 XR CHEST 2 VW: CPT

## 2017-10-31 PROCEDURE — 97116 GAIT TRAINING THERAPY: CPT | Mod: GP | Performed by: PHYSICAL THERAPIST

## 2017-10-31 PROCEDURE — 25000132 ZZH RX MED GY IP 250 OP 250 PS 637: Performed by: PHYSICIAN ASSISTANT

## 2017-10-31 PROCEDURE — 40000193 ZZH STATISTIC PT WARD VISIT: Performed by: PHYSICAL THERAPIST

## 2017-10-31 PROCEDURE — 85027 COMPLETE CBC AUTOMATED: CPT | Performed by: PHYSICIAN ASSISTANT

## 2017-10-31 RX ORDER — ATORVASTATIN CALCIUM 40 MG/1
40 TABLET, FILM COATED ORAL DAILY
Status: DISCONTINUED | OUTPATIENT
Start: 2017-10-31 | End: 2017-11-01 | Stop reason: HOSPADM

## 2017-10-31 RX ADMIN — SENNOSIDES AND DOCUSATE SODIUM 2 TABLET: 8.6; 5 TABLET ORAL at 08:58

## 2017-10-31 RX ADMIN — HYDROMORPHONE HYDROCHLORIDE 4 MG: 2 TABLET ORAL at 08:57

## 2017-10-31 RX ADMIN — POTASSIUM CHLORIDE 20 MEQ: 750 TABLET, EXTENDED RELEASE ORAL at 12:55

## 2017-10-31 RX ADMIN — ATORVASTATIN CALCIUM 40 MG: 40 TABLET, FILM COATED ORAL at 12:55

## 2017-10-31 RX ADMIN — HYDROMORPHONE HYDROCHLORIDE 4 MG: 2 TABLET ORAL at 04:01

## 2017-10-31 RX ADMIN — POLYETHYLENE GLYCOL 3350 17 G: 17 POWDER, FOR SOLUTION ORAL at 08:59

## 2017-10-31 RX ADMIN — HYDROMORPHONE HYDROCHLORIDE 4 MG: 2 TABLET ORAL at 18:32

## 2017-10-31 RX ADMIN — HYDROMORPHONE HYDROCHLORIDE 4 MG: 2 TABLET ORAL at 14:34

## 2017-10-31 RX ADMIN — PANTOPRAZOLE SODIUM 40 MG: 40 TABLET, DELAYED RELEASE ORAL at 08:58

## 2017-10-31 RX ADMIN — ASPIRIN 81 MG: 81 TABLET, COATED ORAL at 08:58

## 2017-10-31 RX ADMIN — HYDROMORPHONE HYDROCHLORIDE 4 MG: 2 TABLET ORAL at 22:18

## 2017-10-31 ASSESSMENT — PAIN DESCRIPTION - DESCRIPTORS
DESCRIPTORS: DISCOMFORT;ACHING
DESCRIPTORS: ACHING;DISCOMFORT
DESCRIPTORS: ACHING;DISCOMFORT

## 2017-10-31 NOTE — PLAN OF CARE
Problem: Patient Care Overview  Goal: Plan of Care/Patient Progress Review  Discharge Planner OT   Patient plan for discharge: Home with OP CR   Current status: Pt SBA sit<>stand, ambulate ~80'x2 FWW CGA, VSS on RA. Pt tolerated leg ergometer for 8 minutes.   Barriers to return to prior living situation: Decreased activity tolerance   Recommendations for discharge: Home with OP CR   Rationale for recommendations: Increase activity tolerance for increased IND and safety in ADLs return to PLOF        Entered by: Tierney Pascal 10/31/2017 1:02 PM

## 2017-10-31 NOTE — PLAN OF CARE
Problem: Patient Care Overview  Goal: Plan of Care/Patient Progress Review  PT 6C: Discharge Planner PT   Patient plan for discharge:   Current status: Pt progressing well with mobility. Sit<>stand transfers with CGA-SBA and occasional reminders for sternal precautions. Progressed ambulation to 250 ft x2 with fww and SBA. Also completes 3 stairs x2 with single railing and CGA. Tolerates well. VSS on RA - SpO2 94-95% on RA and HR 90 bpm.   Barriers to return to prior living situation: weakness, deconditioning, sternal precautions  Recommendations for discharge: Home with OP Cardiac Rehab  Rationale for recommendations: Moving well, appropriate to return to home environment with OP Cardiac Rehab follow up once medically appropriate         Entered by: Lesia Stroud 10/31/2017 2:59 PM

## 2017-10-31 NOTE — PLAN OF CARE
Problem: Patient Care Overview  Goal: Plan of Care/Patient Progress Review  D: Pt is s/p POD # 4 of AAA repair and PFO closure.      I/A:  Pt is alert and oriented x 4. Reports generalized and incisional pain, PRN Hydromorphone 4 mg given with effect x 3.  Up independently, uses a walker for ambulation. On room air,  Sp 02 98%, VSS, cardiac monitor shows SR HR 60-80 bpm.  R pleural chest tube and pace wires removed today. Chest x-ray completed post removal.  Adequate urine output noted. On a regular diet, no N& V, blood glucose checks with meals. Pt had small BM today, up to BR. Oral intake/fluids encouraged. Potassium 3.7, replacement given as per protocol.      P:  Continue to monitor and notify [facility] of issues and concerns. Possible discharge home tomorrow.

## 2017-10-31 NOTE — PLAN OF CARE
Problem: Patient Care Overview  Goal: Plan of Care/Patient Progress Review  Outcome: No Change  [id number]: A&O, VSS on RA. Tele reading NSR. C/o pain in neck and on sides that was well controlled with PRN PO Dilaudid x3. Denied nausea. Chest tube patent to water seal with moderate amounts of output this shift. Old CT sites CDI. BG stable this shift, no SS needed. PIV patent and SL'ed. On regular diet with good appetite and PO intake. Up with SBA. Voiding good amounts VIA commode. Medium BMx1. Continue to monitor and follow POC.

## 2017-11-01 ENCOUNTER — CARE COORDINATION (OUTPATIENT)
Dept: CARE COORDINATION | Facility: CLINIC | Age: 62
End: 2017-11-01

## 2017-11-01 ENCOUNTER — APPOINTMENT (OUTPATIENT)
Dept: OCCUPATIONAL THERAPY | Facility: CLINIC | Age: 62
DRG: 220 | End: 2017-11-01
Attending: THORACIC SURGERY (CARDIOTHORACIC VASCULAR SURGERY)
Payer: COMMERCIAL

## 2017-11-01 VITALS
OXYGEN SATURATION: 95 % | BODY MASS INDEX: 32.61 KG/M2 | HEIGHT: 66 IN | SYSTOLIC BLOOD PRESSURE: 141 MMHG | WEIGHT: 202.9 LBS | RESPIRATION RATE: 18 BRPM | TEMPERATURE: 97.9 F | DIASTOLIC BLOOD PRESSURE: 79 MMHG | HEART RATE: 76 BPM

## 2017-11-01 LAB
ANION GAP SERPL CALCULATED.3IONS-SCNC: 6 MMOL/L (ref 3–14)
BUN SERPL-MCNC: 16 MG/DL (ref 7–30)
CALCIUM SERPL-MCNC: 8.3 MG/DL (ref 8.5–10.1)
CHLORIDE SERPL-SCNC: 103 MMOL/L (ref 94–109)
CO2 SERPL-SCNC: 25 MMOL/L (ref 20–32)
CREAT SERPL-MCNC: 0.51 MG/DL (ref 0.52–1.04)
ERYTHROCYTE [DISTWIDTH] IN BLOOD BY AUTOMATED COUNT: 13.4 % (ref 10–15)
GFR SERPL CREATININE-BSD FRML MDRD: >90 ML/MIN/1.7M2
GLUCOSE SERPL-MCNC: 100 MG/DL (ref 70–99)
HCT VFR BLD AUTO: 33.6 % (ref 35–47)
HGB BLD-MCNC: 10.8 G/DL (ref 11.7–15.7)
MCH RBC QN AUTO: 29 PG (ref 26.5–33)
MCHC RBC AUTO-ENTMCNC: 32.1 G/DL (ref 31.5–36.5)
MCV RBC AUTO: 90 FL (ref 78–100)
PLATELET # BLD AUTO: 127 10E9/L (ref 150–450)
POTASSIUM SERPL-SCNC: 3.8 MMOL/L (ref 3.4–5.3)
RBC # BLD AUTO: 3.72 10E12/L (ref 3.8–5.2)
SODIUM SERPL-SCNC: 135 MMOL/L (ref 133–144)
WBC # BLD AUTO: 6.4 10E9/L (ref 4–11)

## 2017-11-01 PROCEDURE — 36415 COLL VENOUS BLD VENIPUNCTURE: CPT | Performed by: PHYSICIAN ASSISTANT

## 2017-11-01 PROCEDURE — 25000132 ZZH RX MED GY IP 250 OP 250 PS 637: Performed by: STUDENT IN AN ORGANIZED HEALTH CARE EDUCATION/TRAINING PROGRAM

## 2017-11-01 PROCEDURE — 80048 BASIC METABOLIC PNL TOTAL CA: CPT | Performed by: PHYSICIAN ASSISTANT

## 2017-11-01 PROCEDURE — 97110 THERAPEUTIC EXERCISES: CPT | Mod: GO

## 2017-11-01 PROCEDURE — 97530 THERAPEUTIC ACTIVITIES: CPT | Mod: GO

## 2017-11-01 PROCEDURE — 40000133 ZZH STATISTIC OT WARD VISIT

## 2017-11-01 PROCEDURE — 25000132 ZZH RX MED GY IP 250 OP 250 PS 637: Performed by: PHYSICIAN ASSISTANT

## 2017-11-01 PROCEDURE — 85027 COMPLETE CBC AUTOMATED: CPT | Performed by: PHYSICIAN ASSISTANT

## 2017-11-01 PROCEDURE — 25000132 ZZH RX MED GY IP 250 OP 250 PS 637: Performed by: THORACIC SURGERY (CARDIOTHORACIC VASCULAR SURGERY)

## 2017-11-01 PROCEDURE — 97535 SELF CARE MNGMENT TRAINING: CPT | Mod: GO

## 2017-11-01 RX ORDER — ACETAMINOPHEN 325 MG/1
650 TABLET ORAL EVERY 4 HOURS PRN
Qty: 100 TABLET | Refills: 0 | Status: SHIPPED | OUTPATIENT
Start: 2017-11-01 | End: 2023-09-25

## 2017-11-01 RX ORDER — LANOLIN ALCOHOL/MO/W.PET/CERES
3 CREAM (GRAM) TOPICAL
Qty: 30 TABLET | Refills: 0 | Status: SHIPPED | OUTPATIENT
Start: 2017-11-01 | End: 2023-09-25

## 2017-11-01 RX ORDER — PANTOPRAZOLE SODIUM 40 MG/1
40 TABLET, DELAYED RELEASE ORAL EVERY MORNING
Qty: 30 TABLET | Refills: 0 | Status: SHIPPED | OUTPATIENT
Start: 2017-11-01 | End: 2018-09-07

## 2017-11-01 RX ORDER — METOPROLOL TARTRATE 25 MG/1
25 TABLET, FILM COATED ORAL 2 TIMES DAILY
Status: DISCONTINUED | OUTPATIENT
Start: 2017-11-01 | End: 2017-11-01 | Stop reason: HOSPADM

## 2017-11-01 RX ORDER — AMOXICILLIN 250 MG
1-2 CAPSULE ORAL 2 TIMES DAILY PRN
Qty: 100 TABLET | Refills: 0 | Status: SHIPPED | OUTPATIENT
Start: 2017-11-01 | End: 2018-09-07

## 2017-11-01 RX ORDER — POLYETHYLENE GLYCOL 3350 17 G/17G
17 POWDER, FOR SOLUTION ORAL DAILY
Qty: 7 PACKET | Refills: 0 | Status: SHIPPED | OUTPATIENT
Start: 2017-11-01 | End: 2017-11-20

## 2017-11-01 RX ORDER — HYDROMORPHONE HYDROCHLORIDE 2 MG/1
2-4 TABLET ORAL
Qty: 80 TABLET | Refills: 0 | Status: SHIPPED | OUTPATIENT
Start: 2017-11-01 | End: 2017-11-14

## 2017-11-01 RX ADMIN — SENNOSIDES AND DOCUSATE SODIUM 2 TABLET: 8.6; 5 TABLET ORAL at 08:21

## 2017-11-01 RX ADMIN — ATORVASTATIN CALCIUM 40 MG: 40 TABLET, FILM COATED ORAL at 08:21

## 2017-11-01 RX ADMIN — METOPROLOL TARTRATE 25 MG: 25 TABLET ORAL at 08:24

## 2017-11-01 RX ADMIN — ACETAMINOPHEN 650 MG: 325 TABLET, FILM COATED ORAL at 08:21

## 2017-11-01 RX ADMIN — PANTOPRAZOLE SODIUM 40 MG: 40 TABLET, DELAYED RELEASE ORAL at 08:21

## 2017-11-01 RX ADMIN — HYDROMORPHONE HYDROCHLORIDE 4 MG: 2 TABLET ORAL at 08:21

## 2017-11-01 RX ADMIN — HYDROMORPHONE HYDROCHLORIDE 4 MG: 2 TABLET ORAL at 03:33

## 2017-11-01 RX ADMIN — POTASSIUM CHLORIDE 20 MEQ: 750 TABLET, EXTENDED RELEASE ORAL at 08:20

## 2017-11-01 RX ADMIN — ASPIRIN 81 MG: 81 TABLET, COATED ORAL at 08:21

## 2017-11-01 ASSESSMENT — PAIN DESCRIPTION - DESCRIPTORS: DESCRIPTORS: ACHING;DISCOMFORT

## 2017-11-01 NOTE — PLAN OF CARE
Problem: Patient Care Overview  Goal: Plan of Care/Patient Progress Review  Outcome: No Change  D: Ascending aortic aneurysm repair 10/27  I/A: VSS. Incisional pain managed with PRN dilaudid (PO). Sinus rhythm. Up ad darrius.  P: Potential discharge today.

## 2017-11-01 NOTE — PLAN OF CARE
Problem: Patient Care Overview  Goal: Plan of Care/Patient Progress Review  Outcome: Adequate for Discharge Date Met:  11/01/17  DISCHARGE                         No discharge date for patient encounter.  ----------------------------------------------------------------------------  Discharged to: Home  Via: Automobile  Accompanied by: Family (accompanied by )  Discharge Instructions: diet, activity, medications, follow up appointments, when to call the MD, aftercare instructions, and what to watchout for (i.e. s/s of infection, increasing SOB, palpitations, chest pain,)  Prescriptions: To be filled by [place] discharge pharmacy per pt's request; medication list reviewed & sent with pt  Follow Up Appointments: arranged; information given  Belongings: All sent with pt  IV: out  Telemetry: off  Pt exhibits understanding of above discharge instructions; all questions answered.     Discharge Paperwork: Signed, copied, and sent home with patient.

## 2017-11-01 NOTE — PLAN OF CARE
Problem: Patient Care Overview  Goal: Plan of Care/Patient Progress Review  Discharge Planner OT   Patient plan for discharge: Home with  to assist PRN and OP CR  Current status: Educated pt on how to modify ADLs including UB dressing and showering within sternal precautions, demonstrates IND with TB dressing and verbalized understanding of bathing precautions.  Pt transfers and ambulates IND ~220' X2 and engages in 15 minutes of AE on NuStep between bouts of ambulation, VSS on RA throughout.   Barriers to return to prior living situation: None identified   Recommendations for discharge: Home with  to assist PRN and OP CR (referral placed to [facility] in [place])   Rationale for recommendations: To improve cardiovascular strength and endurance necessary to return to previous occupations at [employer].        Entered by: Rene Kurtz 11/01/2017 10:05 AM

## 2017-11-01 NOTE — PROGRESS NOTES
Care Coordinator- Discharge Planning     Admission Date/Time:  10/27/2017  Attending MD:  Rubio Piña   Data  Chart reviewed, discussed with interdisciplinary team.   Patient was admitted for:   1. S/P ascending aortic aneurysm repair    2. Ascending aortic aneurysm (H)    3. Acute post-operative pain    Assessment  Concerns with insurance coverage for discharge needs: None.  Current Living Situation: Patient lives with spouse.  Support System: Supportive and Involved  and daughter, Ginger.   Services Involved: none currently  Transportation: Pt's  will provide pt with a ride home.     Coordination of Care and Referrals: No home care needs per pt.   OT is recommending [facility]; pt is in agreement with this plan.     Plan  Anticipated Discharge Date:  11/1/17  Anticipated Discharge Plan:  Discharge to home     CTS Handoff completed:  YES  EUNICE KIM RN BSN  Care Coordinator   [phone]

## 2017-11-01 NOTE — ADDENDUM NOTE
Addendum  created 11/01/17 1315 by Kathleen Hamilton APRN CRNA    Anesthesia Intra Meds edited

## 2017-11-01 NOTE — PLAN OF CARE
Problem: Patient Care Overview  Goal: Plan of Care/Patient Progress Review  Physical Therapy Discharge Summary     Reason for therapy discharge:    Discharged to home with outpatient therapy.     Progress towards therapy goal(s). See goals on [facility] Plan in [facility] health record for goal details.  Goals partially met.  Barriers to achieving goals:   discharge from facility.     Therapy recommendation(s):    Continued therapy is recommended.  Rationale/Recommendations:  to progress strength, balance, activity tolerance, and mobility.

## 2017-11-01 NOTE — DISCHARGE SUMMARY
[facility], [place]   Cardiothoracic Surgery Hospital Discharge Summary       Georgie Patino MRN# [id number]   YOB: 1955 Age: 62 year old     Date of Admission:  10/27/2017  Date of Discharge::  11/1/2017  Admitting Physician:  Rubio Piña MD  Discharge Physician:  Rubio Piña MD  Primary Care Physician:        Marlys Wade          Admission Diagnoses:   aorta ascending aneurysm   PFO  Hypertension  Anxiety  Obesity          Discharge Diagnosis:   Aorta ascending aneurysm s/p ascending aortic aneurysm repair  PFO - s/p closure  Hypertension  Anxiety  Obesity         Procedures:   10/27/2017 Dr. Rubio Piña  Ascending Aorta Aneurysm Repair using Hemashield Platinum Woven Double Velour Graft 34cm X 30cm, PFO closure        Non-operative procedures:   None performed          Consultations:   PHARMACY IP CONSULT  NUTRITION SERVICES ADULT IP CONSULT  CARDIAC REHAB IP CONSULT  PHYSICAL THERAPY ADULT IP CONSULT  OCCUPATIONAL THERAPY ADULT IP CONSULT  RESPIRATORY CARE IP CONSULT  SMOKING CESSATION PROGRAM IP CONSULT  VASCULAR ACCESS CARE ADULT IP CONSULT          Brief History of Illness:   Geogrie Patino is a 62 year old female with a past medical history of hypertension, obesity 6.2 cm ascending aortic aneurysm, PFO who presented with on outpatient basis for repair of her ascending aortic aneurysm and closure of PFO with Dr. Piña.           Hospital Course:   Georgie Patino is a 62 year old female who on 10/27/2017 underwent the above-named procedures.  Patient tolerated the operation well and was admitted to the CVICU post-operatively.  Patient was extubated on POD # 0.  Pressors were weaned off as able. Patient's ICU stay was unremarkable. Patient was transferred to the surgical floor on POD # 1. Chest tubes were removed when drainage decreased and follow-up CXR was negative for any significant pneumothorax. TPW were removed when  appropriate.    Patient continued to improve post-operatively. Patient was started on ASA, atorvastatin, metoprolol 25 BID. Patient remained hemodynamically stable. Home lisinopril was not restarted, can be restarted on outpatient basis as BP tolerates Patient was diuresed with IV lasix to her dry weight and will not be discharged on lasix, has maintained adequate UOP and weight stable off lasix.      Patient has had right flank pain, since surgery    Post-operative pain control included IV dilaudid, PO oxycodone and tylenol and will be discharged on oxycodone and tylenol.     Prior to discharge, patient's pain was controlled well, she was able to perform most ADLs and ambulate without difficulty, and had full return of bowel and bladder function.  On November 1, 2017, she was Discharged to home in stable condition.      Day of Discharge Exam   Vitals:  Temp:  [97.8  F (36.6  C)-99  F (37.2  C)] 98.1  F (36.7  C)  Pulse:  [76] 76  Heart Rate:  [75-87] 87  Resp:  [17-18] 18  BP: (120-132)/(71-81) 120/77  SpO2:  [95 %-98 %] 95 %  Wt: 202 lbs 14.4 oz, 92 kg     Physical Exam:   Gen: NAD, conversational  CV: RRR, S1S2 normal, no murmurs, rubs, or gallops  Pulm: Lungs CTA, no rhonchi or wheezes  Abd: +BS, Soft, nondistended, NTTP  Ext: No lower extremity edema  Neuro: CN II-XII intact, no focal deficits  Incision: Clean, dry, intact, no erythema  Chest Tube sites:  Dressings clean and dry    Labs:   [facility]  Recent Labs  Lab 11/01/17  0640 10/31/17  0723 10/30/17  0727 10/29/17  0811  10/28/17  0341 10/27/17  2008 10/27/17  1402    136 139 138  < > 147*  --  147*   POTASSIUM 3.8 3.7 3.9 3.8  < > 4.4 4.6 3.8   CHLORIDE 103 101 106 106  < > 117*  --  114*   CO2 25 27 24 24  < > 24  --  24   BUN 16 18 20 25  < > 24  --  18   CR 0.51* 0.46* 0.42* 0.47*  < > 0.58  --  0.63   * 110* 106* 124*  < > 132*  --  203*   MAG  --   --   --  2.1  --  2.4* 2.4* 2.6*   PHOS  --   --  2.5 2.4*  --  3.5  --  4.7*   < > = values in  this interval not displayed.  CBC    Recent Labs  Lab 11/01/17  0640 10/31/17  0723 10/30/17  0727 10/29/17  0811   WBC 6.4 7.9 10.1 12.8*   RBC 3.72* 3.86 3.72* 3.86   HGB 10.8* 11.1* 10.8* 11.1*   HCT 33.6* 34.1* 33.7* 35.1   MCV 90 88 91 91   MCH 29.0 28.8 29.0 28.8   MCHC 32.1 32.6 32.0 31.6   RDW 13.4 13.3 13.6 13.7   * 116* 79* 69*     INR    Recent Labs  Lab 10/29/17  0811 10/27/17  1402 10/27/17  1235 10/27/17  0544   INR 1.21* 1.23* 1.29* 0.91      Hepatic Panel No lab results found in last 7 days.         Imaging Studies:   CXR 10/31:  Findings:   Right basilar chest tube has been removed. No pneumothorax. Median  sternotomy wires. Central airways midline. No pleural effusion. No new  focal airspace opacity. Mild streaky bibasilar atelectasis. Left upper  lobe [id number].       Impression:   No pneumothorax status post chest tube removal.      Final Pathology/Culture Result:   10/27:  Aorta, ascending, aneurysm repair: agree with findings  - Elastic artery with cystic medial degeneration, and associated giant   cell reaction and cholesterol clefts       Drains/tubes Present at Discharge   None         Medications Prior to Admission:     Prescriptions Prior to Admission   Medication Sig Dispense Refill Last Dose     ALPRAZolam (XANAX) 0.25 MG tablet Take 1 tablet (0.25 mg) by mouth 2 times daily as needed for anxiety 30 tablet 0 10/26/2017 at 1800     RaNITidine HCl (ZANTAC PO) Take 150 mg by mouth every morning   10/26/2017 at 1000     lisinopril (PRINIVIL/ZESTRIL) 10 MG tablet Take 4 tablets (40 mg) by mouth daily (Patient taking differently: Take 40 mg by mouth every morning ) 90 tablet 3 10/26/2017 at 1000     metoprolol (LOPRESSOR) 25 MG tablet Take 1 tablet (25 mg) by mouth 2 times daily 60 tablet 3 10/27/2017 at 0400     atorvastatin (LIPITOR) 40 MG tablet Take 1 tablet (40 mg) by mouth daily (Patient taking differently: Take 40 mg by mouth every morning ) 90 tablet 3 10/26/2017 at 1000      nicotine polacrilex (NICORETTE) 2 MG gum Place 1 each (2 mg) inside cheek as needed for smoking cessation Place 1 each inside cheek as needed for smoking cessation 100 tablet 5 10/26/2017 at 1000     Ibuprofen (ADVIL PO) Take 400 mg by mouth every morning   10/25/2017          Discharge Medications:        Review of your medicines      START taking       Dose / Directions    acetaminophen 325 MG tablet   Commonly known as:  TYLENOL   Used for:  S/P ascending aortic aneurysm repair, Acute post-operative pain        Dose:  650 mg   Take 2 tablets (650 mg) by mouth every 4 hours as needed for other (surgical pain)   Quantity:  100 tablet   Refills:  0       aspirin 81 MG EC tablet   Used for:  S/P ascending aortic aneurysm repair        Dose:  81 mg   Take 1 tablet (81 mg) by mouth daily   Quantity:  30 tablet   Refills:  0       HYDROmorphone 2 MG tablet   Commonly known as:  DILAUDID   Used for:  S/P ascending aortic aneurysm repair, Acute post-operative pain        Dose:  2-4 mg   Take 1-2 tablets (2-4 mg) by mouth every 3 hours as needed for moderate to severe pain   Quantity:  80 tablet   Refills:  0       melatonin 3 MG tablet   Used for:  Ascending aortic aneurysm (H), S/P ascending aortic aneurysm repair        Dose:  3 mg   Take 1 tablet (3 mg) by mouth nightly as needed for sleep   Quantity:  30 tablet   Refills:  0       pantoprazole 40 MG EC tablet   Commonly known as:  PROTONIX   Used for:  S/P ascending aortic aneurysm repair        Dose:  40 mg   Take 1 tablet (40 mg) by mouth every morning   Quantity:  30 tablet   Refills:  0       polyethylene glycol Packet   Commonly known as:  MIRALAX/GLYCOLAX   Used for:  S/P ascending aortic aneurysm repair        Dose:  17 g   Take 17 g by mouth daily   Quantity:  7 packet   Refills:  0       senna-docusate 8.6-50 MG per tablet   Commonly known as:  SENOKOT-S;PERICOLACE   Used for:  Acute post-operative pain, S/P ascending aortic aneurysm repair        Dose:  1-2  tablet   Take 1-2 tablets by mouth 2 times daily as needed for constipation   Quantity:  100 tablet   Refills:  0         CONTINUE these medicines which may have CHANGED, or have new prescriptions. If we are uncertain of the size of tablets/capsules you have at home, strength may be listed as something that might have changed.       Dose / Directions    atorvastatin 40 MG tablet   Commonly known as:  LIPITOR   This may have changed:  when to take this   Used for:  CARDIOVASCULAR SCREENING; LDL GOAL LESS THAN 160        Dose:  40 mg   Take 1 tablet (40 mg) by mouth daily   Quantity:  90 tablet   Refills:  3         CONTINUE these medicines which have NOT CHANGED       Dose / Directions    ALPRAZolam 0.25 MG tablet   Commonly known as:  XANAX   Used for:  Anxiety        Dose:  0.25 mg   Take 1 tablet (0.25 mg) by mouth 2 times daily as needed for anxiety   Quantity:  30 tablet   Refills:  0       metoprolol 25 MG tablet   Commonly known as:  LOPRESSOR   Used for:  Palpitations        Dose:  25 mg   Take 1 tablet (25 mg) by mouth 2 times daily   Quantity:  60 tablet   Refills:  3       nicotine polacrilex 2 MG gum   Commonly known as:  NICORETTE   Used for:  History of tobacco use        Dose:  2 mg   Place 1 each (2 mg) inside cheek as needed for smoking cessation Place 1 each inside cheek as needed for smoking cessation   Quantity:  100 tablet   Refills:  5         STOP taking          ADVIL PO           lisinopril 10 MG tablet   Commonly known as:  PRINIVIL/ZESTRIL           ZANTAC PO                Where to get your medicines      These medications were sent to [facility] - [place], [place] - [address]  [address], [facility] [address]     Phone:  [phone]      acetaminophen 325 MG tablet     aspirin 81 MG EC tablet     melatonin 3 MG tablet     pantoprazole 40 MG EC tablet     polyethylene glycol Packet     senna-docusate 8.6-50 MG per tablet         Some of these will need  a paper prescription and others can be bought over the counter. Ask your nurse if you have questions.     Bring a paper prescription for each of these medications      HYDROmorphone 2 MG tablet                  Discharge Instructions and Follow-Up:   Avoid lifting anything greater than ten pounds for 6 weeks after surgery and then less than 20 pounds for an additional 6 weeks.    No driving for 4 weeks after surgery or while on pain medication. If you had a minimally invasive procedure, you may drive after three weeks if not taking pain medication.      Avoid strenuous activities such as bowling, vacuuming, raking, shoveling, golf or tennis for 12 weeks after your surgery. It is okay to resume sex if you feel comfortable in doing so. You may have to try different positions with your partner.     Splint your chest incision by hugging a pillow or bringing your arms across your chest when coughing or sneezing. Avoid pushing off with your arms when getting up for the first month if you have had your sternum opened.     Shower or wash your incisions daily with soap and water (or as instructed), pat dry. Watch for signs of infection: increased redness, tenderness, warmth or any drainage that appears infected (pus like) or is persistent.  Also a temperature > 100.5 F or chills. Call your surgeon or primary care provider's office immediately. Remove any skin glue left on incisions after 10 days. This will not affect your incision and can speed up healing.    Exercise is very important in your recovery. Please follow the guidelines set up for you in your cardiac rehab classes at the hospital. If outpatient cardiac rehab was ordered for you, we highly recommend you participate. If you have problems arranging your cardiac rehab, please call [phone].     Avoid sitting for prolonged periods of time, try to walk every hour during the day. If you have a leg incision, elevate your leg often when you are not walking.    Check  your weight when you get home from the hospital and continue to check it daily through your recovery for at least a month. If you notice a weight gain of 2-3 pounds in a week, notify your primary care physician, cardiologist or surgeon.    Bowel activity may be slow after surgery. If necessary, you may take an over the counter laxative such as Milk of Magnesia or Miralax. You may have stool softeners prescribed (docusate sodium, Senokot). We recommend using stool softeners while using narcotics for pain (oxycodone/percocet, hydrocodone/vicodin).      DENTAL VISITS AFTER SURGERY  If you have had your heart valve repaired or replaced, we do not recommend having any dental work done for 6 months and you will need to take an antibiotic prior to dental visits from now on.  Please notify your dentist before any procedure for the proper treatment needed. The antibiotic is taken by mouth one hour prior to visit. This includes routine cleanings.    DO NOT SMOKE.  IF YOU NEED HELP QUITTING, PLEASE TALK WITH YOUR CARDIOLOGIST OR PRIMARY DOCTOR.    If you are on a blood thinner, follow the instructions you were given in the hospital and DO NOT SKIP this medication. Try and take it the same time everyday. Your primary care physician or coumadin clinic will manage the dosing.     If you have an LVAD device call your LVAD coordinator.   If you had a heart transplant call your Transplant Coordinator.    REGARDING PRESCRIPTION REFILLS.  If you need a refill on your pain medication contact us.  All other medications will be adjusted, discontinued and re-filled by your primary care physician and/or your cardiologist as they were prior to your surgery. We have given you enough for one to three month with possibly one refill.    POST-OPERATIVE CLINIC VISITS  You have a follow up visit with CVTS Surgery Advance Care Practitioners on 11/14/2017 at the [facility].  You will then return to the care of your primary  physician and your cardiologist.   It is important to call for an appointment to see your cardiologist in 4-6 weeks after surgery and your primary care physician in 3-5 days after discharge. If there is a need to return to see CT Surgery please call our  at [phone].    SURGICAL QUESTIONS  Please call Matilda Zacharychuy with any surgical questions, her phone number is listed below.  She can assist you with your needs and contact other surgery care team members as indicated.    For general questions or concerns, please call the Cardiothoracic Surgery Department at [phone] 8-4:30 M-F.   On weekends or after hours, please call [phone] and ask the  to   page the Cardiothoracic Surgery fellow on call.        Home Health Care:   Not needed           Discharge Disposition:   Discharged to home      Condition at discharge: Stable    Jovon Collazo PA-C            CC:Marlys Durán

## 2017-11-01 NOTE — DISCHARGE INSTRUCTIONS
[facility]      AFTER YOU GO HOME FROM YOUR HEART SURGERY    Avoid lifting anything greater than ten pounds for 6 weeks after surgery and then less than 20 pounds for an additional 6 weeks.    No driving for 4 weeks after surgery or while on pain medication. If you had a minimally invasive procedure, you may drive after three weeks if not taking pain medication.      Avoid strenuous activities such as bowling, vacuuming, raking, shoveling, golf or tennis for 12 weeks after your surgery. It is okay to resume sex if you feel comfortable in doing so. You may have to try different positions with your partner.     Splint your chest incision by hugging a pillow or bringing your arms across your chest when coughing or sneezing. Avoid pushing off with your arms when getting up for the first month if you have had your sternum opened.    Shower or wash your incisions daily with soap and water (or as instructed), pat dry. Keep wound clean and dry, showers are okay after discharge, but don't let spray hit directly on incision. No baths or swimming for 1 month.  Clean wounds twice a day for 2 weeks with microklenz spray if available. Cover chest tube sites with gauze until they stop draining, then leave open. It is not abnormal for chest tube sites to drain yellowish/clear fluid for up to 2-3 weeks after surgery.   Watch for signs of infection: increased redness, tenderness, warmth or any drainage that appears infected (pus like) or is persistent.  Also a temperature > 100.5 F or chills. Call your surgeon or primary care provider's office immediately. Remove any skin glue left on incisions after 10 days. This will not affect your incision and can speed up healing.    Exercise is very important in your recovery. Please follow the guidelines set up for you in your cardiac rehab classes at the hospital. If outpatient cardiac rehab was ordered for you, we highly recommend you participate. If you have  problems arranging your cardiac rehab, please call [phone].     Avoid sitting for prolonged periods of time, try to walk every hour during the day. If you have a leg incision, elevate your leg often when you are not walking.    Check your weight when you get home from the hospital and continue to check it daily through your recovery for at least a month. If you notice a weight gain of 2-3 pounds in a week, notify your primary care physician, cardiologist or surgeon.    Bowel activity may be slow after surgery. If necessary, you may take an over the counter laxative such as Milk of Magnesia or Miralax. You may have stool softeners prescribed (docusate sodium, Senokot). We recommend using stool softeners while using narcotics for pain (oxycodone/percocet, hydrocodone/vicodin).      DENTAL VISITS AFTER SURGERY  If you have had your heart valve repaired or replaced, we do not recommend having any dental work done for 6 months and you will need to take an antibiotic prior to dental visits from now on.  Please notify your dentist before any procedure for the proper treatment needed. The antibiotic is taken by mouth one hour prior to visit. This includes routine cleanings.    DO NOT SMOKE.  IF YOU NEED HELP QUITTING, PLEASE TALK WITH YOUR CARDIOLOGIST OR PRIMARY DOCTOR.    REGARDING PRESCRIPTION REFILLS.  If you need a refill on your pain medication contact us.  All other medications will be adjusted, discontinued and re-filled by your primary care physician and/or your cardiologist as they were prior to your surgery. We have given you enough for one to three month with possibly one refill.    POST-OPERATIVE CLINIC VISITS  You have a follow up visit with CVTS Surgery Advance Care Practitioners on 11/14 at 2 pm at the [facility]. This will be listed on your discharge paperwork. Please follow-up with your PCP in 3-5 days to have labs checked (CBC).  You will then return to the care of your  cardiologist.   It is important to call for an appointment to see your cardiologist in 4-6 weeks after surgery and your primary care physician in 3-5 weeks after discharge. If there is a need to return to see CT Surgery please call our  at [phone].    SURGICAL QUESTIONS  Please call Matilda Obando with any surgical questions, her phone number is listed below.  She can assist you with your needs and contact other surgery care team members as indicated.    For general questions or concerns, please call the Cardiothoracic Surgery Department at [phone] 8-4:30 M-F.   On weekends or after hours, please call [phone] and ask the  to   page the Cardiothoracic Surgery fellow on call.      Thank you,    Your Cardiothoracic Surgery Team  Matilda Obando RN Care Coordinator-  [phone]   Beatrice Collazo PA-C

## 2017-11-02 NOTE — PLAN OF CARE
Problem: Patient Care Overview  Goal: Plan of Care/Patient Progress Review  Occupational Therapy Discharge Summary     Reason for therapy discharge:    Discharged to home with outpatient therapy.     Progress towards therapy goal(s). See goals on Care Plan in [facility] electronic health record for goal details.  Goals partially met.  Barriers to achieving goals:   discharge from facility.     Therapy recommendation(s):    Continued therapy is recommended.  Rationale/Recommendations:  Rec home with  assist and OP CR To improve cardiovascular strength and endurance necessary to return to previous occupations at [employer]. .

## 2017-11-03 ENCOUNTER — CARE COORDINATION (OUTPATIENT)
Dept: CARDIOLOGY | Facility: CLINIC | Age: 62
End: 2017-11-03

## 2017-11-03 ENCOUNTER — CARE COORDINATION (OUTPATIENT)
Dept: CARE COORDINATION | Facility: CLINIC | Age: 62
End: 2017-11-03

## 2017-11-03 NOTE — LETTER
Health Care Home - Access Care Plan    About Me  Patient Name:  Georgie Patino    YOB: 1955  Age:                            62 year old   Aury MRN:         [id number] Telephone Information:     Home Phone [phone]   Mobile Not on file.       Address:    [address] [place] [address]  [place] [address] Email address:  No e-mail address on record      Emergency Contact(s)  Name Relationship Lgl Grd Work Phone Home Phone Mobile Phone   1. MANDA PATINO Spouse   [phone] none   2. NO SECONDARY C*    [phone]              Health Maintenance: Routine Health maintenance Reviewed: Due/Overdue     My Access Plan  Medical Emergency 911   Questions or concerns during clinic hours Primary Clinic Line, I will call the clinic directly: Primary Clinic: [facility] [phone]   24 Hour Appointment Line [phone] or  [phone])  (toll free)   24 Hour Nurse Line [phone] (toll free)   Questions or concerns outside clinic hours 24 Hour Appointment Line, I will call the after-hours on-call line:   [facility] [phone] or [phone] ([phone]) (toll-free)   Preferred Urgent Care     Preferred Hospital Preferred Hospital: [facility]  [phone]   Preferred Pharmacy [id number] - [place]     Behavioral Health Crisis Line The National Suicide Prevention Lifeline at 1-399.626.8941 or 911     My Care Team Members  Patient Care Team       Relationship Specialty Notifications Start End    Marlys Wade APRN CNP PCP - General Nurse Practitioner - Family  1/26/16     Phone: [phone] Fax: [phone]         [address] [facility] [address]    Matilda Obando, RN Nurse Coordinator Thoracic Surgery (Cardiothoracic Vascular Surgery) Admissions 9/22/17     Phone: [phone] Pager: [phone]            My Medical and Care Information  Problem List   Patient  Active Problem List   Diagnosis     CARDIOVASCULAR SCREENING; LDL GOAL LESS THAN 160     Smoker     Obesity     Benign essential hypertension     Family history of sudden cardiac death     Ascending aortic aneurysm (H)     Hypertension     Status post coronary angiogram     Anxiety     Aortic aneurysm (H)      Current Medications and Allergies:  See printed Medication Report

## 2017-11-03 NOTE — PROGRESS NOTES
Clinic Care Coordination Contact  OUTREACH    Referral Information:  Referral Source: CTS  Reason for Contact: post hospital f/u.  Pt s/p aorta ascending aneurysm s/p repair.    Care Conference: No      Clinical Concerns:  Current Medical Concerns: Pt notes no concerns at this time.  Incision is CDI. Notes some redness around the scabbed area's.  Reviewed/discussed s/s of infection and when to contact her provider.   Pt denies any fever or chills.   Pt notes she is drinking 6-8 8oz glasses of water daily.  Reviewed discharged instructions.      Current Behavioral Concerns: History of anxiety  Education Provided to patient:Discharge instructions, CC role.      Medication Management:  Reviewed post hospital discharge medication list with patient.  Pt notes that she was able to  all of her prescriptions.       Functional Status:  Mobility Status: Independent     Transportation:  Reviewed restriction while taking narcotics.  Pt states she will need to arrange rides to appointments with her spouse or daughter.     Psychosocial:  Current living arrangement:: I live in a private home with spouse.  Pt notes that her daughter is involved and support.  Financial/Insurance: Not addressed     Resources and Interventions:  Current Resources: Discussed referral for outpatient cardiac rehab.  Pt confirmed she has [facility].  Pt initially declined to schedule cardiac rehab did not think it was needed. Briefly discussed importance of cardiac rehab.  Pt does have f/u with CV[facility] on 11/14.  Encouraged pt to schedule initial appointment and to also discuss further with CVTS provider.  Frequency of Care Coordination: TBD  Upcoming appointment: 11/13/17 (Lab on the 13th and CVTS on 14th)     Plan:   Pt will follow plan of care as directed by her primary care provider and speciality providers.  Pt will f/u with cardiology provider on 11/14 as currently schedule.  RN CC agreed to f/u with patient in one  week.    Carol Spence, RN BSN, PHN RN Care Coordinator  [facility]  [email or web address]  [phone]  11/3/2017 4:04 PM

## 2017-11-03 NOTE — LETTER
[facility]   [address] [place] [address]  [place] [address]  [phone]    November 3, 2017    Georgie Patino  [address] [place] [address]  [place] [address]    Dear Georgie,  I am the Clinic Care Coordinator that works with your primary care provider's clinic. I wanted to introduce myself and provide you with my contact information for you to be able to call me with any questions or concerns. Below is a description of what Clinic Care Coordination is and how I can further assist you.      The Clinic Care Coordinator role is a Registered Nurse and/or  who understands the health care system. The goal of Clinic Care Coordination is to help you manage your health and improve access to the [facility] in the most efficient manner.  The Registered Nurse can assist you in meeting your health care goals by providing education, coordinating services, and strengthening the communication among your providers. The  can assist you with financial, behavioral, psychosocial, and chemical dependency and counseling/psychiatric resources.    Please feel free to keep this letter and contact information to contact me at [phone] with any further questions or concerns that may arise. We at [facility] are focused on providing you with the highest-quality healthcare experience possible and that all starts with you.       Sincerely,     Carol Spence RN BSN, PHN RN Care Coordinator  [facility]  [email or web address]  [phone]  11/3/2017 4:06 PM      Enclosed: I have enclosed a copy of a 24 Hour Access Plan. This has helpful phone numbers for you to call when needed. Please keep this in an easy to access place to use as needed.

## 2017-11-07 ENCOUNTER — TELEPHONE (OUTPATIENT)
Dept: INTERNAL MEDICINE | Facility: CLINIC | Age: 62
End: 2017-11-07

## 2017-11-07 ENCOUNTER — OFFICE VISIT (OUTPATIENT)
Dept: FAMILY MEDICINE | Facility: CLINIC | Age: 62
End: 2017-11-07
Payer: COMMERCIAL

## 2017-11-07 VITALS
OXYGEN SATURATION: 93 % | HEART RATE: 92 BPM | HEIGHT: 66 IN | SYSTOLIC BLOOD PRESSURE: 130 MMHG | TEMPERATURE: 97.9 F | BODY MASS INDEX: 33.11 KG/M2 | DIASTOLIC BLOOD PRESSURE: 80 MMHG | WEIGHT: 206 LBS

## 2017-11-07 DIAGNOSIS — Z87.891 HISTORY OF TOBACCO USE: ICD-10-CM

## 2017-11-07 DIAGNOSIS — G89.18 ACUTE POST-OPERATIVE PAIN: ICD-10-CM

## 2017-11-07 DIAGNOSIS — Z98.890 S/P ASCENDING AORTIC ANEURYSM REPAIR: Primary | ICD-10-CM

## 2017-11-07 DIAGNOSIS — Z86.79 S/P ASCENDING AORTIC ANEURYSM REPAIR: Primary | ICD-10-CM

## 2017-11-07 PROBLEM — I71.9 AORTIC ANEURYSM (H): Status: RESOLVED | Noted: 2017-10-27 | Resolved: 2017-11-07

## 2017-11-07 PROBLEM — I71.21 ASCENDING AORTIC ANEURYSM (H): Status: RESOLVED | Noted: 2017-09-21 | Resolved: 2017-11-07

## 2017-11-07 PROCEDURE — 99495 TRANSJ CARE MGMT MOD F2F 14D: CPT | Performed by: NURSE PRACTITIONER

## 2017-11-07 RX ORDER — HYDROMORPHONE HYDROCHLORIDE 2 MG/1
2-4 TABLET ORAL
Qty: 80 TABLET | Refills: 0 | Status: CANCELLED | OUTPATIENT
Start: 2017-11-07

## 2017-11-07 ASSESSMENT — PAIN SCALES - GENERAL: PAINLEVEL: SEVERE PAIN (6)

## 2017-11-07 NOTE — NURSING NOTE
"Chief Complaint   Patient presents with     Hospital F/U       Initial /80  Pulse 92  Temp 97.9  F (36.6  C) (Oral)  Ht 5' 6\" (1.676 m)  Wt 206 lb (93.4 kg)  SpO2 93%  BMI 33.25 kg/m2 Estimated body mass index is 33.25 kg/(m^2) as calculated from the following:    Height as of this encounter: 5' 6\" (1.676 m).    Weight as of this encounter: 206 lb (93.4 kg).  Medication Reconciliation: complete     Melissa Meraz MA       "

## 2017-11-07 NOTE — PROGRESS NOTES
"[facility]  [address] [place] [address]  [phone]  Dept: [phone]    PRE-OP EVALUATION:  Today's date: 2017    Georgie Patino (: 1955) presents for pre-operative evaluation assessment as requested by  ***.  She requires evaluation and anesthesia risk assessment prior to undergoing surgery/procedure for treatment of *** .  Proposed procedure: ***    Date of Surgery/ Procedure: ***  Time of Surgery/ Procedure: ***  Hospital/Surgical Facility: ***  {SURGERY FAX NUMBER:[id number]::\"Fax number for surgical facility: ***\"}  Primary Physician: Marlys Wade  Type of Anesthesia Anticipated: {ANESTHESIA:539394}    Patient has a Health Care Directive or Living Will:  {YES/NO:856426::\"NO\"}    {PREOP QUESTIONNAIRE OPTIONS 2 (by MA):749407}    HPI:                                                      Brief HPI related to upcoming procedure: ***      {Ch. Problems:939304}    MEDICAL HISTORY:                                                      Patient Active Problem List    Diagnosis Date Noted     Aortic aneurysm (H) 10/27/2017     Priority: Medium     Anxiety 10/17/2017     Priority: Medium     Status post coronary angiogram 2017     Priority: Medium     Ascending aortic aneurysm (H) 2017     Priority: Medium     Hypertension 2017     Priority: Medium     Family history of sudden cardiac death 08/15/2017     Priority: Medium     Benign essential hypertension 2016     Priority: Medium     CARDIOVASCULAR SCREENING; LDL GOAL LESS THAN 160 2012     Priority: Medium     Smoker 2012     Priority: Medium     Obesity 2012     Priority: Medium      Past Medical History:   Diagnosis Date     Ascending aortic aneurysm (H) 2017     Blood transfusions age 19    due to menorhagia     Hypertension 2017     Tobacco abuse      Past Surgical History:   Procedure Laterality Date     COLONOSCOPY WITH CO2 INSUFFLATION N/A 3/15/2017 "    Procedure: COLONOSCOPY WITH CO2 INSUFFLATION;  Surgeon: Duane, William Charles, MD;  Location: [facility] OR     DILATION AND CURETTAGE  age 20     REPAIR ANEURYSM ASCENDING AORTA N/A 10/27/2017    Procedure: REPAIR ANEURYSM ASCENDING AORTA;  Median Sternotomy, Cardiopulmonary bypass, Ascending Aorta Aneurysm Repair using Hemashield Platinum Woven Double Velour Graft 34cm X 30cm.;  Surgeon: Rubio Piña MD;  Location: [facility] OR     [place] teeth removed       Current Outpatient Prescriptions   Medication Sig Dispense Refill     melatonin 3 MG tablet Take 1 tablet (3 mg) by mouth nightly as needed for sleep 30 tablet 0     HYDROmorphone (DILAUDID) 2 MG tablet Take 1-2 tablets (2-4 mg) by mouth every 3 hours as needed for moderate to severe pain 80 tablet 0     acetaminophen (TYLENOL) 325 MG tablet Take 2 tablets (650 mg) by mouth every 4 hours as needed for other (surgical pain) 100 tablet 0     aspirin EC 81 MG EC tablet Take 1 tablet (81 mg) by mouth daily 30 tablet 0     polyethylene glycol (MIRALAX/GLYCOLAX) Packet Take 17 g by mouth daily 7 packet 0     senna-docusate (SENOKOT-S;PERICOLACE) 8.6-50 MG per tablet Take 1-2 tablets by mouth 2 times daily as needed for constipation 100 tablet 0     pantoprazole (PROTONIX) 40 MG EC tablet Take 1 tablet (40 mg) by mouth every morning 30 tablet 0     ALPRAZolam (XANAX) 0.25 MG tablet Take 1 tablet (0.25 mg) by mouth 2 times daily as needed for anxiety 30 tablet 0     metoprolol (LOPRESSOR) 25 MG tablet Take 1 tablet (25 mg) by mouth 2 times daily 60 tablet 3     atorvastatin (LIPITOR) 40 MG tablet Take 1 tablet (40 mg) by mouth daily (Patient taking differently: Take 40 mg by mouth every morning ) 90 tablet 3     nicotine polacrilex (NICORETTE) 2 MG gum Place 1 each (2 mg) inside cheek as needed for smoking cessation Place 1 each inside cheek as needed for smoking cessation 100 tablet 5     OTC products: {OTC ANALGESICS:746753}    Allergies   Allergen Reactions      "Blood Transfusion Related (Informational Only) Other (See Comments)     Patient has a history of a clinically significant antibody against RBC antigens.  A delay in compatible RBCs may occur.      Latex Allergy: {YES/NO WITH DEFAULT:287425::\"NO\"}    Social History   Substance Use Topics     Smoking status: Current Every Day Smoker     Packs/day: 0.05     Years: 40.00     Types: Cigarettes     Smokeless tobacco: Never Used      Comment: 2 cigs daily     Alcohol use 0.5 - 1.0 oz/week     1 - 2 Standard drinks or equivalent per week      Comment: 3-4 drinks per week      History   Drug Use No       REVIEW OF SYSTEMS:                                                    {ROS Preop Choices:661651}    EXAM:                                                    There were no vitals taken for this visit.  {EXAM Preop Choices:734259}    DIAGNOSTICS:                                                    {DIAGNOSTIC FOR PREOP:116642}    Recent Labs   Lab Test  11/01/17   0640  10/31/17   0723   10/29/17   0811   10/28/17   0341   10/27/17   1402   HGB  10.8*  11.1*   < >  11.1*   --   12.0   --   13.6   PLT  127*  116*   < >  69*   --   75*   --   95*   INR   --    --    --   1.21*   --    --    --   1.23*   NA  135  136   < >  138   < >  147*   --   147*   POTASSIUM  3.8  3.7   < >  3.8   < >  4.4   < >  3.8   CR  0.51*  0.46*   < >  0.47*   < >  0.58   --   0.63   A1C   --    --    --    --    --   5.5   --    --     < > = values in this interval not displayed.        IMPRESSION:                                                    {PREOP REASONS:966410::\"Reason for surgery/procedure: ***\",\"Diagnosis/reason for consult: ***\"}    The proposed surgical procedure is considered {HIGH=major cardiovascular or procedures requiring prolonged anesthesia >4 hours or large fluid shifts;    INTERMEDIATE=abdominal, most orthopedic and intrathoracic surgery; LOW= endoscopy, cataract and breast surgery:074601} risk.    REVISED CARDIAC RISK " "INDEX  The patient has the following serious cardiovascular risks for perioperative complications such as (MI, PE, VFib and 3  AV Block):  {PREOP REVISED CARDIAC INDEX (RCI):980208:p:\"No serious cardiac risks\"}  INTERPRETATION: {REVISED CARDIAC RISK INTERPRETATION:074301}    The patient has the following additional risks for perioperative complications:  {Additional perioperative risks:048995:p:\"No identified additional risks\"}    No diagnosis found.    RECOMMENDATIONS:                                                      {IMPORTANT - Conditions - complete carefully!!:837315}    {IMPORTANT - Medications:648487::\"--Patient is to take all scheduled medications on the day of surgery EXCEPT for modifications listed below.\"}    {IMPORTANT - Approval:809986:p:\"APPROVAL GIVEN to proceed with proposed procedure, without further diagnostic evaluation\"}       Signed Electronically by: TESHA Chanel CNP    Copy of this evaluation report is provided to requesting physician.    [facility] Preop Guidelines  "

## 2017-11-07 NOTE — TELEPHONE ENCOUNTER
Reason for Call:  Other prescription    Detailed comments: Pharmacy calling and stating they need specifics on the SIG to how many pieces of gum patient should be chewing. ie: 1 piece every 4 hrs as needed, or if there should be a max per day for insurance billing purposes. Please advise.    Phone Number Patient can be reached at: Other phone number:  [phone]    Best Time: any time    Can we leave a detailed message on this number? YES    Call taken on 11/7/2017 at 5:13 PM by Katie [place]

## 2017-11-07 NOTE — PATIENT INSTRUCTIONS
Before Your Surgery      Call your surgeon if there is any change in your health. This includes signs of a cold or flu (such as a sore throat, runny nose, cough, rash or fever).    Do not smoke, drink alcohol or take over the counter medicine (unless your surgeon or primary care doctor tells you to) for the 24 hours before and after surgery.    If you take prescribed drugs: Follow your doctor s orders about which medicines to take and which to stop until after surgery.    Eating and drinking prior to surgery: follow the instructions from your surgeon    Take a shower or bath the night before surgery. Use the soap your surgeon gave you to gently clean your skin. If you do not have soap from your surgeon, use your regular soap. Do not shave or scrub the surgery site.  Wear clean pajamas and have clean sheets on your bed.       [facility]    If you have any questions regarding to your visit please contact your care team:     Team Pink:   Clinic Hours Telephone Number   Internal Medicine:  Dr. Helen Wade NP       7am-7pm  Monday - Thursday   7am-5pm  Fridays  [phone]  (Appointment scheduling available 24/7)    Questions about your visit?  Team Line  [phone]   Urgent Care - [place] - 11am-9pm Monday-Friday Saturday-Sunday- 9am-5pm   [place] - 5pm-9pm Monday-Friday Saturday-Sunday- 9am-5pm  [phone] - [facility]   [phone] - [place]       What options do I have for visits at the clinic other than the traditional office visit?  To expand how we care for you, many of our providers are utilizing electronic visits (e-visits) and telephone visits, when medically appropriate, for interactions with their patients rather than a visit in the clinic.   We also offer nurse visits for many medical concerns. Just like any other service, we will bill your insurance company for this type of visit based on time spent on the phone  with your provider. Not all insurance companies cover these visits. Please check with your medical insurance if this type of visit is covered. You will be responsible for any charges that are not paid by your insurance.      E-visits via [employer]:  generally incur a $35.00 fee.  Telephone visits:  Time spent on the phone: *charged based on time that is spent on the phone in increments of 10 minutes. Estimated cost:   5-10 mins $30.00   11-20 mins. $59.00   21-30 mins. $85.00   Use Layer[employer]oss (secure email communication and access to your chart) to send your primary care provider a message or make an appointment. Ask someone on your Team how to sign up for LayerGloss.    For a Price Quote for your services, please call our Consumer Price Line at [phone].    As always, Thank you for trusting us with your health care needs!    Akua Mckinnon, CMA

## 2017-11-07 NOTE — MR AVS SNAPSHOT
After Visit Summary   11/7/2017    Georgie Patino    MRN: [id number]           Patient Information     Date Of Birth          1955        Visit Information        Provider Department      11/7/2017 3:20 PM Marlys Wade APRN [facility]        Today's Diagnoses     S/P ascending aortic aneurysm repair    -  1    Acute post-operative pain        History of tobacco use          Care Instructions      Before Your Surgery      Call your surgeon if there is any change in your health. This includes signs of a cold or flu (such as a sore throat, runny nose, cough, rash or fever).    Do not smoke, drink alcohol or take over the counter medicine (unless your surgeon or primary care doctor tells you to) for the 24 hours before and after surgery.    If you take prescribed drugs: Follow your doctor s orders about which medicines to take and which to stop until after surgery.    Eating and drinking prior to surgery: follow the instructions from your surgeon    Take a shower or bath the night before surgery. Use the soap your surgeon gave you to gently clean your skin. If you do not have soap from your surgeon, use your regular soap. Do not shave or scrub the surgery site.  Wear clean pajamas and have clean sheets on your bed.       [facility]    If you have any questions regarding to your visit please contact your care team:     Team Pink:   Clinic Hours Telephone Number   Internal Medicine:  Dr. Helen Wade, NP       7am-7pm  Monday - Thursday   7am-5pm  Fridays  [phone]  (Appointment scheduling available 24/7)    Questions about your visit?  Team Line  [phone]   Urgent Care - [place] - 11am-9pm Monday-Friday Saturday-Sunday- 9am-5pm   [place] - 5pm-9pm Monday-Friday Saturday-Sunday- 9am-5pm  [phone] - [place]  [phone] - [place]       What options do I have for visits  at the clinic other than the traditional office visit?  To expand how we care for you, many of our providers are utilizing electronic visits (e-visits) and telephone visits, when medically appropriate, for interactions with their patients rather than a visit in the clinic.   We also offer nurse visits for many medical concerns. Just like any other service, we will bill your insurance company for this type of visit based on time spent on the phone with your provider. Not all insurance companies cover these visits. Please check with your medical insurance if this type of visit is covered. You will be responsible for any charges that are not paid by your insurance.      E-visits via [employer]:  generally incur a $35.00 fee.  Telephone visits:  Time spent on the phone: *charged based on time that is spent on the phone in increments of 10 minutes. Estimated cost:   5-10 mins $30.00   11-20 mins. $59.00   21-30 mins. $85.00   Use [employer] (secure email communication and access to your chart) to send your primary care provider a message or make an appointment. Ask someone on your Team how to sign up for [employer].    For a Price Quote for your services, please call our Consumer Price Line at [phone].    As always, Thank you for trusting us with your health care needs!    Akua Mckinnon CMA            Follow-ups after your visit        Your next 10 appointments already scheduled     Nov 13, 2017  2:45 PM CST   LAB with  LAB   [facility]    [address] [place] [id number] [phone]           Please do not eat 10-12 hours before your appointment if you are coming in fasting for labs on lipids, cholesterol, or glucose (sugar). This does not apply to pregnant women. Water, hot tea and black coffee (with nothing added) are okay. Do not drink other fluids, diet soda or chew gum.            Nov 14, 2017  2:00 PM CST   (Arrive by 1:45 PM)   Return Visit with RAFAEL PERERA  "[facility] ([facility] [place])    [address]  [address] [facility] [address] [phone]              Who to contact     If you have questions or need follow up information about today's clinic visit or your schedule please contact [facility] directly at [phone].  Normal or non-critical lab and imaging results will be communicated to you by MyChart, letter or phone within 4 business days after the clinic has received the results. If you do not hear from us within 7 days, please contact the clinic through MyChart or phone. If you have a critical or abnormal lab result, we will notify you by phone as soon as possible.  Submit refill requests through [employer] or call your pharmacy and they will forward the refill request to us. Please allow 3 business days for your refill to be completed.          Additional Information About Your Visit        [employer] Information     [employer] lets you send messages to your doctor, view your test results, renew your prescriptions, schedule appointments and more. To sign up, go to [email or web address] . Click on \"Log in\" on the left side of the screen, which will take you to the Welcome page. Then click on \"Sign up Now\" on the right side of the page.     You will be asked to enter the access code listed below, as well as some personal information. Please follow the directions to create your username and password.     Your access code is: [email or web address]  Expires: 2017  3:05 PM     Your access code will  in 90 days. If you need help or a new code, please call your [facility] clinic or [phone].        Care EveryWhere ID     This is your Care EveryWhere ID. This could be used by other organizations to access your [facility] medical records  [id number]        Your Vitals Were     Pulse Temperature Height Pulse Oximetry BMI (Body Mass Index)       92 97.9  F (36.6  C) (Oral) 5' 6\" (1.676 m) 93% 33.25 kg/m2        " Blood Pressure from Last 3 Encounters:   11/07/17 130/80   11/01/17 141/79   10/16/17 145/83    Weight from Last 3 Encounters:   11/07/17 206 lb (93.4 kg)   11/01/17 202 lb 14.4 oz (92 kg)   10/16/17 205 lb 6.4 oz (93.2 kg)              Today, you had the following     No orders found for display         Today's Medication Changes          These changes are accurate as of: 11/7/17  3:52 PM.  If you have any questions, ask your nurse or doctor.               These medicines have changed or have updated prescriptions.        Dose/Directions    atorvastatin 40 MG tablet   Commonly known as:  LIPITOR   This may have changed:  when to take this   Used for:  CARDIOVASCULAR SCREENING; LDL GOAL LESS THAN 160        Dose:  40 mg   Take 1 tablet (40 mg) by mouth daily   Quantity:  90 tablet   Refills:  3            Where to get your medicines      These medications were sent to [id number] - [place], [place] - [address] [place] [address], [place] [address]     Phone:  [phone]     nicotine polacrilex 2 MG gum                Primary Care Provider Office Phone # Fax #    Marlys Harrington Jesusita Tapia, APRN [facility] [phone] [address]  [place] [address]        Equal Access to Services     CAROLINA LOVELL AH: Haddanielle oneilo Soomaali, waaxda luqadaha, qaybta kaalmada adeegyada, humza garza. So [facility] [phone].    ATENCIÓN: Si habla español, tiene a thomas disposición servicios gratuitos de asistencia lingüística. ame al [phone].    We comply with applicable federal civil rights laws and [place] laws. We do not discriminate on the basis of race, color, national origin, age, disability, sex, sexual orientation, or gender identity.            Thank you!     Thank you for choosing [facility]  for your care. Our goal is always to provide you with excellent care. Hearing back from our patients is one way we can continue to improve our services.  Please take a few minutes to complete the written survey that you may receive in the mail after your visit with us. Thank you!             Your Updated Medication List - Protect others around you: Learn how to safely use, store and throw away your medicines at www.disposemymeds.org.          This list is accurate as of: 11/7/17  3:52 PM.  Always use your most recent med list.                   Brand Name Dispense Instructions for use Diagnosis    acetaminophen 325 MG tablet    TYLENOL    100 tablet    Take 2 tablets (650 mg) by mouth every 4 hours as needed for other (surgical pain)    S/P ascending aortic aneurysm repair, Acute post-operative pain       ALPRAZolam 0.25 MG tablet    XANAX    30 tablet    Take 1 tablet (0.25 mg) by mouth 2 times daily as needed for anxiety    Anxiety       aspirin 81 MG EC tablet     30 tablet    Take 1 tablet (81 mg) by mouth daily    S/P ascending aortic aneurysm repair       atorvastatin 40 MG tablet    LIPITOR    90 tablet    Take 1 tablet (40 mg) by mouth daily    CARDIOVASCULAR SCREENING; LDL GOAL LESS THAN 160       HYDROmorphone 2 MG tablet    DILAUDID    80 tablet    Take 1-2 tablets (2-4 mg) by mouth every 3 hours as needed for moderate to severe pain    S/P ascending aortic aneurysm repair, Acute post-operative pain       melatonin 3 MG tablet     30 tablet    Take 1 tablet (3 mg) by mouth nightly as needed for sleep    Ascending aortic aneurysm (H), S/P ascending aortic aneurysm repair       metoprolol 25 MG tablet    LOPRESSOR    60 tablet    Take 1 tablet (25 mg) by mouth 2 times daily    Palpitations       nicotine polacrilex 2 MG gum    NICORETTE    100 tablet    Place 1 each (2 mg) inside cheek as needed for smoking cessation Place 1 each inside cheek as needed for smoking cessation    History of tobacco use       pantoprazole 40 MG EC tablet    PROTONIX    30 tablet    Take 1 tablet (40 mg) by mouth every morning    S/P ascending aortic aneurysm repair        polyethylene glycol Packet    MIRALAX/GLYCOLAX    7 packet    Take 17 g by mouth daily    S/P ascending aortic aneurysm repair       senna-docusate 8.6-50 MG per tablet    SENOKOT-S;PERICOLACE    100 tablet    Take 1-2 tablets by mouth 2 times daily as needed for constipation    Acute post-operative pain, S/P ascending aortic aneurysm repair

## 2017-11-07 NOTE — PROGRESS NOTES
SUBJECTIVE:   Georgie Patino is a 62 year old female who presents to clinic today for the following health issues:          Hospital Follow-up Visit:    Hospital/Nursing Home/IP Rehab Facility: [facility]  Date of Admission: 10/27/2017  Date of Discharge: 11/1/2017  Reason(s) for Admission: Open heart surgery             Problems taking medications regularly:  None       Medication changes since discharge: Miralax, senna       Problems adhering to non-medication therapy:  None    Summary of hospitalization:  [facility] discharge summary reviewed  Diagnostic Tests/Treatments reviewed.  Follow up needed: none  Other Healthcare Providers Involved in Patient s Care:         Specialist appointment - Cardiothoracic surgery 11/14/17  Update since discharge: stable.          Hospital Course:   Georgie Patino is a 62 year old female who on 10/27/2017 underwent the above-named procedures.  Patient tolerated the operation well and was admitted to the CVICU post-operatively.  Patient was extubated on POD # 0.  Pressors were weaned off as able. Patient's ICU stay was unremarkable. Patient was transferred to the surgical floor on POD # 1. Chest tubes were removed when drainage decreased and follow-up CXR was negative for any significant pneumothorax. TPW were removed when appropriate.     Patient continued to improve post-operatively. Patient was started on ASA, atorvastatin, metoprolol 25 BID. Patient remained hemodynamically stable. Home lisinopril was not restarted, can be restarted on outpatient basis as BP tolerates Patient was diuresed with IV lasix to her dry weight and will not be discharged on lasix, has maintained adequate UOP and weight stable off lasix.       Patient has had right flank pain, since surgery     Post-operative pain control included IV dilaudid, PO oxycodone and tylenol and will be discharged on oxycodone and tylenol.      Prior to discharge, patient's pain was  controlled well, she was able to perform most ADLs and ambulate without difficulty, and had full return of bowel and bladder function.  On November 1, 2017, she was Discharged to home in stable condition.  --------  Post Hospital patient feel she is doing well at home.  She denies shortness of breath, cough, edema, weight gain.  She is following lifting instructions.  She denies any drainage from incision or fever.  She is taking dilauded 4 mg every 3 hours around the clock.  She feels her pain is well controlled.  Patient is also taking tylenol.  She denies constipation, dysuria.    Post Discharge Medication Reconciliation: discharge medications reconciled and changed, per note/orders (see AVS).  Plan of care communicated with patient     Coding guidelines for this visit:  Type of Medical   Decision Making Face-to-Face Visit       within 7 Days of discharge Face-to-Face Visit        within 14 days of discharge   Moderate Complexity [id number]   High Complexity [id number] 05202            Problem list and histories reviewed & adjusted, as indicated.  Additional history: as documented    Patient Active Problem List   Diagnosis     CARDIOVASCULAR SCREENING; LDL GOAL LESS THAN 160     Obesity     Benign essential hypertension     Family history of sudden cardiac death     Hypertension     Anxiety     S/P ascending aortic aneurysm repair     Former smoker     Past Surgical History:   Procedure Laterality Date     COLONOSCOPY WITH CO2 INSUFFLATION N/A 3/15/2017    Procedure: COLONOSCOPY WITH CO2 INSUFFLATION;  Surgeon: Duane, William Charles, MD;  Location: [facility] OR     DILATION AND CURETTAGE  age 20     REPAIR ANEURYSM ASCENDING AORTA N/A 10/27/2017    Procedure: REPAIR ANEURYSM ASCENDING AORTA;  Median Sternotomy, Cardiopulmonary bypass, Ascending Aorta Aneurysm Repair using Hemashield Platinum Woven Double Velour Graft 34cm X 30cm.;  Surgeon: Rubio Piña MD;  Location: [facility] OR     [place] teeth removed          Social History   Substance Use Topics     Smoking status: Former Smoker     Packs/day: 0.05     Years: 40.00     Types: Cigarettes     Quit date: 10/27/2017     Smokeless tobacco: Never Used      Comment: 2 cigs daily     Alcohol use 0.5 - 1.0 oz/week     1 - 2 Standard drinks or equivalent per week      Comment: 3-4 drinks per week      Family History   Problem Relation Age of Onset     Respiratory Mother      copd     CANCER Maternal Grandmother      Leg     Alzheimer Disease Maternal Grandfather      HEART DISEASE Paternal Grandfather      HEART DISEASE Brother 54     Heart Attack      CANCER Sister      Lung cancer age 55-smoker d age 57         Current Outpatient Prescriptions   Medication Sig Dispense Refill     nicotine polacrilex (NICORETTE) 2 MG gum Place 1 each (2 mg) inside cheek as needed for smoking cessation Place 1 each inside cheek as needed for smoking cessation 100 tablet 5     melatonin 3 MG tablet Take 1 tablet (3 mg) by mouth nightly as needed for sleep 30 tablet 0     HYDROmorphone (DILAUDID) 2 MG tablet Take 1-2 tablets (2-4 mg) by mouth every 3 hours as needed for moderate to severe pain 80 tablet 0     acetaminophen (TYLENOL) 325 MG tablet Take 2 tablets (650 mg) by mouth every 4 hours as needed for other (surgical pain) 100 tablet 0     aspirin EC 81 MG EC tablet Take 1 tablet (81 mg) by mouth daily 30 tablet 0     polyethylene glycol (MIRALAX/GLYCOLAX) Packet Take 17 g by mouth daily 7 packet 0     senna-docusate (SENOKOT-S;PERICOLACE) 8.6-50 MG per tablet Take 1-2 tablets by mouth 2 times daily as needed for constipation 100 tablet 0     pantoprazole (PROTONIX) 40 MG EC tablet Take 1 tablet (40 mg) by mouth every morning 30 tablet 0     metoprolol (LOPRESSOR) 25 MG tablet Take 1 tablet (25 mg) by mouth 2 times daily 60 tablet 3     atorvastatin (LIPITOR) 40 MG tablet Take 1 tablet (40 mg) by mouth daily (Patient taking differently: Take 40 mg by mouth every morning ) 90 tablet 3      "ALPRAZolam (XANAX) 0.25 MG tablet Take 1 tablet (0.25 mg) by mouth 2 times daily as needed for anxiety (Patient not taking: Reported on 11/7/2017) 30 tablet 0     Allergies   Allergen Reactions     Blood Transfusion Related (Informational Only) Other (See Comments)     Patient has a history of a clinically significant antibody against RBC antigens.  A delay in compatible RBCs may occur.     BP Readings from Last 3 Encounters:   11/07/17 130/80   11/01/17 141/79   10/16/17 145/83    Wt Readings from Last 3 Encounters:   11/07/17 206 lb (93.4 kg)   11/01/17 202 lb 14.4 oz (92 kg)   10/16/17 205 lb 6.4 oz (93.2 kg)                  Labs reviewed in EPIC        Reviewed and updated as needed this visit by clinical staff       Reviewed and updated as needed this visit by Provider         ROS:  Constitutional, HEENT, cardiovascular, pulmonary, gi and gu systems are negative, except as otherwise noted.      OBJECTIVE:   /80  Pulse 92  Temp 97.9  F (36.6  C) (Oral)  Ht 5' 6\" (1.676 m)  Wt 206 lb (93.4 kg)  SpO2 93%  BMI 33.25 kg/m2  Body mass index is 33.25 kg/(m^2).  GENERAL: healthy, alert and no distress  RESP: expiratory wheezes bibasilar  CV: regular rate and rhythm, normal S1 S2, no S3 or S4, no murmur, click or rub, no peripheral edema and peripheral pulses strong  MS: no gross musculoskeletal defects noted, no edema  SKIN: sternal incision and chest tube sites- clean, dry, and intact, no surrounding erythema or warmth noted;   PSYCH: mentation appears normal, affect normal/bright    Diagnostic Test Results:  none     ASSESSMENT/PLAN:     1. S/P ascending aortic aneurysm repair  Patient doing well post-operatively.    2. Acute post-operative pain  Patient to work on weaning down dilauded by decreasing to 1 tab every 3-4 hours and stretching out time.  She will contact surgery if pain requires continued significant medication use to control.    3. History of tobacco use  Patient congratulated on quitting " smoking.  - nicotine polacrilex (NICORETTE) 2 MG gum; Place 1 each (2 mg) inside cheek as needed for smoking cessation Place 1 each inside cheek as needed for smoking cessation  Dispense: 100 tablet; Refill: 5    FUTURE APPOINTMENTS:       - Follow-up for annual visit or as needed    TESHA [facility]

## 2017-11-08 NOTE — TELEPHONE ENCOUNTER
Patient may chew one piece of gum every 1-2 hours as needed, maximum of 24 pieces in 24 hours.    Thanks,  Marlys Wade, CNP

## 2017-11-08 NOTE — TELEPHONE ENCOUNTER
Clarified prescription sent    Signed Prescriptions:                        Disp   Refills    nicotine polacrilex (NICORETTE) 2 MG gum   100 ta*5        Sig: Chew one piece of gum every 1-2 hours as needed,           maximum of 24 pieces in 24 hours  Authorizing Provider: LIZZY JIMENEZ  Ordering User: JASWANT JEONG RN

## 2017-11-10 ENCOUNTER — OFFICE VISIT (OUTPATIENT)
Dept: FAMILY MEDICINE | Facility: CLINIC | Age: 62
End: 2017-11-10
Payer: COMMERCIAL

## 2017-11-10 VITALS
BODY MASS INDEX: 32.44 KG/M2 | WEIGHT: 201 LBS | SYSTOLIC BLOOD PRESSURE: 132 MMHG | HEART RATE: 97 BPM | OXYGEN SATURATION: 97 % | DIASTOLIC BLOOD PRESSURE: 86 MMHG | TEMPERATURE: 97.4 F

## 2017-11-10 DIAGNOSIS — R10.9 RIGHT FLANK PAIN: Primary | ICD-10-CM

## 2017-11-10 LAB
ALBUMIN UR-MCNC: NEGATIVE MG/DL
ANION GAP SERPL CALCULATED.3IONS-SCNC: 10 MMOL/L (ref 3–14)
APPEARANCE UR: CLEAR
BACTERIA #/AREA URNS HPF: ABNORMAL /HPF
BASOPHILS # BLD AUTO: 0 10E9/L (ref 0–0.2)
BASOPHILS NFR BLD AUTO: 0.5 %
BILIRUB UR QL STRIP: NEGATIVE
BUN SERPL-MCNC: 12 MG/DL (ref 7–30)
CALCIUM SERPL-MCNC: 9.4 MG/DL (ref 8.5–10.1)
CHLORIDE SERPL-SCNC: 107 MMOL/L (ref 94–109)
CO2 SERPL-SCNC: 23 MMOL/L (ref 20–32)
COLOR UR AUTO: YELLOW
CREAT SERPL-MCNC: 0.53 MG/DL (ref 0.52–1.04)
DIFFERENTIAL METHOD BLD: NORMAL
EOSINOPHIL # BLD AUTO: 0.1 10E9/L (ref 0–0.7)
EOSINOPHIL NFR BLD AUTO: 1.3 %
ERYTHROCYTE [DISTWIDTH] IN BLOOD BY AUTOMATED COUNT: 13.5 % (ref 10–15)
GFR SERPL CREATININE-BSD FRML MDRD: >90 ML/MIN/1.7M2
GLUCOSE SERPL-MCNC: 104 MG/DL (ref 70–99)
GLUCOSE UR STRIP-MCNC: NEGATIVE MG/DL
HCT VFR BLD AUTO: 36.8 % (ref 35–47)
HGB BLD-MCNC: 12 G/DL (ref 11.7–15.7)
HGB UR QL STRIP: ABNORMAL
KETONES UR STRIP-MCNC: NEGATIVE MG/DL
LEUKOCYTE ESTERASE UR QL STRIP: NEGATIVE
LYMPHOCYTES # BLD AUTO: 2.2 10E9/L (ref 0.8–5.3)
LYMPHOCYTES NFR BLD AUTO: 27.5 %
MCH RBC QN AUTO: 29.4 PG (ref 26.5–33)
MCHC RBC AUTO-ENTMCNC: 32.6 G/DL (ref 31.5–36.5)
MCV RBC AUTO: 90 FL (ref 78–100)
MONOCYTES # BLD AUTO: 0.5 10E9/L (ref 0–1.3)
MONOCYTES NFR BLD AUTO: 5.9 %
NEUTROPHILS # BLD AUTO: 5.1 10E9/L (ref 1.6–8.3)
NEUTROPHILS NFR BLD AUTO: 64.8 %
NITRATE UR QL: NEGATIVE
NON-SQ EPI CELLS #/AREA URNS LPF: ABNORMAL /LPF
PH UR STRIP: 5.5 PH (ref 5–7)
PLATELET # BLD AUTO: 309 10E9/L (ref 150–450)
POTASSIUM SERPL-SCNC: 3.8 MMOL/L (ref 3.4–5.3)
RBC # BLD AUTO: 4.08 10E12/L (ref 3.8–5.2)
RBC #/AREA URNS AUTO: ABNORMAL /HPF
SODIUM SERPL-SCNC: 140 MMOL/L (ref 133–144)
SOURCE: ABNORMAL
SP GR UR STRIP: 1.01 (ref 1–1.03)
URNS CMNT MICRO: ABNORMAL
UROBILINOGEN UR STRIP-ACNC: 0.2 EU/DL (ref 0.2–1)
WBC # BLD AUTO: 7.9 10E9/L (ref 4–11)
WBC #/AREA URNS AUTO: ABNORMAL /HPF

## 2017-11-10 PROCEDURE — 99213 OFFICE O/P EST LOW 20 MIN: CPT | Performed by: NURSE PRACTITIONER

## 2017-11-10 PROCEDURE — 85025 COMPLETE CBC W/AUTO DIFF WBC: CPT | Performed by: NURSE PRACTITIONER

## 2017-11-10 PROCEDURE — 81001 URINALYSIS AUTO W/SCOPE: CPT | Performed by: NURSE PRACTITIONER

## 2017-11-10 PROCEDURE — 36415 COLL VENOUS BLD VENIPUNCTURE: CPT | Performed by: NURSE PRACTITIONER

## 2017-11-10 PROCEDURE — 80048 BASIC METABOLIC PNL TOTAL CA: CPT | Performed by: NURSE PRACTITIONER

## 2017-11-10 ASSESSMENT — PAIN SCALES - GENERAL: PAINLEVEL: MODERATE PAIN (5)

## 2017-11-10 NOTE — MR AVS SNAPSHOT
After Visit Summary   11/10/2017    Georgie Patino    MRN: [id number]           Patient Information     Date Of Birth          1955        Visit Information        Provider Department      11/10/2017 2:20 PM Marlys Wade APRN [facility]        Today's Diagnoses     Right flank pain    -  1      Care Instructions    [facility]    If you have any questions regarding to your visit please contact your care team:     Team Pink:   Clinic Hours Telephone Number   Internal Medicine:  Dr. Helen Wade, NP       7am-7pm  Monday - Thursday   7am-5pm  Fridays  [phone]  (Appointment scheduling available 24/7)    Questions about your visit?  Team Line  [phone]   Urgent Care - [place] [facility] - 11am-9pm Monday-Friday Saturday-Sunday- 9am-5pm   [place] - 5pm-9pm Monday-Friday Saturday-Sunday- 9am-5pm  [phone] - [place]   [phone] - [place]       What options do I have for visits at the clinic other than the traditional office visit?  To expand how we care for you, many of our providers are utilizing electronic visits (e-visits) and telephone visits, when medically appropriate, for interactions with their patients rather than a visit in the clinic.   We also offer nurse visits for many medical concerns. Just like any other service, we will bill your insurance company for this type of visit based on time spent on the phone with your provider. Not all insurance companies cover these visits. Please check with your medical insurance if this type of visit is covered. You will be responsible for any charges that are not paid by your insurance.      E-visits via [employer]:  generally incur a $35.00 fee.  Telephone visits:  Time spent on the phone: *charged based on time that is spent on the phone in increments of 10 minutes. Estimated cost:   5-10 mins $30.00   11-20 mins. $59.00   21-30  mins. $85.00   Use [employer] (secure email communication and access to your chart) to send your primary care provider a message or make an appointment. Ask someone on your Team how to sign up for [employer].    For a Price Quote for your services, please call our Consumer Price Line at [phone].    As always, Thank you for trusting us with your health care needs!    Bernie Licona MA            Follow-ups after your visit        Your next 10 appointments already scheduled     Nov 13, 2017  2:45 PM CST   LAB with  LAB   [facility]    [address] [address] [phone]           Please do not eat 10-12 hours before your appointment if you are coming in fasting for labs on lipids, cholesterol, or glucose (sugar). This does not apply to pregnant women. Water, hot tea and black coffee (with nothing added) are okay. Do not drink other fluids, diet soda or chew gum.            Nov 14, 2017  2:00 PM CST   (Arrive by 1:45 PM)   Return Visit with [facility] ([facility])    [address] [facility] [place] [address] [phone]              Who to contact     If you have questions or need follow up information about today's clinic visit or your schedule please contact [facility] directly at [phone].  Normal or non-critical lab and imaging results will be communicated to you by MyChart, letter or phone within 4 business days after the clinic has received the results. If you do not hear from us within 7 days, please contact the clinic through [employer] or phone. If you have a critical or abnormal lab result, we will notify you by phone as soon as possible.  Submit refill requests through [employer] or call your pharmacy and they will forward the refill request to us. Please allow 3 business days for your refill to be completed.          Additional Information About Your Visit       "  MyChart Information     [employer] lets you send messages to your doctor, view your test results, renew your prescriptions, schedule appointments and more. To sign up, go to [email or web address] . Click on \"Log in\" on the left side of the screen, which will take you to the Welcome page. Then click on \"Sign up Now\" on the right side of the page.     You will be asked to enter the access code listed below, as well as some personal information. Please follow the directions to create your username and password.     Your access code is: [email or web address]  Expires: 2017  3:05 PM     Your access code will  in 90 days. If you need help or a new code, please call your [place] clinic or [phone].        Care EveryWhere ID     This is your [facility] EveryWhere ID. This could be used by other organizations to access your [place] medical records  [id number]        Your Vitals Were     Pulse Temperature Pulse Oximetry BMI (Body Mass Index)          97 97.4  F (36.3  C) (Oral) 97% 32.44 kg/m2         Blood Pressure from Last 3 Encounters:   11/10/17 132/86   17 130/80   17 141/79    Weight from Last 3 Encounters:   11/10/17 201 lb (91.2 kg)   17 206 lb (93.4 kg)   17 202 lb 14.4 oz (92 kg)              We Performed the Following     *UA reflex to Microscopic and Culture ([place] and [facility] (except [place] and [place])     Basic metabolic panel     CBC with platelets differential     Urine Microscopic          Today's Medication Changes          These changes are accurate as of: 11/10/17  3:22 PM.  If you have any questions, ask your nurse or doctor.               These medicines have changed or have updated prescriptions.        Dose/Directions    atorvastatin 40 MG tablet   Commonly known as:  LIPITOR   This may have changed:  when to take this   Used for:  CARDIOVASCULAR SCREENING; LDL GOAL LESS THAN 160        Dose:  40 mg   Take 1 tablet (40 mg) by mouth daily   Quantity:  90 " tablet   Refills:  3                Primary Care Provider Office Phone # Fax #    Marlys Wade, TESHA [facility] [phone] [address] [address]        Equal Access to Services     CAROLINA LOVELL : Hadii aad ku hadasho Soomaali, waaxda luqadaha, qaybta kaalmada adeegyada, waxkaleb idiin haydorothyn adeeg juan laisabel . So [facility] [phone].    ATENCIÓN: Si habla español, tiene a thomas disposición servicios gratuitos de asistencia lingüística. Llame al [phone].    We comply with applicable federal civil rights laws and [place] laws. We do not discriminate on the basis of race, color, national origin, age, disability, sex, sexual orientation, or gender identity.            Thank you!     Thank you for choosing [facility]  for your care. Our goal is always to provide you with excellent care. Hearing back from our patients is one way we can continue to improve our services. Please take a few minutes to complete the written survey that you may receive in the mail after your visit with us. Thank you!             Your Updated Medication List - Protect others around you: Learn how to safely use, store and throw away your medicines at www.disposemymeds.org.          This list is accurate as of: 11/10/17  3:22 PM.  Always use your most recent med list.                   Brand Name Dispense Instructions for use Diagnosis    acetaminophen 325 MG tablet    TYLENOL    100 tablet    Take 2 tablets (650 mg) by mouth every 4 hours as needed for other (surgical pain)    S/P ascending aortic aneurysm repair, Acute post-operative pain       ALPRAZolam 0.25 MG tablet    XANAX    30 tablet    Take 1 tablet (0.25 mg) by mouth 2 times daily as needed for anxiety    Anxiety       aspirin 81 MG EC tablet     30 tablet    Take 1 tablet (81 mg) by mouth daily    S/P ascending aortic aneurysm repair       atorvastatin 40 MG tablet    LIPITOR    90 tablet    Take 1 tablet (40 mg) by mouth  daily    CARDIOVASCULAR SCREENING; LDL GOAL LESS THAN 160       HYDROmorphone 2 MG tablet    DILAUDID    80 tablet    Take 1-2 tablets (2-4 mg) by mouth every 3 hours as needed for moderate to severe pain    S/P ascending aortic aneurysm repair, Acute post-operative pain       melatonin 3 MG tablet     30 tablet    Take 1 tablet (3 mg) by mouth nightly as needed for sleep    Ascending aortic aneurysm (H), S/P ascending aortic aneurysm repair       metoprolol 25 MG tablet    LOPRESSOR    60 tablet    Take 1 tablet (25 mg) by mouth 2 times daily    Palpitations       nicotine polacrilex 2 MG gum    NICORETTE    100 tablet    Chew one piece of gum every 1-2 hours as needed, maximum of 24 pieces in 24 hours    History of tobacco use       pantoprazole 40 MG EC tablet    PROTONIX    30 tablet    Take 1 tablet (40 mg) by mouth every morning    S/P ascending aortic aneurysm repair       polyethylene glycol Packet    MIRALAX/GLYCOLAX    7 packet    Take 17 g by mouth daily    S/P ascending aortic aneurysm repair       senna-docusate 8.6-50 MG per tablet    SENOKOT-S;PERICOLACE    100 tablet    Take 1-2 tablets by mouth 2 times daily as needed for constipation    Acute post-operative pain, S/P ascending aortic aneurysm repair

## 2017-11-10 NOTE — PATIENT INSTRUCTIONS
[facility]    If you have any questions regarding to your visit please contact your care team:     Team Pink:   Clinic Hours Telephone Number   Internal Medicine:  Dr. Helen Wade NP       7am-7pm  Monday - Thursday   7am-5pm  Fridays  [phone]  (Appointment scheduling available 24/7)    Questions about your visit?  Team Line  [phone]   Urgent Care - [place] [facility] - 11am-9pm Monday-Friday Saturday-Sunday- 9am-5pm   [place] - 5pm-9pm Monday-Friday Saturday-Sunday- 9am-5pm  [phone] - [place]   [phone] - [place]       What options do I have for visits at the clinic other than the traditional office visit?  To expand how we care for you, many of our providers are utilizing electronic visits (e-visits) and telephone visits, when medically appropriate, for interactions with their patients rather than a visit in the clinic.   We also offer nurse visits for many medical concerns. Just like any other service, we will bill your insurance company for this type of visit based on time spent on the phone with your provider. Not all insurance companies cover these visits. Please check with your medical insurance if this type of visit is covered. You will be responsible for any charges that are not paid by your insurance.      E-visits via [employer]:  generally incur a $35.00 fee.  Telephone visits:  Time spent on the phone: *charged based on time that is spent on the phone in increments of 10 minutes. Estimated cost:   5-10 mins $30.00   11-20 mins. $59.00   21-30 mins. $85.00   Use [employer] (secure email communication and access to your chart) to send your primary care provider a message or make an appointment. Ask someone on your Team how to sign up for [employer].    For a Price Quote for your services, please call our Consumer Price Line at [phone].    As always, Thank you for trusting us with your health care  needs!    Bernie Licona MA

## 2017-11-10 NOTE — PROGRESS NOTES
SUBJECTIVE:   Georgie Patino is a 62 year old female who presents to clinic today for the following health issues:      Abdominal Pain      Duration: 2 days    Description (location/character/radiation): dull constant pain       Associated flank pain: None    Intensity:  moderate    Accompanying signs and symptoms:        Fever/Chills: no        Gas/Bloating: YES- Gas       Nausea/vomitting: no        Diarrhea: no        Dysuria or Hematuria: no     History (previous similar pain/trauma/previous testing): Had same pain in Hospital after surgery    Precipitating or alleviating factors:       Pain worse with eating/BM/urination: No       Pain relieved by BM: no     Therapies tried and outcome: Ibuprofen    LMP:  not applicable    Patient complains of right lower abdominal pain and swelling.  She had similar pain in the hospital after surgery and it was relieved with bm and walking.  Patient now notes that pain at night.  She denies constipation, melena, hematochezia.  Patient recently stopped taking dilauded and continues on program of stool softeners and laxatives.    Problem list and histories reviewed & adjusted, as indicated.  Additional history: as documented    Patient Active Problem List   Diagnosis     CARDIOVASCULAR SCREENING; LDL GOAL LESS THAN 160     Obesity     Benign essential hypertension     Family history of sudden cardiac death     Hypertension     Anxiety     S/P ascending aortic aneurysm repair     Former smoker     Past Surgical History:   Procedure Laterality Date     COLONOSCOPY WITH CO2 INSUFFLATION N/A 3/15/2017    Procedure: COLONOSCOPY WITH CO2 INSUFFLATION;  Surgeon: Duane, William Charles, MD;  Location: [facility] OR     DILATION AND CURETTAGE  age 20     REPAIR ANEURYSM ASCENDING AORTA N/A 10/27/2017    Procedure: REPAIR ANEURYSM ASCENDING AORTA;  Median Sternotomy, Cardiopulmonary bypass, Ascending Aorta Aneurysm Repair using Hemashield Platinum Woven Double Velour Graft 34cm X 30cm.;   Surgeon: Rubio Piña MD;  Location: [facility] OR     [place] teeth removed         Social History   Substance Use Topics     Smoking status: Former Smoker     Packs/day: 0.05     Years: 40.00     Types: Cigarettes     Quit date: 10/27/2017     Smokeless tobacco: Never Used      Comment: 2 cigs daily     Alcohol use 0.5 - 1.0 oz/week     1 - 2 Standard drinks or equivalent per week      Comment: 3-4 drinks per week      Family History   Problem Relation Age of Onset     Respiratory Mother      copd     CANCER Maternal Grandmother      Leg     Alzheimer Disease Maternal Grandfather      HEART DISEASE Paternal Grandfather      HEART DISEASE Brother 54     Heart Attack      CANCER Sister      Lung cancer age 55-smoker d age 57         Current Outpatient Prescriptions   Medication Sig Dispense Refill     nicotine polacrilex (NICORETTE) 2 MG gum Chew one piece of gum every 1-2 hours as needed, maximum of 24 pieces in 24 hours 100 tablet 5     melatonin 3 MG tablet Take 1 tablet (3 mg) by mouth nightly as needed for sleep 30 tablet 0     acetaminophen (TYLENOL) 325 MG tablet Take 2 tablets (650 mg) by mouth every 4 hours as needed for other (surgical pain) 100 tablet 0     aspirin EC 81 MG EC tablet Take 1 tablet (81 mg) by mouth daily 30 tablet 0     polyethylene glycol (MIRALAX/GLYCOLAX) Packet Take 17 g by mouth daily 7 packet 0     senna-docusate (SENOKOT-S;PERICOLACE) 8.6-50 MG per tablet Take 1-2 tablets by mouth 2 times daily as needed for constipation 100 tablet 0     pantoprazole (PROTONIX) 40 MG EC tablet Take 1 tablet (40 mg) by mouth every morning 30 tablet 0     metoprolol (LOPRESSOR) 25 MG tablet Take 1 tablet (25 mg) by mouth 2 times daily 60 tablet 3     atorvastatin (LIPITOR) 40 MG tablet Take 1 tablet (40 mg) by mouth daily (Patient taking differently: Take 40 mg by mouth every morning ) 90 tablet 3     HYDROmorphone (DILAUDID) 2 MG tablet Take 1-2 tablets (2-4 mg) by mouth every 3 hours as needed  for moderate to severe pain (Patient not taking: Reported on 11/10/2017) 80 tablet 0     ALPRAZolam (XANAX) 0.25 MG tablet Take 1 tablet (0.25 mg) by mouth 2 times daily as needed for anxiety (Patient not taking: Reported on 11/7/2017) 30 tablet 0     Allergies   Allergen Reactions     Blood Transfusion Related (Informational Only) Other (See Comments)     Patient has a history of a clinically significant antibody against RBC antigens.  A delay in compatible RBCs may occur.     BP Readings from Last 3 Encounters:   11/10/17 132/86   11/07/17 130/80   11/01/17 141/79    Wt Readings from Last 3 Encounters:   11/10/17 201 lb (91.2 kg)   11/07/17 206 lb (93.4 kg)   11/01/17 202 lb 14.4 oz (92 kg)                  Labs reviewed in EPIC        Reviewed and updated as needed this visit by clinical staffTobacco  Allergies  Meds  Med Hx  Surg Hx  Fam Hx  Soc Hx      Reviewed and updated as needed this visit by Provider         ROS:  Constitutional, HEENT, cardiovascular, pulmonary, gi and gu systems are negative, except as otherwise noted.      OBJECTIVE:   /86  Pulse 97  Temp 97.4  F (36.3  C) (Oral)  Wt 201 lb (91.2 kg)  SpO2 97%  BMI 32.44 kg/m2  Body mass index is 32.44 kg/(m^2).  GENERAL: healthy, alert and no distress  RESP: lungs clear to auscultation - no rales, rhonchi or wheezes  CV: regular rate and rhythm, normal S1 S2, no S3 or S4, no murmur, click or rub, no peripheral edema and peripheral pulses strong  ABDOMEN: tenderness RLQ and no rebound or guarding present, bowel sounds active all 4 quadrants, no organomegaly or masses, bowel sounds normal and no palpable or pulsatile masses  MS: no gross musculoskeletal defects noted, no edema    Diagnostic Test Results:  Results for orders placed or performed in visit on 11/10/17 (from the past 24 hour(s))   *UA reflex to Microscopic and Culture ([place] and [facility] (except [place] and [place])   Result Value Ref Range    Color Urine Yellow      Appearance Urine Clear     Glucose Urine Negative NEG^Negative mg/dL    Bilirubin Urine Negative NEG^Negative    Ketones Urine Negative NEG^Negative mg/dL    Specific Gravity Urine 1.010 1.003 - 1.035    Blood Urine Small (A) NEG^Negative    pH Urine 5.5 5.0 - 7.0 pH    Protein Albumin Urine Negative NEG^Negative mg/dL    Urobilinogen Urine 0.2 0.2 - 1.0 EU/dL    Nitrite Urine Negative NEG^Negative    Leukocyte Esterase Urine Negative NEG^Negative    Source Midstream Urine    Urine Microscopic   Result Value Ref Range    WBC Urine O - 2 OTO2^O - 2 /HPF    RBC Urine O - 2 OTO2^O - 2 /HPF    Squamous Epithelial /LPF Urine Few FEW^Few /LPF    Bacteria Urine Few (A) NEG^Negative /HPF    Comment Urine FEW WBC CLUMPS    CBC with platelets differential   Result Value Ref Range    WBC 7.9 4.0 - 11.0 10e9/L    RBC Count 4.08 3.8 - 5.2 10e12/L    Hemoglobin 12.0 11.7 - 15.7 g/dL    Hematocrit 36.8 35.0 - 47.0 %    MCV 90 78 - 100 fl    MCH 29.4 26.5 - 33.0 pg    MCHC 32.6 31.5 - 36.5 g/dL    RDW 13.5 10.0 - 15.0 %    Platelet Count 309 150 - 450 10e9/L    Diff Method Automated Method     % Neutrophils 64.8 %    % Lymphocytes 27.5 %    % Monocytes 5.9 %    % Eosinophils 1.3 %    % Basophils 0.5 %    Absolute Neutrophil 5.1 1.6 - 8.3 10e9/L    Absolute Lymphocytes 2.2 0.8 - 5.3 10e9/L    Absolute Monocytes 0.5 0.0 - 1.3 10e9/L    Absolute Eosinophils 0.1 0.0 - 0.7 10e9/L    Absolute Basophils 0.0 0.0 - 0.2 10e9/L       ASSESSMENT/PLAN:     1. Right flank pain  Normal urine, WBC count.  Possibly gas or mild constipation.  Patient to continue to monitor and will try over the counter simethicone for discomfort.  - *UA reflex to Microscopic and Culture ([place] and [place] [facility] (except [place] and [place])  - Urine Microscopic  - CBC with platelets differential  - Basic metabolic panel    FUTURE APPOINTMENTS:       - Follow-up for annual visit or as needed    TESHA Chanel CNP  [facility]

## 2017-11-10 NOTE — NURSING NOTE
"Chief Complaint   Patient presents with     Pain     right sided abdominal pain for 2 days.  Has some swelling.       Initial /86  Pulse 97  Temp 97.4  F (36.3  C) (Oral)  Wt 201 lb (91.2 kg)  SpO2 97%  BMI 32.44 kg/m2 Estimated body mass index is 32.44 kg/(m^2) as calculated from the following:    Height as of 11/7/17: 5' 6\" (1.676 m).    Weight as of this encounter: 201 lb (91.2 kg).  Medication Reconciliation: complete   Camila Pena MA    "

## 2017-11-14 ENCOUNTER — OFFICE VISIT (OUTPATIENT)
Dept: CARDIOLOGY | Facility: CLINIC | Age: 62
End: 2017-11-14
Attending: THORACIC SURGERY (CARDIOTHORACIC VASCULAR SURGERY)
Payer: COMMERCIAL

## 2017-11-14 VITALS
HEART RATE: 89 BPM | WEIGHT: 204.2 LBS | DIASTOLIC BLOOD PRESSURE: 83 MMHG | OXYGEN SATURATION: 95 % | SYSTOLIC BLOOD PRESSURE: 122 MMHG | HEIGHT: 66 IN | BODY MASS INDEX: 32.82 KG/M2

## 2017-11-14 DIAGNOSIS — L76.82 INCISIONAL PAIN: Primary | ICD-10-CM

## 2017-11-14 PROCEDURE — 99212 OFFICE O/P EST SF 10 MIN: CPT | Mod: ZF

## 2017-11-14 RX ORDER — HYDROCODONE BITARTRATE AND ACETAMINOPHEN 5; 325 MG/1; MG/1
1-2 TABLET ORAL EVERY 6 HOURS PRN
Qty: 30 TABLET | Refills: 0 | Status: SHIPPED | OUTPATIENT
Start: 2017-11-14 | End: 2018-06-12

## 2017-11-14 RX ORDER — HYDROCODONE BITARTRATE AND ACETAMINOPHEN 5; 325 MG/1; MG/1
1-2 TABLET ORAL EVERY 6 HOURS PRN
Qty: 30 TABLET | Refills: 0 | Status: SHIPPED | OUTPATIENT
Start: 2017-11-14 | End: 2017-11-14

## 2017-11-14 ASSESSMENT — PAIN SCALES - GENERAL: PAINLEVEL: NO PAIN (0)

## 2017-11-14 NOTE — PROGRESS NOTES
CARDIOTHORACIC SURGERY FOLLOW-UP VISIT     Georgie Patino   1955   [id number]       Reason for visit: Post-Op Assending aortic aneurysm repair and PFO closure with Dr. Ted Piña on 10/27/2017    HPI: Georgie Patino is a 62 year old year old female seen in clinic for a routine follow-up appointment after surgery. Patient has past medical history of HTN, Anxiety, and PFO. Hospital course was unremarkable. Discharged about POD #5.     Patient has been doing well since discharge and now returns to clinic for postop visit.    Has R sided abd pain that has occurred since surgery.   Is done taking dilaudid, APAP not helping that much.      Sleeping still disturbed. Still some fatigue. Taking melatonin, but no other sleep aids.     Patient reports incision is healing well.  Patient denies any fever, chills, chest pain, edema, SOB, lightheadedness and nausea.    Walking well without problems.   Normal bowel movements.  Voiding without problems.      Has not been attending cardiac rehabilitation and that is going to start next week.    Patient is not on Coumadin.   Weight has been up and down.     PAST MEDICAL HISTORY:  Past Medical History:   Diagnosis Date     Ascending aortic aneurysm (H) 09/21/2017     Blood transfusions age 19    due to menorhagia     Hypertension 09/21/2017     Tobacco abuse        PAST SURGICAL HISTORY:  Past Surgical History:   Procedure Laterality Date     COLONOSCOPY WITH CO2 INSUFFLATION N/A 3/15/2017    Procedure: COLONOSCOPY WITH CO2 INSUFFLATION;  Surgeon: Duane, William Charles, MD;  Location: [facility] OR     DILATION AND CURETTAGE  age 20     REPAIR ANEURYSM ASCENDING AORTA N/A 10/27/2017    Procedure: REPAIR ANEURYSM ASCENDING AORTA;  Median Sternotomy, Cardiopulmonary bypass, Ascending Aorta Aneurysm Repair using Hemashield Platinum Woven Double Velour Graft 34cm X 30cm.;  Surgeon: Rubio Piña MD;  Location: [facility] OR     [place] teeth removed         CURRENT MEDICATIONS:   Current  Outpatient Prescriptions   Medication     nicotine polacrilex (NICORETTE) 2 MG gum     melatonin 3 MG tablet     acetaminophen (TYLENOL) 325 MG tablet     aspirin EC 81 MG EC tablet     senna-docusate (SENOKOT-S;PERICOLACE) 8.6-50 MG per tablet     pantoprazole (PROTONIX) 40 MG EC tablet     ALPRAZolam (XANAX) 0.25 MG tablet     metoprolol (LOPRESSOR) 25 MG tablet     atorvastatin (LIPITOR) 40 MG tablet     HYDROmorphone (DILAUDID) 2 MG tablet     polyethylene glycol (MIRALAX/GLYCOLAX) Packet     No current facility-administered medications for this visit.      ALLERGIES:      Allergies   Allergen Reactions     Blood Transfusion Related (Informational Only) Other (See Comments)     Patient has a history of a clinically significant antibody against RBC antigens.  A delay in compatible RBCs may occur.       ROS:  Gen: denies frequent headaches, double/blurry vision, persistent insomnia, fatigue, unexplained weight loss/gain   CV: denies chest pain, SOB, palpitations, peripheral edema  Pulm: denies SOB, asthma or wheezing  GI/: denies liver or kidney problems, blood in stool or BRBPR, voiding without problems  Endo: denies thyroid problems or Diabetes  Heme/Onc: denies bleeding  MS: no weakness, tremors or gait instability  Neuro: denies depression, memory problems    LABS:  Last Basic Metabolic Panel:  Lab Results   Component Value Date     11/10/2017      Lab Results   Component Value Date    POTASSIUM 3.8 11/10/2017     Lab Results   Component Value Date    CHLORIDE 107 11/10/2017     Lab Results   Component Value Date    QASIM 9.4 11/10/2017     Lab Results   Component Value Date    CO2 23 11/10/2017     Lab Results   Component Value Date    BUN 12 11/10/2017     Lab Results   Component Value Date    CR 0.53 11/10/2017     Lab Results   Component Value Date     11/10/2017       Last CBC:   Lab Results   Component Value Date    WBC 7.9 11/10/2017     Lab Results   Component Value Date    RBC 4.08  "11/10/2017     Lab Results   Component Value Date    HGB 12.0 11/10/2017     Lab Results   Component Value Date    HCT 36.8 11/10/2017     No components found for: MCT  Lab Results   Component Value Date    MCV 90 11/10/2017     Lab Results   Component Value Date    MCH 29.4 11/10/2017     Lab Results   Component Value Date    MCHC 32.6 11/10/2017     Lab Results   Component Value Date    RDW 13.5 11/10/2017     Lab Results   Component Value Date     11/10/2017       INR:  Lab Results   Component Value Date    INR 1.21 10/29/2017    INR 1.23 10/27/2017    INR 1.29 10/27/2017    INR 0.91 10/27/2017       IMAGING:  None    PHYSICAL EXAM:   /83 (BP Location: Left arm, Patient Position: Chair, Cuff Size: Adult Large)  Pulse 89  Ht 1.676 m (5' 6\")  Wt 92.6 kg (204 lb 3.2 oz)  SpO2 95%  BMI 32.96 kg/m2  General: alert and oriented x 3, pleasant, no acute distress, normal mood and affect  CV: S1 S2, no murmurs, rubs or gallops, regular rate and rhythm, no peripheral edema  Pulm: bilateral breath sounds, clear to auscultation, easy work of breathing  GI: (+) bowel sounds, soft non-tender and non-distended  Incision: incisions clean dry and intact without erythema, swelling or drainage  Neuro: nonfocal    PROCEDURES: None       ASSESSMENT/PLAN:  Georgie Patino is a 62 year old year old female status post Assending aortic aneurysm repair who returns to clinic for postop visit.     1. Surgically doing very well.  Incisions are healing well with no signs of infection.   2. Hemodynamics are stable. No medication changes were needed today.  Gave # 30 Vicodin today for pain control at night.   3. Follow up with your cardiologist as scheduled. Please make appt with Dr Garcia in about 3-4 weeks.    4. Continue Cardiac Rehab until completed.   5. Continue sternal precautions for 12 weeks from surgery date.   6. R sided abd pain: anterolateral pain, pain has improved with time but swelling/bulge is still there, " increases with bearing down.  Consider Abd/Pelvis CT scan if not improving with time.    7. Vicodin 5/325 mg #30 for pain at night.   No refills.      The total time spent with the patient was 25 minutes, > 50% of which was spent in counseling.    LENNY Wade

## 2017-11-14 NOTE — LETTER
11/14/2017      RE: Georgie Patino  [address] [place] [id number]       Dear Colleague,    Thank you for the opportunity to participate in the care of your patient, Georgie Patino, at the [facility]. Please see a copy of my visit note below.    CARDIOTHORACIC SURGERY FOLLOW-UP VISIT     Georgie Patino   1955   [id number]       Reason for visit: Post-Op Assending aortic aneurysm repair and PFO closure with Dr. Ted Piña on 10/27/2017    HPI: Georgie Patino is a 62 year old year old female seen in clinic for a routine follow-up appointment after surgery. Patient has past medical history of HTN, Anxiety, and PFO. Hospital course was unremarkable. Discharged about POD #5.     Patient has been doing well since discharge and now returns to clinic for postop visit.    Has R sided abd pain that has occurred since surgery.   Is done taking dilaudid, APAP not helping that much.      Sleeping still disturbed. Still some fatigue. Taking melatonin, but no other sleep aids.     Patient reports incision is healing well.  Patient denies any fever, chills, chest pain, edema, SOB, lightheadedness and nausea.    Walking well without problems.   Normal bowel movements.  Voiding without problems.      Has not been attending cardiac rehabilitation and that is going to start next week.    Patient is not on Coumadin.   Weight has been up and down.     PAST MEDICAL HISTORY:  Past Medical History:   Diagnosis Date     Ascending aortic aneurysm (H) 09/21/2017     Blood transfusions age 19    due to menorhagia     Hypertension 09/21/2017     Tobacco abuse        PAST SURGICAL HISTORY:  Past Surgical History:   Procedure Laterality Date     COLONOSCOPY WITH CO2 INSUFFLATION N/A 3/15/2017    Procedure: COLONOSCOPY WITH CO2 INSUFFLATION;  Surgeon: Duane, William Charles, MD;  Location: [facility]     DILATION AND CURETTAGE  age 20     REPAIR ANEURYSM ASCENDING AORTA N/A  10/27/2017    Procedure: REPAIR ANEURYSM ASCENDING AORTA;  Median Sternotomy, Cardiopulmonary bypass, Ascending Aorta Aneurysm Repair using Hemashield Platinum Woven Double Velour Graft 34cm X 30cm.;  Surgeon: Rubio Piña MD;  Location: [facility] OR     [place] teeth removed         CURRENT MEDICATIONS:   Current Outpatient Prescriptions   Medication     nicotine polacrilex (NICORETTE) 2 MG gum     melatonin 3 MG tablet     acetaminophen (TYLENOL) 325 MG tablet     aspirin EC 81 MG EC tablet     senna-docusate (SENOKOT-S;PERICOLACE) 8.6-50 MG per tablet     pantoprazole (PROTONIX) 40 MG EC tablet     ALPRAZolam (XANAX) 0.25 MG tablet     metoprolol (LOPRESSOR) 25 MG tablet     atorvastatin (LIPITOR) 40 MG tablet     HYDROmorphone (DILAUDID) 2 MG tablet     polyethylene glycol (MIRALAX/GLYCOLAX) Packet     No current facility-administered medications for this visit.      ALLERGIES:      Allergies   Allergen Reactions     Blood Transfusion Related (Informational Only) Other (See Comments)     Patient has a history of a clinically significant antibody against RBC antigens.  A delay in compatible RBCs may occur.       ROS:  Gen: denies frequent headaches, double/blurry vision, persistent insomnia, fatigue, unexplained weight loss/gain   CV: denies chest pain, SOB, palpitations, peripheral edema  Pulm: denies SOB, asthma or wheezing  GI/: denies liver or kidney problems, blood in stool or BRBPR, voiding without problems  Endo: denies thyroid problems or Diabetes  Heme/Onc: denies bleeding  MS: no weakness, tremors or gait instability  Neuro: denies depression, memory problems    LABS:  Last Basic Metabolic Panel:  Lab Results   Component Value Date     11/10/2017      Lab Results   Component Value Date    POTASSIUM 3.8 11/10/2017     Lab Results   Component Value Date    CHLORIDE 107 11/10/2017     Lab Results   Component Value Date    QASIM 9.4 11/10/2017     Lab Results   Component Value Date    CO2 23  "11/10/2017     Lab Results   Component Value Date    BUN 12 11/10/2017     Lab Results   Component Value Date    CR 0.53 11/10/2017     Lab Results   Component Value Date     11/10/2017       Last CBC:   Lab Results   Component Value Date    WBC 7.9 11/10/2017     Lab Results   Component Value Date    RBC 4.08 11/10/2017     Lab Results   Component Value Date    HGB 12.0 11/10/2017     Lab Results   Component Value Date    HCT 36.8 11/10/2017     No components found for: MCT  Lab Results   Component Value Date    MCV 90 11/10/2017     Lab Results   Component Value Date    MCH 29.4 11/10/2017     Lab Results   Component Value Date    MCHC 32.6 11/10/2017     Lab Results   Component Value Date    RDW 13.5 11/10/2017     Lab Results   Component Value Date     11/10/2017       INR:  Lab Results   Component Value Date    INR 1.21 10/29/2017    INR 1.23 10/27/2017    INR 1.29 10/27/2017    INR 0.91 10/27/2017       IMAGING:  None    PHYSICAL EXAM:   /83 (BP Location: Left arm, Patient Position: Chair, Cuff Size: Adult Large)  Pulse 89  Ht 1.676 m (5' 6\")  Wt 92.6 kg (204 lb 3.2 oz)  SpO2 95%  BMI 32.96 kg/m2  General: alert and oriented x 3, pleasant, no acute distress, normal mood and affect  CV: S1 S2, no murmurs, rubs or gallops, regular rate and rhythm, no peripheral edema  Pulm: bilateral breath sounds, clear to auscultation, easy work of breathing  GI: (+) bowel sounds, soft non-tender and non-distended  Incision: incisions clean dry and intact without erythema, swelling or drainage  Neuro: nonfocal    PROCEDURES: None       ASSESSMENT/PLAN:  Georgie Patino is a 62 year old year old female status post Assending aortic aneurysm repair who returns to clinic for postop visit.     1. Surgically doing very well.  Incisions are healing well with no signs of infection.   2. Hemodynamics are stable. No medication changes were needed today.  Gave # 30 Vicodin today for pain control at night.   3. " Follow up with your cardiologist as scheduled. Please make appt with Dr Garcia in about 3-4 weeks.    4. Continue Cardiac Rehab until completed.   5. Continue sternal precautions for 12 weeks from surgery date.   6. R sided abd pain: anterolateral pain, pain has improved with time but swelling/bulge is still there, increases with bearing down.  Consider Abd/Pelvis CT scan if not improving with time.    7. Vicodin 5/325 mg #30 for pain at night.   No refills.      The total time spent with the patient was 25 minutes, > 50% of which was spent in counseling.    LENNY Wade

## 2017-11-14 NOTE — MR AVS SNAPSHOT
"              After Visit Summary   2017    Georgie Patino    MRN: [id number]           Patient Information     Date Of Birth          1955        Visit Information        Provider Department      2017 2:00 PM [facility]        Today's Diagnoses     Incisional pain    -  1       Follow-ups after your visit        Follow-up notes from your care team     Return if symptoms worsen or fail to improve.      Who to contact     If you have questions or need follow up information about today's clinic visit or your schedule please contact [facility] directly at [phone].  Normal or non-critical lab and imaging results will be communicated to you by [email or web address], letter or phone within 4 business days after the clinic has received the results. If you do not hear from us within 7 days, please contact the clinic through [email or web address] or phone. If you have a critical or abnormal lab result, we will notify you by phone as soon as possible.  Submit refill requests through [employer] or call your pharmacy and they will forward the refill request to us. Please allow 3 business days for your refill to be completed.          Additional Information About Your Visit        MyChart Information     [employer] lets you send messages to your doctor, view your test results, renew your prescriptions, schedule appointments and more. To sign up, go to [email or web address] . Click on \"Log in\" on the left side of the screen, which will take you to the Welcome page. Then click on \"Sign up Now\" on the right side of the page.     You will be asked to enter the access code listed below, as well as some personal information. Please follow the directions to create your username and password.     Your access code is: [email or web address]  Expires: 2018  2:23 PM     Your access code will  in 90 days. If you need help or a new code, please call your [place] clinic or [phone].        Care EveryWhere ID     This is " "your Care EveryWhere ID. This could be used by other organizations to access your [place] medical records  [id number]        Your Vitals Were     Pulse Height Pulse Oximetry BMI (Body Mass Index)          89 1.676 m (5' 6\") 95% 32.96 kg/m2         Blood Pressure from Last 3 Encounters:   11/14/17 122/83   11/10/17 132/86   11/07/17 130/80    Weight from Last 3 Encounters:   11/14/17 92.6 kg (204 lb 3.2 oz)   11/10/17 91.2 kg (201 lb)   11/07/17 93.4 kg (206 lb)              Today, you had the following     No orders found for display         Today's Medication Changes          These changes are accurate as of: 11/14/17  2:23 PM.  If you have any questions, ask your nurse or doctor.               Start taking these medicines.        Dose/Directions    HYDROcodone-acetaminophen 5-325 MG per tablet   Commonly known as:  NORCO   Used for:  Incisional pain        Dose:  1-2 tablet   Take 1-2 tablets by mouth every 6 hours as needed for pain   Quantity:  30 tablet   Refills:  0         These medicines have changed or have updated prescriptions.        Dose/Directions    atorvastatin 40 MG tablet   Commonly known as:  LIPITOR   This may have changed:  when to take this   Used for:  CARDIOVASCULAR SCREENING; LDL GOAL LESS THAN 160        Dose:  40 mg   Take 1 tablet (40 mg) by mouth daily   Quantity:  90 tablet   Refills:  3            Where to get your medicines      Some of these will need a paper prescription and others can be bought over the counter.  Ask your nurse if you have questions.     Bring a paper prescription for each of these medications     HYDROcodone-acetaminophen 5-325 MG per tablet                Primary Care Provider Office Phone # Fax #    Marlys Wade, T[facility] [phone]       [address] [facility] [address]        Equal Access to Services     CAROLINA LOVELL AH: Renata Hobson, evy fernandes, qasarthak kaanahy barragan, humza chavez " juan padgettaademetrius ah. So [facility] [phone].    ATENCIÓN: Si taylorla tori, tiene a thomas disposición servicios gratuitos de asistencia lingüística. Igor al [phone].    We comply with applicable federal civil rights laws and [place] laws. We do not discriminate on the basis of race, color, national origin, age, disability, sex, sexual orientation, or gender identity.            Thank you!     Thank you for choosing [facility]  for your care. Our goal is always to provide you with excellent care. Hearing back from our patients is one way we can continue to improve our services. Please take a few minutes to complete the written survey that you may receive in the mail after your visit with us. Thank you!             Your Updated Medication List - Protect others around you: Learn how to safely use, store and throw away your medicines at www.disposemymeds.org.          This list is accurate as of: 11/14/17  2:23 PM.  Always use your most recent med list.                   Brand Name Dispense Instructions for use Diagnosis    acetaminophen 325 MG tablet    TYLENOL    100 tablet    Take 2 tablets (650 mg) by mouth every 4 hours as needed for other (surgical pain)    S/P ascending aortic aneurysm repair, Acute post-operative pain       ALPRAZolam 0.25 MG tablet    XANAX    30 tablet    Take 1 tablet (0.25 mg) by mouth 2 times daily as needed for anxiety    Anxiety       aspirin 81 MG EC tablet     30 tablet    Take 1 tablet (81 mg) by mouth daily    S/P ascending aortic aneurysm repair       atorvastatin 40 MG tablet    LIPITOR    90 tablet    Take 1 tablet (40 mg) by mouth daily    CARDIOVASCULAR SCREENING; LDL GOAL LESS THAN 160       HYDROcodone-acetaminophen 5-325 MG per tablet    NORCO    30 tablet    Take 1-2 tablets by mouth every 6 hours as needed for pain    Incisional pain       melatonin 3 MG tablet     30 tablet    Take 1 tablet (3 mg) by mouth nightly as needed for sleep    Ascending aortic aneurysm  (H), S/P ascending aortic aneurysm repair       metoprolol 25 MG tablet    LOPRESSOR    60 tablet    Take 1 tablet (25 mg) by mouth 2 times daily    Palpitations       nicotine polacrilex 2 MG gum    NICORETTE    100 tablet    Chew one piece of gum every 1-2 hours as needed, maximum of 24 pieces in 24 hours    History of tobacco use       pantoprazole 40 MG EC tablet    PROTONIX    30 tablet    Take 1 tablet (40 mg) by mouth every morning    S/P ascending aortic aneurysm repair       polyethylene glycol Packet    MIRALAX/GLYCOLAX    7 packet    Take 17 g by mouth daily    S/P ascending aortic aneurysm repair       senna-docusate 8.6-50 MG per tablet    SENOKOT-S;PERICOLACE    100 tablet    Take 1-2 tablets by mouth 2 times daily as needed for constipation    Acute post-operative pain, S/P ascending aortic aneurysm repair

## 2017-11-14 NOTE — NURSING NOTE
Chief Complaint   Patient presents with     Follow Up For     aortic aneurysm repair Dr Piña     Vitals were taken and medications were reconciled.     Carol Dominguez MA    1:44 PM

## 2017-11-20 ENCOUNTER — OFFICE VISIT (OUTPATIENT)
Dept: FAMILY MEDICINE | Facility: CLINIC | Age: 62
End: 2017-11-20
Payer: COMMERCIAL

## 2017-11-20 VITALS
WEIGHT: 202.4 LBS | SYSTOLIC BLOOD PRESSURE: 126 MMHG | HEART RATE: 99 BPM | BODY MASS INDEX: 32.67 KG/M2 | TEMPERATURE: 98.6 F | OXYGEN SATURATION: 93 % | DIASTOLIC BLOOD PRESSURE: 86 MMHG

## 2017-11-20 DIAGNOSIS — R19.01 RIGHT UPPER QUADRANT ABDOMINAL SWELLING: ICD-10-CM

## 2017-11-20 DIAGNOSIS — E04.1 THYROID NODULE: Primary | ICD-10-CM

## 2017-11-20 PROCEDURE — 99213 OFFICE O/P EST LOW 20 MIN: CPT | Performed by: NURSE PRACTITIONER

## 2017-11-20 ASSESSMENT — PAIN SCALES - GENERAL: PAINLEVEL: NO PAIN (0)

## 2017-11-20 NOTE — NURSING NOTE
"Chief Complaint   Patient presents with     Hernia     on right ribcage/hip area. Happened right after surgery. Swelling but no longer has pain.       Initial /86 (BP Location: Left arm, Cuff Size: Adult Large)  Pulse 99  Temp 98.6  F (37  C) (Oral)  Wt 202 lb 6.4 oz (91.8 kg)  SpO2 93%  Breastfeeding? No  BMI 32.67 kg/m2 Estimated body mass index is 32.67 kg/(m^2) as calculated from the following:    Height as of 11/14/17: 5' 6\" (1.676 m).    Weight as of this encounter: 202 lb 6.4 oz (91.8 kg).  Medication Reconciliation: complete       Akua Mckinnon CMA      "

## 2017-11-20 NOTE — MR AVS SNAPSHOT
After Visit Summary   11/20/2017    Georgie Patino    MRN: [id number]           Patient Information     Date Of Birth          1955        Visit Information        Provider Department      11/20/2017 3:40 PM Marlys Wade APRN CNP [facility]        Today's Diagnoses     Thyroid nodule    -  1    Right upper quadrant abdominal swelling          Care Instructions    [facility]    If you have any questions regarding to your visit please contact your care team:     Team Pink:   Clinic Hours Telephone Number   Internal Medicine:  Dr. Helen Wade, NP       7am-7pm  Monday - Thursday   7am-5pm  Fridays  [phone]  (Appointment scheduling available 24/7)    Questions about your visit?  Team Line  [phone]   Urgent Care - [place] and [place] - 11am-9pm Monday-Friday Saturday-Sunday- 9am-5pm   [place] - 5pm-9pm Monday-Friday Saturday-Sunday- 9am-5pm  [phone] - [facility]   [phone] - [place]       What options do I have for visits at the clinic other than the traditional office visit?  To expand how we care for you, many of our providers are utilizing electronic visits (e-visits) and telephone visits, when medically appropriate, for interactions with their patients rather than a visit in the clinic.   We also offer nurse visits for many medical concerns. Just like any other service, we will bill your insurance company for this type of visit based on time spent on the phone with your provider. Not all insurance companies cover these visits. Please check with your medical insurance if this type of visit is covered. You will be responsible for any charges that are not paid by your insurance.      E-visits via [employer]:  generally incur a $35.00 fee.  Telephone visits:  Time spent on the phone: *charged based on time that is spent on the phone in increments of 10 minutes. Estimated cost:   5-10  "mins $30.00   11-20 mins. $59.00   21-30 mins. $85.00   Use NowPublichart (secure email communication and access to your chart) to send your primary care provider a message or make an appointment. Ask someone on your Team how to sign up for "[employer]"t.    For a Price Quote for your services, please call our [employer] Price Line at [phone].    As always, Thank you for trusting us with your health care needs!    Akua Mckinnon CMA          Follow-ups after your visit        Future tests that were ordered for you today     Open Future Orders        Priority Expected Expires Ordered    CT Abdomen Pelvis w Contrast Routine  11/20/2018 11/20/2017    US Thyroid Routine  11/20/2018 11/20/2017            Who to contact     If you have questions or need follow up information about today's clinic visit or your schedule please contact [facility] directly at [phone].  Normal or non-critical lab and imaging results will be communicated to you by NowPublichart, letter or phone within 4 business days after the clinic has received the results. If you do not hear from us within 7 days, please contact the clinic through NowPublichart or phone. If you have a critical or abnormal lab result, we will notify you by phone as soon as possible.  Submit refill requests through [facility] or call your pharmacy and they will forward the refill request to us. Please allow 3 business days for your refill to be completed.          Additional Information About Your Visit        [facility] Information     [facility] lets you send messages to your doctor, view your test results, renew your prescriptions, schedule appointments and more. To sign up, go to [email or web address] . Click on \"Log in\" on the left side of the screen, which will take you to the Welcome page. Then click on \"Sign up Now\" on the right side of the page.     You will be asked to enter the access code listed below, as well as some personal information. Please follow the directions to " create your username and password.     Your access code is: [email or web address]  Expires: 2018  2:23 PM     Your access code will  in 90 days. If you need help or a new code, please call your [place] clinic or [phone].        Care EveryWhere ID     This is your Care EveryWhere ID. This could be used by other organizations to access your [place] medical records  [id number]        Your Vitals Were     Pulse Temperature Pulse Oximetry Breastfeeding? BMI (Body Mass Index)       99 98.6  F (37  C) (Oral) 93% No 32.67 kg/m2        Blood Pressure from Last 3 Encounters:   17 126/86   17 122/83   11/10/17 132/86    Weight from Last 3 Encounters:   17 202 lb 6.4 oz (91.8 kg)   17 204 lb 3.2 oz (92.6 kg)   11/10/17 201 lb (91.2 kg)                 Today's Medication Changes          These changes are accurate as of: 17  4:05 PM.  If you have any questions, ask your nurse or doctor.               These medicines have changed or have updated prescriptions.        Dose/Directions    atorvastatin 40 MG tablet   Commonly known as:  LIPITOR   This may have changed:  when to take this   Used for:  CARDIOVASCULAR SCREENING; LDL GOAL LESS THAN 160        Dose:  40 mg   Take 1 tablet (40 mg) by mouth daily   Quantity:  90 tablet   Refills:  3                Primary Care Provider Office Phone # Fax #    Marlys Juany Wade, TESHA [facility] [phone] [address] [facility] [address]        Equal Access to Services     [facility] AH: Hadii ana sage hadasho Someek, waaxda luqadaha, qaybta kaalmada yung, humza garza. So [facility] [phone].    ATENCIÓN: Si habla español, tiene a thomas disposición servicios gratuitos de asistencia lingüística. Llame al [phone].    We comply with applicable federal civil rights laws and [place] laws. We do not discriminate on the basis of race, color, national origin, age, disability, sex, sexual  orientation, or gender identity.            Thank you!     Thank you for choosing [facility]  for your care. Our goal is always to provide you with excellent care. Hearing back from our patients is one way we can continue to improve our services. Please take a few minutes to complete the written survey that you may receive in the mail after your visit with us. Thank you!             Your Updated Medication List - Protect others around you: Learn how to safely use, store and throw away your medicines at www.disposemymeds.org.          This list is accurate as of: 11/20/17  4:05 PM.  Always use your most recent med list.                   Brand Name Dispense Instructions for use Diagnosis    acetaminophen 325 MG tablet    TYLENOL    100 tablet    Take 2 tablets (650 mg) by mouth every 4 hours as needed for other (surgical pain)    S/P ascending aortic aneurysm repair, Acute post-operative pain       aspirin 81 MG EC tablet     30 tablet    Take 1 tablet (81 mg) by mouth daily    S/P ascending aortic aneurysm repair       atorvastatin 40 MG tablet    LIPITOR    90 tablet    Take 1 tablet (40 mg) by mouth daily    CARDIOVASCULAR SCREENING; LDL GOAL LESS THAN 160       HYDROcodone-acetaminophen 5-325 MG per tablet    NORCO    30 tablet    Take 1-2 tablets by mouth every 6 hours as needed for pain    Incisional pain       melatonin 3 MG tablet     30 tablet    Take 1 tablet (3 mg) by mouth nightly as needed for sleep    Ascending aortic aneurysm (H), S/P ascending aortic aneurysm repair       metoprolol 25 MG tablet    LOPRESSOR    60 tablet    Take 1 tablet (25 mg) by mouth 2 times daily    Palpitations       nicotine polacrilex 2 MG gum    NICORETTE    100 tablet    Chew one piece of gum every 1-2 hours as needed, maximum of 24 pieces in 24 hours    History of tobacco use       pantoprazole 40 MG EC tablet    PROTONIX    30 tablet    Take 1 tablet (40 mg) by mouth every morning    S/P ascending aortic  aneurysm repair       senna-docusate 8.6-50 MG per tablet    SENOKOT-S;PERICOLACE    100 tablet    Take 1-2 tablets by mouth 2 times daily as needed for constipation    Acute post-operative pain, S/P ascending aortic aneurysm repair

## 2017-11-20 NOTE — PROGRESS NOTES
SUBJECTIVE:   Georgie Patino is a 62 year old female who presents to clinic today for the following health issues:      Chief Complaint   Patient presents with     Hernia     on right ribcage/hip area. Happened right after surgery. Swelling but no longer has pain.       Patient notes continued swelling to her right abdomen.  Her pain has resolved.  She saw her cardiologist and he suggested she may have a hernia.  Patient has only noticed bulging since her heart surgery.  Patient denies any constipation, melena, hematochezia, nausea, vomiting.  Patient has not had abdominal surgery in the past.      On reviewing patient's chest CTA it is noted that a thyroid nodule in noted and that follow-up with thyroid ultrasound is recommended.  Patient has not previously been diagnosed with a thyroid nodule.  Recent TSH was normal.      Problem list and histories reviewed & adjusted, as indicated.  Additional history: as documented    Patient Active Problem List   Diagnosis     CARDIOVASCULAR SCREENING; LDL GOAL LESS THAN 160     Obesity     Benign essential hypertension     Family history of sudden cardiac death     Hypertension     Anxiety     S/P ascending aortic aneurysm repair     Former smoker     Thyroid nodule     Past Surgical History:   Procedure Laterality Date     COLONOSCOPY WITH CO2 INSUFFLATION N/A 3/15/2017    Procedure: COLONOSCOPY WITH CO2 INSUFFLATION;  Surgeon: Duane, William Charles, MD;  Location: [facility] OR     DILATION AND CURETTAGE  age 20     REPAIR ANEURYSM ASCENDING AORTA N/A 10/27/2017    Procedure: REPAIR ANEURYSM ASCENDING AORTA;  Median Sternotomy, Cardiopulmonary bypass, Ascending Aorta Aneurysm Repair using Hemashield Platinum Woven Double Velour Graft 34cm X 30cm.;  Surgeon: Rubio Piña MD;  Location: [facility] OR     [place] teeth removed         Social History   Substance Use Topics     Smoking status: Former Smoker     Packs/day: 0.05     Years: 40.00     Types: Cigarettes     Quit date:  10/27/2017     Smokeless tobacco: Never Used      Comment: 2 cigs daily     Alcohol use 0.5 - 1.0 oz/week     1 - 2 Standard drinks or equivalent per week      Comment: 3-4 drinks per week      Family History   Problem Relation Age of Onset     Respiratory Mother      copd     CANCER Maternal Grandmother      Leg     Alzheimer Disease Maternal Grandfather      HEART DISEASE Paternal Grandfather      HEART DISEASE Brother 54     Heart Attack      CANCER Sister      Lung cancer age 55-smoker d age 57         Current Outpatient Prescriptions   Medication Sig Dispense Refill     HYDROcodone-acetaminophen (NORCO) 5-325 MG per tablet Take 1-2 tablets by mouth every 6 hours as needed for pain 30 tablet 0     nicotine polacrilex (NICORETTE) 2 MG gum Chew one piece of gum every 1-2 hours as needed, maximum of 24 pieces in 24 hours 100 tablet 5     melatonin 3 MG tablet Take 1 tablet (3 mg) by mouth nightly as needed for sleep 30 tablet 0     acetaminophen (TYLENOL) 325 MG tablet Take 2 tablets (650 mg) by mouth every 4 hours as needed for other (surgical pain) 100 tablet 0     aspirin EC 81 MG EC tablet Take 1 tablet (81 mg) by mouth daily 30 tablet 0     senna-docusate (SENOKOT-S;PERICOLACE) 8.6-50 MG per tablet Take 1-2 tablets by mouth 2 times daily as needed for constipation 100 tablet 0     pantoprazole (PROTONIX) 40 MG EC tablet Take 1 tablet (40 mg) by mouth every morning 30 tablet 0     metoprolol (LOPRESSOR) 25 MG tablet Take 1 tablet (25 mg) by mouth 2 times daily 60 tablet 3     atorvastatin (LIPITOR) 40 MG tablet Take 1 tablet (40 mg) by mouth daily (Patient taking differently: Take 40 mg by mouth every morning ) 90 tablet 3     Allergies   Allergen Reactions     Blood Transfusion Related (Informational Only) Other (See Comments)     Patient has a history of a clinically significant antibody against RBC antigens.  A delay in compatible RBCs may occur.     BP Readings from Last 3 Encounters:   11/20/17 126/86    11/14/17 122/83   11/10/17 132/86    Wt Readings from Last 3 Encounters:   11/20/17 202 lb 6.4 oz (91.8 kg)   11/14/17 204 lb 3.2 oz (92.6 kg)   11/10/17 201 lb (91.2 kg)                  Labs reviewed in EPIC        Reviewed and updated as needed this visit by clinical staff       Reviewed and updated as needed this visit by Provider         ROS:  Constitutional, HEENT, cardiovascular, pulmonary, gi and gu systems are negative, except as otherwise noted.      OBJECTIVE:   /86 (BP Location: Left arm, Cuff Size: Adult Large)  Pulse 99  Temp 98.6  F (37  C) (Oral)  Wt 202 lb 6.4 oz (91.8 kg)  SpO2 93%  Breastfeeding? No  BMI 32.67 kg/m2  Body mass index is 32.67 kg/(m^2).  GENERAL: healthy, alert and no distress  RESP: lungs clear to auscultation - no rales, rhonchi or wheezes  CV: regular rate and rhythm, normal S1 S2, no S3 or S4, no murmur, click or rub, no peripheral edema and peripheral pulses strong  ABDOMEN: soft, nontender, without hepatosplenomegaly or masses, bowel sounds normal and abnormal contour of abdomen noted with enlargement of right side of abdomen, unable to reduce bulging.  MS: no gross musculoskeletal defects noted, no edema    Diagnostic Test Results:  none     ASSESSMENT/PLAN:     1. Thyroid nodule    - US Thyroid; Future    2. Right upper quadrant abdominal swelling  Patient is anxious that she may have a hernia.  No red flag symptoms noted and patient reassured.  Will obtain CT scan of abdomen/pelvis.  - CT Abdomen Pelvis w Contrast; Future    FUTURE APPOINTMENTS:       - Follow-up for annual visit or as needed    TESHA Chanel CNP  [facility]

## 2017-11-20 NOTE — PATIENT INSTRUCTIONS
[facility]    If you have any questions regarding to your visit please contact your care team:     Team Pink:   Clinic Hours Telephone Number   Internal Medicine:  Dr. Helen Wade NP       7am-7pm  Monday - Thursday   7am-5pm  Fridays  [phone]  (Appointment scheduling available 24/7)    Questions about your visit?  Team Line  [phone]   Urgent Care - [facility] and [facility] - 11am-9pm Monday-Friday Saturday-Sunday- 9am-5pm   [place] - 5pm-9pm Monday-Friday Saturday-Sunday- 9am-5pm  [phone] - [facility]   [phone] - [place]       What options do I have for visits at the clinic other than the traditional office visit?  To expand how we care for you, many of our providers are utilizing electronic visits (e-visits) and telephone visits, when medically appropriate, for interactions with their patients rather than a visit in the clinic.   We also offer nurse visits for many medical concerns. Just like any other service, we will bill your insurance company for this type of visit based on time spent on the phone with your provider. Not all insurance companies cover these visits. Please check with your medical insurance if this type of visit is covered. You will be responsible for any charges that are not paid by your insurance.      E-visits via [employer]:  generally incur a $35.00 fee.  Telephone visits:  Time spent on the phone: *charged based on time that is spent on the phone in increments of 10 minutes. Estimated cost:   5-10 mins $30.00   11-20 mins. $59.00   21-30 mins. $85.00   Use [employer] (secure email communication and access to your chart) to send your primary care provider a message or make an appointment. Ask someone on your Team how to sign up for [employer].    For a Price Quote for your services, please call our Consumer Price Line at [phone].    As always, Thank you for trusting us with your health care  needs!    Akua Mckinnon, CMA

## 2017-11-22 ENCOUNTER — RADIANT APPOINTMENT (OUTPATIENT)
Dept: CT IMAGING | Facility: CLINIC | Age: 62
End: 2017-11-22
Attending: NURSE PRACTITIONER
Payer: COMMERCIAL

## 2017-11-22 DIAGNOSIS — R16.0 LIVER MASS: Primary | ICD-10-CM

## 2017-11-22 DIAGNOSIS — R19.01 RIGHT UPPER QUADRANT ABDOMINAL SWELLING: ICD-10-CM

## 2017-11-22 PROCEDURE — 74177 CT ABD & PELVIS W/CONTRAST: CPT | Mod: TC

## 2017-11-22 RX ORDER — IOPAMIDOL 755 MG/ML
75 INJECTION, SOLUTION INTRAVASCULAR ONCE
Status: COMPLETED | OUTPATIENT
Start: 2017-11-22 | End: 2017-11-22

## 2017-11-22 RX ADMIN — IOPAMIDOL 75 ML: 755 INJECTION, SOLUTION INTRAVASCULAR at 13:57

## 2017-11-24 NOTE — OP NOTE
DATE OF SURGERY:  10/27/2017.      PREOPERATIVE DIAGNOSES:   1.  Ascending aortic aneurysm.   2.  Patent foramen ovale.   3.  Hypertension.   4.  Obesity.      POSTOPERATIVE DIAGNOSES:   1.  Ascending aortic aneurysm.   2.  Patent foramen ovale.   3.  Hypertension.   4.  Obesity.      SURGICAL PROCEDURES PERFORMED:   1.  Ascending aortic aneurysm repair with 34 mm Hemashield vascular graft.   2.  Primary repair of the patent foramen ovale.      SURGEON:  Rubio Piña MD      FIRST ASSISTANT:  Jasiel Jean MD      SECOND ASSISTANT:  Seth Richard MD      INDICATIONS FOR SURGERY:  Georgie Patino is a 62-year-old female who was recently found to have a large ascending aortic aneurysm of 6.2 cm.  She was asymptomatic.  She underwent cardiac workup, which showed enlarged aneurysm.  Her aortic valve is intact with tricuspid leaflets.  The sinotubular junction appears to start to efface, but there is no obvious aortic valve insufficiency.  We decided to proceed with ascending aortic aneurysm repair and to preserve the aortic root and to repair the patent foramen ovale.  The benefits and risks of surgery were explained to the patient and the consent was obtained.      SURGICAL PROCEDURE:  The patient was brought to the operating room.  She was intubated.  General anesthesia was given.  Her chest, abdomen and bilateral lower extremities were prepped and draped in sterile fashion.  We made a median sternotomy incision.  We opened the chest.  We placed a sternal retractor.  We noted the patient has a significantly enlarged ascending aorta of up to 6.2 cm.  The distal ascending aorta appears to taper down near the aortic arch area which has a diameter of approximately 3.4 cm.  We gave heparin.  We cannulated the distal aortic arch with a 20-Albanian EOPA arterial cannula.  We placed 2 separate single stage venous cannulas to SVC and IVC.  We gave heparin and induced cardiopulmonary bypass.  We snared the SVC and IVC  cannulas with umbilical tapes around the IVC and SVC cannulas.  We placed an antegrade cardioplegia in the proximal ascending aorta and then applied the aortic crossclamp.  We gave a dose of antegrade cardioplegia.  The heart was arrested.  We then opened right atrium and identified the patent foramen ovale, which is about 5 mm in diameter.  We used a pair of 4-0 Prolene sutures to conduct a 2-layer continuous running suture repair to close the patent foramen ovale.  The repair was satisfactory.  We placed a retrograde cardioplegic cannula directly into the coronary sinus.  We also placed the LV vent through the right superior pulmonary vein.      We transected the distal ascending aorta about 1 cm off the aortic crossclamp which was placed right between the aortic arch and the distal ascending aorta junction.  We then identified the sinotubular junction.  We resected the proximal ascending aorta right at the sinotubular junction.  The specimen of resected aorta was sent for pathology study and also physical lab study.  We then inspected the aortic valve.  The aortic valve is tricuspid valve leaflets without obvious anomalies.  We decided to preserve the aortic sinotubular junction by reinforcing the proximal stump with the PTFE filled strip on the outside with vascular graft strip in the inside.  We resected a piece of 34 mm Hemashield vascular graft and started the proximal anastomosis first.  We used a 3-0 Prolene suture to conduct the proximal anastomosis between the vascular graft and the reinforced aortic stump.  It was conducted in a continuous running suture fashion.  The hemostasis appears to be satisfactory.  We then transected the distal vascular graft to appropriate length to reach the distal aortic stump.  We used a similar way to reinforce the distal aortic stump.  Externally we used a PTFE felt strip to reinforce outside circumferentially.  Inside we used the vascular graft strip to reinforce the  inside.  We anchored both outside and inside strips with 4-0 Prolene sutures.  We then used 4-0 Prolene sutures to conduct the distal anastomosis between the vascular graft and the distal aortic stump.  It was conducted in a continuous running suture anastomosis and then the anastomosis was satisfactory.  We then performed the aggressive deair measures.  We then released the aortic crossclamp.      The heart rate was relatively slow after we released the aortic crossclamp, so we placed a pair of epicardial pacing leads and then paced the heart rate at 80 beats per minute, but very soon the patient's native heart rate resumed and we were able to wean the patient off the cardiopulmonary bypass without difficulties.  We then removed the arterial and venous cannulas.  The cannulation sites were reinforced with 4-0 Prolene sutures.  We then closed the sternum with multiple interrupted sternal wires.  The subcutaneous tissue and skin were closed with the Vicryl sutures.  The patient tolerated the procedure well and was transferred to surgical intensive care unit.         ASCENCION CANALES MD             D: 2017 03:18   T: 2017 05:31   MT: delmy      Name:     MINDA MEDEIROS   MRN:      [id number]        Account:        [id number]   :      1955           Procedure Date: 10/27/2017      Document: [id number]       cc: Jitendra Fernandez MD, PH.D       [facility] Surgery Billing

## 2017-12-04 ENCOUNTER — RADIANT APPOINTMENT (OUTPATIENT)
Dept: MRI IMAGING | Facility: CLINIC | Age: 62
End: 2017-12-04
Attending: NURSE PRACTITIONER
Payer: COMMERCIAL

## 2017-12-04 DIAGNOSIS — D18.03 LIVER HEMANGIOMA: Primary | ICD-10-CM

## 2017-12-04 DIAGNOSIS — R16.0 LIVER MASS: ICD-10-CM

## 2017-12-04 PROCEDURE — 74183 MRI ABD W/O CNTR FLWD CNTR: CPT | Mod: TC

## 2017-12-04 PROCEDURE — A9585 GADOBUTROL INJECTION: HCPCS | Performed by: NURSE PRACTITIONER

## 2017-12-04 RX ORDER — GADOBUTROL 604.72 MG/ML
10 INJECTION INTRAVENOUS ONCE
Status: COMPLETED | OUTPATIENT
Start: 2017-12-04 | End: 2017-12-04

## 2017-12-04 RX ADMIN — GADOBUTROL 10 ML: 604.72 INJECTION INTRAVENOUS at 10:52

## 2017-12-05 NOTE — PROGRESS NOTES
Please call patient-    Her abdominal MRI shows gallstones.  It also shows 2 liver hemangioma's, one is slightly atypical looking, so they recommend a repeat abdominal MRI w and w/o contrast in 3 months to make sure it is stable (indication liver hemangioma).  Liver hemangiomas are generally benign and if stable in 3 months, no further follow-up will likely be needed.    Thanks,  Marlys Wade, CNP

## 2018-01-08 DIAGNOSIS — R00.2 PALPITATIONS: ICD-10-CM

## 2018-01-08 NOTE — TELEPHONE ENCOUNTER
Pending Prescriptions:                       Disp   Refills    metoprolol (LOPRESSOR) 25 MG tablet       60 tab*3            Sig: Take 1 tablet (25 mg) by mouth 2 times daily    Last Visit 9/1/2017 with Dr. Fernandez  Last Filled 12/3/2017  Quantity 60  Req. Received 1/8/2018  Brooklyn Shen MA

## 2018-01-09 RX ORDER — METOPROLOL TARTRATE 25 MG/1
25 TABLET, FILM COATED ORAL 2 TIMES DAILY
Qty: 180 TABLET | Refills: 3 | Status: SHIPPED | OUTPATIENT
Start: 2018-01-09 | End: 2018-03-16

## 2018-03-16 ENCOUNTER — OFFICE VISIT (OUTPATIENT)
Dept: CARDIOLOGY | Facility: CLINIC | Age: 63
End: 2018-03-16
Payer: COMMERCIAL

## 2018-03-16 VITALS
SYSTOLIC BLOOD PRESSURE: 143 MMHG | DIASTOLIC BLOOD PRESSURE: 94 MMHG | BODY MASS INDEX: 32.3 KG/M2 | OXYGEN SATURATION: 97 % | HEART RATE: 70 BPM | HEIGHT: 67 IN | WEIGHT: 205.8 LBS

## 2018-03-16 DIAGNOSIS — R00.2 PALPITATIONS: ICD-10-CM

## 2018-03-16 DIAGNOSIS — I71.21 ASCENDING AORTIC ANEURYSM (H): Primary | ICD-10-CM

## 2018-03-16 DIAGNOSIS — E78.2 MIXED HYPERLIPIDEMIA: ICD-10-CM

## 2018-03-16 DIAGNOSIS — I10 BENIGN ESSENTIAL HYPERTENSION: ICD-10-CM

## 2018-03-16 PROCEDURE — 99214 OFFICE O/P EST MOD 30 MIN: CPT | Performed by: INTERNAL MEDICINE

## 2018-03-16 RX ORDER — METOPROLOL TARTRATE 50 MG
50 TABLET ORAL 2 TIMES DAILY
Qty: 180 TABLET | Refills: 3 | Status: SHIPPED | OUTPATIENT
Start: 2018-03-16 | End: 2019-05-09

## 2018-03-16 RX ORDER — LISINOPRIL 10 MG/1
10 TABLET ORAL DAILY
Qty: 90 TABLET | Refills: 3 | Status: SHIPPED | OUTPATIENT
Start: 2018-03-16 | End: 2018-10-12

## 2018-03-16 RX ORDER — METOPROLOL TARTRATE 25 MG/1
50 TABLET, FILM COATED ORAL 2 TIMES DAILY
Qty: 180 TABLET | Refills: 3 | Status: SHIPPED | OUTPATIENT
Start: 2018-03-16 | End: 2018-03-16

## 2018-03-16 ASSESSMENT — PAIN SCALES - GENERAL: PAINLEVEL: NO PAIN (0)

## 2018-03-16 NOTE — NURSING NOTE
Cardiac Testing: Patient given instructions regarding  echocardiogram in 6 months prior to f/u. Discussed purpose, preparation, procedure and when to expect results reported back to the patient. Patient demonstrated understanding of this information and agreed to call with further questions or concerns.    Med Reconcile: Reviewed and verified all current medications with the patient. The updated medication list was printed and given to the patient.    Pt to increase Metoprolol to 50mg/BID.    New Medication: Patient was educated regarding newly prescribed medication, Lisionpril 10mg/day, including discussion of  the indication, administration, side effects, and when to report to MD or RN. Patient demonstrated understanding of this information and agreed to call with further questions or concerns.    Return Appointment: Patient given instructions regarding scheduling next clinic visit, in 6 months with echo prior. Patient demonstrated understanding of this information and agreed to call with further questions or concerns.    Patient stated she understood all health information given and agreed to call with further questions or concerns.    Vianca Medrano RN

## 2018-03-16 NOTE — NURSING NOTE
Chief Complaint   Patient presents with     Follow Up For     6 month follow up with Dr. Jitendra Fernandez for Ascending aortic aneurysm. S/p Ascending aortic aneurysm repair and PFO closure with Dr. Ted Piña on 10/27/2017. Ask about side/abdominal pain      Vitals were taken and medications were reconciled.     DAVID Boyd  11:19 AM

## 2018-03-16 NOTE — MR AVS SNAPSHOT
After Visit Summary   3/16/2018    Georgie Patino    MRN: [id number]           Patient Information     Date Of Birth          1955        Visit Information        Provider Department      3/16/2018 11:20 AM Jitendra Fernandez MD [facility]        Today's Diagnoses     Ascending aortic aneurysm (H)    -  1    Mixed hyperlipidemia        Benign essential hypertension        Palpitations          Care Instructions    Thank you for coming to the [facility] @ [facility]; please note the following instructions:    1. Medication changes today:    -Increase your Metoprolol to 50mg/Twice a day  -Start lisinopril 10mg/day    2. Echocardiogram in 6 months followed by appt with Dr. Fernandez. This is scheduled for Friday, September 14th, 2018 at 9:00am for echo and 10:00am for Dr. Fernandez.      If you have any questions regarding your visit please contact your care team:     Cardiology  Telephone Number   Jocelin VIEYRA., RN  Vianca BLACK, RN   [place], [place], [facility]  [place], [place]   [phone]    *After hours: [phone]   For scheduling appts:     [phone] or    [phone] (select option 1)    *After hours: [phone]     For the Device Clinic (Pacemakers and ICD's)  RN's :  Marisabel Kaiser   During business hours: [phone]    *After business hours:  [phone] (select option 4)      Normal test result notifications will be released via Eventp[employer] or mailed within 7 business days.  All other test results, will be communicated via telephone once reviewed by your cardiologist.    If you need a medication refill please contact your pharmacy.  Please allow 3 business days for your refill to be completed.    As always, thank you for trusting us with your health care needs!    Lisinopril Oral tablet  What is this medicine?  LISINOPRIL (lyse IN oh pril) is an ACE inhibitor. This medicine is used to treat high blood pressure and heart  failure. It is also used to protect the heart immediately after a heart attack.  This medicine may be used for other purposes; ask your health care provider or pharmacist if you have questions.  What should I tell my health care provider before I take this medicine?  They need to know if you have any of these conditions:    diabetes    heart or blood vessel disease    immune system disease like lupus or scleroderma    kidney disease    low blood pressure    previous swelling of the tongue, face, or lips with difficulty breathing, difficulty swallowing, hoarseness, or tightening of the throat    an unusual or allergic reaction to lisinopril, other ACE inhibitors, insect venom, foods, dyes, or preservatives    pregnant or trying to get pregnant    breast-feeding  How should I use this medicine?  Take this medicine by mouth with a glass of water. Follow the directions on your prescription label. You may take this medicine with or without food. Take your medicine at regular intervals. Do not stop taking this medicine except on the advice of your doctor or health care professional.  Talk to your pediatrician regarding the use of this medicine in children. Special care may be needed. While this drug may be prescribed for children as young as 6 years of age for selected conditions, precautions do apply.  Overdosage: If you think you have taken too much of this medicine contact a poison control center or emergency room at once.  NOTE: This medicine is only for you. Do not share this medicine with others.  What if I miss a dose?  If you miss a dose, take it as soon as you can. If it is almost time for your next dose, take only that dose. Do not take double or extra doses.  What may interact with this medicine?    diuretics    lithium    NSAIDs, medicines for pain and inflammation, like ibuprofen or naproxen    over-the-counter herbal supplements like hawthorn    potassium salts or potassium supplements    salt  substitutes  This list may not describe all possible interactions. Give your health care provider a list of all the medicines, herbs, non-prescription drugs, or dietary supplements you use. Also tell them if you smoke, drink alcohol, or use illegal drugs. Some items may interact with your medicine.  What should I watch for while using this medicine?  Visit your doctor or health care professional for regular check ups. Check your blood pressure as directed. Ask your doctor what your blood pressure should be, and when you should contact him or her. Call your doctor or health care professional if you notice an irregular or fast heart beat.  Women should inform their doctor if they wish to become pregnant or think they might be pregnant. There is a potential for serious side effects to an unborn child. Talk to your health care professional or pharmacist for more information.  Check with your doctor or health care professional if you get an attack of severe diarrhea, nausea and vomiting, or if you sweat a lot. The loss of too much body fluid can make it dangerous for you to take this medicine.  You may get drowsy or dizzy. Do not drive, use machinery, or do anything that needs mental alertness until you know how this drug affects you. Do not stand or sit up quickly, especially if you are an older patient. This reduces the risk of dizzy or fainting spells. Alcohol can make you more drowsy and dizzy. Avoid alcoholic drinks.  Avoid salt substitutes unless you are told otherwise by your doctor or health care professional.  Do not treat yourself for coughs, colds, or pain while you are taking this medicine without asking your doctor or health care professional for advice. Some ingredients may increase your blood pressure.  What side effects may I notice from receiving this medicine?  Side effects that you should report to your doctor or health care professional as soon as possible:    abdominal pain with or without nausea or  vomiting    allergic reactions like skin rash or hives, swelling of the hands, feet, face, lips, throat, or tongue    dark urine    difficulty breathing    dizzy, lightheaded or fainting spell    fever or sore throat    irregular heart beat, chest pain    pain or difficulty passing urine    redness, blistering, peeling or loosening of the skin, including inside the mouth    unusually weak    yellowing of the eyes or skin  Side effects that usually do not require medical attention (report to your doctor or health care professional if they continue or are bothersome):    change in taste    cough    decreased sexual function or desire    headache    sun sensitivity    tiredness  This list may not describe all possible side effects. Call your doctor for medical advice about side effects. You may report side effects to FDA at 2-368-UJQ-4215.  Where should I keep my medicine?  Keep out of the reach of children.  Store at room temperature between 15 and 30 degrees C (59 and 86 degrees F). Protect from moisture. Keep container tightly closed. Throw away any unused medicine after the expiration date.  NOTE:This sheet is a summary. It may not cover all possible information. If you have questions about this medicine, talk to your doctor, pharmacist, or health care provider. Copyright  2016 Gold Standard                Follow-ups after your visit        Your next 10 appointments already scheduled     Mar 27, 2018  9:30 AM CDT   (Arrive by 9:15 AM)   MR ABDOMEN W/O & W CONTRAST with BEMR1   [facility]    [address] [facility] [id number] [phone]           Take your medicines as usual, unless your doctor tells you not to. Bring a list of your current medicines to your exam (including vitamins, minerals and over-the-counter drugs). Also bring the results of similar scans you may have had.    You may or may not receive IV contrast for this exam pending the discretion of  the Radiologist.   Do not eat or drink for 6 hours prior to exam.  The MRI machine uses a strong magnet. Please wear clothes without metal (snaps, zippers). A sweatsuit works well, or we may give you a hospital gown.  Please remove any body piercings and hair extensions before you arrive. You will also remove watches, jewelry, hairpins, wallets, dentures, partial dental plates and hearing aids. You may wear contact lenses, and you may be able to wear your rings. We have a safe place to keep your personal items, but it is safer to leave them at home.  **IMPORTANT** THE INSTRUCTIONS BELOW ARE ONLY FOR THOSE PATIENTS WHO HAVE BEEN PRESCRIBED SEDATION OR GENERAL ANESTHESIA DURING THEIR MRI PROCEDURE:  IF YOUR DOCTOR PRESCRIBED ORAL SEDATION (take medicine to help you relax during your exam):   You must get the medicine from your doctor (oral medication) before you arrive. Bring the medicine to the exam. Do not take it at home. You ll be told when to take it upon arriving for your exam.   Arrive one hour early. Bring someone who can take you home after the test. Your medicine will make you sleepy. After the exam, you may not drive, take a bus or take a taxi by yourself.  IF YOUR DOCTOR PRESCRIBED IV SEDATION:   Arrive one hour early. Bring someone who can take you home after the test. Your medicine will make you sleepy. After the exam, you may not drive, take a bus or take a taxi by yourself.   No eating 6 hours before your exam. You may have clear liquids up until 4 hours before your exam. (Clear liquids include water, clear tea, black coffee and fruit juice without pulp.)  IF YOUR DOCTOR PRESCRIBED ANESTHESIA (be asleep for your exam):   Arrive 1 1/2 hours early. Bring someone who can take you home after the test. You may not drive, take a bus or take a taxi by yourself.   No eating 8 hours before your exam. You may have clear liquids up until 4 hours before your exam. (Clear liquids include water, clear tea, black  coffee and fruit juice without pulp.)   You will spend four to five hours in the recovery room.  If you have any questions, please contact your Imaging Department exam site.            Sep 14, 2018  9:00 AM CDT   Ech Complete with [id number]   [facility] [place] ([facility])    [address] [place] [id number]   [phone]           1. Please bring or wear a comfortable two-piece outfit. 2. You may eat, drink and take your normal medicines. 3. For any questions that cannot be answered, please contact the ordering physician            Sep 14, 2018 10:00 AM CDT   Return Visit with Jitendra Fernandez MD   [facility] ([facility])    [address] [place] [id number]   [phone]              Future tests that were ordered for you today     Open Future Orders        Priority Expected Expires Ordered    Echocardiogram Complete Routine  3/16/2019 3/16/2018            Who to contact     If you have questions or need follow up information about today's clinic visit or your schedule please contact [facility] directly at [phone].  Normal or non-critical lab and imaging results will be communicated to you by MyChart, letter or phone within 4 business days after the clinic has received the results. If you do not hear from us within 7 days, please contact the clinic through [facility] or phone. If you have a critical or abnormal lab result, we will notify you by phone as soon as possible.  Submit refill requests through [facility] or call your pharmacy and they will forward the refill request to us. Please allow 3 business days for your refill to be completed.          Additional Information About Your Visit        [facility] Information     [facility] lets you send messages to your doctor, view your test results, renew your prescriptions, schedule  "appointments and more. To sign up, go to [email or web address] . Click on \"Log in\" on the left side of the screen, which will take you to the Welcome page. Then click on \"Sign up Now\" on the right side of the page.     You will be asked to enter the access code listed below, as well as some personal information. Please follow the directions to create your username and password.     Your access code is: [email or web address]  Expires: 2018 11:40 AM     Your access code will  in 90 days. If you need help or a new code, please call your [facility] clinic or [phone].        Care EveryWhere ID     This is your Care EveryWhere ID. This could be used by other organizations to access your [facility] medical records  [id number]        Your Vitals Were     Pulse Height Pulse Oximetry BMI (Body Mass Index)          70 1.702 m (5' 7\") 97% 32.23 kg/m2         Blood Pressure from Last 3 Encounters:   18 (!) 143/94   17 126/86   17 122/83    Weight from Last 3 Encounters:   18 93.4 kg (205 lb 12.8 oz)   17 91.8 kg (202 lb 6.4 oz)   17 92.6 kg (204 lb 3.2 oz)                 Today's Medication Changes          These changes are accurate as of 3/16/18 11:44 AM.  If you have any questions, ask your nurse or doctor.               Start taking these medicines.        Dose/Directions    lisinopril 10 MG tablet   Commonly known as:  PRINIVIL/ZESTRIL   Used for:  Ascending aortic aneurysm (H), Benign essential hypertension   Started by:  Jitendra Fernandez MD        Dose:  10 mg   Take 1 tablet (10 mg) by mouth daily   Quantity:  90 tablet   Refills:  3       metoprolol tartrate 50 MG tablet   Commonly known as:  LOPRESSOR   Used for:  Palpitations   Started by:  Jitendra Fernandez MD        Dose:  50 mg   Take 1 tablet (50 mg) by mouth 2 times daily   Quantity:  180 tablet   Refills:  3         These medicines have changed or have updated prescriptions.        Dose/Directions    atorvastatin 40 MG tablet "   Commonly known as:  LIPITOR   This may have changed:  when to take this   Used for:  CARDIOVASCULAR SCREENING; LDL GOAL LESS THAN 160        Dose:  40 mg   Take 1 tablet (40 mg) by mouth daily   Quantity:  90 tablet   Refills:  3            Where to get your medicines      These medications were sent to [id number] - [place], [place] - [address]  [address], [place] [address]     Phone:  [phone]     lisinopril 10 MG tablet    metoprolol tartrate 50 MG tablet                Primary Care Provider Office Phone # Fax #    Marlys Wade, APRN [facility] [phone]       [address] [address] [facility] [address] [facility]  [facility] [address]        Equal Access to Services     CAROLINA LOVELL : Hadii ana oneilo Someek, waaxda luqadaha, qaybta kaalmada adeegyada, humza crenshaw . So [facility] [phone].    ATENCIÓN: Si habla español, tiene a thomas disposición servicios gratuitos de asistencia lingüística. LlUniversity [facility]als [facility] [phone].    We comply with applicable federal civil rights laws and [place] laws. We do not discriminate on the basis of race, color, national origin, age, disability, sex, sexual orientation, or gender identity.            Thank you!     Thank you for choosing [facility] AT [facility]  for your care. Our goal is always to provide you with excellent care. Hearing back from our patients is one way we can continue to improve our services. Please take a few minutes to complete the written survey that you may receive in the mail after your visit with us. Thank you!             Your Updated Medication List - Protect others around you: Learn how to safely use, store and throw away your medicines at www.disposemymeds.org.          This list is accurate as of 3/16/18 11:44 AM.  Always use your most recent med list.                   Brand Name Dispense Instructions for use Diagnosis    acetaminophen 325 MG tablet    TYLENOL    100 tablet    Take  2 tablets (650 mg) by mouth every 4 hours as needed for other (surgical pain)    S/P ascending aortic aneurysm repair, Acute post-operative pain       aspirin 81 MG EC tablet     30 tablet    Take 1 tablet (81 mg) by mouth daily    S/P ascending aortic aneurysm repair       atorvastatin 40 MG tablet    LIPITOR    90 tablet    Take 1 tablet (40 mg) by mouth daily    CARDIOVASCULAR SCREENING; LDL GOAL LESS THAN 160       HYDROcodone-acetaminophen 5-325 MG per tablet    NORCO    30 tablet    Take 1-2 tablets by mouth every 6 hours as needed for pain    Incisional pain       lisinopril 10 MG tablet    PRINIVIL/ZESTRIL    90 tablet    Take 1 tablet (10 mg) by mouth daily    Ascending aortic aneurysm (H), Benign essential hypertension       melatonin 3 MG tablet     30 tablet    Take 1 tablet (3 mg) by mouth nightly as needed for sleep    Ascending aortic aneurysm (H), S/P ascending aortic aneurysm repair       metoprolol tartrate 50 MG tablet    LOPRESSOR    180 tablet    Take 1 tablet (50 mg) by mouth 2 times daily    Palpitations       nicotine polacrilex 2 MG gum    NICORETTE    100 tablet    Chew one piece of gum every 1-2 hours as needed, maximum of 24 pieces in 24 hours    History of tobacco use       pantoprazole 40 MG EC tablet    PROTONIX    30 tablet    Take 1 tablet (40 mg) by mouth every morning    S/P ascending aortic aneurysm repair       senna-docusate 8.6-50 MG per tablet    SENOKOT-S;PERICOLACE    100 tablet    Take 1-2 tablets by mouth 2 times daily as needed for constipation    Acute post-operative pain, S/P ascending aortic aneurysm repair

## 2018-03-16 NOTE — LETTER
3/16/2018    RE: Georgie Patino  [address] [place] [id number]       Dear Colleague,    Thank you for the opportunity to participate in the care of your patient, Georgie Patino, at the [facility] HEART AT [facility] at [facility]. Please see a copy of my visit note below.    March 16, 2018    I had the pleasure of seeing Georgie Patino  in the [facility] for follow-up.  I seen him initially for episodes of palpitation however did an echocardiogram revealed significantly dilated ascending aorta up to 6.2 cm she was referred to surgery which was performed on October 27, 2017 successfully with no complications and PFO closure was performed at the same time.   Since her surgery she has been doing really well denies any complaints.  She feels that she is back at her baseline presurgery.  She denies any chest pain incisional pain no drainage really well-healed surgical scar.  She denies any limitations to her daily activities.  Her blood pressure was elevated here and at home as well to the 140s-150s systolic range.  Unfortunately she continues to smoke about 5 cigarettes a day.  No other complaints at this time     PAST MEDICAL HISTORY:  Past Medical History:   Diagnosis Date     Ascending aortic aneurysm (H) 09/21/2017     Blood transfusions age 19    due to menorhagia     Hypertension 09/21/2017     Thyroid nodule 11/20/2017     Tobacco abuse      FAMILY HISTORY:  Family History   Problem Relation Age of Onset     Respiratory Mother      copd     CANCER Maternal Grandmother      Leg     Alzheimer Disease Maternal Grandfather      HEART DISEASE Paternal Grandfather      HEART DISEASE Brother 54     Heart Attack      CANCER Sister      Lung cancer age 55-smoker d age 57     SOCIAL HISTORY:  Social History     Social History     Marital status:      Spouse name: N/A     Number of children: 3     Years of education:  "N/A     Occupational History     Day care      Social History Main Topics     Smoking status: Former Smoker     Packs/day: 0.05     Years: 40.00     Types: Cigarettes     Quit date: 10/27/2017     Smokeless tobacco: Never Used      Comment: 2 cigs daily     Alcohol use 0.5 - 1.0 oz/week     1 - 2 Standard drinks or equivalent per week      Comment: 3-4 drinks per week      Drug use: No     Sexual activity: Yes     Partners: Male     Birth control/ protection: Surgical     Other Topics Concern     Parent/Sibling W/ Cabg, Mi Or Angioplasty Before 65f 55m? Yes     brother w/ sudden cardiac arrest @ age 54     Social History Narrative     CURRENT MEDICATIONS:  Current Outpatient Prescriptions   Medication     metoprolol (LOPRESSOR) 25 MG tablet     HYDROcodone-acetaminophen (NORCO) 5-325 MG per tablet     nicotine polacrilex (NICORETTE) 2 MG gum     melatonin 3 MG tablet     acetaminophen (TYLENOL) 325 MG tablet     aspirin EC 81 MG EC tablet     senna-docusate (SENOKOT-S;PERICOLACE) 8.6-50 MG per tablet     pantoprazole (PROTONIX) 40 MG EC tablet     atorvastatin (LIPITOR) 40 MG tablet     No current facility-administered medications for this visit.      ROS:   Constitutional: No fever, chills, or sweats. Weight is 0 lbs 0 oz  ENT: No visual disturbance, ear ache, epistaxis, sore throat.   Allergies/Immunologic: Negative.   Respiratory: No cough, hemoptysis.   Cardiovascular: As per HPI.   GI: No nausea, vomiting, hematemesis, melena, or hematochezia.   : No urinary frequency, dysuria, or hematuria.   Integrument: Negative.   Psychiatric: No evidence of major depression  Neuro: No new neurological complaints at this time. Non focal  Endocrinology: Negative.   Musculoskeletal: As per HPI.      EXAM:  BP (!) 143/94 (BP Location: Left arm, Patient Position: Chair, Cuff Size: Adult Large)  Pulse 70  Ht 1.702 m (5' 7\")  Wt 93.4 kg (205 lb 12.8 oz)  SpO2 97%  BMI 32.23 kg/m2  General: appears comfortable, alert and " oriented  Head: normocephalic, atraumatic  Eyes: anicteric sclera, EOMI , PERRL  Neck: no adenopathy  Orophyarynx: moist mucosa, no lesions noted  Heart: regular, S1/S2, no murmurs, rubs or gallop. Estimated JVP at 5 cmH2O. Well healed scar  Lungs: CTAB, No wheezing.   Abdomen: soft, non-tender, bowel sounds present, no hepatosplenomegaly  Extremities: No LE edema today  Skin: no open lesions noted  Neuro: grossly non-focal  Psych: no evidence of depression noted     Labs:  Lab Results   Component Value Date    WBC 7.9 11/10/2017    HGB 12.0 11/10/2017    HCT 36.8 11/10/2017     11/10/2017     11/10/2017    POTASSIUM 3.8 11/10/2017    CHLORIDE 107 11/10/2017    CO2 23 11/10/2017    BUN 12 11/10/2017    CR 0.53 11/10/2017     (H) 11/10/2017    INR 1.21 (H) 10/29/2017     ECHO: 09/01/17  Global and regional left ventricular function is normal with an EF of 55-60%. The right ventricle is normal size. Global right ventricular function is normal. Severe dilatation of the ascending aorta (6.2 cm at the mid ascending aorta). The sinotubular ridge is effaced, however there is no evidence of dissection. The aortic arch and descending thoracic aorta appear normal in caliber.  Mild aortic regurgitation. Right ventricular systolic pressure is 26mmHg above the right atrial pressure. The inferior vena cava was normal in size with preserved respiratory variability. Estimated mean right atrial pressure is 3 mmHg. No pericardial effusion is present. Previous study not available for comparison.     CT chest 9/5/17:  Aneurysmal fusiform dilatation involving the ascending thoracic aorta, with involvement of the innominate artery, the left common carotid artery, with aneurysm ending at the level of the left subclavian artery. The aneurysm begins at the sinus of Valsalva and extends to the level of the proximal aortic arch. Additionally the the descending thoracic aorta is mildly ectatic. There is mild atherosclerotic  plaque, primarily at the aortic arch.  Thoracic aortic diameters:  Aortic annulus: 3.6 x 3.8 x 3.9 cm.   Sinuses of Valsalva: 4.9 x 4.8 cm.   Ascending aorta: 6.1 x 6.2 cm.   Aortic arch: 3.6 x 3.6 cm.   Proximal thoracic aorta: 3.6 x 3.8 cm.   Mid thoracic aorta: 3.4 x 3.3 cm.   Descending thoracic aorta: 3.5 x 3.6 cm.    [facility] 9/25/17: Minimal luminal irregularities, otherwise normal coronary angiogram    Ascending aorta repair: 10/27/2017 Dr. Rubio Piña  Ascending Aorta Aneurysm Repair using Hemashield Platinum Woven Double Velour Graft 34cm X 30cm, PFO closure    ASSESSMENT AND PLAN:  In summary, patient is a 62 year old lady status post ascending aortic repair as well as PFO closure in October 2017 by Dr. Piña who presents for follow-up.  She has done really well postsurgery denies any new complaints.  Unfortunately she continues to smoke 5 cigarettes a day and we did have a long discussion about this she promised to quit within the next couple of days.  In addition she was hypertensive on today's visit so we increased her metoprolol to 50 mg twice daily dosing and also started her on lisinopril 10 mg daily.  Side effects discussed.  She will get a transthoracic echocardiogram to evaluate the valve and the ascending aortic segment in about 6 months and I will see her back in the clinic afterwards.  We discussed that should her blood pressure remain elevated she is going to contact us and we might need to further adjust her blood pressure medications.     Follow up:   6 months    I appreciate the opportunity to participate in the care of Georgie MERE Patino . Please do not hesitate to contact me with any further questions.    Sincerely,     Jitendra Fernandez MD     [facility] Division of Cardiology

## 2018-03-16 NOTE — PROGRESS NOTES
March 16, 2018    I had the pleasure of seeing Georgie Patino  in the [facility] for follow-up.  I seen him initially for episodes of palpitation however did an echocardiogram revealed significantly dilated ascending aorta up to 6.2 cm she was referred to surgery which was performed on October 27, 2017 successfully with no complications and PFO closure was performed at the same time.   Since her surgery she has been doing really well denies any complaints.  She feels that she is back at her baseline presurgery.  She denies any chest pain incisional pain no drainage really well-healed surgical scar.  She denies any limitations to her daily activities.  Her blood pressure was elevated here and at home as well to the 140s-150s systolic range.  Unfortunately she continues to smoke about 5 cigarettes a day.  No other complaints at this time     PAST MEDICAL HISTORY:  Past Medical History:   Diagnosis Date     Ascending aortic aneurysm (H) 09/21/2017     Blood transfusions age 19    due to menorhagia     Hypertension 09/21/2017     Thyroid nodule 11/20/2017     Tobacco abuse      FAMILY HISTORY:  Family History   Problem Relation Age of Onset     Respiratory Mother      copd     CANCER Maternal Grandmother      Leg     Alzheimer Disease Maternal Grandfather      HEART DISEASE Paternal Grandfather      HEART DISEASE Brother 54     Heart Attack      CANCER Sister      Lung cancer age 57-smoker d age 57     SOCIAL HISTORY:  Social History     Social History     Marital status:      Spouse name: N/A     Number of children: 3     Years of education: N/A     Occupational History     Day care      Social History Main Topics     Smoking status: Former Smoker     Packs/day: 0.05     Years: 40.00     Types: Cigarettes     Quit date: 10/27/2017     Smokeless tobacco: Never Used      Comment: 2 cigs daily     Alcohol use 0.5 - 1.0 oz/week     1 - 2 Standard drinks or equivalent per week      Comment: 3-4  "drinks per week      Drug use: No     Sexual activity: Yes     Partners: Male     Birth control/ protection: Surgical     Other Topics Concern     Parent/Sibling W/ Cabg, Mi Or Angioplasty Before 65f 55m? Yes     brother w/ sudden cardiac arrest @ age 54     Social History Narrative     CURRENT MEDICATIONS:  Current Outpatient Prescriptions   Medication     metoprolol (LOPRESSOR) 25 MG tablet     HYDROcodone-acetaminophen (NORCO) 5-325 MG per tablet     nicotine polacrilex (NICORETTE) 2 MG gum     melatonin 3 MG tablet     acetaminophen (TYLENOL) 325 MG tablet     aspirin EC 81 MG EC tablet     senna-docusate (SENOKOT-S;PERICOLACE) 8.6-50 MG per tablet     pantoprazole (PROTONIX) 40 MG EC tablet     atorvastatin (LIPITOR) 40 MG tablet     No current facility-administered medications for this visit.      ROS:   Constitutional: No fever, chills, or sweats. Weight is 0 lbs 0 oz  ENT: No visual disturbance, ear ache, epistaxis, sore throat.   Allergies/Immunologic: Negative.   Respiratory: No cough, hemoptysis.   Cardiovascular: As per HPI.   GI: No nausea, vomiting, hematemesis, melena, or hematochezia.   : No urinary frequency, dysuria, or hematuria.   Integrument: Negative.   Psychiatric: No evidence of major depression  Neuro: No new neurological complaints at this time. Non focal  Endocrinology: Negative.   Musculoskeletal: As per HPI.      EXAM:  BP (!) 143/94 (BP Location: Left arm, Patient Position: Chair, Cuff Size: Adult Large)  Pulse 70  Ht 1.702 m (5' 7\")  Wt 93.4 kg (205 lb 12.8 oz)  SpO2 97%  BMI 32.23 kg/m2  General: appears comfortable, alert and oriented  Head: normocephalic, atraumatic  Eyes: anicteric sclera, EOMI , PERRL  Neck: no adenopathy  Orophyarynx: moist mucosa, no lesions noted  Heart: regular, S1/S2, no murmurs, rubs or gallop. Estimated JVP at 5 cmH2O. Well healed scar  Lungs: CTAB, No wheezing.   Abdomen: soft, non-tender, bowel sounds present, no hepatosplenomegaly  Extremities: No " LE edema today  Skin: no open lesions noted  Neuro: grossly non-focal  Psych: no evidence of depression noted     Labs:  Lab Results   Component Value Date    WBC 7.9 11/10/2017    HGB 12.0 11/10/2017    HCT 36.8 11/10/2017     11/10/2017     11/10/2017    POTASSIUM 3.8 11/10/2017    CHLORIDE 107 11/10/2017    CO2 23 11/10/2017    BUN 12 11/10/2017    CR 0.53 11/10/2017     (H) 11/10/2017    INR 1.21 (H) 10/29/2017     ECHO: 09/01/17  Global and regional left ventricular function is normal with an EF of 55-60%. The right ventricle is normal size. Global right ventricular function is normal. Severe dilatation of the ascending aorta (6.2 cm at the mid ascending aorta). The sinotubular ridge is effaced, however there is no evidence of dissection. The aortic arch and descending thoracic aorta appear normal in caliber.  Mild aortic regurgitation. Right ventricular systolic pressure is 26mmHg above the right atrial pressure. The inferior vena cava was normal in size with preserved respiratory variability. Estimated mean right atrial pressure is 3 mmHg. No pericardial effusion is present. Previous study not available for comparison.     CT chest 9/5/17:  Aneurysmal fusiform dilatation involving the ascending thoracic aorta, with involvement of the innominate artery, the left common carotid artery, with aneurysm ending at the level of the left subclavian artery. The aneurysm begins at the sinus of Valsalva and extends to the level of the proximal aortic arch. Additionally the the descending thoracic aorta is mildly ectatic. There is mild atherosclerotic plaque, primarily at the aortic arch.  Thoracic aortic diameters:  Aortic annulus: 3.6 x 3.8 x 3.9 cm.   Sinuses of Valsalva: 4.9 x 4.8 cm.   Ascending aorta: 6.1 x 6.2 cm.   Aortic arch: 3.6 x 3.6 cm.   Proximal thoracic aorta: 3.6 x 3.8 cm.   Mid thoracic aorta: 3.4 x 3.3 cm.   Descending thoracic aorta: 3.5 x 3.6 cm.    [facility] 9/25/17: Minimal luminal  irregularities, otherwise normal coronary angiogram    Ascending aorta repair: 10/27/2017 Dr. Rubio Piña  Ascending Aorta Aneurysm Repair using Hemashield Platinum Woven Double Velour Graft 34cm X 30cm, PFO closure    ASSESSMENT AND PLAN:  In summary, patient is a 62 year old lady status post ascending aortic repair as well as PFO closure in October 2017 by Dr. Piña who presents for follow-up.  She has done really well postsurgery denies any new complaints.  Unfortunately she continues to smoke 5 cigarettes a day and we did have a long discussion about this she promised to quit within the next couple of days.  In addition she was hypertensive on today's visit so we increased her metoprolol to 50 mg twice daily dosing and also started her on lisinopril 10 mg daily.  Side effects discussed.  She will get a transthoracic echocardiogram to evaluate the valve and the ascending aortic segment in about 6 months and I will see her back in the clinic afterwards.  We discussed that should her blood pressure remain elevated she is going to contact us and we might need to further adjust her blood pressure medications.     Follow up:   6 months    I appreciate the opportunity to participate in the care of Georgie Patino . Please do not hesitate to contact me with any further questions.    Sincerely,    Jitendra Fernandez MD     [facility] Division of Cardiology

## 2018-03-16 NOTE — PATIENT INSTRUCTIONS
Thank you for coming to the [facility] @ [facility] [place]; please note the following instructions:    1. Medication changes today:    -Increase your Metoprolol to 50mg/Twice a day  -Start lisinopril 10mg/day    2. Echocardiogram in 6 months followed by appt with Dr. Fernandez. This is scheduled for Friday, September 14th, 2018 at 9:00am for echo and 10:00am for Dr. Fernandez.      If you have any questions regarding your visit please contact your care team:     Cardiology  Telephone Number   Jocelin MELTON, RN  Vianca BLACK, RN   Mercedez LUKE, RMA  Alis LOPES, MIGUEL DAVIS, MA   [phone]    *After hours: [phone]   For scheduling appts:     [phone] or    [phone] (select option 1)    *After hours: [phone]     For the Device Clinic (Pacemakers and ICD's)  RN's :  Marisabel Kaiser   During business hours: [phone]    *After business hours:  [phone] (select option 4)      Normal test result notifications will be released via [employer] or mailed within 7 business days.  All other test results, will be communicated via telephone once reviewed by your cardiologist.    If you need a medication refill please contact your pharmacy.  Please allow 3 business days for your refill to be completed.    As always, thank you for trusting us with your health care needs!    Lisinopril Oral tablet  What is this medicine?  LISINOPRIL (lyse IN oh pril) is an ACE inhibitor. This medicine is used to treat high blood pressure and heart failure. It is also used to protect the heart immediately after a heart attack.  This medicine may be used for other purposes; ask your health care provider or pharmacist if you have questions.  What should I tell my health care provider before I take this medicine?  They need to know if you have any of these conditions:    diabetes    heart or blood vessel disease    immune system disease like lupus or scleroderma    kidney disease    low blood pressure    previous swelling of  the tongue, face, or lips with difficulty breathing, difficulty swallowing, hoarseness, or tightening of the throat    an unusual or allergic reaction to lisinopril, other ACE inhibitors, insect venom, foods, dyes, or preservatives    pregnant or trying to get pregnant    breast-feeding  How should I use this medicine?  Take this medicine by mouth with a glass of water. Follow the directions on your prescription label. You may take this medicine with or without food. Take your medicine at regular intervals. Do not stop taking this medicine except on the advice of your doctor or health care professional.  Talk to your pediatrician regarding the use of this medicine in children. Special care may be needed. While this drug may be prescribed for children as young as 6 years of age for selected conditions, precautions do apply.  Overdosage: If you think you have taken too much of this medicine contact a poison control center or emergency room at once.  NOTE: This medicine is only for you. Do not share this medicine with others.  What if I miss a dose?  If you miss a dose, take it as soon as you can. If it is almost time for your next dose, take only that dose. Do not take double or extra doses.  What may interact with this medicine?    diuretics    lithium    NSAIDs, medicines for pain and inflammation, like ibuprofen or naproxen    over-the-counter herbal supplements like hawthorn    potassium salts or potassium supplements    salt substitutes  This list may not describe all possible interactions. Give your health care provider a list of all the medicines, herbs, non-prescription drugs, or dietary supplements you use. Also tell them if you smoke, drink alcohol, or use illegal drugs. Some items may interact with your medicine.  What should I watch for while using this medicine?  Visit your doctor or health care professional for regular check ups. Check your blood pressure as directed. Ask your doctor what your blood  pressure should be, and when you should contact him or her. Call your doctor or health care professional if you notice an irregular or fast heart beat.  Women should inform their doctor if they wish to become pregnant or think they might be pregnant. There is a potential for serious side effects to an unborn child. Talk to your health care professional or pharmacist for more information.  Check with your doctor or health care professional if you get an attack of severe diarrhea, nausea and vomiting, or if you sweat a lot. The loss of too much body fluid can make it dangerous for you to take this medicine.  You may get drowsy or dizzy. Do not drive, use machinery, or do anything that needs mental alertness until you know how this drug affects you. Do not stand or sit up quickly, especially if you are an older patient. This reduces the risk of dizzy or fainting spells. Alcohol can make you more drowsy and dizzy. Avoid alcoholic drinks.  Avoid salt substitutes unless you are told otherwise by your doctor or health care professional.  Do not treat yourself for coughs, colds, or pain while you are taking this medicine without asking your doctor or health care professional for advice. Some ingredients may increase your blood pressure.  What side effects may I notice from receiving this medicine?  Side effects that you should report to your doctor or health care professional as soon as possible:    abdominal pain with or without nausea or vomiting    allergic reactions like skin rash or hives, swelling of the hands, feet, face, lips, throat, or tongue    dark urine    difficulty breathing    dizzy, lightheaded or fainting spell    fever or sore throat    irregular heart beat, chest pain    pain or difficulty passing urine    redness, blistering, peeling or loosening of the skin, including inside the mouth    unusually weak    yellowing of the eyes or skin  Side effects that usually do not require medical attention (report  to your doctor or health care professional if they continue or are bothersome):    change in taste    cough    decreased sexual function or desire    headache    sun sensitivity    tiredness  This list may not describe all possible side effects. Call your doctor for medical advice about side effects. You may report side effects to FDA at 7-273-DGI-4958.  Where should I keep my medicine?  Keep out of the reach of children.  Store at room temperature between 15 and 30 degrees C (59 and 86 degrees F). Protect from moisture. Keep container tightly closed. Throw away any unused medicine after the expiration date.  NOTE:This sheet is a summary. It may not cover all possible information. If you have questions about this medicine, talk to your doctor, pharmacist, or health care provider. Copyright  2016 [employer] Standard

## 2018-04-10 ENCOUNTER — RADIANT APPOINTMENT (OUTPATIENT)
Dept: MRI IMAGING | Facility: CLINIC | Age: 63
End: 2018-04-10
Attending: NURSE PRACTITIONER
Payer: COMMERCIAL

## 2018-04-10 DIAGNOSIS — D18.03 LIVER HEMANGIOMA: ICD-10-CM

## 2018-04-10 PROCEDURE — A9585 GADOBUTROL INJECTION: HCPCS

## 2018-04-10 PROCEDURE — 74183 MRI ABD W/O CNTR FLWD CNTR: CPT | Mod: TC

## 2018-04-10 RX ORDER — GADOBUTROL 604.72 MG/ML
10 INJECTION INTRAVENOUS ONCE
Status: COMPLETED | OUTPATIENT
Start: 2018-04-10 | End: 2018-04-10

## 2018-04-10 RX ADMIN — GADOBUTROL 10 ML: 604.72 INJECTION INTRAVENOUS at 10:17

## 2018-04-10 NOTE — LETTER
[facility]  [address] [place] [address]. [place] [address]    April 11, 2018    Georgie Patino  [address]  [place] [address]          Dear Georgie,    Your liver lesions are unchanged.  You also have gallstones.  Gallstones are not concerning unless you are having abdominal pain.  No further follow-up needed.     If you have any questions please feel free to contact [phone] or myself via [employer]    Enclosed is a copy of your results.     Results for orders placed or performed in visit on 04/10/18   MR Abdomen w/o & w Contrast    Narrative    MR ABDOMEN WITHOUT AND WITH CONTRAST   4/10/2018 10:20 AM     HISTORY: Liver lesion follow-up.    TECHNIQUE: Multisequence, multiplanar imaging of the abdomen was  performed without IV gadolinium contrast. A total of 10 mL Gadavist  gadolinium contrast was then administered intravenously. Additional  dynamic postcontrast T1 fat-sat sequences were performed.     COMPARISON: MRI of the abdomen performed 12/4/2017.    FINDINGS: No evidence for fatty infiltration of the liver. Two right  hepatic lesions have signal and enhancement characteristics consistent  with hemangiomas (series 10 images 14 and 41) with the largest in the  posterior segment of the right hepatic lobe medially measuring 2.6 cm.  There is a 0.5 cm cyst in the posterior segment of the right hepatic  lobe inferiorly (series 10 image 46), also unchanged. Multiple  gallstones are again noted within the gallbladder. Mild cortical  scarring in the lower pole of the right kidney posteriorly is  unchanged. A few small cortical cysts are noted in the right kidney,  with the largest measuring 0.9 cm. The spleen, adrenal glands,  pancreas, and kidneys are otherwise unremarkable. No hydronephrosis.  Visualized loops of small bowel and colon are of normal caliber. No  enlarged lymph nodes are identified in the abdomen.      Impression     IMPRESSION:   1. Two right hepatic hemangiomas are unchanged since 12/4/2017.  2. No worrisome liver lesions are identified.  3. Cholelithiasis.    MARION NAVARRO MD       If you have any questions or concerns, please call myself or my nurse at [phone].      Sincerely,        Marlys Wade CNP/guzman

## 2018-04-10 NOTE — PROGRESS NOTES
Dear Georgie,    Your recent test results are attached.      Your liver lesions are unchanged.  You also have gallstones.  Gallstones are not concerning unless you are having abdominal pain.  No further follow-up needed.    If you have any questions please feel free to contact [phone] or myself via [employer].    Sincerely,  Marlys Wade, CNP

## 2018-06-12 ENCOUNTER — RADIANT APPOINTMENT (OUTPATIENT)
Dept: GENERAL RADIOLOGY | Facility: CLINIC | Age: 63
End: 2018-06-12
Attending: FAMILY MEDICINE
Payer: COMMERCIAL

## 2018-06-12 ENCOUNTER — OFFICE VISIT (OUTPATIENT)
Dept: FAMILY MEDICINE | Facility: CLINIC | Age: 63
End: 2018-06-12
Payer: COMMERCIAL

## 2018-06-12 VITALS
TEMPERATURE: 97 F | OXYGEN SATURATION: 95 % | BODY MASS INDEX: 32.36 KG/M2 | DIASTOLIC BLOOD PRESSURE: 82 MMHG | WEIGHT: 206.2 LBS | HEART RATE: 70 BPM | RESPIRATION RATE: 13 BRPM | SYSTOLIC BLOOD PRESSURE: 166 MMHG | HEIGHT: 67 IN

## 2018-06-12 DIAGNOSIS — R10.32 GROIN PAIN, LEFT: Primary | ICD-10-CM

## 2018-06-12 DIAGNOSIS — R10.32 GROIN PAIN, LEFT: ICD-10-CM

## 2018-06-12 DIAGNOSIS — M16.10 ARTHRITIS OF HIP: ICD-10-CM

## 2018-06-12 PROCEDURE — 99213 OFFICE O/P EST LOW 20 MIN: CPT | Performed by: FAMILY MEDICINE

## 2018-06-12 PROCEDURE — 73502 X-RAY EXAM HIP UNI 2-3 VIEWS: CPT

## 2018-06-12 RX ORDER — HYDROCODONE BITARTRATE AND ACETAMINOPHEN 5; 325 MG/1; MG/1
1 TABLET ORAL EVERY 8 HOURS PRN
Qty: 15 TABLET | Refills: 0 | Status: SHIPPED | OUTPATIENT
Start: 2018-06-12 | End: 2018-06-19

## 2018-06-12 NOTE — MR AVS SNAPSHOT
After Visit Summary   6/12/2018    Georgie Patino    MRN: [id number]           Patient Information     Date Of Birth          1955        Visit Information        Provider Department      6/12/2018 9:20 AM Liang Mcknight MD [facility]        Today's Diagnoses     Groin pain, left    -  1    Arthritis of hip          Care Instructions    [facility]    If you have any questions regarding to your visit please contact your care team:       Team Red:   Clinic Hours Telephone Number   Dr. Kimberly Bustillo, NP   7am-7pm  Monday - Thursday   7am-5pm  Fridays  [phone]  (Appointment scheduling available 24/7)    Questions about your recent visit?   Team Line  [phone]   Urgent Care - [place] and [facility] - 11am-9pm Monday-Friday Saturday-Sunday- 9am-5pm   [place] - 5pm-9pm Monday-Friday Saturday-Sunday- 9am-5pm  [phone] - [place]  [phone] - [place]       What options do I have for a visit other than an office visit? We offer electronic visits (e-visits) and telephone visits, when medically appropriate.  Please check with your medical insurance to see if these types of visits are covered, as you will be responsible for any charges that are not paid by your insurance.      You can use [employer] (secure electronic communication) to access to your chart, send your primary care provider a message, or make an appointment. Ask a team member how to get started.     For a price quote for your services, please call our Consumer Price Line at [phone] or our Imaging Cost estimation line at [phone] (for imaging tests).    Ryan Becerra              Follow-ups after your visit        Additional Services     [facility] PT, HAND, AND CHIROPRACTIC REFERRAL       === This order will print in the JOHN Scheduling  Office ===    Physical therapy, hand therapy and chiropractic care are  available through:    [place] [facility]  [facility]    Call one easy number to schedule at any of the above locations:  [phone].    Your provider has referred you to Physical Therapy at [facility] or [facility]    Indication/Reason for Referral: Hip Pain  Onset of Illness:  X 5 days  Therapy Orders:  Evaluate and Treat  Special Programs:  None  Special Request:  None    Additional Comments for the therapist or chiropractor:  None    Please be aware that coverage of these services is subject to the terms and limitations of your health insurance plan.  Call member services at your health plan with any benefit or coverage questions.      Please bring the following to your appointment:    >>   Your personal calendar for scheduling future appointments  >>   Comfortable clothing                  Your next 10 appointments already scheduled     Sep 14, 2018  9:00 AM CDT   Ech Complete with [id number]   [facility] [place] ([facility])    [address] [place] [address] [phone]           1. Please bring or wear a comfortable two-piece outfit. 2. You may eat, drink and take your normal medicines. 3. For any questions that cannot be answered, please contact the ordering physician            Sep 14, 2018 10:00 AM CDT   Return Visit with Jitendra Fernandez MD   [facility] ([facility])    [address] [place] [address] [phone]              Who to contact     If you have questions or need follow up information about today's clinic visit or your schedule please contact [facility] directly at [phone].  Normal or non-critical lab and imaging results will be communicated to you by MyChart, letter or phone within 4 business days after the clinic has received the results. If you do not hear from us within 7 days, please  "contact the clinic through [employer] or phone. If you have a critical or abnormal lab result, we will notify you by phone as soon as possible.  Submit refill requests through [employer] or call your pharmacy and they will forward the refill request to us. Please allow 3 business days for your refill to be completed.          Additional Information About Your Visit        Care EveryWhere ID     This is your Care EveryWhere ID. This could be used by other organizations to access your [facility] medical records  [id number]        Your Vitals Were     Pulse Temperature Respirations Height Pulse Oximetry Breastfeeding?    70 97  F (36.1  C) (Oral) 13 5' 7\" (1.702 m) 95% No    BMI (Body Mass Index)                   32.3 kg/m2            Blood Pressure from Last 3 Encounters:   06/12/18 166/82   03/16/18 (!) 143/94   11/20/17 126/86    Weight from Last 3 Encounters:   06/12/18 206 lb 3.2 oz (93.5 kg)   03/16/18 205 lb 12.8 oz (93.4 kg)   11/20/17 202 lb 6.4 oz (91.8 kg)              We Performed the Following     [facility] PT, HAND, AND CHIROPRACTIC REFERRAL     XR Hip Left 2-3 Views          Today's Medication Changes          These changes are accurate as of 6/12/18 10:23 AM.  If you have any questions, ask your nurse or doctor.               Start taking these medicines.        Dose/Directions    HYDROcodone-acetaminophen 5-325 MG per tablet   Commonly known as:  NORCO   Used for:  Arthritis of hip, Groin pain, left   Started by:  Liang Mcknight MD        Dose:  1 tablet   Take 1 tablet by mouth every 8 hours as needed for pain   Quantity:  15 tablet   Refills:  0         These medicines have changed or have updated prescriptions.        Dose/Directions    atorvastatin 40 MG tablet   Commonly known as:  LIPITOR   This may have changed:  when to take this   Used for:  CARDIOVASCULAR SCREENING; LDL GOAL LESS THAN 160        Dose:  40 mg   Take 1 tablet (40 mg) by mouth daily   Quantity:  90 tablet   Refills:  3          "   Where to get your medicines      Some of these will need a paper prescription and others can be bought over the counter.  Ask your nurse if you have questions.     Bring a paper prescription for each of these medications     HYDROcodone-acetaminophen 5-325 MG per tablet               Information about OPIOIDS     PRESCRIPTION OPIOIDS: WHAT YOU NEED TO KNOW   You have a prescription for an opioid (narcotic) pain medicine. Opioids can cause addiction. If you have a history of chemical dependency of any type, you are at a higher risk of becoming addicted to opioids. Only take this medicine after all other options have been tried. Take it for as short a time and as few doses as possible.     Do not:    Drive. If you drive while taking these medicines, you could be arrested for driving under the influence (DUI).    Operate heavy machinery    Do any other dangerous activities while taking these medicines.     Drink any alcohol while taking these medicines.      Take with any other medicines that contain acetaminophen. Read all labels carefully. Look for the word  acetaminophen  or  Tylenol.  Ask your pharmacist if you have questions or are unsure.    Store your pills in a secure place, locked if possible. We will not replace any lost or stolen medicine. If you don t finish your medicine, please throw away (dispose) as directed by your pharmacist. The [place] Pollution Control Agency has more information about safe disposal: https://www.pca.[facility].[place].us/living-green/managing-unwanted-medications    All opioids tend to cause constipation. Drink plenty of water and eat foods that have a lot of fiber, such as fruits, vegetables, prune juice, apple juice and high-fiber cereal. Take a laxative (Miralax, milk of magnesia, Colace, Senna) if you don t move your bowels at least every other day.          Primary Care Provider Office Phone # Fax #    TESHA Dos Santos [phone]  [place]  [facility] [address]        Equal Access to Services     CAROLINA LOVELL : Hadii aad ku hadriverao Sotamraali, waaxda luqadaha, qaybta kajossleynda andrewkristajuan, waxkaleb kenny robertodemetrius moffetttiffanyalexandra crenshaw . So [facility] [phone].    ATENCIÓN: Si habla español, tiene a thomas disposición servicios gratuitos de asistencia lingüística. [facility] [phone].    We comply with applicable federal civil rights laws and [place] laws. We do not discriminate on the basis of race, color, national origin, age, disability, sex, sexual orientation, or gender identity.            Thank you!     Thank you for choosing [facility]  for your care. Our goal is always to provide you with excellent care. Hearing back from our patients is one way we can continue to improve our services. Please take a few minutes to complete the written survey that you may receive in the mail after your visit with us. Thank you!             Your Updated Medication List - Protect others around you: Learn how to safely use, store and throw away your medicines at www.disposemymeds.org.          This list is accurate as of 6/12/18 10:23 AM.  Always use your most recent med list.                   Brand Name Dispense Instructions for use Diagnosis    acetaminophen 325 MG tablet    TYLENOL    100 tablet    Take 2 tablets (650 mg) by mouth every 4 hours as needed for other (surgical pain)    S/P ascending aortic aneurysm repair, Acute post-operative pain       aspirin 81 MG EC tablet     30 tablet    Take 1 tablet (81 mg) by mouth daily    S/P ascending aortic aneurysm repair       atorvastatin 40 MG tablet    LIPITOR    90 tablet    Take 1 tablet (40 mg) by mouth daily    CARDIOVASCULAR SCREENING; LDL GOAL LESS THAN 160       HYDROcodone-acetaminophen 5-325 MG per tablet    NORCO    15 tablet    Take 1 tablet by mouth every 8 hours as needed for pain    Arthritis of hip, Groin pain, left       lisinopril 10 MG tablet    PRINIVIL/ZESTRIL    90 tablet    Take 1  tablet (10 mg) by mouth daily    Ascending aortic aneurysm (H), Benign essential hypertension       melatonin 3 MG tablet     30 tablet    Take 1 tablet (3 mg) by mouth nightly as needed for sleep    Ascending aortic aneurysm (H), S/P ascending aortic aneurysm repair       metoprolol tartrate 50 MG tablet    LOPRESSOR    180 tablet    Take 1 tablet (50 mg) by mouth 2 times daily    Palpitations       nicotine polacrilex 2 MG gum    NICORETTE    100 tablet    Chew one piece of gum every 1-2 hours as needed, maximum of 24 pieces in 24 hours    History of tobacco use       pantoprazole 40 MG EC tablet    PROTONIX    30 tablet    Take 1 tablet (40 mg) by mouth every morning    S/P ascending aortic aneurysm repair       senna-docusate 8.6-50 MG per tablet    SENOKOT-S;PERICOLACE    100 tablet    Take 1-2 tablets by mouth 2 times daily as needed for constipation    Acute post-operative pain, S/P ascending aortic aneurysm repair

## 2018-06-12 NOTE — PROGRESS NOTES
SUBJECTIVE:   Georgie Patino is a 62 year old female who presents to clinic today for the following health issues:    Musculoskeletal problem/pain    Duration: x 5 days    Description  Location: Left leg/hip-thigh    Intensity:  7/10- when walking up stairs its like 8-9/10    Accompanying signs and symptoms: weakness of the left leg     History  Previous similar problem: no   Previous evaluation:  none    Precipitating or alleviating factors:  Trauma or overuse: no   Aggravating factors include: standing, walking, climbing stairs and laying down on the area so pain with pressure    Therapies tried and outcome: Ibuprofen    HPI:  Woke up with left groin pain 5 days ago  Dull pain when sitting or laying in bed  Pain sharp going upstairs or just walking.    Problem list and histories reviewed & adjusted, as indicated.  Additional history: as documented    Patient Active Problem List   Diagnosis     CARDIOVASCULAR SCREENING; LDL GOAL LESS THAN 160     Obesity     Benign essential hypertension     Family history of sudden cardiac death     Hypertension     Anxiety     S/P ascending aortic aneurysm repair     Former smoker     Thyroid nodule     Past Surgical History:   Procedure Laterality Date     COLONOSCOPY WITH CO2 INSUFFLATION N/A 3/15/2017    Procedure: COLONOSCOPY WITH CO2 INSUFFLATION;  Surgeon: Duane, William Charles, MD;  Location: [facility] OR     DILATION AND CURETTAGE  age 20     REPAIR ANEURYSM ASCENDING AORTA N/A 10/27/2017    Procedure: REPAIR ANEURYSM ASCENDING AORTA;  Median Sternotomy, Cardiopulmonary bypass, Ascending Aorta Aneurysm Repair using Hemashield Platinum Woven Double Velour Graft 34cm X 30cm.;  Surgeon: Rubio Piña MD;  Location: [facility] OR     Meadow teeth removed         Social History   Substance Use Topics     Smoking status: Former Smoker     Packs/day: 0.05     Years: 40.00     Types: Cigarettes     Quit date: 10/27/2017     Smokeless tobacco: Never Used      Comment: 2 cigs  "daily     Alcohol use 0.5 - 1.0 oz/week     1 - 2 Standard drinks or equivalent per week      Comment: 3-4 drinks per week      Family History   Problem Relation Age of Onset     Respiratory Mother      copd     CANCER Maternal Grandmother      Leg     Alzheimer Disease Maternal Grandfather      HEART DISEASE Paternal Grandfather      HEART DISEASE Brother 54     Heart Attack      CANCER Sister      Lung cancer age 55-smoker d age 57         Reviewed and updated as needed this visit by clinical staff  Tobacco  Allergies  Meds  Med Hx  Surg Hx  Fam Hx  Soc Hx      ROS:  Constitutional, HEENT, cardiovascular, pulmonary, gi and gu systems are negative, except as otherwise noted.    OBJECTIVE:     /82 (BP Location: Right arm, Patient Position: Chair, Cuff Size: Adult Regular)  Pulse 70  Temp 97  F (36.1  C) (Oral)  Resp 13  Ht 5' 7\" (1.702 m)  Wt 206 lb 3.2 oz (93.5 kg)  SpO2 95%  Breastfeeding? No  BMI 32.3 kg/m2  Body mass index is 32.3 kg/(m^2).  GENERAL: healthy, alert and no distress  NECK: no adenopathy and thyroid normal to palpation  RESP: lungs clear to auscultation - no rales, rhonchi or wheezes  CV: regular rate and rhythm, normal S1 S2, no S3 or S4, no murmur, click or rub  ABDOMEN: soft, obese, nontender, no masses and bowel sounds normal  MS: Rt HIP   Walking with limp   Log roll negative   Slight tenderness with flexion and rotation.  Diagnostic Test Results:  X ray   XR PELVIS 1/2 VW, XR HIP LEFT 2-3 VIEWS 6/12/2018 10:00 AM     HISTORY: Left groin pain.     COMPARISON: None.     FINDINGS: Single view of the pelvis with 2 additional views of the  left hip. Hip joint spaces are preserved bilaterally. No significant  osseous degenerative change. No fracture or malalignment. Sacroiliac  joints appear patent and symmetric.         IMPRESSION: Pelvis and left hip appear normal.    ASSESSMENT/PLAN:     (R10.32) Groin pain, left  (primary encounter diagnosis)  Comment: Differentials: " Arthritis, arthralgia, hernia. X ray unremnarkable. Hernia unlikely but will pursue if pain persist. Pain medication and PT  Plan: XR Pelvis 1/2 Views, XR Hip Left 2-3 Views, [facility]        PT, HAND, AND CHIROPRACTIC REFERRAL,         HYDROcodone-acetaminophen (NORCO) 5-325 MG per         tablet    (M16.10) Arthritis of hip  Comment: Arthralgia  Plan: [facility] PT, HAND, AND CHIROPRACTIC REFERRAL,         HYDROcodone-acetaminophen (NORCO) 5-325 MG per         tablet    Call or return to clinic prn if these symptoms worsen or fail to improve as anticipated in 1 month.    Liang Mcknight MD  [facility]

## 2018-06-12 NOTE — NURSING NOTE
"Chief Complaint   Patient presents with     Musculoskeletal Problem     left leg      Initial /82 (BP Location: Right arm, Patient Position: Chair, Cuff Size: Adult Regular)  Pulse 70  Temp 97  F (36.1  C) (Oral)  Resp 13  Ht 5' 7\" (1.702 m)  Wt 206 lb 3.2 oz (93.5 kg)  SpO2 95%  Breastfeeding? No  BMI 32.3 kg/m2 Estimated body mass index is 32.3 kg/(m^2) as calculated from the following:    Height as of this encounter: 5' 7\" (1.702 m).    Weight as of this encounter: 206 lb 3.2 oz (93.5 kg).  BP completed using cuff size: regular    Ryan Becerra  "

## 2018-06-12 NOTE — PATIENT INSTRUCTIONS
[facility]    If you have any questions regarding to your visit please contact your care team:       Team Red:   Clinic Hours Telephone Number   Dr. Kimberly Bustillo, NP   7am-7pm  Monday - Thursday   7am-5pm  Fridays  [phone]  (Appointment scheduling available 24/7)    Questions about your recent visit?   Team Line  [phone]   Urgent Care - [place] and [facility] - 11am-9pm Monday-Friday Saturday-Sunday- 9am-5pm   [place] - 5pm-9pm Monday-Friday Saturday-Sunday- 9am-5pm  [phone] - [place]  [phone] - [place]       What options do I have for a visit other than an office visit? We offer electronic visits (e-visits) and telephone visits, when medically appropriate.  Please check with your medical insurance to see if these types of visits are covered, as you will be responsible for any charges that are not paid by your insurance.      You can use [employer] (secure electronic communication) to access to your chart, send your primary care provider a message, or make an appointment. Ask a team member how to get started.     For a price quote for your services, please call our Consumer Price Line at [phone] or our Imaging Cost estimation line at [phone] (for imaging tests).    Ryan Becerra

## 2018-09-07 ENCOUNTER — OFFICE VISIT (OUTPATIENT)
Dept: FAMILY MEDICINE | Facility: CLINIC | Age: 63
End: 2018-09-07
Payer: COMMERCIAL

## 2018-09-07 VITALS
BODY MASS INDEX: 32.33 KG/M2 | RESPIRATION RATE: 18 BRPM | WEIGHT: 206 LBS | SYSTOLIC BLOOD PRESSURE: 130 MMHG | OXYGEN SATURATION: 94 % | DIASTOLIC BLOOD PRESSURE: 80 MMHG | TEMPERATURE: 97.2 F | HEART RATE: 73 BPM | HEIGHT: 67 IN

## 2018-09-07 DIAGNOSIS — Z11.4 SCREENING FOR HIV (HUMAN IMMUNODEFICIENCY VIRUS): ICD-10-CM

## 2018-09-07 DIAGNOSIS — R35.0 URINARY FREQUENCY: ICD-10-CM

## 2018-09-07 DIAGNOSIS — N89.8 VAGINAL ITCHING: ICD-10-CM

## 2018-09-07 DIAGNOSIS — R31.29 MICROSCOPIC HEMATURIA: ICD-10-CM

## 2018-09-07 DIAGNOSIS — B96.89 BACTERIAL VAGINOSIS: ICD-10-CM

## 2018-09-07 DIAGNOSIS — Z87.891 PERSONAL HISTORY OF TOBACCO USE: ICD-10-CM

## 2018-09-07 DIAGNOSIS — R10.2 PELVIC PAIN IN FEMALE: ICD-10-CM

## 2018-09-07 DIAGNOSIS — I10 BENIGN ESSENTIAL HYPERTENSION: ICD-10-CM

## 2018-09-07 DIAGNOSIS — E78.5 HYPERLIPIDEMIA LDL GOAL <100: ICD-10-CM

## 2018-09-07 DIAGNOSIS — N76.0 BACTERIAL VAGINOSIS: ICD-10-CM

## 2018-09-07 DIAGNOSIS — Z00.00 ROUTINE GENERAL MEDICAL EXAMINATION AT A HEALTH CARE FACILITY: Primary | ICD-10-CM

## 2018-09-07 LAB
ALBUMIN UR-MCNC: NEGATIVE MG/DL
ANION GAP SERPL CALCULATED.3IONS-SCNC: 9 MMOL/L (ref 3–14)
APPEARANCE UR: CLEAR
BILIRUB UR QL STRIP: NEGATIVE
BUN SERPL-MCNC: 19 MG/DL (ref 7–30)
CALCIUM SERPL-MCNC: 9.1 MG/DL (ref 8.5–10.1)
CHLORIDE SERPL-SCNC: 107 MMOL/L (ref 94–109)
CHOLEST SERPL-MCNC: 180 MG/DL
CO2 SERPL-SCNC: 25 MMOL/L (ref 20–32)
COLOR UR AUTO: YELLOW
CREAT SERPL-MCNC: 0.53 MG/DL (ref 0.52–1.04)
GFR SERPL CREATININE-BSD FRML MDRD: >90 ML/MIN/1.7M2
GLUCOSE SERPL-MCNC: 108 MG/DL (ref 70–99)
GLUCOSE UR STRIP-MCNC: NEGATIVE MG/DL
HDLC SERPL-MCNC: 61 MG/DL
HGB UR QL STRIP: ABNORMAL
KETONES UR STRIP-MCNC: NEGATIVE MG/DL
LDLC SERPL CALC-MCNC: 101 MG/DL
LEUKOCYTE ESTERASE UR QL STRIP: ABNORMAL
NITRATE UR QL: NEGATIVE
NON-SQ EPI CELLS #/AREA URNS LPF: ABNORMAL /LPF
NONHDLC SERPL-MCNC: 119 MG/DL
PH UR STRIP: 6 PH (ref 5–7)
POTASSIUM SERPL-SCNC: 4.3 MMOL/L (ref 3.4–5.3)
RBC #/AREA URNS AUTO: ABNORMAL /HPF
SODIUM SERPL-SCNC: 141 MMOL/L (ref 133–144)
SOURCE: ABNORMAL
SP GR UR STRIP: 1.01 (ref 1–1.03)
SPECIMEN SOURCE: ABNORMAL
TRIGL SERPL-MCNC: 91 MG/DL
UROBILINOGEN UR STRIP-ACNC: 0.2 EU/DL (ref 0.2–1)
WBC #/AREA URNS AUTO: ABNORMAL /HPF
WET PREP SPEC: ABNORMAL

## 2018-09-07 PROCEDURE — 36415 COLL VENOUS BLD VENIPUNCTURE: CPT | Performed by: NURSE PRACTITIONER

## 2018-09-07 PROCEDURE — G0296 VISIT TO DETERM LDCT ELIG: HCPCS | Performed by: NURSE PRACTITIONER

## 2018-09-07 PROCEDURE — 87086 URINE CULTURE/COLONY COUNT: CPT | Performed by: NURSE PRACTITIONER

## 2018-09-07 PROCEDURE — 80048 BASIC METABOLIC PNL TOTAL CA: CPT | Performed by: NURSE PRACTITIONER

## 2018-09-07 PROCEDURE — 99213 OFFICE O/P EST LOW 20 MIN: CPT | Mod: 25 | Performed by: NURSE PRACTITIONER

## 2018-09-07 PROCEDURE — 80061 LIPID PANEL: CPT | Performed by: NURSE PRACTITIONER

## 2018-09-07 PROCEDURE — 81001 URINALYSIS AUTO W/SCOPE: CPT | Performed by: NURSE PRACTITIONER

## 2018-09-07 PROCEDURE — 87210 SMEAR WET MOUNT SALINE/INK: CPT | Performed by: NURSE PRACTITIONER

## 2018-09-07 PROCEDURE — 99396 PREV VISIT EST AGE 40-64: CPT | Performed by: NURSE PRACTITIONER

## 2018-09-07 RX ORDER — HYDROCODONE BITARTRATE AND ACETAMINOPHEN 5; 325 MG/1; MG/1
1 TABLET ORAL EVERY 6 HOURS PRN
Qty: 15 TABLET | Refills: 0 | Status: SHIPPED | OUTPATIENT
Start: 2018-09-07 | End: 2018-09-13

## 2018-09-07 RX ORDER — METRONIDAZOLE 500 MG/1
500 TABLET ORAL 2 TIMES DAILY
Qty: 14 TABLET | Refills: 0 | Status: SHIPPED | OUTPATIENT
Start: 2018-09-07 | End: 2018-10-08

## 2018-09-07 ASSESSMENT — PAIN SCALES - GENERAL: PAINLEVEL: SEVERE PAIN (7)

## 2018-09-07 NOTE — MR AVS SNAPSHOT
After Visit Summary   9/7/2018    Georgie Patino    MRN: [id number]           Patient Information     Date Of Birth          1955        Visit Information        Provider Department      9/7/2018 8:20 AM Marlys Wade APRN [facility]        Today's Diagnoses     Routine general medical examination at a health care facility    -  1    Screening for HIV (human immunodeficiency virus)        Urinary frequency        Vaginal itching        Benign essential hypertension        Hyperlipidemia LDL goal <100        Personal history of tobacco use        Pelvic pain in female        Bacterial vaginosis        Microscopic hematuria          Care Instructions      Preventive Health Recommendations  Female Ages 50 - 64    Yearly exam: See your health care provider every year in order to  o Review health changes.   o Discuss preventive care.    o Review your medicines if your doctor has prescribed any.      Get a Pap test every three years (unless you have an abnormal result and your provider advises testing more often).    If you get Pap tests with HPV test, you only need to test every 5 years, unless you have an abnormal result.     You do not need a Pap test if your uterus was removed (hysterectomy) and you have not had cancer.    You should be tested each year for STDs (sexually transmitted diseases) if you're at risk.     Have a mammogram every 1 to 2 years.    Have a colonoscopy at age 50, or have a yearly FIT test (stool test). These exams screen for colon cancer.      Have a cholesterol test every 5 years, or more often if advised.    Have a diabetes test (fasting glucose) every three years. If you are at risk for diabetes, you should have this test more often.     If you are at risk for osteoporosis (brittle bone disease), think about having a bone density scan (DEXA).    Shots: Get a flu shot each year. Get a tetanus shot every 10 years.    Nutrition:     Eat at least 5  servings of fruits and vegetables each day.    Eat whole-grain bread, whole-wheat pasta and brown rice instead of white grains and rice.    Get adequate Calcium and Vitamin D.     Lifestyle    Exercise at least 150 minutes a week (30 minutes a day, 5 days a week). This will help you control your weight and prevent disease.    Limit alcohol to one drink per day.    No smoking.     Wear sunscreen to prevent skin cancer.     See your dentist every six months for an exam and cleaning.    See your eye doctor every 1 to 2 years.      Lung Cancer Screening   Frequently Asked Questions  If you are at high-risk for lung cancer, getting screened with low-dose computed tomography (LDCT) every year can help save your life. This handout offers answers to some of the most common questions about lung cancer screening. If you have other questions, please call [phone] ([phone]).     What is it?  Lung cancer screening uses special X-ray technology to create an image of your lung tissue. The exam is quick and easy and takes less than 10 seconds. We don t give you any medicine or use any needles. You can eat before and after the exam. You don t need to change your clothes as long as the clothing on your chest doesn t contain metal. But, you do need to be able to hold your breath for at least 6 seconds during the exam.    What is the goal of lung cancer screening?  The goal of lung cancer screening is to save lives. Many times, lung cancer is not found until a person starts having physical symptoms. Lung cancer screening can help detect lung cancer in the earliest stages when it may be easier to treat.    Who should be screened for lung cancer?  We suggest lung cancer screening for anyone who is at high-risk for lung cancer. You are in the high-risk group if you:      are between the ages of 55 and 79, and    have smoked at least 1 pack of cigarettes a day for 30 or more years, and    still smoke or have quit within the  past 15 years.    However, if you have a new cough or shortness of breath, you should talk to your doctor before being screened.    Some national lung health advocacy groups also recommend screening for people ages 50 to 79 who have smoked an average of 1 pack of cigarettes a day for 20 years. They must also have at least 1 other risk factor for lung cancer, not including exposure to secondhand smoke. Other risk factors are having had cancer in the past, emphysema, pulmonary fibrosis, COPD, a family history of lung cancer, or exposure to certain materials such as arsenic, asbestos, beryllium, cadmium, chromium, diesel fumes, nickel, radon or silica. Your care team can help you know if you have one of these risk factors.     Why does it matter if I have symptoms?  Certain symptoms can be a sign that you have a condition in your lungs that should be checked and treated by your doctor. These symptoms include fever, chest pain, a new or changing cough, shortness of breath that you have never felt before, coughing up blood or unexplained weight loss. Having any of these symptoms can greatly affect the results of lung cancer screening.       Should all smokers get an LDCT lung cancer screening exam?  It depends. Lung cancer screening is for a very specific group of men and women who have a history of heavy smoking over a long period of time (see  Who should be screened for lung cancer  above).  I am in the high-risk group, but have been diagnosed with cancer in the past. Is LDCT lung cancer screening right for me?  In some cases, you should not have LDCT lung screening, such as when your doctor is already following your cancer with CT scan studies. Your doctor will help you decide if LDCT lung screening is right for you.  Do I need to have a screening exam every year?  Yes. If you are in the high-risk group described earlier, you should get an LDCT lung cancer screening exam every year until you are 79, or are no longer  willing or able to undergo screening and possible procedures to diagnose and treat lung cancer.  How effective is LDCT at preventing death from lung cancer?  Studies have shown that LDCT lung cancer screening can lower the risk of death from lung cancer by 20 percent in people who are at high-risk.  What are the risks?  There are some risks and limitations of LDCT lung cancer screening. We want to make sure you understand the risks and benefits, so please let us know if you have any questions. Your doctor may want to talk with you more about these risks.    Radiation exposure: As with any exam that uses radiation, there is a very small increased risk of cancer. The amount of radiation in LDCT is small--about the same amount a person would get from a mammogram. Your doctor orders the exam when he or she feels the potential benefits outweigh the risks.    False negatives: No test is perfect, including LDCT. It is possible that you may have a medical condition, including lung cancer, that is not found during your exam. This is called a false negative result.    False positives and more testing: LDCT very often finds something in the lung that could be cancer, but in fact is not. This is called a false positive result. False positive tests often cause anxiety. To make sure these findings are not cancer, you may need to have more tests. These tests will be done only if you give us permission. Sometimes patients need a treatment that can have side effects, such as a biopsy. For more information on false positives, see  What can I expect from the results?     Findings not related to lung cancer: Your LDCT exam also takes pictures of areas of your body next to your lungs. In a very small number of cases, the CT scan will show an abnormal finding in one of these areas, such as your kidneys, adrenal glands, liver or thyroid. This finding may not be serious, but you may need more tests. Your doctor can help you decide what  other tests you may need, if any.  What can I expect from the results?  About 1 out of 4 LDCT exams will find something that may need more tests. Most of the time, these findings are lung nodules. Lung nodules are very small collections of tissue in the lung. These nodules are very common, and the vast majority--more than 97 percent--are not cancer (benign). Most are normal lymph nodes or small areas of scarring from past infections.  But, if a small lung nodule is found to be cancer, the cancer can be cured more than 90 percent of the time. To know if the nodule is cancer, we may need to get more images before your next yearly screening exam. If the nodule has suspicious features (for example, it is large, has an odd shape or grows over time), we will refer you to a specialist for further testing.  Will my doctor also get the results?  Yes. Your doctor will get a copy of your results.  Is it okay to keep smoking now that there s a cancer screening exam?  No. Tobacco is one of the strongest cancer-causing agents. It causes not only lung cancer, but other cancers and cardiovascular (heart) diseases as well. The damage caused by smoking builds over time. This means that the longer you smoke, the higher your risk of disease. While it is never too late to quit, the sooner you quit, the better.  Where can I find help to quit smoking?  The best way to prevent lung cancer is to stop smoking. If you have already quit smoking, congratulations and keep it up! For help on quitting smoking, please call [employer] at 5-672-357-IJXJ ([phone]) or the American Cancer Society at 1-543.194.4200 to find local resources near you.  One-on-one health coaching:  If you d prefer to work individually with a health care provider on tobacco cessation, we offer:      Medication Therapy Management:  Our specially trained pharmacists work closely with you and your doctor to help you quit smoking.  Call [phone] or [phone] (toll free).      Can Do: Health coaching offered by [facility].  [email or web address]        [facility]    If you have any questions regarding to your visit please contact your care team:     Team Pink:   Clinic Hours Telephone Number   Internal Medicine:  Dr. Helen Wade NP 7am-7pm  Monday - Thursday   7am-5pm  Fridays  [phone]  (Appointment scheduling available 24/7)   Urgent Care - [place] and [facility] - 11am-9pm Monday-Friday Saturday-Sunday- 9am-5pm   [place] - 5pm-9pm Monday-Friday Saturday-Sunday- 9am-5pm  [phone] - [place]  [phone] - [place]       What options do I have for a visit other than an office visit? We offer electronic visits (e-visits) and telephone visits, when medically appropriate.  Please check with your medical insurance to see if these types of visits are covered, as you will be responsible for any charges that are not paid by your insurance.      You can use [employer] (secure electronic communication) to access to your chart, send your primary care provider a message, or make an appointment. Ask a team member how to get started.     For a price quote for your services, please call our Consumer Price Line at [phone] or our Imaging Cost estimation line at [phone] (for imaging tests).  Val Shah CMA             Follow-ups after your visit        Your next 10 appointments already scheduled     Oct 12, 2018  9:00 AM CDT   Ech Complete with [id number]   [facility]    [address] [place] [id number] [phone]           1.  Please bring or wear a comfortable two-piece outfit. 2.  You may eat, drink and take your normal medicines. 3.  For any questions that cannot be answered, please contact the ordering physician 4.  Please do not wear perfumes or scented lotions on the day of your exam.            Oct  "12, 2018 10:00 AM CDT   Return Visit with Jitendra Fernandez MD   [facility] ([facility])    [address]  [place] [address] [phone]              Future tests that were ordered for you today     Open Future Orders        Priority Expected Expires Ordered    CT Abdomen Pelvis w/o Contrast Routine  9/7/2019 9/7/2018    CT Chest Lung Cancer Scrn Low Dose wo Routine  9/7/2019 9/7/2018            Who to contact     If you have questions or need follow up information about today's clinic visit or your schedule please contact [facility] directly at [phone].  Normal or non-critical lab and imaging results will be communicated to you by MyChart, letter or phone within 4 business days after the clinic has received the results. If you do not hear from us within 7 days, please contact the clinic through MyChart or phone. If you have a critical or abnormal lab result, we will notify you by phone as soon as possible.  Submit refill requests through [employer] or call your pharmacy and they will forward the refill request to us. Please allow 3 business days for your refill to be completed.          Additional Information About Your Visit        Care EveryWhere ID     This is your Care EveryWhere ID. This could be used by other organizations to access your [facility] medical records  [id number]        Your Vitals Were     Pulse Temperature Respirations Height Pulse Oximetry BMI (Body Mass Index)    73 97.2  F (36.2  C) (Oral) 18 5' 7\" (1.702 m) 94% 32.26 kg/m2       Blood Pressure from Last 3 Encounters:   09/07/18 130/80   06/12/18 166/82   03/16/18 (!) 143/94    Weight from Last 3 Encounters:   09/07/18 206 lb (93.4 kg)   06/12/18 206 lb 3.2 oz (93.5 kg)   03/16/18 205 lb 12.8 oz (93.4 kg)              We Performed the Following     *UA reflex to Microscopic and Culture ([place] and [facility] (except [place] and [place])     " Basic metabolic panel     Lipid panel reflex to direct LDL Fasting     Prof Fee: Shared Decision Making Visit for Lung Cancer Screening     Urine Culture Aerobic Bacterial     Urine Microscopic     Wet prep          Today's Medication Changes          These changes are accurate as of 9/7/18  9:11 AM.  If you have any questions, ask your nurse or doctor.               Start taking these medicines.        Dose/Directions    HYDROcodone-acetaminophen 5-325 MG per tablet   Commonly known as:  NORCO   Used for:  Pelvic pain in female   Started by:  Marlys Wade APRN CNP        Dose:  1 tablet   Take 1 tablet by mouth every 6 hours as needed for severe pain   Quantity:  15 tablet   Refills:  0       metroNIDAZOLE 500 MG tablet   Commonly known as:  FLAGYL   Used for:  Bacterial vaginosis   Started by:  Marlys Wade APRN CNP        Dose:  500 mg   Take 1 tablet (500 mg) by mouth 2 times daily   Quantity:  14 tablet   Refills:  0            Where to get your medicines      These medications were sent to [id number] - [place], [address] [place] [address]     Phone:  [phone]     metroNIDAZOLE 500 MG tablet         Some of these will need a paper prescription and others can be bought over the counter.  Ask your nurse if you have questions.     Bring a paper prescription for each of these medications     HYDROcodone-acetaminophen 5-325 MG per tablet               Information about OPIOIDS     PRESCRIPTION OPIOIDS: WHAT YOU NEED TO KNOW   We gave you an opioid (narcotic) pain medicine. It is important to manage your pain, but opioids are not always the best choice. You should first try all the other options your care team gave you. Take this medicine for as short a time (and as few doses) as possible.    Some activities can increase your pain, such as bandage changes or therapy sessions. It may help to take your pain medicine 30 to 60 minutes before these  activities. Reduce your stress by getting enough sleep, working on hobbies you enjoy and practicing relaxation or meditation. Talk to your care team about ways to manage your pain beyond prescription opioids.    These medicines have risks:    DO NOT drive when on new or higher doses of pain medicine. These medicines can affect your alertness and reaction times, and you could be arrested for driving under the influence (DUI). If you need to use opioids long-term, talk to your care team about driving.    DO NOT operate heavy machinery    DO NOT do any other dangerous activities while taking these medicines.    DO NOT drink any alcohol while taking these medicines.     If the opioid prescribed includes acetaminophen, DO NOT take with any other medicines that contain acetaminophen. Read all labels carefully. Look for the word  acetaminophen  or  Tylenol.  Ask your pharmacist if you have questions or are unsure.    You can get addicted to pain medicines, especially if you have a history of addiction (chemical, alcohol or substance dependence). Talk to your care team about ways to reduce this risk.    All opioids tend to cause constipation. Drink plenty of water and eat foods that have a lot of fiber, such as fruits, vegetables, prune juice, apple juice and high-fiber cereal. Take a laxative (Miralax, milk of magnesia, Colace, Senna) if you don t move your bowels at least every other day. Other side effects include upset stomach, sleepiness, dizziness, throwing up, tolerance (needing more of the medicine to have the same effect), physical dependence and slowed breathing.    Store your pills in a secure place, locked if possible. We will not replace any lost or stolen medicine. If you don t finish your medicine, please throw away (dispose) as directed by your pharmacist. The [place] Pollution Control Agency has more information about safe disposal: https://[email or web address].[facility].[email or web address]-medications          Primary Care Provider Office Phone # Fax #    Marlys Wade, APRN [facility] [phone] [address]  [facility] [address]        Equal Access to Services     CAROLINA LOVELL : Hadii aad ku hadasho Soomaali, waaxda luqadaha, qaybta kaalmada adeegyada, waxkaleb idiin haydorothyn adecarlos enrique martinez laYawedna garza. So [place] -[phone].    ATENCIÓN: Si habla español, tiene a thomas disposición servicios gratuitos de asistencia lingüística. Llame al [phone].    We comply with applicable federal civil rights laws and [place] laws. We do not discriminate on the basis of race, color, national origin, age, disability, sex, sexual orientation, or gender identity.            Thank you!     Thank you for choosing [facility]  for your care. Our goal is always to provide you with excellent care. Hearing back from our patients is one way we can continue to improve our services. Please take a few minutes to complete the written survey that you may receive in the mail after your visit with us. Thank you!             Your Updated Medication List - Protect others around you: Learn how to safely use, store and throw away your medicines at www.disposemymeds.org.          This list is accurate as of 9/7/18  9:11 AM.  Always use your most recent med list.                   Brand Name Dispense Instructions for use Diagnosis    acetaminophen 325 MG tablet    TYLENOL    100 tablet    Take 2 tablets (650 mg) by mouth every 4 hours as needed for other (surgical pain)    S/P ascending aortic aneurysm repair, Acute post-operative pain       aspirin 81 MG EC tablet     30 tablet    Take 1 tablet (81 mg) by mouth daily    S/P ascending aortic aneurysm repair       atorvastatin 40 MG tablet    LIPITOR    90 tablet    Take 1 tablet (40 mg) by mouth daily    CARDIOVASCULAR SCREENING; LDL GOAL LESS THAN 160       HYDROcodone-acetaminophen 5-325 MG per tablet    NORCO    15 tablet    Take 1 tablet by mouth every 6 hours  as needed for severe pain    Pelvic pain in female       lisinopril 10 MG tablet    PRINIVIL/ZESTRIL    90 tablet    Take 1 tablet (10 mg) by mouth daily    Ascending aortic aneurysm (H), Benign essential hypertension       melatonin 3 MG tablet     30 tablet    Take 1 tablet (3 mg) by mouth nightly as needed for sleep    Ascending aortic aneurysm (H), S/P ascending aortic aneurysm repair       metoprolol tartrate 50 MG tablet    LOPRESSOR    180 tablet    Take 1 tablet (50 mg) by mouth 2 times daily    Palpitations       metroNIDAZOLE 500 MG tablet    FLAGYL    14 tablet    Take 1 tablet (500 mg) by mouth 2 times daily    Bacterial vaginosis       ZANTAC PO      Take 1 tablet by mouth daily

## 2018-09-07 NOTE — PATIENT INSTRUCTIONS
Preventive Health Recommendations  Female Ages 50 - 64    Yearly exam: See your health care provider every year in order to  o Review health changes.   o Discuss preventive care.    o Review your medicines if your doctor has prescribed any.      Get a Pap test every three years (unless you have an abnormal result and your provider advises testing more often).    If you get Pap tests with HPV test, you only need to test every 5 years, unless you have an abnormal result.     You do not need a Pap test if your uterus was removed (hysterectomy) and you have not had cancer.    You should be tested each year for STDs (sexually transmitted diseases) if you're at risk.     Have a mammogram every 1 to 2 years.    Have a colonoscopy at age 50, or have a yearly FIT test (stool test). These exams screen for colon cancer.      Have a cholesterol test every 5 years, or more often if advised.    Have a diabetes test (fasting glucose) every three years. If you are at risk for diabetes, you should have this test more often.     If you are at risk for osteoporosis (brittle bone disease), think about having a bone density scan (DEXA).    Shots: Get a flu shot each year. Get a tetanus shot every 10 years.    Nutrition:     Eat at least 5 servings of fruits and vegetables each day.    Eat whole-grain bread, whole-wheat pasta and brown rice instead of white grains and rice.    Get adequate Calcium and Vitamin D.     Lifestyle    Exercise at least 150 minutes a week (30 minutes a day, 5 days a week). This will help you control your weight and prevent disease.    Limit alcohol to one drink per day.    No smoking.     Wear sunscreen to prevent skin cancer.     See your dentist every six months for an exam and cleaning.    See your eye doctor every 1 to 2 years.      Lung Cancer Screening   Frequently Asked Questions  If you are at high-risk for lung cancer, getting screened with low-dose computed tomography (LDCT) every year can help save  your life. This handout offers answers to some of the most common questions about lung cancer screening. If you have other questions, please call [phone] ([phone]).     What is it?  Lung cancer screening uses special X-ray technology to create an image of your lung tissue. The exam is quick and easy and takes less than 10 seconds. We don t give you any medicine or use any needles. You can eat before and after the exam. You don t need to change your clothes as long as the clothing on your chest doesn t contain metal. But, you do need to be able to hold your breath for at least 6 seconds during the exam.    What is the goal of lung cancer screening?  The goal of lung cancer screening is to save lives. Many times, lung cancer is not found until a person starts having physical symptoms. Lung cancer screening can help detect lung cancer in the earliest stages when it may be easier to treat.    Who should be screened for lung cancer?  We suggest lung cancer screening for anyone who is at high-risk for lung cancer. You are in the high-risk group if you:      are between the ages of 55 and 79, and    have smoked at least 1 pack of cigarettes a day for 30 or more years, and    still smoke or have quit within the past 15 years.    However, if you have a new cough or shortness of breath, you should talk to your doctor before being screened.    Some national lung health advocacy groups also recommend screening for people ages 50 to 79 who have smoked an average of 1 pack of cigarettes a day for 20 years. They must also have at least 1 other risk factor for lung cancer, not including exposure to secondhand smoke. Other risk factors are having had cancer in the past, emphysema, pulmonary fibrosis, COPD, a family history of lung cancer, or exposure to certain materials such as arsenic, asbestos, beryllium, cadmium, chromium, diesel fumes, nickel, radon or silica. Your care team can help you know if you have one of  these risk factors.     Why does it matter if I have symptoms?  Certain symptoms can be a sign that you have a condition in your lungs that should be checked and treated by your doctor. These symptoms include fever, chest pain, a new or changing cough, shortness of breath that you have never felt before, coughing up blood or unexplained weight loss. Having any of these symptoms can greatly affect the results of lung cancer screening.       Should all smokers get an LDCT lung cancer screening exam?  It depends. Lung cancer screening is for a very specific group of men and women who have a history of heavy smoking over a long period of time (see  Who should be screened for lung cancer  above).  I am in the high-risk group, but have been diagnosed with cancer in the past. Is LDCT lung cancer screening right for me?  In some cases, you should not have LDCT lung screening, such as when your doctor is already following your cancer with CT scan studies. Your doctor will help you decide if LDCT lung screening is right for you.  Do I need to have a screening exam every year?  Yes. If you are in the high-risk group described earlier, you should get an LDCT lung cancer screening exam every year until you are 79, or are no longer willing or able to undergo screening and possible procedures to diagnose and treat lung cancer.  How effective is LDCT at preventing death from lung cancer?  Studies have shown that LDCT lung cancer screening can lower the risk of death from lung cancer by 20 percent in people who are at high-risk.  What are the risks?  There are some risks and limitations of LDCT lung cancer screening. We want to make sure you understand the risks and benefits, so please let us know if you have any questions. Your doctor may want to talk with you more about these risks.    Radiation exposure: As with any exam that uses radiation, there is a very small increased risk of cancer. The amount of radiation in LDCT is  small--about the same amount a person would get from a mammogram. Your doctor orders the exam when he or she feels the potential benefits outweigh the risks.    False negatives: No test is perfect, including LDCT. It is possible that you may have a medical condition, including lung cancer, that is not found during your exam. This is called a false negative result.    False positives and more testing: LDCT very often finds something in the lung that could be cancer, but in fact is not. This is called a false positive result. False positive tests often cause anxiety. To make sure these findings are not cancer, you may need to have more tests. These tests will be done only if you give us permission. Sometimes patients need a treatment that can have side effects, such as a biopsy. For more information on false positives, see  What can I expect from the results?     Findings not related to lung cancer: Your LDCT exam also takes pictures of areas of your body next to your lungs. In a very small number of cases, the CT scan will show an abnormal finding in one of these areas, such as your kidneys, adrenal glands, liver or thyroid. This finding may not be serious, but you may need more tests. Your doctor can help you decide what other tests you may need, if any.  What can I expect from the results?  About 1 out of 4 LDCT exams will find something that may need more tests. Most of the time, these findings are lung nodules. Lung nodules are very small collections of tissue in the lung. These nodules are very common, and the vast majority--more than 97 percent--are not cancer (benign). Most are normal lymph nodes or small areas of scarring from past infections.  But, if a small lung nodule is found to be cancer, the cancer can be cured more than 90 percent of the time. To know if the nodule is cancer, we may need to get more images before your next yearly screening exam. If the nodule has suspicious features (for example, it  is large, has an odd shape or grows over time), we will refer you to a specialist for further testing.  Will my doctor also get the results?  Yes. Your doctor will get a copy of your results.  Is it okay to keep smoking now that there s a cancer screening exam?  No. Tobacco is one of the strongest cancer-causing agents. It causes not only lung cancer, but other cancers and cardiovascular (heart) diseases as well. The damage caused by smoking builds over time. This means that the longer you smoke, the higher your risk of disease. While it is never too late to quit, the sooner you quit, the better.  Where can I find help to quit smoking?  The best way to prevent lung cancer is to stop smoking. If you have already quit smoking, congratulations and keep it up! For help on quitting smoking, please call [employer] at 8-308-651-CFYO ([phone]) or the American [employer] Society at 1-371.361.5920 to find local resources near you.  One-on-one health coaching:  If you d prefer to work individually with a health care provider on tobacco cessation, we offer:      Medication Therapy Management:  Our specially trained pharmacists work closely with you and your doctor to help you quit smoking.  Call [phone] or [phone] (toll free).     Can Do: Health coaching offered by [facility].  [email or web address]        [facility]    If you have any questions regarding to your visit please contact your care team:     Team Pink:   Clinic Hours Telephone Number   Internal Medicine:  Dr. Helen Wade NP 7am-7pm  Monday - Thursday   7am-5pm  Fridays  [phone]  (Appointment scheduling available 24/7)   Urgent Care - [place] [facility] [facility] [place] [facility] - 11am-9pm Monday-Friday Saturday-Sunday- 9am-5pm   [place] - 5pm-9pm Monday-Friday Saturday-Sunday- 9am-5pm  [phone] - [place] [facility]  [phone] - [place]       What options do I have for a visit other than an  office visit? We offer electronic visits (e-visits) and telephone visits, when medically appropriate.  Please check with your medical insurance to see if these types of visits are covered, as you will be responsible for any charges that are not paid by your insurance.      You can use [employer] (secure electronic communication) to access to your chart, send your primary care provider a message, or make an appointment. Ask a team member how to get started.     For a price quote for your services, please call our Consumer Price Line at [phone] or our Imaging Cost estimation line at [phone] (for imaging tests).  Val Shah CMA

## 2018-09-07 NOTE — PROGRESS NOTES
SUBJECTIVE:   CC: Georgie Patino is an 63 year old woman who presents for preventive health visit.     Healthy Habits:    Do you get at least three servings of calcium containing foods daily (dairy, green leafy vegetables, etc.)? yes    Amount of exercise or daily activities, outside of work: 0 day(s) per week    Problems taking medications regularly No    Medication side effects: Lipitor    Have you had an eye exam in the past two years? yes    Do you see a dentist twice per year? once    Do you have sleep apnea, excessive snoring or daytime drowsiness?no      Hyperlipidemia Follow-Up      Rate your low fat/cholesterol diet?: good    Taking statin?  Stopped due to abdominal pain    Other lipid medications/supplements?:  none    Hypertension Follow-up      Outpatient blood pressures are not being checked.    Low Salt Diet: no added salt    Patient notes pelvic pain for the past several weeks.  She notes increased urinary frequency, pelvic pressure.  She notes a pulling sensation down her legs.  She will note burning after urinating.  She denies hematuria.  She notes some vaginal irritation.  She denies discharge.    Today's PHQ-2 Score:   PHQ-2 ( 1999 Pfizer) 9/7/2018 8/15/2017   Q1: Little interest or pleasure in doing things 0 0   Q2: Feeling down, depressed or hopeless 0 0   PHQ-2 Score 0 0       Abuse: Current or Past(Physical, Sexual or Emotional)- No  Do you feel safe in your environment - Yes    Social History   Substance Use Topics     Smoking status: Former Smoker     Packs/day: 0.05     Years: 40.00     Types: Cigarettes     Quit date: 10/27/2017     Smokeless tobacco: Never Used      Comment: 2 cigs daily     Alcohol use 0.5 - 1.0 oz/week     1 - 2 Standard drinks or equivalent per week      Comment: 3-4 drinks per week      If you drink alcohol do you typically have >3 drinks per day or >7 drinks per week? No                     Reviewed orders with patient.  Reviewed health maintenance and updated  orders accordingly - Yes  Labs reviewed in EPIC  BP Readings from Last 3 Encounters:   09/07/18 130/80   06/12/18 166/82   03/16/18 (!) 143/94    Wt Readings from Last 3 Encounters:   09/07/18 206 lb (93.4 kg)   06/12/18 206 lb 3.2 oz (93.5 kg)   03/16/18 205 lb 12.8 oz (93.4 kg)                  Patient Active Problem List   Diagnosis     CARDIOVASCULAR SCREENING; LDL GOAL LESS THAN 160     Obesity     Benign essential hypertension     Family history of sudden cardiac death     Hypertension     Anxiety     S/P ascending aortic aneurysm repair     Former smoker     Thyroid nodule     Past Surgical History:   Procedure Laterality Date     COLONOSCOPY WITH CO2 INSUFFLATION N/A 3/15/2017    Procedure: COLONOSCOPY WITH CO2 INSUFFLATION;  Surgeon: Duane, William Charles, MD;  Location: [facility] OR     DILATION AND CURETTAGE  age 20     REPAIR ANEURYSM ASCENDING AORTA N/A 10/27/2017    Procedure: REPAIR ANEURYSM ASCENDING AORTA;  Median Sternotomy, Cardiopulmonary bypass, Ascending Aorta Aneurysm Repair using Hemashield Platinum Woven Double Velour Graft 34cm X 30cm.;  Surgeon: Rubio Piña MD;  Location: [facility] OR     [place] teeth removed         Social History   Substance Use Topics     Smoking status: Former Smoker     Packs/day: 0.05     Years: 40.00     Types: Cigarettes     Quit date: 10/27/2017     Smokeless tobacco: Never Used      Comment: 2 cigs daily     Alcohol use 0.5 - 1.0 oz/week     1 - 2 Standard drinks or equivalent per week      Comment: 3-4 drinks per week      Family History   Problem Relation Age of Onset     Respiratory Mother      copd     Cancer Maternal Grandmother      Leg     Alzheimer Disease Maternal Grandfather      HEART DISEASE Paternal Grandfather      HEART DISEASE Brother 54     Heart Attack      Cancer Sister      Lung cancer age 57-smoker d age 57         Allergies   Allergen Reactions     Blood Transfusion Related (Informational Only) Other (See Comments)     Patient has a history  of a clinically significant antibody against RBC antigens.  A delay in compatible RBCs may occur.     Recent Labs   Lab Test  11/10/17   1527  11/01/17   0640   10/28/17   0341   09/12/17   1505  08/15/17   1655   05/30/12   1156   A1C   --    --    --   5.5   --    --    --    --    --    LDL   --    --    --    --    --   47  148*   --   148*   HDL   --    --    --    --    --   60  72   --   50   TRIG   --    --    --    --    --   77  96   --   124   CR  0.53  0.51*   < >  0.58   < >   --   0.57   < >   --    GFRESTIMATED  >90  >90   < >  >90   < >   --   >90   < >   --    GFRESTBLACK  >90  >90   < >  >90   < >   --   >90   < >   --    POTASSIUM  3.8  3.8   < >  4.4   < >   --   4.3   < >   --    TSH   --    --    --    --    --    --   0.73   --    --     < > = values in this interval not displayed.        Patient over age 50, mutual decision to screen reflected in health maintenance.    Pertinent mammograms are reviewed under the imaging tab.  History of abnormal Pap smear:   Last 3 Pap and HPV Results:   PAP / HPV Latest Ref Rng & Units 3/14/2016 5/30/2012   PAP - NIL NIL   HPV 16 DNA NEG Negative -   HPV 18 DNA NEG Negative -   OTHER HR HPV NEG Negative -     Reviewed and updated as needed this visit by clinical staff  Tobacco  Allergies  Meds  Med Hx  Surg Hx  Fam Hx  Soc Hx        Reviewed and updated as needed this visit by Provider  Allergies  Meds            ROS:  CONSTITUTIONAL: NEGATIVE for fever, chills, change in weight  INTEGUMENTARY/SKIN: NEGATIVE for worrisome rashes, moles or lesions  EYES: NEGATIVE for vision changes or irritation  ENT: NEGATIVE for ear, mouth and throat problems  RESP: NEGATIVE for significant cough or SOB  BREAST: NEGATIVE for masses, tenderness or discharge  CV: NEGATIVE for chest pain, palpitations or peripheral edema  GI: NEGATIVE for nausea, abdominal pain, heartburn, or change in bowel habits   menopausal female: dysuria, frequency and urgency  MUSCULOSKELETAL:  "NEGATIVE for significant arthralgias or myalgia  NEURO: NEGATIVE for weakness, dizziness or paresthesias  PSYCHIATRIC: NEGATIVE for changes in mood or affect     OBJECTIVE:   /80  Pulse 73  Temp 97.2  F (36.2  C) (Oral)  Resp 18  Ht 5' 7\" (1.702 m)  Wt 206 lb (93.4 kg)  SpO2 94%  BMI 32.26 kg/m2  EXAM:  GENERAL: healthy, alert and no distress  EYES: Eyes grossly normal to inspection, PERRL and conjunctivae and sclerae normal  HENT: ear canals and TM's normal, nose and mouth without ulcers or lesions  NECK: no adenopathy, no asymmetry, masses, or scars and thyroid normal to palpation  RESP: lungs clear to auscultation - no rales, rhonchi or wheezes  BREAST: normal without masses, tenderness or nipple discharge and no palpable axillary masses or adenopathy  CV: regular rate and rhythm, normal S1 S2, no S3 or S4, no murmur, click or rub, no peripheral edema and peripheral pulses strong  ABDOMEN: soft, nontender, no hepatosplenomegaly, no masses and bowel sounds normal  MS: no gross musculoskeletal defects noted, no edema  PSYCH: mentation appears normal, affect normal/bright    Diagnostic Test Results:  Results for orders placed or performed in visit on 09/07/18 (from the past 24 hour(s))   *UA reflex to Microscopic and Culture ([place] [facility] (except [place] and [place])   Result Value Ref Range    Color Urine Yellow     Appearance Urine Clear     Glucose Urine Negative NEG^Negative mg/dL    Bilirubin Urine Negative NEG^Negative    Ketones Urine Negative NEG^Negative mg/dL    Specific Gravity Urine 1.010 1.003 - 1.035    Blood Urine Large (A) NEG^Negative    pH Urine 6.0 5.0 - 7.0 pH    Protein Albumin Urine Negative NEG^Negative mg/dL    Urobilinogen Urine 0.2 0.2 - 1.0 EU/dL    Nitrite Urine Negative NEG^Negative    Leukocyte Esterase Urine Small (A) NEG^Negative    Source Midstream Urine    Wet prep   Result Value Ref Range    Specimen Description Vagina     Wet Prep No Trichomonas seen "     Wet Prep Clue cells seen (A)     Wet Prep No yeast seen    Urine Microscopic   Result Value Ref Range    WBC Urine 0 - 5 OTO5^0 - 5 /HPF    RBC Urine 25-50 (A) OTO2^O - 2 /HPF    Squamous Epithelial /LPF Urine Few FEW^Few /LPF       ASSESSMENT/PLAN:   1. Routine general medical examination at a health care facility    - Urine Microscopic    2. Screening for HIV (human immunodeficiency virus)  Patient declines.    3. Urinary frequency    - *UA reflex to Microscopic and Culture ([place] [facility] (except [place] and [place])    4. Vaginal itching    - Wet prep    5. Benign essential hypertension  Stable.  Continue current treatment plan and medications.    - Basic metabolic panel    6. Hyperlipidemia LDL goal <100  Patient to consider restarting atorvastatin at a lower dose pending lab results.  - Lipid panel reflex to direct LDL Fasting    7. Personal history of tobacco use    - Prof Fee: Shared Decision Making Visit for Lung Cancer Screening  - CT Chest Lung Cancer Scrn Low Dose wo; Future    8. Pelvic pain in female  Given moderate amount of microscopic blood, I am concerned patient may be passing a kidney stone.  Will check CT as below.  Patient to push fluids.  - CT Abdomen Pelvis w/o Contrast; Future  - HYDROcodone-acetaminophen (NORCO) 5-325 MG per tablet; Take 1 tablet by mouth every 6 hours as needed for severe pain  Dispense: 15 tablet; Refill: 0    9. Bacterial vaginosis    - metroNIDAZOLE (FLAGYL) 500 MG tablet; Take 1 tablet (500 mg) by mouth 2 times daily  Dispense: 14 tablet; Refill: 0    10. Microscopic hematuria  Will rule out infection with culture.  Likely due to renal stone.  - Urine Culture Aerobic Bacterial    COUNSELING:   Reviewed preventive health counseling, as reflected in patient instructions       Regular exercise       Healthy diet/nutrition       Immunizations    Declined: Influenza due to Conscientious objector               HIV screeninx in teen years, 1x in adult  "years, and at intervals if high risk       Consider lung cancer screening for ages 55-80 years and 30 pack-year smoking history     BP Readings from Last 1 Encounters:   09/07/18 130/80     Estimated body mass index is 32.26 kg/(m^2) as calculated from the following:    Height as of this encounter: 5' 7\" (1.702 m).    Weight as of this encounter: 206 lb (93.4 kg).      Weight management plan: Discussed healthy diet and exercise guidelines and patient will follow up in 12 months in clinic to re-evaluate.     reports that she quit smoking about 10 months ago. Her smoking use included Cigarettes. She has a 2.00 pack-year smoking history. She has never used smokeless tobacco.      Counseling Resources:  ATP IV Guidelines  Pooled Cohorts Equation Calculator  Breast Cancer Risk Calculator  FRAX Risk Assessment  ICSI Preventive Guidelines  Dietary Guidelines for Americans, 2010  [employer]'s MyPlate  ASA Prophylaxis  Lung CA Screening    [facility]    Lung Cancer Screening Shared Decision Making Visit     Georgie Patino is eligible for lung cancer screening on the basis of the information provided in my signed lung cancer screening order.     I have discussed with patient the risks and benefits of screening for lung cancer with low-dose CT.     The risks include:  radiation exposure: one low dose chest CT has as much ionizing radiation as about 15 chest x-rays or 6 months of background radiation living in [place]    false positives: 96% of positive findings/nodules are NOT cancer, but some might still require additional diagnostic evaluation, including biopsy  over-diagnosis: some slow growing cancers that might never have been clinically significant will be detected and treated unnecessarily }    The benefit of early detection of lung cancer is contingent upon adherence to annual screening or more frequent follow up if indicated.     Furthermore, reaping the benefits of screening " requires Georgie Patino to be willing and physically able to undergo diagnostic procedures, if indicated. Although no specific guide is available for determining severity of comorbidities, it is reasonable to withhold screening in patients who have greater mortality risk from other diseases.     We did discuss that the only way to prevent lung cancer is to not smoke.     I did not offer risk estimation using a calculator such as this one:    ShouldIScreen

## 2018-09-08 LAB
BACTERIA SPEC CULT: NORMAL
BACTERIA SPEC CULT: NORMAL
SPECIMEN SOURCE: NORMAL

## 2018-09-10 ENCOUNTER — RADIANT APPOINTMENT (OUTPATIENT)
Dept: CT IMAGING | Facility: CLINIC | Age: 63
End: 2018-09-10
Attending: NURSE PRACTITIONER
Payer: COMMERCIAL

## 2018-09-10 DIAGNOSIS — R10.2 PELVIC PAIN IN FEMALE: ICD-10-CM

## 2018-09-10 DIAGNOSIS — Z87.891 PERSONAL HISTORY OF TOBACCO USE: ICD-10-CM

## 2018-09-10 PROCEDURE — 74176 CT ABD & PELVIS W/O CONTRAST: CPT | Mod: TC

## 2018-09-10 PROCEDURE — G0297 LDCT FOR LUNG CA SCREEN: HCPCS | Mod: TC

## 2018-09-10 NOTE — PROGRESS NOTES
Dear Georgie,    Your recent test results are attached.      Normal Chest CT.    If you have any questions please feel free to contact [phone] or myself via [employer].    Sincerely,  Marlys Wade, CNP

## 2018-09-10 NOTE — LETTER
"                                                     [facility]  [address] [place] [address]. [place] [address]    September 11, 2018    Georgie Patino  [address] [facility] [address]          Dear Georgie,  Nas Chest CT.   Enclosed is a copy of your results.     Results for orders placed or performed in visit on 09/10/18   CT Chest Lung Cancer Scrn Low Dose wo    Narrative    CT CHEST LUNG CANCER  SCREEN LOW DOSE  WITHOUT CONTRAST September 10,  2018 8:27 AM    HISTORY: Personal history of tobacco use.    TECHNIQUE: Scans obtained from the apices through the diaphragm  without IV contrast. Low dose CT chest technique was utilized.  Radiation dose for this scan was reduced using automated exposure  control, adjustment of the mA and/or kV according to patient size, or  iterative reconstruction technique.    COMPARISON: CT chest 9/5/2017.    FINDINGS:    Lungs: No worrisome focal airspace disease identified. Granulomatous  noted at the medial left upper lobe, and left lower lobe, stable.    Additional findings: No enlarging lymph nodes. Stable small  mediastinal lymph nodes. No axillary or internal mammary adenopathy.  Sternotomy is new in the interval. Thoracic aortic and coronary artery  calcification. Ascending thoracic aortic surgical change.  Cholelithiasis. Upper abdomen images limited by low-dose technique.      Impression    IMPRESSION:   1. ACR Assessment Category:  Lung-RADS Category 1. Negative, continue  annual screening, if clinically relevant (please order exam code IMG  2290). .   2. Significant Incidental Finding(s):  Category S: No.       Download the \"LungRADS Assessment Categories\" table at this site:   http://www.acr.org/Quality-Safety/Resources/LungRADS    GRISELDA SAEED MD       If you have any questions or concerns, please call myself or my nurse at [phone].      Sincerely,        Marlys Wade, CNP /pb  "

## 2018-09-11 ENCOUNTER — TELEPHONE (OUTPATIENT)
Dept: INTERNAL MEDICINE | Facility: CLINIC | Age: 63
End: 2018-09-11

## 2018-09-11 DIAGNOSIS — R31.9 HEMATURIA: ICD-10-CM

## 2018-09-11 DIAGNOSIS — N20.0 KIDNEY STONES: ICD-10-CM

## 2018-09-11 DIAGNOSIS — Z13.6 CARDIOVASCULAR SCREENING; LDL GOAL LESS THAN 160: ICD-10-CM

## 2018-09-11 DIAGNOSIS — R30.0 DYSURIA: Primary | ICD-10-CM

## 2018-09-11 RX ORDER — ATORVASTATIN CALCIUM 20 MG/1
20 TABLET, FILM COATED ORAL DAILY
Qty: 90 TABLET | Refills: 3 | Status: SHIPPED | OUTPATIENT
Start: 2018-09-11 | End: 2019-11-21

## 2018-09-11 NOTE — TELEPHONE ENCOUNTER
Patient notified of providers message as written.  Urology referral and lab order placed, prescription sent to pharmacy.  Patient verbalized understanding, she will call to schedule appointment with urology.       Notes Recorded by Marlys Wade APRN CNP on 9/11/2018 at 10:41 AM  Please call patient-    Her CT scan showed several large kidney stones in her left kidney and a 7 mm stone in her right kidney.  None are obstructing her ureter and I'm unsure that they are causing her pelvic pain.  I do think they are likely causing the blood in her urine.  Her urine culture was normal.  I would like her to follow-up with urology to discuss stones, hematuria and dysuria.  Her labs were normal.  I would like her to resume atorvastatin at 20 mg daily to see if this better tolerated and can help better control her cholesterol (#90, 3 RF).  Please have her return for fasting lipid panel in 3 months.    Thanks,  Marlys Wade, AZIZA Martínez RN

## 2018-09-13 DIAGNOSIS — R10.2 PELVIC PAIN IN FEMALE: ICD-10-CM

## 2018-09-13 NOTE — TELEPHONE ENCOUNTER
Controlled Substance Refill Request for HYDROcodone-acetaminophen (NORCO) 5-325 MG per tablet  Problem List Complete:  No     PROVIDER TO CONSIDER COMPLETION OF PROBLEM LIST AND OVERVIEW/CONTROLLED SUBSTANCE AGREEMENT    Last Written Prescription Date:  9/7/2018  Last Fill Quantity: 15,   # refills: 0    Last Office Visit with [facility] primary care provider: 9/7/2018    Future Office visit:   Next 5 appointments (look out 90 days)     Oct 12, 2018 10:00 AM CDT   Return Visit with Jitendra Fernandez MD   [facility] AT [facility] ([facility])    [address] [place] [id number] [phone]                  Controlled substance agreement on file: No.     Processing:  NA   checked in past 3 months?  No, route to RN

## 2018-09-13 NOTE — TELEPHONE ENCOUNTER
reviewed. No concerns.   Last dispensed on 9/7/18 #15 for a 4 day supply.    Celsa Ross RN  [facility]

## 2018-09-14 RX ORDER — HYDROCODONE BITARTRATE AND ACETAMINOPHEN 5; 325 MG/1; MG/1
TABLET ORAL
Qty: 15 TABLET | Refills: 0 | Status: SHIPPED | OUTPATIENT
Start: 2018-09-14 | End: 2018-11-12

## 2018-09-14 NOTE — TELEPHONE ENCOUNTER
Norco prescription at  for .  Patient called and informed.  Almita Barone,           Georgie picked up envelope 9-14-18

## 2018-09-21 ENCOUNTER — OFFICE VISIT (OUTPATIENT)
Dept: UROLOGY | Facility: CLINIC | Age: 63
End: 2018-09-21
Payer: COMMERCIAL

## 2018-09-21 ENCOUNTER — RADIANT APPOINTMENT (OUTPATIENT)
Dept: GENERAL RADIOLOGY | Facility: CLINIC | Age: 63
End: 2018-09-21
Attending: UROLOGY
Payer: COMMERCIAL

## 2018-09-21 VITALS — SYSTOLIC BLOOD PRESSURE: 160 MMHG | DIASTOLIC BLOOD PRESSURE: 94 MMHG | HEART RATE: 72 BPM | RESPIRATION RATE: 14 BRPM

## 2018-09-21 DIAGNOSIS — N20.0 CALCULUS OF KIDNEY: Primary | ICD-10-CM

## 2018-09-21 DIAGNOSIS — R10.2 PELVIC PAIN IN FEMALE: ICD-10-CM

## 2018-09-21 DIAGNOSIS — I10 HYPERTENSION, UNSPECIFIED TYPE: ICD-10-CM

## 2018-09-21 DIAGNOSIS — R31.29 MICROSCOPIC HEMATURIA: ICD-10-CM

## 2018-09-21 LAB
ALBUMIN UR-MCNC: NEGATIVE MG/DL
APPEARANCE UR: CLEAR
BACTERIA #/AREA URNS HPF: ABNORMAL /HPF
BILIRUB UR QL STRIP: NEGATIVE
COLOR UR AUTO: YELLOW
GLUCOSE UR STRIP-MCNC: NEGATIVE MG/DL
HGB UR QL STRIP: ABNORMAL
KETONES UR STRIP-MCNC: NEGATIVE MG/DL
LEUKOCYTE ESTERASE UR QL STRIP: ABNORMAL
NITRATE UR QL: NEGATIVE
PH UR STRIP: 5.5 PH (ref 5–7)
RBC #/AREA URNS AUTO: ABNORMAL /HPF
SOURCE: ABNORMAL
SP GR UR STRIP: 1.01 (ref 1–1.03)
UROBILINOGEN UR STRIP-ACNC: 0.2 EU/DL (ref 0.2–1)
WBC #/AREA URNS AUTO: ABNORMAL /HPF

## 2018-09-21 PROCEDURE — 99244 OFF/OP CNSLTJ NEW/EST MOD 40: CPT | Performed by: UROLOGY

## 2018-09-21 PROCEDURE — 81001 URINALYSIS AUTO W/SCOPE: CPT | Performed by: UROLOGY

## 2018-09-21 PROCEDURE — 74019 RADEX ABDOMEN 2 VIEWS: CPT

## 2018-09-21 NOTE — PROGRESS NOTES
Visit Date:   09/21/2018      SUBJECTIVE:  The patient is a 63-year-old female who was requested to be seen by Marlys Wade for a consultation, with regard to patient's pelvic discomfort.  The patient said that since her heart surgery last year she has pelvic pain.  It locates in the pelvic region and radiates down to her thigh area on both sides, but worse on the left than the right side.  She has some mild urgency, but no dysuria or hematuria.  She denies any problem with bowel movements.  She had a urine test done recently, that shows some microscopic hematuria.  Therefore, a CT scan of the abdomen and pelvis was obtained.  This showed bilateral renal stones, smaller on the left side than the right side.  She has no flank pain.  She said that her pain is worse with movements and better with rest.  She has been taking oxycodone as needed for pain.      KUB today showed bilateral renal stones, as expected.  Her UA showed 10-25 RBC per high-power field.      ASSESSMENT:  A 63-year-old  female with pelvic pain since her heart surgery last year.  I had a long discussion with the patient today with regard to pelvic pain.  I told the patient this has nothing to do with kidney stones.  As a matter of fact, from the way she describes her pain this is musculoskeletal until proven otherwise.  I asked her to see Marlys again for further evaluation of this pelvic discomfort.  With regard to her renal stones, even though she is not symptomatic from it, given the size of the stones (especially the one on the left), I would recommend an extracorporal shockwave lithotripsy in the future.  She also had microscopic hematuria.  Given a long history of smoking, I will schedule the patient for cystoscopy next also.  Noted her blood pressure is elevated today.  She will follow up with Marlys for further evaluation.         MARION DENNY MD             D: 09/21/2018   T: 09/21/2018   MT: JUSTO      Name:     MALA  MINDA   MRN:      [id number]        Account:      [id number]   :      1955           Visit Date:   2018      Document: [id number]

## 2018-09-21 NOTE — PROGRESS NOTES
S: See dictated note   Current Outpatient Prescriptions   Medication Sig Dispense Refill     acetaminophen (TYLENOL) 325 MG tablet Take 2 tablets (650 mg) by mouth every 4 hours as needed for other (surgical pain) 100 tablet 0     aspirin EC 81 MG EC tablet Take 1 tablet (81 mg) by mouth daily 30 tablet 0     atorvastatin (LIPITOR) 20 MG tablet Take 1 tablet (20 mg) by mouth daily 90 tablet 3     lisinopril (PRINIVIL/ZESTRIL) 10 MG tablet Take 1 tablet (10 mg) by mouth daily 90 tablet 3     melatonin 3 MG tablet Take 1 tablet (3 mg) by mouth nightly as needed for sleep 30 tablet 0     metoprolol tartrate (LOPRESSOR) 50 MG tablet Take 1 tablet (50 mg) by mouth 2 times daily 180 tablet 3     metroNIDAZOLE (FLAGYL) 500 MG tablet Take 1 tablet (500 mg) by mouth 2 times daily 14 tablet 0     RaNITidine HCl (ZANTAC PO) Take 1 tablet by mouth daily       HYDROcodone-acetaminophen (NORCO) 5-325 MG per tablet TAKE ONE TABLET BY MOUTH EVERY SIX HOURS AS NEEDED FOR SEVERE PAIN (Patient not taking: Reported on 9/21/2018) 15 tablet 0     Allergies   Allergen Reactions     Blood Transfusion Related (Informational Only) Other (See Comments)     Patient has a history of a clinically significant antibody against RBC antigens.  A delay in compatible RBCs may occur.     Past Medical History:   Diagnosis Date     Ascending aortic aneurysm (H) 09/21/2017     Blood transfusions age 19    due to menorhagia     Hypertension 09/21/2017     Thyroid nodule 11/20/2017     Tobacco abuse      Past Surgical History:   Procedure Laterality Date     COLONOSCOPY WITH CO2 INSUFFLATION N/A 3/15/2017    Procedure: COLONOSCOPY WITH CO2 INSUFFLATION;  Surgeon: Duane, William Charles, MD;  Location: [facility] OR     DILATION AND CURETTAGE  age 20     REPAIR ANEURYSM ASCENDING AORTA N/A 10/27/2017    Procedure: REPAIR ANEURYSM ASCENDING AORTA;  Median Sternotomy, Cardiopulmonary bypass, Ascending Aorta Aneurysm Repair using Hemashield Platinum Woven Double Velour  Graft 34cm X 30cm.;  Surgeon: Rubio Piña MD;  Location: [facility] OR     [place] teeth removed        Family History   Problem Relation Age of Onset     Respiratory Mother      copd     Cancer Maternal Grandmother      Leg     Alzheimer Disease Maternal Grandfather      HEART DISEASE Paternal Grandfather      HEART DISEASE Brother 54     Heart Attack      Cancer Sister      Lung cancer age 57-smoker d age 57     Social History     Social History     Marital status:      Spouse name: N/A     Number of children: 3     Years of education: N/A     Occupational History     Day care      Social History Main Topics     Smoking status: Former Smoker     Packs/day: 0.05     Years: 40.00     Types: Cigarettes     Quit date: 10/27/2017     Smokeless tobacco: Never Used      Comment: 2 cigs daily     Alcohol use 0.5 - 1.0 oz/week     1 - 2 Standard drinks or equivalent per week      Comment: 3-4 drinks per week      Drug use: No     Sexual activity: Yes     Partners: Male     Birth control/ protection: Surgical     Other Topics Concern     Parent/Sibling W/ Cabg, Mi Or Angioplasty Before 65f 55m? Yes     brother w/ sudden cardiac arrest @ age 54     Social History Narrative       REVIEW OF SYSTEMS  =================  C: NEGATIVE for fever, chills, change in weight  I: NEGATIVE for worrisome rashes, moles or lesions  E/M: NEGATIVE for ear, mouth and throat problems  R: NEGATIVE for significant cough or SHORTNESS OF BREATH  CV:  NEGATIVE for chest pain, palpitations or peripheral edema  GI: NEGATIVE for nausea, abdominal pain, heartburn, or change in bowel habits  NEURO: NEGATIVE numbness/weakness  : see HPI  PSYCH: NEGATIVE depression/anxiety  LYmph: no new enlarged lymph nodes  Ortho: no new trauma/movements      Physical Exam:  BP (!) 160/94 (BP Location: Right arm, Patient Position: Chair, Cuff Size: Adult Regular)  Pulse 72  Resp 14   Patient is pleasant, in no acute distress, good general condition.  HEENT:   Normalcephalic, atraumatic  Lung: no evidence of respiratory distress    Abdomen: Soft, nondistended, non tender. No masses. No rebound or guarding.   Exam: no cva tenderness.  No suprapubic tenderness.  Skin: Warm and dry.  No redness.  Neuro: grossly normal  Psych normal mood and affect  Musculoskeletal  moving all extremities    Assessment/Plan:   See dictated note     HTN: Patient to follow up with Primary Care provider regarding elevated blood pressure.

## 2018-09-21 NOTE — MR AVS SNAPSHOT
After Visit Summary   9/21/2018    Georgie Patino    MRN: [id number]           Patient Information     Date Of Birth          1955        Visit Information        Provider Department      9/21/2018 8:15 AM Tavo Saavedra MD [facility]        Today's Diagnoses     Calculus of kidney    -  1      Care Instructions    Your cystoscopy is scheduled 10/9/2018 @ 9:30am. Please call  if you need to reschedule this appointment.      Cystoscopy    Cystoscopy is a procedure that lets your doctor look directly inside your urethra and bladder. It can be used to:    Help diagnose a problem with your urethra, bladder, or kidneys.    Take a sample (biopsy) of bladder or urethral tissue.    Treat certain problems (such as removing kidney stones).    Place a stent to bypass an obstruction.    Take special X-rays of the kidneys.  Based on the findings, your doctor may recommend other tests or treatments.  What is a cystoscope?  A cystoscope is a telescope-like instrument that contains lenses and fiberoptics (small glass wires that make bright light). The cystoscope may be straight and rigid, or flexible to bend around curves in the urethra. The doctor may look directly into the cystoscope, or project the image onto a monitor.  Getting ready    Ask your doctor if you should stop taking any medicines before the procedure.    Ask whether you should avoid eating or drinking anything after midnight before the procedure.    Follow any other instructions your doctor gives you.  Tell your doctor before the exam if you:    Take any medicines, such as aspirin or blood thinners    Have allergies to any medicines    Are pregnant   The procedure  Cystoscopy is done in the doctor s office, surgery center, or hospital. The doctor and a nurse are present during the procedure. It takes only a few minutes, longer if a biopsy, X-ray, or treatment needs to be done.  During the procedure:    You lie on an  exam table on your back, knees bent and legs apart. You are covered with a drape.    Your urethra and the area around it are washed. Anesthetic jelly may be applied to numb the urethra. Other pain medicine is usually not needed. In some cases, you may be offered a mild sedative to help you relax. If a more extensive procedure is to be done, such as a biopsy or kidney stone removal, general anesthesia may be needed.    The cystoscope is inserted. A sterile fluid is put into the bladder to expand it. You may feel pressure from this fluid.    When the procedure is done, the cystoscope is removed.  After the procedure  If you had a sedative, general anesthesia, or spinal anesthesia, you must have someone drive you home. Once you re home:    Drink plenty of fluids.    You may have burning or light bleeding when you urinate--this is normal.    Medicines may be prescribed to ease any discomfort or prevent infection. Take these as directed.    Call your doctor if you have heavy bleeding or blood clots, burning that lasts more than a day, a fever over 100 F  (38  C), or trouble urinating.  Date Last Reviewed: 1/1/2017 2000-2017 [employer]. [address] [place] [address]. All rights reserved. This information is not intended as a substitute for professional medical care. Always follow your healthcare professional's instructions.                Follow-ups after your visit        Your next 10 appointments already scheduled     Oct 09, 2018  9:30 AM CDT   Return Visit with Tavo Saavedra MD, YENNY CYSTO PROC ROOM   [facility] ([facility])    [address] [place] [address]   [phone]            Oct 12, 2018  9:00 AM CDT   Ech Complete with FKECHR1   [facility] ([facility])    [address] [place] [address] 2nd Floor  [facility] [address]   [phone]           1.  Please bring or wear a comfortable two-piece  outfit. 2.  You may eat, drink and take your normal medicines. 3.  For any questions that cannot be answered, please contact the ordering physician 4.  Please do not wear perfumes or scented lotions on the day of your exam.            Oct 12, 2018 10:00 AM CDT   Return Visit with Jitendra Fernandez MD   [facility]YSIC[facility] HEART AT [facility] ([facility])    [address] [place] [address] 2nd Floor  [facility] [address] [phone]              Who to contact     If you have questions or need follow up information about today's clinic visit or your schedule please contact [facility] directly at [phone].  Normal or non-critical lab and imaging results will be communicated to you by MyChart, letter or phone within 4 business days after the clinic has received the results. If you do not hear from us within 7 days, please contact the clinic through MyChart or phone. If you have a critical or abnormal lab result, we will notify you by phone as soon as possible.  Submit refill requests through [employer] or call your pharmacy and they will forward the refill request to us. Please allow 3 business days for your refill to be completed.          Additional Information About Your Visit        Care EveryWhere ID     This is your Care EveryWhere ID. This could be used by other organizations to access your [place] medical records  [id number]        Your Vitals Were     Pulse Respirations                72 14           Blood Pressure from Last 3 Encounters:   09/21/18 (!) 160/94   09/07/18 130/80   06/12/18 166/82    Weight from Last 3 Encounters:   09/07/18 93.4 kg (206 lb)   06/12/18 93.5 kg (206 lb 3.2 oz)   03/16/18 93.4 kg (205 lb 12.8 oz)              We Performed the Following     UA reflex to Microscopic and Culture [[id number]]     Urine Microscopic     XR KUB        Primary Care Provider Office Phone # Fax #    TESHA Dos Santos [phone]  [facility] [address]        Equal Access to Services     CAROLINA LOVELL : Hadii aad ku hadriveraester Valmeek, wavalenciada lucodymustaphaha, qaybta kajosselynjuan barragan, humza moffetttiffanyalexandra garza. So [facility] [phone].    ATENCIÓN: Si habla español, tiene a thomas disposición servicios gratuitos de asistencia lingüística. [facility] [phone].    We comply with applicable federal civil rights laws and [place] laws. We do not discriminate on the basis of race, color, national origin, age, disability, sex, sexual orientation, or gender identity.            Thank you!     Thank you for choosing [facility]  for your care. Our goal is always to provide you with excellent care. Hearing back from our patients is one way we can continue to improve our services. Please take a few minutes to complete the written survey that you may receive in the mail after your visit with us. Thank you!             Your Updated Medication List - Protect others around you: Learn how to safely use, store and throw away your medicines at www.disposemymeds.org.          This list is accurate as of 9/21/18  8:50 AM.  Always use your most recent med list.                   Brand Name Dispense Instructions for use Diagnosis    acetaminophen 325 MG tablet    TYLENOL    100 tablet    Take 2 tablets (650 mg) by mouth every 4 hours as needed for other (surgical pain)    S/P ascending aortic aneurysm repair, Acute post-operative pain       aspirin 81 MG EC tablet     30 tablet    Take 1 tablet (81 mg) by mouth daily    S/P ascending aortic aneurysm repair       atorvastatin 20 MG tablet    LIPITOR    90 tablet    Take 1 tablet (20 mg) by mouth daily    CARDIOVASCULAR SCREENING; LDL GOAL LESS THAN 160       HYDROcodone-acetaminophen 5-325 MG per tablet    NORCO    15 tablet    TAKE ONE TABLET BY MOUTH EVERY SIX HOURS AS NEEDED FOR SEVERE PAIN    Pelvic pain in female       lisinopril 10 MG tablet    PRINIVIL/ZESTRIL    90 tablet     Take 1 tablet (10 mg) by mouth daily    Ascending aortic aneurysm (H), Benign essential hypertension       melatonin 3 MG tablet     30 tablet    Take 1 tablet (3 mg) by mouth nightly as needed for sleep    Ascending aortic aneurysm (H), S/P ascending aortic aneurysm repair       metoprolol tartrate 50 MG tablet    LOPRESSOR    180 tablet    Take 1 tablet (50 mg) by mouth 2 times daily    Palpitations       metroNIDAZOLE 500 MG tablet    FLAGYL    14 tablet    Take 1 tablet (500 mg) by mouth 2 times daily    Bacterial vaginosis       ZANTAC PO      Take 1 tablet by mouth daily

## 2018-09-21 NOTE — PATIENT INSTRUCTIONS
Your cystoscopy is scheduled 10/9/2018 @ 9:30am. Please call  if you need to reschedule this appointment.      Cystoscopy    Cystoscopy is a procedure that lets your doctor look directly inside your urethra and bladder. It can be used to:    Help diagnose a problem with your urethra, bladder, or kidneys.    Take a sample (biopsy) of bladder or urethral tissue.    Treat certain problems (such as removing kidney stones).    Place a stent to bypass an obstruction.    Take special X-rays of the kidneys.  Based on the findings, your doctor may recommend other tests or treatments.  What is a cystoscope?  A cystoscope is a telescope-like instrument that contains lenses and fiberoptics (small glass wires that make bright light). The cystoscope may be straight and rigid, or flexible to bend around curves in the urethra. The doctor may look directly into the cystoscope, or project the image onto a monitor.  Getting ready    Ask your doctor if you should stop taking any medicines before the procedure.    Ask whether you should avoid eating or drinking anything after midnight before the procedure.    Follow any other instructions your doctor gives you.  Tell your doctor before the exam if you:    Take any medicines, such as aspirin or blood thinners    Have allergies to any medicines    Are pregnant   The procedure  Cystoscopy is done in the doctor s office, surgery center, or hospital. The doctor and a nurse are present during the procedure. It takes only a few minutes, longer if a biopsy, X-ray, or treatment needs to be done.  During the procedure:    You lie on an exam table on your back, knees bent and legs apart. You are covered with a drape.    Your urethra and the area around it are washed. Anesthetic jelly may be applied to numb the urethra. Other pain medicine is usually not needed. In some cases, you may be offered a mild sedative to help you relax. If a more extensive procedure is to be done, such as a  biopsy or kidney stone removal, general anesthesia may be needed.    The cystoscope is inserted. A sterile fluid is put into the bladder to expand it. You may feel pressure from this fluid.    When the procedure is done, the cystoscope is removed.  After the procedure  If you had a sedative, general anesthesia, or spinal anesthesia, you must have someone drive you home. Once you re home:    Drink plenty of fluids.    You may have burning or light bleeding when you urinate--this is normal.    Medicines may be prescribed to ease any discomfort or prevent infection. Take these as directed.    Call your doctor if you have heavy bleeding or blood clots, burning that lasts more than a day, a fever over 100 F  (38  C), or trouble urinating.  Date Last Reviewed: 1/1/2017 2000-2017 [employer]. [address] [place] [address], [place] [address]. All rights reserved. This information is not intended as a substitute for professional medical care. Always follow your healthcare professional's instructions.

## 2018-10-05 NOTE — PROGRESS NOTES
"  SUBJECTIVE:   Georgie Patino is a 63 year old female who presents to clinic today for the following health issues:        Joint Pain    Onset: a month    Description:   Location: left knee and right knee  Character: Sharp when moving and Dull ache    Intensity: moderate    Progression of Symptoms: same    Accompanying Signs & Symptoms:  Other symptoms: radiation of pain to the back of the leg    History:   Previous similar pain: YES      Precipitating factors:   Trauma or overuse: no     Alleviating factors:  Improved by: rest/inactivity and Ibuprofen helps a little    Therapies Tried and outcome: Ibuprofen helps a little    Patient notes continued pain down her posterior thighs that sometimes radiates to anterior thighs, left knee, pelvis.  Patient notes that pain is dependent on position.  She notes pain more to left side than her right.  Lying down, walking worsen pain.  Patient can only relieve pain with sitting.  Pain is described as \"pulling\".  Patient denies numbness, tingling, loss of bowel or bladder control.      Problem list and histories reviewed & adjusted, as indicated.  Additional history: as documented    Patient Active Problem List   Diagnosis     CARDIOVASCULAR SCREENING; LDL GOAL LESS THAN 160     Obesity     Benign essential hypertension     Family history of sudden cardiac death     Hypertension     Anxiety     S/P ascending aortic aneurysm repair     Former smoker     Thyroid nodule     Past Surgical History:   Procedure Laterality Date     COLONOSCOPY WITH CO2 INSUFFLATION N/A 3/15/2017    Procedure: COLONOSCOPY WITH CO2 INSUFFLATION;  Surgeon: Duane, William Charles, MD;  Location: [facility] OR     DILATION AND CURETTAGE  age 20     REPAIR ANEURYSM ASCENDING AORTA N/A 10/27/2017    Procedure: REPAIR ANEURYSM ASCENDING AORTA;  Median Sternotomy, Cardiopulmonary bypass, Ascending Aorta Aneurysm Repair using Hemashield Platinum Woven Double Velour Graft 34cm X 30cm.;  Surgeon: Rubio Piña " MD Saida;  Location: [facility] OR     Conger teeth removed         Social History   Substance Use Topics     Smoking status: Former Smoker     Packs/day: 0.05     Years: 40.00     Types: Cigarettes     Quit date: 10/27/2017     Smokeless tobacco: Never Used      Comment: 2 cigs daily     Alcohol use 0.5 - 1.0 oz/week     1 - 2 Standard drinks or equivalent per week      Comment: 3-4 drinks per week      Family History   Problem Relation Age of Onset     Respiratory Mother      copd     Cancer Maternal Grandmother      Leg     Alzheimer Disease Maternal Grandfather      HEART DISEASE Paternal Grandfather      HEART DISEASE Brother 54     Heart Attack      Cancer Sister      Lung cancer age 55-smoker d age 57         Current Outpatient Prescriptions   Medication Sig Dispense Refill     acetaminophen (TYLENOL) 325 MG tablet Take 2 tablets (650 mg) by mouth every 4 hours as needed for other (surgical pain) 100 tablet 0     aspirin EC 81 MG EC tablet Take 1 tablet (81 mg) by mouth daily 30 tablet 0     atorvastatin (LIPITOR) 20 MG tablet Take 1 tablet (20 mg) by mouth daily 90 tablet 3     lisinopril (PRINIVIL/ZESTRIL) 10 MG tablet Take 1 tablet (10 mg) by mouth daily 90 tablet 3     melatonin 3 MG tablet Take 1 tablet (3 mg) by mouth nightly as needed for sleep 30 tablet 0     methylPREDNISolone (MEDROL DOSEPAK) 4 MG tablet Follow package instructions 21 tablet 0     metoprolol tartrate (LOPRESSOR) 50 MG tablet Take 1 tablet (50 mg) by mouth 2 times daily 180 tablet 3     RaNITidine HCl (ZANTAC PO) Take 1 tablet by mouth daily       HYDROcodone-acetaminophen (NORCO) 5-325 MG per tablet TAKE ONE TABLET BY MOUTH EVERY SIX HOURS AS NEEDED FOR SEVERE PAIN (Patient not taking: Reported on 9/21/2018) 15 tablet 0     Allergies   Allergen Reactions     Blood Transfusion Related (Informational Only) Other (See Comments)     Patient has a history of a clinically significant antibody against RBC antigens.  A delay in compatible RBCs  "may occur.     BP Readings from Last 3 Encounters:   10/08/18 134/80   09/21/18 (!) 160/94   09/07/18 130/80    Wt Readings from Last 3 Encounters:   10/08/18 207 lb 12.8 oz (94.3 kg)   09/07/18 206 lb (93.4 kg)   06/12/18 206 lb 3.2 oz (93.5 kg)                  Labs reviewed in EPIC    Reviewed and updated as needed this visit by clinical staff       Reviewed and updated as needed this visit by Provider         ROS:  Constitutional, HEENT, cardiovascular, pulmonary, gi and gu systems are negative, except as otherwise noted.    OBJECTIVE:     /80  Pulse 57  Temp 97.7  F (36.5  C) (Oral)  Resp 20  Ht 5' 7\" (1.702 m)  Wt 207 lb 12.8 oz (94.3 kg)  SpO2 96%  BMI 32.55 kg/m2  Body mass index is 32.55 kg/(m^2).  GENERAL: healthy, alert and no distress  RESP: lungs clear to auscultation - no rales, rhonchi or wheezes  CV: regular rate and rhythm, normal S1 S2, no S3 or S4, no murmur, click or rub, no peripheral edema and peripheral pulses strong  ABDOMEN: soft, nontender, no hepatosplenomegaly, no masses and bowel sounds normal  MS: Left knee: tenderness noted to palpation of anterior knee, full range of motion present.  Comprehensive back pain exam:  No tenderness, Pain limits the following motions: all, Lower extremity strength functional and equal on both sides, Lower extremity reflexes within normal limits bilaterally, Lower extremity sensation normal and equal on both sides and Straight leg raise negative bilaterally    Diagnostic Test Results:  pending    ASSESSMENT/PLAN:     1. Lumbar radiculopathy  Patient previously described pain as being in her pelvis, though now it appears to radicular in nature to her thighs.  I'm concerned she may have spinal stenosis.  Patient to to trial medrol dose clementina and physical therapy.  Will obtain xray of lumbar spine today.  Consider MRI if not improving in the next several weeks.  - methylPREDNISolone (MEDROL DOSEPAK) 4 MG tablet; Follow package instructions  " Dispense: 21 tablet; Refill: 0  - XR Lumbar Spine 2/3 Views; Future  - [facility] PT, HAND, AND CHIROPRACTIC REFERRAL; Future    2. Acute pain of left knee  I suspect this pain is likely radicular in nature as well, but will check xray.  - XR Knee Left 3 Views; Future    FUTURE APPOINTMENTS:       - Follow-up for annual visit or as needed    TESHA Chanel [facility]

## 2018-10-08 ENCOUNTER — OFFICE VISIT (OUTPATIENT)
Dept: FAMILY MEDICINE | Facility: CLINIC | Age: 63
End: 2018-10-08
Payer: COMMERCIAL

## 2018-10-08 ENCOUNTER — RADIANT APPOINTMENT (OUTPATIENT)
Dept: GENERAL RADIOLOGY | Facility: CLINIC | Age: 63
End: 2018-10-08
Attending: NURSE PRACTITIONER
Payer: COMMERCIAL

## 2018-10-08 VITALS
WEIGHT: 207.8 LBS | OXYGEN SATURATION: 96 % | HEIGHT: 67 IN | BODY MASS INDEX: 32.62 KG/M2 | HEART RATE: 57 BPM | RESPIRATION RATE: 20 BRPM | DIASTOLIC BLOOD PRESSURE: 80 MMHG | TEMPERATURE: 97.7 F | SYSTOLIC BLOOD PRESSURE: 134 MMHG

## 2018-10-08 DIAGNOSIS — M54.16 LUMBAR RADICULOPATHY: Primary | ICD-10-CM

## 2018-10-08 DIAGNOSIS — M54.16 LUMBAR RADICULOPATHY: ICD-10-CM

## 2018-10-08 DIAGNOSIS — M25.562 ACUTE PAIN OF LEFT KNEE: ICD-10-CM

## 2018-10-08 PROCEDURE — 99213 OFFICE O/P EST LOW 20 MIN: CPT | Performed by: NURSE PRACTITIONER

## 2018-10-08 PROCEDURE — 72100 X-RAY EXAM L-S SPINE 2/3 VWS: CPT

## 2018-10-08 PROCEDURE — 73562 X-RAY EXAM OF KNEE 3: CPT | Mod: LT

## 2018-10-08 RX ORDER — METHYLPREDNISOLONE 4 MG
TABLET, DOSE PACK ORAL
Qty: 21 TABLET | Refills: 0 | Status: SHIPPED | OUTPATIENT
Start: 2018-10-08 | End: 2018-11-12

## 2018-10-08 ASSESSMENT — PAIN SCALES - GENERAL: PAINLEVEL: EXTREME PAIN (8)

## 2018-10-08 NOTE — LETTER
[facility]  [address] [place] [address]    October 9, 2018    Georgie Patino  [address]  [place] [address]      Dear Gerogie,    Your x-ray notes moderate arthritis to your left knee.  Please continue with plan for physical therapy and if not improving, please contact clinic.     If you have any questions please feel free to contact [phone] or myself via [employer].     Enclosed is a copy of your results.     Results for orders placed or performed in visit on 10/08/18   XR Knee Left 3 Views    Narrative    LEFT KNEE THREE VIEWS  10/8/2018 11:51 AM     HISTORY:  Acute pain of left knee.    COMPARISON: None.      Impression    IMPRESSION: Three views of the left knee are performed. Moderate joint  space narrowing is noted in the medial and lateral joint compartment  on the AP view. Moderate joint space narrowing patellofemoral joint  compartment is also noted. Posterior patellar osteophytes and lateral  joint line osteophytes are noted. Tibial spurring is also present. No  evidence of an intra-articular effusion in the suprapatellar region.  No fracture or dislocation.    YASH BELL MD       If you have any questions or concerns, please call myself or my nurse at [phone].      Sincerely,        Marlys Wade APRN CNP /guzman

## 2018-10-08 NOTE — PATIENT INSTRUCTIONS
[facility]    If you have any questions regarding to your visit please contact your care team:     Team Pink:   Clinic Hours Telephone Number   Internal Medicine:  Dr. Helen Wade NP 7am-7pm  Monday - Thursday   7am-5pm  Fridays  [phone]  (Appointment scheduling available 24/7)   Urgent Care - [place] and [facility] - 11am-9pm Monday-Friday Saturday-Sunday- 9am-5pm   [place] - 5pm-9pm Monday-Friday Saturday-Sunday- 9am-5pm  [phone] - [place]  [phone] - [place]       What options do I have for a visit other than an office visit? We offer electronic visits (e-visits) and telephone visits, when medically appropriate.  Please check with your medical insurance to see if these types of visits are covered, as you will be responsible for any charges that are not paid by your insurance.      You can use [employer] (secure electronic communication) to access to your chart, send your primary care provider a message, or make an appointment. Ask a team member how to get started.     For a price quote for your services, please call our Consumer Price Line at [phone] or our Imaging Cost estimation line at [phone] (for imaging tests).  Val GOMEZ CMA

## 2018-10-08 NOTE — LETTER
[facility]  [address] [place] [address]    October 9, 2018    Georgie Patino  [address] [place] [address]      Dear Georgie,    Your x-ray shows degenerative changes to your lumbar spine.  I suspect these are contributing to your pain.  Please continue with plan for physical therapy and if not improving, please contact clinic.     If you have any questions please feel free to contact [phone] or myself via [employer].     Enclosed is a copy of your results.     Results for orders placed or performed in visit on 10/08/18   XR Lumbar Spine 2/3 Views    Narrative    LUMBAR SPINE TWO TO THREE VIEWS   10/8/2018 11:52 AM     HISTORY:  Lumbar radiculopathy.    COMPARISON: None.      Impression    IMPRESSION: Three views of the lumbar spine are performed. There is  grade 1, nearly grade 2 spondylolisthesis of L4 on L5 probably related  to degenerative facet changes. No fracture or additional areas of  malalignment are appreciated. Rightward curvature lumbar spine is  noted in the AP view. Degenerative facet changes are noted at L4-5 and  L5-S1.    YASH BELL MD       If you have any questions or concerns, please call myself or my nurse at [phone].      Sincerely,        Marlys Wade, AZIZA/guzman

## 2018-10-08 NOTE — MR AVS SNAPSHOT
After Visit Summary   10/8/2018    Georgie Patino    MRN: [id number]           Patient Information     Date Of Birth          1955        Visit Information        Provider Department      10/8/2018 10:20 AM Marlys Wade APRN CNP [facility]        Today's Diagnoses     Lumbar radiculopathy    -  1    Acute pain of left knee          Care Instructions    [facility]    If you have any questions regarding to your visit please contact your care team:     Team Pink:   Clinic Hours Telephone Number   Internal Medicine:  Dr. Helen Wade, NP 7am-7pm  Monday - Thursday   7am-5pm  Fridays  [phone]  (Appointment scheduling available 24/7)   Urgent Care - [place] and [facility] - 11am-9pm Monday-Friday Saturday-Sunday- 9am-5pm   [place] - 5pm-9pm Monday-Friday Saturday-Sunday- 9am-5pm  [phone] - [place]  [phone] - [place]       What options do I have for a visit other than an office visit? We offer electronic visits (e-visits) and telephone visits, when medically appropriate.  Please check with your medical insurance to see if these types of visits are covered, as you will be responsible for any charges that are not paid by your insurance.      You can use [employer] (secure electronic communication) to access to your chart, send your primary care provider a message, or make an appointment. Ask a team member how to get started.     For a price quote for your services, please call our Consumer Price Line at [phone] or our Imaging Cost estimation line at [phone] (for imaging tests).  Val GOMEZ CMA            Follow-ups after your visit        Additional Services     [facility] PT, HAND, AND CHIROPRACTIC REFERRAL       Physical Therapy, Hand Therapy and Chiropractic Care are available through:  *[facility]  *Hand Therapy (Occupational Therapy or Physical Therapy)  *[facility]    Call one number to schedule at any of the above locations: [phone].    Physical therapy, Hand therapy and/or Chiropractic care has been recommended by your physician as an excellent treatment option to reduce pain and help people return to normal activities, including sports.  Therapy and/or chiropractic care services are a great complement or alternative to expensive and invasive surgery, injections, or long-term use of prescription medications. The primary goal is to identify the underlying problem and provide you the tools to manage your condition on your own.     Please be aware that coverage of these services is subject to the terms and limitations of your health insurance plan.  Call member services at your health plan with any benefit or coverage questions.      Please bring the following to your appointment:  *Your personal calendar for scheduling future appointments  *Comfortable clothing                  Your next 10 appointments already scheduled     Oct 09, 2018  9:30 AM CDT   Return Visit with Tavo Saavedra MD, [place] [facility]ST[facility] PROC ROOM   [facility] ([facility])    [address] [place] [address]   [phone]            Oct 12, 2018  9:00 AM CDT   Ech Complete with [id number]   [facility] ([facility])    [address] [place] [address]   [phone]           1.  Please bring or wear a comfortable two-piece outfit. 2.  You may eat, drink and take your normal medicines. 3.  For any questions that cannot be answered, please contact the ordering physician 4.  Please do not wear perfumes or scented lotions on the day of your exam.            Oct 12, 2018 10:00 AM CDT   Return Visit with Jitendra Fernandez MD   [facility] ([facility])    [address] [place] [address]   [phone]     "          Future tests that were ordered for you today     Open Future Orders        Priority Expected Expires Ordered    XR Lumbar Spine 2/3 Views Routine 10/8/2018 10/8/2019 10/8/2018    XR Knee Left 3 Views Routine 10/8/2018 10/8/2019 10/8/2018    [facility] PT, HAND, AND CHIROPRACTIC REFERRAL Routine  10/8/2019 10/8/2018            Who to contact     If you have questions or need follow up information about today's clinic visit or your schedule please contact [facility] [place] directly at [phone].  Normal or non-critical lab and imaging results will be communicated to you by MyChart, letter or phone within 4 business days after the clinic has received the results. If you do not hear from us within 7 days, please contact the clinic through "[employer]hart or phone. If you have a critical or abnormal lab result, we will notify you by phone as soon as possible.  Submit refill requests through [employer] or call your pharmacy and they will forward the refill request to us. Please allow 3 business days for your refill to be completed.          Additional Information About Your Visit        Care EveryWhere ID     This is your Care EveryWhere ID. This could be used by other organizations to access your [facility] medical records  [id number]        Your Vitals Were     Pulse Temperature Respirations Height Pulse Oximetry BMI (Body Mass Index)    57 97.7  F (36.5  C) (Oral) 20 5' 7\" (1.702 m) 96% 32.55 kg/m2       Blood Pressure from Last 3 Encounters:   10/08/18 134/80   09/21/18 (!) 160/94   09/07/18 130/80    Weight from Last 3 Encounters:   10/08/18 207 lb 12.8 oz (94.3 kg)   09/07/18 206 lb (93.4 kg)   06/12/18 206 lb 3.2 oz (93.5 kg)                 Today's Medication Changes          These changes are accurate as of 10/8/18 11:15 AM.  If you have any questions, ask your nurse or doctor.               Start taking these medicines.        Dose/Directions    methylPREDNISolone 4 MG tablet   Commonly known as:  MEDROL " DOSEPAK   Used for:  Lumbar radiculopathy   Started by:  Marlys Wade APRN CNP        Follow package instructions   Quantity:  21 tablet   Refills:  0            Where to get your medicines      These medications were sent to [id number] - [place], [place] - [address]  [address] [place] [address]     Phone:  [phone]     methylPREDNISolone 4 MG tablet                Primary Care Provider Office Phone # Fax #    TESHA Dos Santos [phone]       [address]  [place] [address]        Equal Access to Services     [facility]: Hadii aad ku hadasho Soomaali, waaxda luqadaha, qaybta kaalmada adeegyada, waxay idiin hayaan adeeg kharaalexandra crenshaw . So [facility] [phone].    ATENCIÓN: Si habla español, tiene a thomas disposición servicios gratuitos de asistencia lingüística. Llame al [phone].    We comply with applicable federal civil rights laws and [place] laws. We do not discriminate on the basis of race, color, national origin, age, disability, sex, sexual orientation, or gender identity.            Thank you!     Thank you for choosing [facility]  for your care. Our goal is always to provide you with excellent care. Hearing back from our patients is one way we can continue to improve our services. Please take a few minutes to complete the written survey that you may receive in the mail after your visit with us. Thank you!             Your Updated Medication List - Protect others around you: Learn how to safely use, store and throw away your medicines at www.disposemymeds.org.          This list is accurate as of 10/8/18 11:15 AM.  Always use your most recent med list.                   Brand Name Dispense Instructions for use Diagnosis    acetaminophen 325 MG tablet    TYLENOL    100 tablet    Take 2 tablets (650 mg) by mouth every 4 hours as needed for other (surgical pain)    S/P ascending aortic aneurysm repair, Acute  post-operative pain       aspirin 81 MG EC tablet     30 tablet    Take 1 tablet (81 mg) by mouth daily    S/P ascending aortic aneurysm repair       atorvastatin 20 MG tablet    LIPITOR    90 tablet    Take 1 tablet (20 mg) by mouth daily    CARDIOVASCULAR SCREENING; LDL GOAL LESS THAN 160       HYDROcodone-acetaminophen 5-325 MG per tablet    NORCO    15 tablet    TAKE ONE TABLET BY MOUTH EVERY SIX HOURS AS NEEDED FOR SEVERE PAIN    Pelvic pain in female       lisinopril 10 MG tablet    PRINIVIL/ZESTRIL    90 tablet    Take 1 tablet (10 mg) by mouth daily    Ascending aortic aneurysm (H), Benign essential hypertension       melatonin 3 MG tablet     30 tablet    Take 1 tablet (3 mg) by mouth nightly as needed for sleep    Ascending aortic aneurysm (H), S/P ascending aortic aneurysm repair       methylPREDNISolone 4 MG tablet    MEDROL DOSEPAK    21 tablet    Follow package instructions    Lumbar radiculopathy       metoprolol tartrate 50 MG tablet    LOPRESSOR    180 tablet    Take 1 tablet (50 mg) by mouth 2 times daily    Palpitations       ZANTAC PO      Take 1 tablet by mouth daily

## 2018-10-09 NOTE — PROGRESS NOTES
Dear Georgie,    Your recent test results are attached.      Your x-ray shows degenerative changes to your lumbar spine.  I suspect these are contributing to your pain.  Please continue with plan for physical therapy and if not improving, please contact clinic.    If you have any questions please feel free to contact [phone] or myself via [employer].    Sincerely,  Marlys Wade, CNP

## 2018-10-09 NOTE — PROGRESS NOTES
Dear Georgie,    Your recent test results are attached.      Your x-ray notes moderate arthritis to your left knee.  Please continue with plan for physical therapy and if not improving, please contact clinic.    If you have any questions please feel free to contact [phone] or myself via [employer].    Sincerely,  Marlys Wade, CNP

## 2018-10-12 ENCOUNTER — RADIANT APPOINTMENT (OUTPATIENT)
Dept: CARDIOLOGY | Facility: CLINIC | Age: 63
End: 2018-10-12
Attending: INTERNAL MEDICINE
Payer: COMMERCIAL

## 2018-10-12 ENCOUNTER — OFFICE VISIT (OUTPATIENT)
Dept: CARDIOLOGY | Facility: CLINIC | Age: 63
End: 2018-10-12
Payer: COMMERCIAL

## 2018-10-12 VITALS
SYSTOLIC BLOOD PRESSURE: 165 MMHG | BODY MASS INDEX: 31.64 KG/M2 | WEIGHT: 202 LBS | HEART RATE: 50 BPM | DIASTOLIC BLOOD PRESSURE: 89 MMHG | OXYGEN SATURATION: 96 %

## 2018-10-12 DIAGNOSIS — E78.2 MIXED HYPERLIPIDEMIA: Primary | ICD-10-CM

## 2018-10-12 DIAGNOSIS — I10 BENIGN ESSENTIAL HYPERTENSION: ICD-10-CM

## 2018-10-12 DIAGNOSIS — I71.21 ASCENDING AORTIC ANEURYSM (H): ICD-10-CM

## 2018-10-12 PROCEDURE — 99214 OFFICE O/P EST MOD 30 MIN: CPT | Performed by: INTERNAL MEDICINE

## 2018-10-12 PROCEDURE — 93306 TTE W/DOPPLER COMPLETE: CPT | Performed by: INTERNAL MEDICINE

## 2018-10-12 RX ORDER — LISINOPRIL 10 MG/1
10 TABLET ORAL DAILY
Qty: 120 TABLET | Refills: 3 | Status: SHIPPED | OUTPATIENT
Start: 2018-10-12 | End: 2019-05-31

## 2018-10-12 NOTE — NURSING NOTE
Patient presents today for resting echo ordered by MD.   Echo Tech provided patient education.    Echo technician completed resting echo. Data transferred to [facility] for final interpretation through [employer].   Patient education provided about cardiology interpretation and primary provider will be notified of results.    Heather Howard RDCS

## 2018-10-12 NOTE — PATIENT INSTRUCTIONS
Thank you for coming to the [facility] @ [place]; please note the following instructions:    1. Please increase your Lisinopril to be 5mg (half a tablet) in the am and 10mg (full tablet) in the pm.    2. You are scheduled for a chest CTA to check on your aortic graph. This is scheduled at the [facility] on Wednesday, October 17th, 2018 at 10:45am check in.    Prep:    CTA CHEST W - How do I prepare for my exam? (Food and drink instructions)  **You will have contrast for this exam.**  Do not eat or drink for 2 hours before your exam. If you need to take medicine, you may take it with small sips of water. (We may ask you to take liquid medicine as well.)    The day before your exam, drink extra fluids--at least six 8-ounce glasses (unless your doctor tells you to restrict your fluids).    How do I prepare for my exam? (Other instructions)  Patients over 70 or patients with diabetes or kidney problems: If you haven t had a blood test (creatinine test) within the last 30 days, the Cardiologist/Radiologist may require you to get this test prior to your exam.    What should I wear: Please wear loose clothing, such as a sweat suit or jogging clothes.  Avoid snaps, zippers and other metal. We may ask you to undress and put on a hospital gown.    How long does the exam take: Most scans take less than 20 minutes.    What should I bring: Please bring any scans or X-rays taken at other hospitals, if similar tests were done. Also bring a list of your medicines, including vitamins, minerals and over-the-counter drugs. It is safest to leave personal items at home.    Do I need a :  No  is needed.    What do I need to tell my doctor?  Be sure to tell your doctor:  * If you have any allergies.  * If there s any chance you are pregnant.  * If you are breastfeeding.  * If you have diabetes as your medication may need to be adjusted for this exam.    What should I do after the  exam: No restrictions, You may resume normal activities.    What is this test: A CT (computed tomography) scan is a series of pictures that allows us to look inside your body. The scanner creates images of the body in cross sections, much like slices of bread. This helps us see any problems more clearly. You may receive contrast (X-ray dye) before or during your scan. Contrast is given through an IV (small needle in your arm).      3. Follow up in 6 months with Dr. Fernandez. This is scheduled for Friday, April 12th at 10:30am in [place].        If you have any questions regarding your visit please contact your care team:     Cardiology  Telephone Number   Jocelin MELTON, RN  Vianca BLACK, RN   Mercedez LUKE, DAVID DAVIS, [place] ALANIZ, N   [phone]    *After hours: [phone]   For scheduling appts:     [phone] or    [phone] (select option 1)    *After hours: [phone]     For the Device Clinic (Pacemakers and ICD's)  RN's :  Marisabel Kaiser   During business hours: [phone]    *After business hours:  [phone] (select option 4)      Normal test result notifications will be released via [employer] or mailed within 7 business days.  All other test results, will be communicated via telephone once reviewed by your cardiologist.    If you need a medication refill please contact your pharmacy.  Please allow 3 business days for your refill to be completed.    As always, thank you for trusting us with your health care needs!

## 2018-10-12 NOTE — LETTER
10/12/2018      RE: Georgie Patino  [address] [place] [id number]       Dear Colleague,    Thank you for the opportunity to participate in the care of your patient, Georgie Patino, at the [facility] AT [facility]. Please see a copy of my visit note below.    October 12, 2018    I had the pleasure of seeing Georgie Patino  in the [facility] for follow-up.  I seen him initially for episodes of palpitation however did an echocardiogram revealed significantly dilated ascending aorta up to 6.2 cm she was referred to surgery which was performed on October 27, 2017 successfully with no complications and PFO closure was performed at the same time.   Since her surgery she has been doing really well denies any complaints other than some sciatica recently.  No further episodes of chest pain incisional pain has resolved.  She did quit smoking recently.  Her blood pressure remains elevated usually running in the 130-140 systolic range as she measures at clinic visits and sometimes in grocery stores.  She is able to ambulate with rehabilitation and offers no complaints at this time.    ROS otherwise negative    PAST MEDICAL HISTORY:  Past Medical History:   Diagnosis Date     Ascending aortic aneurysm (H) 09/21/2017     Blood transfusions age 19    due to menorhagia     Hypertension 09/21/2017     Thyroid nodule 11/20/2017     Tobacco abuse      CURRENT MEDICATIONS:  Home cardiac meds:   Current Outpatient Prescriptions   Medication Sig Dispense Refill     acetaminophen (TYLENOL) 325 MG tablet Take 2 tablets (650 mg) by mouth every 4 hours as needed for other (surgical pain) 100 tablet 0     aspirin EC 81 MG EC tablet Take 1 tablet (81 mg) by mouth daily 30 tablet 0     atorvastatin (LIPITOR) 20 MG tablet Take 1 tablet (20 mg) by mouth daily 90 tablet 3     lisinopril (PRINIVIL/ZESTRIL) 10 MG tablet Take 1 tablet  (10 mg) by mouth daily 90 tablet 3     melatonin 3 MG tablet Take 1 tablet (3 mg) by mouth nightly as needed for sleep 30 tablet 0     methylPREDNISolone (MEDROL DOSEPAK) 4 MG tablet Follow package instructions 21 tablet 0     metoprolol tartrate (LOPRESSOR) 50 MG tablet Take 1 tablet (50 mg) by mouth 2 times daily 180 tablet 3     RaNITidine HCl (ZANTAC PO) Take 1 tablet by mouth daily       HYDROcodone-acetaminophen (NORCO) 5-325 MG per tablet TAKE ONE TABLET BY MOUTH EVERY SIX HOURS AS NEEDED FOR SEVERE PAIN (Patient not taking: Reported on 10/12/2018) 15 tablet 0     EXAM:  /87 (BP Location: Right arm, Patient Position: Sitting, Cuff Size: Adult Large)  Pulse 50  Wt 91.6 kg (202 lb)  SpO2 96%  BMI 31.64 kg/m2  General: appears comfortable, alert and oriented  Head: normocephalic, atraumatic  Eyes: anicteric sclera, EOMI , PERRL  Neck: no adenopathy  Orophyarynx: moist mucosa, no lesions noted  Heart: regular, S1/S2, no murmurs, rubs or gallop. Estimated JVP at 5 cmH2O. Well healed scar  Lungs: CTAB, No wheezing.   Abdomen: soft, non-tender, bowel sounds present, no hepatosplenomegaly  Extremities: No LE edema today  Skin: no open lesions noted  Neuro: grossly non-focal  Psych: no evidence of depression noted    Weight  Wt Readings from Last 10 Encounters:   10/12/18 91.6 kg (202 lb)   10/08/18 94.3 kg (207 lb 12.8 oz)   09/07/18 93.4 kg (206 lb)   06/12/18 93.5 kg (206 lb 3.2 oz)   03/16/18 93.4 kg (205 lb 12.8 oz)   11/20/17 91.8 kg (202 lb 6.4 oz)   11/14/17 92.6 kg (204 lb 3.2 oz)   11/10/17 91.2 kg (201 lb)   11/07/17 93.4 kg (206 lb)   11/01/17 92 kg (202 lb 14.4 oz)     Labs:    CBC RESULTS:  Lab Results   Component Value Date    WBC 7.9 11/10/2017    RBC 4.08 11/10/2017    HGB 12.0 11/10/2017    HCT 36.8 11/10/2017    MCV 90 11/10/2017    MCH 29.4 11/10/2017    MCHC 32.6 11/10/2017    RDW 13.5 11/10/2017     11/10/2017     CMP RESULTS:  Lab Results   Component Value Date      09/07/2018    POTASSIUM 4.3 09/07/2018    CHLORIDE 107 09/07/2018    CO2 25 09/07/2018    ANIONGAP 9 09/07/2018     (H) 09/07/2018    BUN 19 09/07/2018    CR 0.53 09/07/2018    GFRESTIMATED >90 09/07/2018    GFRESTBLACK >90 09/07/2018    QASIM 9.1 09/07/2018      INR RESULTS:  Lab Results   Component Value Date    INR 1.21 (H) 10/29/2017     ECHO: 09/01/17  Global and regional left ventricular function is normal with an EF of 55-60%. The right ventricle is normal size. Global right ventricular function is normal. Severe dilatation of the ascending aorta (6.2 cm at the mid ascending aorta). The sinotubular ridge is effaced, however there is no evidence of dissection. The aortic arch and descending thoracic aorta appear normal in caliber.  Mild aortic regurgitation. Right ventricular systolic pressure is 26mmHg above the right atrial pressure. The inferior vena cava was normal in size with preserved respiratory variability. Estimated mean right atrial pressure is 3 mmHg. No pericardial effusion is present. Previous study not available for comparison.      CT chest 9/5/17:  Aneurysmal fusiform dilatation involving the ascending thoracic aorta, with involvement of the innominate artery, the left common carotid artery, with aneurysm ending at the level of the left subclavian artery. The aneurysm begins at the sinus of Valsalva and extends to the level of the proximal aortic arch. Additionally the the descending thoracic aorta is mildly ectatic. There is mild atherosclerotic plaque, primarily at the aortic arch.  Thoracic aortic diameters:  Aortic annulus: 3.6 x 3.8 x 3.9 cm.   Sinuses of Valsalva: 4.9 x 4.8 cm.   Ascending aorta: 6.1 x 6.2 cm.   Aortic arch: 3.6 x 3.6 cm.   Proximal thoracic aorta: 3.6 x 3.8 cm.   Mid thoracic aorta: 3.4 x 3.3 cm.   Descending thoracic aorta: 3.5 x 3.6 cm.     [facility] 9/25/17: Minimal luminal irregularities, otherwise normal coronary angiogram    TTE 10/12/18: Official read pending,  reviewed in person.  Ejection fraction appears to be in the normal range ascending aortic graft is measured at 3.6 cm just distal to the aortic valve.  The rest of the graft is not well visualized.  Normal RV function.  IVC is collapsible and normal size.      Ascending aorta repair: 10/27/2017 Dr. Rubio Piña  Ascending Aorta Aneurysm Repair using Hemashield Platinum Woven Double Velour Graft 34cm X 30cm, PFO closure     ASSESSMENT AND PLAN:  In summary, patient is a 62 year old lady status post ascending aortic repair as well as PFO closure in October 2017 by Dr. Piña who presents for follow-up.     She has been doing very well for the past couple of month her incisional pain has completely resolved and finally she did quit smoking.  She takes all her medication as prescribed and offers no issues there.  Transthoracic echocardiogram from today indicates normal function and expected graft size.  She does remain hypertensive with systolics in the 130 mmHg plus range, however her heart rate is well controlled is in the 50s and remains asymptomatic of this.  This prohibits further increase in her metoprolol dose.  We will add lisinopril 5 mg in the morning in addition to the 10 mg in the evening dose.  We will also get a CTA to reevaluate the graft is has been almost 1 year since her surgery.    Follow up:   6 months     We appreciate the opportunity to participate in the care of Georgie Patino . Please do not hesitate to contact us with any further questions.    Aron Briceño   Cardiology Fellow    I have seen and evaluated the patient with Dr. Briceño  and agree with assessment and plan as documented above.     Jitendra Fernandez MD     [facility] Division of Cardiology - CTA    CC  Patient Care Team:  Marlys Wade APRN CNP as PCP - General (Nurse Practitioner - Family)  Matilda Obando RN as Nurse Coordinator (Thoracic Surgery (Cardiothoracic Vascular Surgery))

## 2018-10-12 NOTE — PROGRESS NOTES
October 12, 2018    I had the pleasure of seeing Georgie Patino  in the [facility] for follow-up.  I seen him initially for episodes of palpitation however did an echocardiogram revealed significantly dilated ascending aorta up to 6.2 cm she was referred to surgery which was performed on October 27, 2017 successfully with no complications and PFO closure was performed at the same time.   Since her surgery she has been doing really well denies any complaints other than some sciatica recently.  No further episodes of chest pain incisional pain has resolved.  She did quit smoking recently.  Her blood pressure remains elevated usually running in the 130-140 systolic range as she measures at clinic visits and sometimes in grocery stores.  She is able to ambulate with rehabilitation and offers no complaints at this time.    ROS otherwise negative    PAST MEDICAL HISTORY:  Past Medical History:   Diagnosis Date     Ascending aortic aneurysm (H) 09/21/2017     Blood transfusions age 19    due to menorhagia     Hypertension 09/21/2017     Thyroid nodule 11/20/2017     Tobacco abuse      CURRENT MEDICATIONS:  Home cardiac meds:   Current Outpatient Prescriptions   Medication Sig Dispense Refill     acetaminophen (TYLENOL) 325 MG tablet Take 2 tablets (650 mg) by mouth every 4 hours as needed for other (surgical pain) 100 tablet 0     aspirin EC 81 MG EC tablet Take 1 tablet (81 mg) by mouth daily 30 tablet 0     atorvastatin (LIPITOR) 20 MG tablet Take 1 tablet (20 mg) by mouth daily 90 tablet 3     lisinopril (PRINIVIL/ZESTRIL) 10 MG tablet Take 1 tablet (10 mg) by mouth daily 90 tablet 3     melatonin 3 MG tablet Take 1 tablet (3 mg) by mouth nightly as needed for sleep 30 tablet 0     methylPREDNISolone (MEDROL DOSEPAK) 4 MG tablet Follow package instructions 21 tablet 0     metoprolol tartrate (LOPRESSOR) 50 MG tablet Take 1 tablet (50 mg) by mouth 2 times daily 180 tablet 3     RaNITidine HCl  (ZANTAC PO) Take 1 tablet by mouth daily       HYDROcodone-acetaminophen (NORCO) 5-325 MG per tablet TAKE ONE TABLET BY MOUTH EVERY SIX HOURS AS NEEDED FOR SEVERE PAIN (Patient not taking: Reported on 10/12/2018) 15 tablet 0     EXAM:  /87 (BP Location: Right arm, Patient Position: Sitting, Cuff Size: Adult Large)  Pulse 50  Wt 91.6 kg (202 lb)  SpO2 96%  BMI 31.64 kg/m2  General: appears comfortable, alert and oriented  Head: normocephalic, atraumatic  Eyes: anicteric sclera, EOMI , PERRL  Neck: no adenopathy  Orophyarynx: moist mucosa, no lesions noted  Heart: regular, S1/S2, no murmurs, rubs or gallop. Estimated JVP at 5 cmH2O. Well healed scar  Lungs: CTAB, No wheezing.   Abdomen: soft, non-tender, bowel sounds present, no hepatosplenomegaly  Extremities: No LE edema today  Skin: no open lesions noted  Neuro: grossly non-focal  Psych: no evidence of depression noted    Weight  Wt Readings from Last 10 Encounters:   10/12/18 91.6 kg (202 lb)   10/08/18 94.3 kg (207 lb 12.8 oz)   09/07/18 93.4 kg (206 lb)   06/12/18 93.5 kg (206 lb 3.2 oz)   03/16/18 93.4 kg (205 lb 12.8 oz)   11/20/17 91.8 kg (202 lb 6.4 oz)   11/14/17 92.6 kg (204 lb 3.2 oz)   11/10/17 91.2 kg (201 lb)   11/07/17 93.4 kg (206 lb)   11/01/17 92 kg (202 lb 14.4 oz)     Labs:    CBC RESULTS:  Lab Results   Component Value Date    WBC 7.9 11/10/2017    RBC 4.08 11/10/2017    HGB 12.0 11/10/2017    HCT 36.8 11/10/2017    MCV 90 11/10/2017    MCH 29.4 11/10/2017    MCHC 32.6 11/10/2017    RDW 13.5 11/10/2017     11/10/2017     CMP RESULTS:  Lab Results   Component Value Date     09/07/2018    POTASSIUM 4.3 09/07/2018    CHLORIDE 107 09/07/2018    CO2 25 09/07/2018    ANIONGAP 9 09/07/2018     (H) 09/07/2018    BUN 19 09/07/2018    CR 0.53 09/07/2018    GFRESTIMATED >90 09/07/2018    GFRESTBLACK >90 09/07/2018    QASIM 9.1 09/07/2018      INR RESULTS:  Lab Results   Component Value Date    INR 1.21 (H) 10/29/2017     ECHO:  09/01/17  Global and regional left ventricular function is normal with an EF of 55-60%. The right ventricle is normal size. Global right ventricular function is normal. Severe dilatation of the ascending aorta (6.2 cm at the mid ascending aorta). The sinotubular ridge is effaced, however there is no evidence of dissection. The aortic arch and descending thoracic aorta appear normal in caliber.  Mild aortic regurgitation. Right ventricular systolic pressure is 26mmHg above the right atrial pressure. The inferior vena cava was normal in size with preserved respiratory variability. Estimated mean right atrial pressure is 3 mmHg. No pericardial effusion is present. Previous study not available for comparison.      CT chest 9/5/17:  Aneurysmal fusiform dilatation involving the ascending thoracic aorta, with involvement of the innominate artery, the left common carotid artery, with aneurysm ending at the level of the left subclavian artery. The aneurysm begins at the sinus of Valsalva and extends to the level of the proximal aortic arch. Additionally the the descending thoracic aorta is mildly ectatic. There is mild atherosclerotic plaque, primarily at the aortic arch.  Thoracic aortic diameters:  Aortic annulus: 3.6 x 3.8 x 3.9 cm.   Sinuses of Valsalva: 4.9 x 4.8 cm.   Ascending aorta: 6.1 x 6.2 cm.   Aortic arch: 3.6 x 3.6 cm.   Proximal thoracic aorta: 3.6 x 3.8 cm.   Mid thoracic aorta: 3.4 x 3.3 cm.   Descending thoracic aorta: 3.5 x 3.6 cm.     C 9/25/17: Minimal luminal irregularities, otherwise normal coronary angiogram    TTE 10/12/18: Official read pending, reviewed in person.  Ejection fraction appears to be in the normal range ascending aortic graft is measured at 3.6 cm just distal to the aortic valve.  The rest of the graft is not well visualized.  Normal RV function.  IVC is collapsible and normal size.      Ascending aorta repair: 10/27/2017 Dr. Rubio Piña  Ascending Aorta Aneurysm Repair using  Hemashield Platinum Woven Double Velour Graft 34cm X 30cm, PFO closure     ASSESSMENT AND PLAN:  In summary, patient is a 62 year old lady status post ascending aortic repair as well as PFO closure in October 2017 by Dr. Piña who presents for follow-up.    She has been doing very well for the past couple of month her incisional pain has completely resolved and finally she did quit smoking.  She takes all her medication as prescribed and offers no issues there.  Transthoracic echocardiogram from today indicates normal function and expected graft size.  She does remain hypertensive with systolics in the 130 mmHg plus range, however her heart rate is well controlled is in the 50s and remains asymptomatic of this.  This prohibits further increase in her metoprolol dose.  We will add lisinopril 5 mg in the morning in addition to the 10 mg in the evening dose.  We will also get a CTA to reevaluate the graft is has been almost 1 year since her surgery.    Follow up:   6 months     We appreciate the opportunity to participate in the care of Georgie Patino . Please do not hesitate to contact us with any further questions.    Aron Briceño   Cardiology Fellow    I have seen and evaluated the patient with Dr. Briceño  and agree with assessment and plan as documented above.     Jitendra Fernandez MD     [facility] Division of Cardiology - [facility]    CC  Patient Care Team:  Marlys Wade APRN CNP as PCP - General (Nurse Practitioner - Family)  Matilda Obando RN as Nurse Coordinator (Thoracic Surgery (Cardiothoracic Vascular Surgery))

## 2018-10-12 NOTE — NURSING NOTE
"Chief Complaint   Patient presents with     Follow Up For      6 mo. Rtc.Ascending aortic aneurysm, same day echo. Per patient no heart signs concern and leg pains physical therapy starting       Initial /87 (BP Location: Right arm, Patient Position: Sitting, Cuff Size: Adult Large)  Pulse 50  Wt 91.6 kg (202 lb)  SpO2 96%  BMI 31.64 kg/m2 Estimated body mass index is 31.64 kg/(m^2) as calculated from the following:    Height as of 10/8/18: 1.702 m (5' 7\").    Weight as of this encounter: 91.6 kg (202 lb)..  BP completed using cuff size: large    Ang Griffith L.P.N.    "

## 2018-10-12 NOTE — NURSING NOTE
Cardiac Testing: Patient given instructions regarding chest CT at  on 10-17 at 10:45am. Discussed purpose, preparation, procedure and when to expect results reported back to the patient. Patient demonstrated understanding of this information and agreed to call with further questions or concerns.    Med Reconcile: Reviewed and verified all current medications with the patient. The updated medication list was printed and given to the patient.    Return Appointment: Patient given instructions regarding scheduling next clinic visit, in 6 months. Patient demonstrated understanding of this information and agreed to call with further questions or concerns.    Medication Change: Patient was educated regarding prescribed medication change, increasing Lisinopril to be 5 in am and 10 in pm. including discussion of the indication, administration, side effects, and when to report to MD or RN. Patient demonstrated understanding of this information and agreed to call with further questions or concerns.    Patient stated she understood all health information given and agreed to call with further questions or concerns.    Vianca Medrano RN

## 2018-10-12 NOTE — MR AVS SNAPSHOT
After Visit Summary   10/12/2018    Georgie Patino    MRN: [id number]           Patient Information     Date Of Birth          1955        Visit Information        Provider Department      10/12/2018 10:00 AM Jitendra Fernandez MD [facility] PHYSICIANS HEART AT [facility]        Today's Diagnoses     Mixed hyperlipidemia    -  1    Ascending aortic aneurysm (H)        Benign essential hypertension          Care Instructions    Thank you for coming to the [facility] @ [facility]; please note the following instructions:    1. Please increase your Lisinopril to be 5mg (half a tablet) in the am and 10mg (full tablet) in the pm.    2. You are scheduled for a chest CTA to check on your aortic graph. This is scheduled at the [facility] on Wednesday, October 17th, 2018 at 10:45am check in.    Prep:    CTA CHEST W - How do I prepare for my exam? (Food and drink instructions)  **You will have contrast for this exam.**  Do not eat or drink for 2 hours before your exam. If you need to take medicine, you may take it with small sips of water. (We may ask you to take liquid medicine as well.)    The day before your exam, drink extra fluids--at least six 8-ounce glasses (unless your doctor tells you to restrict your fluids).    How do I prepare for my exam? (Other instructions)  Patients over 70 or patients with diabetes or kidney problems: If you haven t had a blood test (creatinine test) within the last 30 days, the Cardiologist/Radiologist may require you to get this test prior to your exam.    What should I wear: Please wear loose clothing, such as a sweat suit or jogging clothes.  Avoid snaps, zippers and other metal. We may ask you to undress and put on a hospital gown.    How long does the exam take: Most scans take less than 20 minutes.    What should I bring: Please bring any scans or X-rays taken at other hospitals, if similar tests were done. Also  bring a list of your medicines, including vitamins, minerals and over-the-counter drugs. It is safest to leave personal items at home.    Do I need a :  No  is needed.    What do I need to tell my doctor?  Be sure to tell your doctor:  * If you have any allergies.  * If there s any chance you are pregnant.  * If you are breastfeeding.  * If you have diabetes as your medication may need to be adjusted for this exam.    What should I do after the exam: No restrictions, You may resume normal activities.    What is this test: A CT (computed tomography) scan is a series of pictures that allows us to look inside your body. The scanner creates images of the body in cross sections, much like slices of bread. This helps us see any problems more clearly. You may receive contrast (X-ray dye) before or during your scan. Contrast is given through an IV (small needle in your arm).      3. Follow up in 6 months with Dr. Fernandez. This is scheduled for Friday, April 12th at 10:30am in [place].        If you have any questions regarding your visit please contact your care team:     Cardiology  Telephone Number   Jocelin MELTON, ALICIA BLACK, ALICIA LUKE, DAVID DAVIS, MA  Ang ALANIZ, SHALONDAN   [phone]    *After hours: [phone]   For scheduling appts:     [phone] or    [phone] (select option 1)    *After hours: [phone]     For the Device Clinic (Pacemakers and ICD's)  RN's :  Marisabel Kaiser   During business hours: [phone]    *After business hours:  [phone] (select option 4)      Normal test result notifications will be released via [employer] or mailed within 7 business days.  All other test results, will be communicated via telephone once reviewed by your cardiologist.    If you need a medication refill please contact your pharmacy.  Please allow 3 business days for your refill to be completed.    As always, thank you for trusting us with your health care needs!                Follow-ups after  your visit        Your next 10 appointments already scheduled     Oct 15, 2018 11:30 AM CDT   (Arrive by 11:15 AM)   [facility] Spine with Dmitry Padilla, PT   [facility] ([facility])    [address] [facility] [address]  [address]  [place] [id number] [phone]            Oct 17, 2018 11:15 AM CDT   CTA CHEST WITH CONTRAST with MGCT1   [facility] ([facility])    [address] [place] [address] [phone]           How do I prepare for my exam? (Food and drink instructions) **You will have contrast for this exam.** Do not eat or drink for 2 hours before your exam. If you need to take medicine, you may take it with small sips of water. (We may ask you to take liquid medicine as well.)  The day before your exam, drink extra fluids at least six 8-ounce glasses (unless your doctor tells you to restrict your fluids).  How do I prepare for my exam? (Other instructions) Patients over 70 or patients with diabetes or kidney problems: If you haven t had a blood test (creatinine test) within the last 30 days, the Cardiologist/Radiologist may require you to get this test prior to your exam.  What should I wear: Please wear loose clothing, such as a sweat suit or jogging clothes.  Avoid snaps, zippers and other metal. We may ask you to undress and put on a hospital gown.  How long does the exam take: Most scans take less than 20 minutes.  What should I bring: Please bring any scans or X-rays taken at other hospitals, if similar tests were done. Also bring a list of your medicines, including vitamins, minerals and over-the-counter drugs. It is safest to leave personal items at home.  Do I need a :  No  is needed.  What do I need to tell my doctor? Be sure to tell your doctor: * If you have any allergies. * If there s any chance you are pregnant. * If you are breastfeeding. * If you have diabetes as your medication may need to be adjusted for this exam.  What should I  do after the exam: No restrictions, You may resume normal activities.  What is this test: A CT (computed tomography) scan is a series of pictures that allows us to look inside your body. The scanner creates images of the body in cross sections, much like slices of bread. This helps us see any problems more clearly. You may receive contrast (X-ray dye) before or during your scan. Contrast is given through an IV (small needle in your arm).  Who should I call with questions: If you have any questions, please call the Imaging Department where you will have your exam. Directions, parking instructions, and other information is available on our website, [email or web address].            Oct 22, 2018 11:30 AM CDT   [facility] Spine with Dmitry Padilla, PT   [facility] ([facility])    [address] [facility] [address]  [facility] [address]   [phone]            Oct 29, 2018 11:30 AM CDT   [facility] Spine with Dmitry Padilla, PT   [facility] ([facility])    [address] [facility]  [address]  [facility] [address]   [phone]            Oct 30, 2018 10:30 AM CDT   Return Visit with Tavo Saavedra MD, [place] CYSTO PROC ROOM   [facility] ([facility])    [address] [place] [address]   [phone]            Apr 12, 2019 10:30 AM CDT   Return Visit with Jitendra Fernandez MD   [facility] PHYSICIANS HEART AT [facility] ([facility])    [address] [place] [address] 2nd Floor  [facility] [address]   [phone]              Future tests that were ordered for you today     Open Future Orders        Priority Expected Expires Ordered    CTA Chest with Contrast Routine  10/12/2019 10/12/2018            Who to contact     If you have questions or need follow up information about today's clinic visit or your schedule please contact [facility] PHYSICIANS HEART AT [facility] directly at [phone].  Normal or non-critical lab and imaging results will  be communicated to you by MyChart, letter or phone within 4 business days after the clinic has received the results. If you do not hear from us within 7 days, please contact the clinic through MyChart or phone. If you have a critical or abnormal lab result, we will notify you by phone as soon as possible.  Submit refill requests through [employer] or call your pharmacy and they will forward the refill request to us. Please allow 3 business days for your refill to be completed.          Additional Information About Your Visit        Care EveryWhere ID     This is your Care EveryWhere ID. This could be used by other organizations to access your [facility] medical records  [id number]        Your Vitals Were     Pulse Pulse Oximetry BMI (Body Mass Index)             50 96% 31.64 kg/m2          Blood Pressure from Last 3 Encounters:   10/12/18 165/89   10/08/18 134/80   09/21/18 (!) 160/94    Weight from Last 3 Encounters:   10/12/18 91.6 kg (202 lb)   10/08/18 94.3 kg (207 lb 12.8 oz)   09/07/18 93.4 kg (206 lb)                 Today's Medication Changes          These changes are accurate as of 10/12/18 10:46 AM.  If you have any questions, ask your nurse or doctor.               These medicines have changed or have updated prescriptions.        Dose/Directions    lisinopril 10 MG tablet   Commonly known as:  PRINIVIL/ZESTRIL   This may have changed:  additional instructions   Used for:  Ascending aortic aneurysm (H), Benign essential hypertension   Changed by:  Jitendra Fernandez MD        Dose:  10 mg   Take 1 tablet (10 mg) by mouth daily Please take 5mg in the AM and 10mg in the evening.   Quantity:  120 tablet   Refills:  3            Where to get your medicines      These medications were sent to [id number] - [place], [place] - [address] [place], [place] [address]th [place] [address]     Phone:  [phone]     lisinopril 10 MG tablet                Primary Care Provider Office Phone # Fax #    Marlys Mc  Regina, APRN [phone]       [address] [facility] [address]        Equal Access to Services     CAROLINA LOVELL : Haddanielle ana sage alejo Hobson, wavalenciada eddcruz, chauta kajosselynda yung, humza martinez laYawedna garza. So [facility] [phone].    ATENCIÓN: Si habla español, tiene a thomas disposición servicios gratuitos de asistencia lingüística. Llame al [phone].    We comply with applicable federal civil rights laws and [place] laws. We do not discriminate on the basis of race, color, national origin, age, disability, sex, sexual orientation, or gender identity.            Thank you!     Thank you for choosing [facility] PHYSICIANS HEART AT [facility]  for your care. Our goal is always to provide you with excellent care. Hearing back from our patients is one way we can continue to improve our services. Please take a few minutes to complete the written survey that you may receive in the mail after your visit with us. Thank you!             Your Updated Medication List - Protect others around you: Learn how to safely use, store and throw away your medicines at www.disposemymeds.org.          This list is accurate as of 10/12/18 10:46 AM.  Always use your most recent med list.                   Brand Name Dispense Instructions for use Diagnosis    acetaminophen 325 MG tablet    TYLENOL    100 tablet    Take 2 tablets (650 mg) by mouth every 4 hours as needed for other (surgical pain)    S/P ascending aortic aneurysm repair, Acute post-operative pain       aspirin 81 MG EC tablet     30 tablet    Take 1 tablet (81 mg) by mouth daily    S/P ascending aortic aneurysm repair       atorvastatin 20 MG tablet    LIPITOR    90 tablet    Take 1 tablet (20 mg) by mouth daily    CARDIOVASCULAR SCREENING; LDL GOAL LESS THAN 160       HYDROcodone-acetaminophen 5-325 MG per tablet    NORCO    15 tablet    TAKE ONE TABLET BY MOUTH EVERY SIX HOURS AS NEEDED FOR SEVERE PAIN     Pelvic pain in female       lisinopril 10 MG tablet    PRINIVIL/ZESTRIL    120 tablet    Take 1 tablet (10 mg) by mouth daily Please take 5mg in the AM and 10mg in the evening.    Ascending aortic aneurysm (H), Benign essential hypertension       melatonin 3 MG tablet     30 tablet    Take 1 tablet (3 mg) by mouth nightly as needed for sleep    Ascending aortic aneurysm (H), S/P ascending aortic aneurysm repair       methylPREDNISolone 4 MG tablet    MEDROL DOSEPAK    21 tablet    Follow package instructions    Lumbar radiculopathy       metoprolol tartrate 50 MG tablet    LOPRESSOR    180 tablet    Take 1 tablet (50 mg) by mouth 2 times daily    Palpitations       ZANTAC PO      Take 1 tablet by mouth daily

## 2018-10-15 ENCOUNTER — THERAPY VISIT (OUTPATIENT)
Dept: PHYSICAL THERAPY | Facility: CLINIC | Age: 63
End: 2018-10-15
Attending: NURSE PRACTITIONER
Payer: COMMERCIAL

## 2018-10-15 DIAGNOSIS — M54.16 LUMBAR RADICULOPATHY: ICD-10-CM

## 2018-10-15 PROCEDURE — 97161 PT EVAL LOW COMPLEX 20 MIN: CPT | Mod: GP | Performed by: PHYSICAL THERAPIST

## 2018-10-15 PROCEDURE — 97140 MANUAL THERAPY 1/> REGIONS: CPT | Mod: GP | Performed by: PHYSICAL THERAPIST

## 2018-10-15 PROCEDURE — 97110 THERAPEUTIC EXERCISES: CPT | Mod: GP | Performed by: PHYSICAL THERAPIST

## 2018-10-15 PROCEDURE — G8979 MOBILITY GOAL STATUS: HCPCS | Mod: GP | Performed by: PHYSICAL THERAPIST

## 2018-10-15 PROCEDURE — G8978 MOBILITY CURRENT STATUS: HCPCS | Mod: GP | Performed by: PHYSICAL THERAPIST

## 2018-10-15 NOTE — MR AVS SNAPSHOT
After Visit Summary   10/15/2018    Georgie Patino    MRN: [id number]           Patient Information     Date Of Birth          1955        Visit Information        Provider Department      10/15/2018 11:30 AM Dmitry Padilla, PT [facility]        Today's Diagnoses     Lumbar radiculopathy           Follow-ups after your visit        Your next 10 appointments already scheduled     Oct 17, 2018 11:15 AM CDT   CTA CHEST WITH CONTRAST with MGCT1   [facility]    [address] [place] [address] [phone]           How do I prepare for my exam? (Food and drink instructions) **You will have contrast for this exam.** Do not eat or drink for 2 hours before your exam. If you need to take medicine, you may take it with small sips of water. (We may ask you to take liquid medicine as well.)  The day before your exam, drink extra fluids at least six 8-ounce glasses (unless your doctor tells you to restrict your fluids).  How do I prepare for my exam? (Other instructions) Patients over 70 or patients with diabetes or kidney problems: If you haven t had a blood test (creatinine test) within the last 30 days, the Cardiologist/Radiologist may require you to get this test prior to your exam.  What should I wear: Please wear loose clothing, such as a sweat suit or jogging clothes.  Avoid snaps, zippers and other metal. We may ask you to undress and put on a hospital gown.  How long does the exam take: Most scans take less than 20 minutes.  What should I bring: Please bring any scans or X-rays taken at other hospitals, if similar tests were done. Also bring a list of your medicines, including vitamins, minerals and over-the-counter drugs. It is safest to leave personal items at home.  Do I need a :  No  is needed.  What do I need to tell my doctor? Be sure to tell your doctor: * If you have any allergies. * If there s any chance  you are pregnant. * If you are breastfeeding. * If you have diabetes as your medication may need to be adjusted for this exam.  What should I do after the exam: No restrictions, You may resume normal activities.  What is this test: A CT (computed tomography) scan is a series of pictures that allows us to look inside your body. The scanner creates images of the body in cross sections, much like slices of bread. This helps us see any problems more clearly. You may receive contrast (X-ray dye) before or during your scan. Contrast is given through an IV (small needle in your arm).  Who should I call with questions: If you have any questions, please call the Imaging Department where you will have your exam. Directions, parking instructions, and other information is available on our website, [email or web address].            Oct 22, 2018 11:30 AM CDT   [facility] Spine with Dmitry Padilla, PT   [facility] ([facility])    [address] [facility]  [address]  [facility] [address]   [phone]            Oct 29, 2018 11:30 AM CDT   [facility] Spine with Dmitry Padilla, PT   [facility] ([facility])    [address] [facility]  [address]  [facility] [address]   [phone]            Oct 30, 2018 10:30 AM CDT   Return Visit with Tavo Saavedra MD, [place] CYSTO PROC ROOM   [facility] ([facility])    [address] [place] [address]   [phone]            Apr 12, 2019 10:30 AM CDT   Return Visit with Jitendra Fernandez MD   [facility] PHYSICIANS HEART AT [facility] ([facility])    [address] [place] [address] 2nd Floor  [facility] [address] [phone]              Who to contact     If you have questions or need follow up information about today's clinic visit or your schedule please contact [facility] directly at [phone].  Normal or non-critical lab and imaging results will be communicated to you by MyChart, letter or phone within 4 business days after  the clinic has received the results. If you do not hear from us within 7 days, please contact the clinic through [employer] or phone. If you have a critical or abnormal lab result, we will notify you by phone as soon as possible.  Submit refill requests through [employer] or call your pharmacy and they will forward the refill request to us. Please allow 3 business days for your refill to be completed.          Additional Information About Your Visit        Care EveryWhere ID     This is your Care EveryWhere ID. This could be used by other organizations to access your [facility] medical records  [id number]         Blood Pressure from Last 3 Encounters:   10/12/18 165/89   10/08/18 134/80   09/21/18 (!) 160/94    Weight from Last 3 Encounters:   10/12/18 91.6 kg (202 lb)   10/08/18 94.3 kg (207 lb 12.8 oz)   09/07/18 93.4 kg (206 lb)              We Performed the Following     HC PT EVAL, LOW COMPLEXITY     JOHN INITIAL EVAL REPORT     [facility] PT, HAND, AND CHIROPRACTIC REFERRAL     MANUAL THER TECH,1+REGIONS,EA 15 MIN     THERAPEUTIC EXERCISES        Primary Care Provider Office Phone # Fax #    Marlys Harrington Jesusita Tapia, APRN [facility] [phone]       [address] [facility]  [place] [address]        Equal Access to Services     CAROLINA LOVELL AH: Hadii aad ku hadasho Soomaali, waaxda luqadaha, qaybta kaalmada adeegyada, waxay idiin haydorothyn scott crenshaw . So [facility] [phone].    ATENCIÓN: Si habla español, tiene a thomas disposición servicios gratuitos de asistencia lingüística. Llame al [phone].    We comply with applicable federal civil rights laws and [place] laws. We do not discriminate on the basis of race, color, national origin, age, disability, sex, sexual orientation, or gender identity.            Thank you!     Thank you for choosing [facility]  for your care. Our goal is always to provide you with excellent care. Hearing back from our patients is one way we can continue to improve our services.  Please take a few minutes to complete the written survey that you may receive in the mail after your visit with us. Thank you!             Your Updated Medication List - Protect others around you: Learn how to safely use, store and throw away your medicines at www.disposemymeds.org.          This list is accurate as of 10/15/18  4:08 PM.  Always use your most recent med list.                   Brand Name Dispense Instructions for use Diagnosis    acetaminophen 325 MG tablet    TYLENOL    100 tablet    Take 2 tablets (650 mg) by mouth every 4 hours as needed for other (surgical pain)    S/P ascending aortic aneurysm repair, Acute post-operative pain       aspirin 81 MG EC tablet     30 tablet    Take 1 tablet (81 mg) by mouth daily    S/P ascending aortic aneurysm repair       atorvastatin 20 MG tablet    LIPITOR    90 tablet    Take 1 tablet (20 mg) by mouth daily    CARDIOVASCULAR SCREENING; LDL GOAL LESS THAN 160       HYDROcodone-acetaminophen 5-325 MG per tablet    NORCO    15 tablet    TAKE ONE TABLET BY MOUTH EVERY SIX HOURS AS NEEDED FOR SEVERE PAIN    Pelvic pain in female       lisinopril 10 MG tablet    PRINIVIL/ZESTRIL    120 tablet    Take 1 tablet (10 mg) by mouth daily Please take 5mg in the AM and 10mg in the evening.    Ascending aortic aneurysm (H), Benign essential hypertension       melatonin 3 MG tablet     30 tablet    Take 1 tablet (3 mg) by mouth nightly as needed for sleep    Ascending aortic aneurysm (H), S/P ascending aortic aneurysm repair       methylPREDNISolone 4 MG tablet    MEDROL DOSEPAK    21 tablet    Follow package instructions    Lumbar radiculopathy       metoprolol tartrate 50 MG tablet    LOPRESSOR    180 tablet    Take 1 tablet (50 mg) by mouth 2 times daily    Palpitations       ZANTAC PO      Take 1 tablet by mouth daily

## 2018-10-15 NOTE — LETTER
JOHN RAMON PT  [address]  [address]  [place] [address]  [phone]    2018    Re: Georgie Patino   :   1955  MRN:  [id number]   REFERRING PHYSICIAN:   Marlys Wade, AZIZA RAMON PT  Date of Initial Evaluation:  10/15/2018  Visits:  Rxs Used: 1  Reason for Referral:  Lumbar radiculopathy    EVALUATION SUMMARY    [facility] Initial Evaluation  Subjective:  Patient is a 63 year old female presenting with rehab back hpi. The history is provided by the patient.   Georgie Patino is a 63 year old female with a lumbar condition.  Condition occurred with:  Degenerative joint disease.  Condition occurred: at home.  This is a chronic condition  Pt describes left LE thigh, knee pain, and pain to her left leg. She describes a pullling in the post thighs which aggrivates when she coughs or sneezes.  This has been all worsened since she had Aortic aneurysm repair 1 year ago.  Recent steroids greatly helped and then the pain returned..    Radiates to:  Foot left, gluteals left, lower leg left, knee left and thigh left.  Pain is described as aching and shooting and is intermittent and reported as 7/10.  Associated symptoms:  Loss of motion/stiffness and loss of strength.   Symptoms are exacerbated by walking, standing and bending   Since onset symptoms are gradually worsening.        General health as reported by patient is fair.  Pertinent medical history includes:  High blood pressure and heart problems. Current medications:  Anti-inflammatory, high blood pressure medication, steroids and pain medication.  Current occupation is Retired .        Barriers include:  None as reported by the patient.  Red flags:  None as reported by the patient.    Objective:  Standing Alignment:    Lumbar:  Lordosis incr  Pelvic:  ASIS high L, iliac crest high R and PSIS high R  Knee:  Genu valgus R (bilat flexn contracture <10degrees )    Gait:  Rt knee valgus, almost  scizzors  Gait Type:  Antalgic                 Re: Georgie Patino   :   1955     Lumbar/SI Evaluation  ROM:    AROM Lumbar:   Flexion:          Min loss   Ext:                    Min loss    Side Bend:        Left:     Right:   Rotation:           Left:     Right:   Side Glide:        Left:  Nil     Right:  Nil         Lumbar Myotomes:  normal  Lumbar DTR's:  not assessed  Cord Signs:  not assessed  Lumbar Dermtomes:  normal  Neural Tension/Mobility:  Lumbar:  Normal      Lumbar Palpation:    Tenderness present at Left:    Piriformis; PSIS; Iliac Crest; Gluteus Medius and Greater Trochanter  Tenderness present at Right: PSIS and Iliac Crest    SI joint/Sacrum:    bilat SI dysf, post rot, flare left   Left positive at:    Ilium Ventromedial  Right negative at:  Ilium Ventromedial  Sacral conclusion left:  Upslip, sacral torsion and outflare  Sacral conclusion right:  Sacral torsion, upslip, posterior inominate, locked, inflare and elevated pubic sym          Hip Evaluation  Hip Strength:  : 3/5 hip ext and abd  : 3/5 hip ext and abd       Hip Special Testing:    Left hip positive for the following special tests:  Piriformis; Cecelia and Toni's  Left hip negative for the following special tests:  Fadir/Labrum or SLR  Right hip negative for the following special tests:  Cecelia; Fadir/Labrum; SLR or Toni's      Hip Palpation:    Left hip tenderness present at:   Greater Trachanter; IT Band; Piriformis; PSIS; Adductors; Abductors; Iliac Crest; Gluteus Medius and Bursa  Right hip tenderness present at:  Adductors       Knee Evaluation:  ROM:  Prom wnl knee: Rt knee ++++ crepitus   Strength wnl knee: 3/5 hip ext and abd  bilat   AROM  Extension:  Left: -6    Right:  -8  Flexion: Left: 135    Right: 125            Re: Georgie Patino   :   1955    Assessment/Plan:    Patient is a 63 year old female with lumbar, sacral, pelvic, left side hip and both sides knee complaints.    Patient has the following  significant findings with corresponding treatment plan.                Diagnosis 1:  Lumbar radic left 2  Pain -  mechanical traction, manual therapy, self management, directional preference exercise and home program  Decreased ROM/flexibility - manual therapy and therapeutic exercise  Decreased joint mobility - manual therapy and therapeutic exercise  Decreased strength - therapeutic exercise and therapeutic activities  Impaired muscle performance - neuro re-education  Decreased function - therapeutic activities  Diagnosis 2:  SI dysfunction/malposition bilat    Pain -  manual therapy, self management and home program  Decreased ROM/flexibility - manual therapy and therapeutic exercise  Decreased joint mobility - manual therapy and therapeutic exercise  Impaired muscle performance - neuro re-education  Decreased function - therapeutic activities  Instability -  Therapeutic Activity  Therapeutic Exercise  Diagnosis 3:  Knee pain bilat   Pain -  hot/cold therapy, US, manual therapy, self management and home program  Decreased ROM/flexibility - manual therapy and therapeutic exercise  Decreased joint mobility - manual therapy and therapeutic exercise  Inflammation - cold therapy and US  Impaired muscle performance - neuro re-education and home program  Decreased function - therapeutic activities     Therapy Evaluation Codes:   1) History comprised of:   Personal factors that impact the plan of care:      Coping style, Overall behavior pattern and Past/current experiences.    Comorbidity factors that impact the plan of care are:      Heart problems, Osteoarthritis and Overweight.     Medications impacting care: Anti-inflammatory, High blood pressure and Pain.  2) Examination of Body Systems comprised of:   Body structures and functions that impact the plan of care:      Knee, Lumbar spine, Pelvis and Sacral illiac joint.   Activity limitations that impact the plan of care are:      Bending, Cooking, Sitting,  Squatting/kneeling, Stairs, Standing and Walking.  3) Clinical presentation characteristics are:   Stable/Uncomplicated.  4) Decision-Making    Low complexity using standardized patient assessment instrument and/or measureable assessment of functional outcome.  Cumulative Therapy Evaluation is: Low complexity.    Previous and current functional limitations:  (See Goal Flow Sheet for this information)    Short term and Long term goals: (See Goal Flow Sheet for this information)       Re: Georgie Patino   :   1955    Communication ability:  Patient appears to be able to clearly communicate and understand verbal and written communication and follow directions correctly.  Treatment Explanation - The following has been discussed with the patient:   RX ordered/plan of care  Anticipated outcomes  Possible risks and side effects  This patient would benefit from PT intervention to resume normal activities.   Rehab potential is good.    Frequency:  2 X week, once daily tapering to 1x per wk   Duration:  for 8 weeks  Discharge Plan:  Achieve all LTG.  Independent in home treatment program.  Reach maximal therapeutic benefit.    Please refer to the daily flowsheet for treatment today, total treatment time and time spent performing 1:1 timed codes.       Thank you for your referral.    INQUIRIES  Therapist: Dmitry Padilla PT   JOHN RAMON PT  [address]  [address]  [place] [address]  Phone: [phone]  Fax: [phone]

## 2018-10-15 NOTE — PROGRESS NOTES
[facility] Initial Evaluation  Subjective:  Patient is a 63 year old female presenting with rehab back hpi. The history is provided by the patient.   Georgie Patino is a 63 year old female with a lumbar condition.  Condition occurred with:  Degenerative joint disease.  Condition occurred: at home.  This is a chronic condition  Pt describes left LE thigh, knee pain, and pain to her left leg. She describes a pullling in the post thighs which aggrivates when she coughs or sneezes.  This has been all worsened since she had Aortic aneurysm repair 1 year ago.  Recent steroids greatly helped and then the pain returned..      Radiates to:  Foot left, gluteals left, lower leg left, knee left and thigh left.  Pain is described as aching and shooting and is intermittent and reported as 7/10.  Associated symptoms:  Loss of motion/stiffness and loss of strength.   Symptoms are exacerbated by walking, standing and bending   Since onset symptoms are gradually worsening.        General health as reported by patient is fair.  Pertinent medical history includes:  High blood pressure and heart problems.      Current medications:  Anti-inflammatory, high blood pressure medication, steroids and pain medication.  Current occupation is Retired .        Barriers include:  None as reported by the patient.    Red flags:  None as reported by the patient.                        Objective:  Standing Alignment:        Lumbar:  Lordosis incr  Pelvic:  ASIS high L, iliac crest high R and PSIS high R    Knee:  Genu valgus R (bilat flexn contracture <10degrees )      Gait:  Rt knee valgus, almost scizzors  Gait Type:  Antalgic                    Lumbar/SI Evaluation  ROM:    AROM Lumbar:   Flexion:          Min loss   Ext:                    Min loss    Side Bend:        Left:     Right:   Rotation:           Left:     Right:   Side Glide:        Left:  Nil     Right:  Nil           Lumbar Myotomes:  normal            Lumbar  DTR's:  not assessed      Cord Signs:  not assessed    Lumbar Dermtomes:  normal                Neural Tension/Mobility:  Lumbar:  Normal        Lumbar Palpation:    Tenderness present at Left:    Piriformis; PSIS; Iliac Crest; Gluteus Medius and Greater Trochanter  Tenderness present at Right: PSIS and Iliac Crest        SI joint/Sacrum:    bilat SI dysf, post rot, flare left     Left positive at:    Ilium Ventromedial    Right negative at:  Ilium Ventromedial  Sacral conclusion left:  Upslip, sacral torsion and outflare  Sacral conclusion right:  Sacral torsion, upslip, posterior inominate, locked, inflare and elevated pubic sym                                  Hip Evaluation    Hip Strength:  : 3/5 hip ext and abd  : 3/5 hip ext and abd                           Hip Special Testing:    Left hip positive for the following special tests:  Piriformis; Cecelia and Toni's  Left hip negative for the following special tests:  Fadir/Labrum or SLR  Right hip negative for the following special tests:  Cecelia; Fadir/Labrum; SLR or Toni's    Hip Palpation:    Left hip tenderness present at:   Greater Trachanter; IT Band; Piriformis; PSIS; Adductors; Abductors; Iliac Crest; Gluteus Medius and Bursa  Right hip tenderness present at:  Adductors       Knee Evaluation:  ROM:  Prom wnl knee: Rt knee ++++ crepitus   Strength wnl knee: 3/5 hip ext and abd  bilat   AROM      Extension:  Left: -6    Right:  -8  Flexion: Left: 135    Right: 125                          General     ROS    Assessment/Plan:    Patient is a 63 year old female with lumbar, sacral, pelvic, left side hip and both sides knee complaints.    Patient has the following significant findings with corresponding treatment plan.                Diagnosis 1:  Lumbar radic left 2  Pain -  mechanical traction, manual therapy, self management, directional preference exercise and home program  Decreased ROM/flexibility - manual therapy and therapeutic exercise  Decreased joint  mobility - manual therapy and therapeutic exercise  Decreased strength - therapeutic exercise and therapeutic activities  Impaired muscle performance - neuro re-education  Decreased function - therapeutic activities  Diagnosis 2:  SI dysfunction/malposition bilat    Pain -  manual therapy, self management and home program  Decreased ROM/flexibility - manual therapy and therapeutic exercise  Decreased joint mobility - manual therapy and therapeutic exercise  Impaired muscle performance - neuro re-education  Decreased function - therapeutic activities  Instability -  Therapeutic Activity  Therapeutic Exercise  Diagnosis 3:  Knee pain bilat   Pain -  hot/cold therapy, US, manual therapy, self management and home program  Decreased ROM/flexibility - manual therapy and therapeutic exercise  Decreased joint mobility - manual therapy and therapeutic exercise  Inflammation - cold therapy and US  Impaired muscle performance - neuro re-education and home program  Decreased function - therapeutic activities     Therapy Evaluation Codes:   1) History comprised of:   Personal factors that impact the plan of care:      Coping style, Overall behavior pattern and Past/current experiences.    Comorbidity factors that impact the plan of care are:      Heart problems, Osteoarthritis and Overweight.     Medications impacting care: Anti-inflammatory, High blood pressure and Pain.  2) Examination of Body Systems comprised of:   Body structures and functions that impact the plan of care:      Knee, Lumbar spine, Pelvis and Sacral illiac joint.   Activity limitations that impact the plan of care are:      Bending, Cooking, Sitting, Squatting/kneeling, Stairs, Standing and Walking.  3) Clinical presentation characteristics are:   Stable/Uncomplicated.  4) Decision-Making    Low complexity using standardized patient assessment instrument and/or measureable assessment of functional outcome.  Cumulative Therapy Evaluation is: Low  complexity.    Previous and current functional limitations:  (See Goal Flow Sheet for this information)    Short term and Long term goals: (See Goal Flow Sheet for this information)     Communication ability:  Patient appears to be able to clearly communicate and understand verbal and written communication and follow directions correctly.  Treatment Explanation - The following has been discussed with the patient:   RX ordered/plan of care  Anticipated outcomes  Possible risks and side effects  This patient would benefit from PT intervention to resume normal activities.   Rehab potential is good.    Frequency:  2 X week, once daily tapering to 1x per wk   Duration:  for 8 weeks  Discharge Plan:  Achieve all LTG.  Independent in home treatment program.  Reach maximal therapeutic benefit.    Please refer to the daily flowsheet for treatment today, total treatment time and time spent performing 1:1 timed codes.

## 2018-10-22 ENCOUNTER — THERAPY VISIT (OUTPATIENT)
Dept: PHYSICAL THERAPY | Facility: CLINIC | Age: 63
End: 2018-10-22
Payer: COMMERCIAL

## 2018-10-22 DIAGNOSIS — M54.59 MECHANICAL LOW BACK PAIN: Primary | ICD-10-CM

## 2018-10-22 PROCEDURE — 97110 THERAPEUTIC EXERCISES: CPT | Mod: GP | Performed by: PHYSICAL THERAPIST

## 2018-10-22 PROCEDURE — 97140 MANUAL THERAPY 1/> REGIONS: CPT | Mod: GP | Performed by: PHYSICAL THERAPIST

## 2018-10-30 ENCOUNTER — TELEPHONE (OUTPATIENT)
Dept: UROLOGY | Facility: CLINIC | Age: 63
End: 2018-10-30

## 2018-10-30 ENCOUNTER — OFFICE VISIT (OUTPATIENT)
Dept: UROLOGY | Facility: CLINIC | Age: 63
End: 2018-10-30
Payer: COMMERCIAL

## 2018-10-30 DIAGNOSIS — R31.29 MICROSCOPIC HEMATURIA: Primary | ICD-10-CM

## 2018-10-30 DIAGNOSIS — N20.0 CALCULUS OF KIDNEY: ICD-10-CM

## 2018-10-30 PROCEDURE — 99213 OFFICE O/P EST LOW 20 MIN: CPT | Mod: 25 | Performed by: UROLOGY

## 2018-10-30 PROCEDURE — 52000 CYSTOURETHROSCOPY: CPT | Performed by: UROLOGY

## 2018-10-30 NOTE — TELEPHONE ENCOUNTER
Left message for patient to call. Post op stent removal scheduled 11/26 at 9:30 and also 12/3 at 10:30---not sure if patient will need stent in for 1 or 2 weeks so both appointments scheduled and will be determined at time of surgery.  Celsa Odonnell, CMA

## 2018-10-30 NOTE — TELEPHONE ENCOUNTER
Type of surgery: Extracorporal shockwave lithotripsy (bilateral) cystoscopy and stent placement (left) CPT code 55369, 00505  Calculus of kidney [N20.0]   Location of surgery: [facility].  Date and time of surgery: 11/19/2018 / 12:30 p  Surgeon: JASON Saavedra  Pre-Op Appt Date: 11/12/2018  Post-Op Appt Date: Dr. Saavedra's nurse will schedule.   Packet sent out: Yes  Pre-cert/Authorization completed:  No prior auth per [facility] list.   Date: 10/30/2018    Insurance valid.

## 2018-10-30 NOTE — PATIENT INSTRUCTIONS
Surgery Preparation    You will receive a phone call from the [facility] within 1-2 days. If you do not receive a call within 2 days, contact [phone] to schedule the procedure. You can write the location/date/time of surgery in the space provided below for your records.    Location of Surgery:                                          Date of Surgery:                                                 Time of Arrival:                                                   Time of Surgery:                                                   Please arrange an appointment with a primary care provider for a pre-op physical. This is a mandatory appointment that needs to be completed within the two weeks prior to your surgery date. This gives clearance for you to receive anesthesia during your procedure. If you have had a pre-op physical within 30 days of your surgery date, you may not need another one. Check with your provider.    If you are having a Same Day Surgery, you must have a  to and from the procedure. Someone needs to stay with you post-operatively for 12 hours.     Do not eat or drink any solids, milk products, or pulpy juices after midnight the night before your surgery. You may have clear liquids up to 8 hours prior to the surgery. This would include water, soda, coffee (without cream), tea, broth, and jello.    Please stop all over the counter blood thinners such as Aspirin, Advil, Aleve, Ibuprofen, Motrin, and Excedrin one week prior to surgery. Please discontinue prescription blood thinners 5-7 days prior to surgery, per your primary physician's orders.     Please check with your insurance company to confirm that your procedure is covered, and that the facility is in-network.     Call the Urology Department @ [phone] if you have questions about your surgery, or if you need to reschedule your procedure.         Shock Wave Lithotripsy  Passing a kidney stone can be very painful. Shock  wave lithotripsy is a treatment that helps by breaking the kidney stone into smaller pieces that are easier to pass. This treatment is also called extracorporeal shock wave lithotripsy (ESWL). Lithotripsy takes about an hour. It s done in a hospital, lithotripsy center, or mobile lithotripsy van. You will likely go home the same day. This treatment is not used for all types of kidney stones. Your healthcare provider will discuss whether this is the right treatment for the type of stone you have.      Energy waves strike the stone, which begins to crack. The stone crumbles into tiny pieces.   During the procedure    You get medicine to prevent pain and help you relax or sleep during lithotripsy. Once this takes effect, the procedure will start.    A flexible tube (stent) with holes in it may be placed into your ureter, the tube that connects the kidney and the bladder. This helps keep urine flowing from the kidney.    Your healthcare provider then uses X-ray or ultrasound to find the exact location of the kidney stone.    Sound waves are aimed at the stone and sent at high speed. If you re awake, you may feel a tapping as they pass through your body.  After the procedure    You ll be closely watched in a recovery room for about 1 to 3 hours. Antibiotics and pain medicine may be prescribed before you leave.    You ll have a follow-up visit in a few weeks. If you received a stent, it will be removed. Your healthcare provider will also check for pieces of stone. If large pieces remain, you may need a second lithotripsy or another procedure.  Possible risks and complications    Infection    Bleeding in the kidney    Bruising of the kidney or skin    Blockage (obstruction) of the ureter    Failure to break up the stone (other procedures may be needed)   Passing the stone  It can take a day to several weeks for the pieces of stone to leave your body. Drink plenty of liquids to help flush your system. During this  time:    Your urine may be cloudy or slightly bloody. You may even see small pieces of stone.    You may have a slight fever and some pain. Take prescribed or over-the-counter pain medicine as instructed by your healthcare provider.    You may be asked to strain your urine to collect some stone particles. These will be studied in the lab.  When to call your healthcare provider   Call your healthcare provider right away if you have any of the following:    Fever of 100.4 F (38 C) or higher, or as directed by your healthcare provider    Heavy bleeding    Pain that doesn t go away with medicine    Upset stomach and vomiting    Problems urinating   Date Last Reviewed: 1/1/2017 2000-2017 [employer]. [address] [place] [address], [place] [address]. All rights reserved. This information is not intended as a substitute for professional medical care. Always follow your healthcare professional's instructions.        Ureteral Stents  A ureteral stent is a soft plastic tube with holes in it. It s temporarily inserted into a ureter to help drain urine into the bladder. One end goes in the kidney. The other end goes in the bladder. A coil on each end holds the stent in place. The stent can t be seen from outside the body. It shouldn t interfere with your normal routine. Your stent will be put in by a doctor trained in treating the urinary tract (a urologist) or another specialist. The procedure is done in a hospital or surgery center. You ll likely go home the same day.  When is a ureteral stent used?  A ureteral stent may be used:    To bypass a blockage in a kidney or ureter.    During kidney stone removal.    To let a ureter heal after surgery.    Before the Procedure  Your healthcare provider will give you instructions to prepare for the procedure. X-rays or other imaging tests of your kidneys and ureters may be done beforehand.  During the procedure    You receive medicine to prevent pain and help you relax or  sleep during the procedure. Once this takes effect, the procedure starts.    The doctor inserts a cystoscope (lighted instrument) through the urethra and into the bladder. This shows the opening to the ureter.    A thin wire is carefully threaded through the cystoscope, up the ureter, and into the kidney. The stent is inserted over the wire.    A fluoroscope (special X-ray machine) is used to help position the stent. When the stent is in place, the wire and cystoscope are removed.  While you have a stent    Some discomfort is normal. Certain movements may trigger pain or a feeling that you need to urinate. You may also feel mild soreness or pressure before or during urination. These symptoms will go away a few days after the stent is removed.    Medicine to control pain or bladder spasms or to prevent infection may be prescribed. Take this as directed.    Drink plenty of fluids to help flush out your urinary tract.    Your urine may be slightly pink or red. This is due to bleeding caused by minor irritation from the stent. This may happen on and off while you have the stent.    As with any synthetic device placed in the body, there is a risk of infection. The stent may have to be removed if this happens.   How long will you need a stent?  The stent is often taken out after the blockage in the ureter is treated or the ureter has healed. This may take 1 week to 2 weeks, or longer. If a stent is needed for a long time, it may need to be changed every few months.  When to call your healthcare provider  Contact your healthcare provider right away if:    Your urine contains blood clots or you see a large amount of blood-tinged urine    You have symptoms similar to those you had before the stent was placed    You constantly leak urine    You have a fever over 100.4 F (38 C), chills, nausea, or vomiting    Your pain is not relieved with medicine    The end of the stent comes out of the urethra   Date Last Reviewed:  1/1/2017 2000-2017 [employer]. [address] [place] [address], [place] [address]. All rights reserved. This information is not intended as a substitute for professional medical care. Always follow your healthcare professional's instructions.

## 2018-10-30 NOTE — PROGRESS NOTES
The patient is here for cystoscopy for evaluation of hematuria.     Patient is prepped and draped.  Flexible cystoscope placed under direct vision.      Cysto: The anterior urethra is normal     In the bladder there is normal mucosa.    Assessment/Plan:  (R31.29) Microscopic hematuria  (primary encounter diagnosis)  Comment:    Plan:  Renal stone related    (N20.0) Calculus of kidney  Comment:    Plan: discussed tx options - 15 min spent face to face consultation.           Observation vs surgery discussed           ESWL vs PCNL vs ureteroscopy discussed            She wants to try ESWL with understanding that she probably will need more than one procedure for left renal stones.

## 2018-10-30 NOTE — MR AVS SNAPSHOT
After Visit Summary   10/30/2018    Georgie Patino    MRN: [id number]           Patient Information     Date Of Birth          1955        Visit Information        Provider Department      10/30/2018 10:30 AM Tavo Saavedra MD; [place] [facility] PROC [facility]OM [facility]        Today's Diagnoses     Microscopic hematuria    -  1    Calculus of kidney          Care Instructions    Surgery Preparation    You will receive a phone call from the [facility] within 1-2 days. If you do not receive a call within 2 days, contact [phone] to schedule the procedure. You can write the location/date/time of surgery in the space provided below for your records.    Location of Surgery:                                          Date of Surgery:                                                 Time of Arrival:                                                   Time of Surgery:                                                   Please arrange an appointment with a primary care provider for a pre-op physical. This is a mandatory appointment that needs to be completed within the two weeks prior to your surgery date. This gives clearance for you to receive anesthesia during your procedure. If you have had a pre-op physical within 30 days of your surgery date, you may not need another one. Check with your provider.    If you are having a Same Day Surgery, you must have a  to and from the procedure. Someone needs to stay with you post-operatively for 12 hours.     Do not eat or drink any solids, milk products, or pulpy juices after midnight the night before your surgery. You may have clear liquids up to 8 hours prior to the surgery. This would include water, soda, coffee (without cream), tea, broth, and jello.    Please stop all over the counter blood thinners such as Aspirin, Advil, Aleve, Ibuprofen, Motrin, and Excedrin one week prior to surgery. Please discontinue prescription blood  thinners 5-7 days prior to surgery, per your primary physician's orders.     Please check with your insurance company to confirm that your procedure is covered, and that the facility is in-network.     Call the Urology Department @ [phone] if you have questions about your surgery, or if you need to reschedule your procedure.         Shock Wave Lithotripsy  Passing a kidney stone can be very painful. Shock wave lithotripsy is a treatment that helps by breaking the kidney stone into smaller pieces that are easier to pass. This treatment is also called extracorporeal shock wave lithotripsy (ESWL). Lithotripsy takes about an hour. It s done in a hospital, lithotripsy center, or mobile lithotripsy van. You will likely go home the same day. This treatment is not used for all types of kidney stones. Your healthcare provider will discuss whether this is the right treatment for the type of stone you have.      Energy waves strike the stone, which begins to crack. The stone crumbles into tiny pieces.   During the procedure    You get medicine to prevent pain and help you relax or sleep during lithotripsy. Once this takes effect, the procedure will start.    A flexible tube (stent) with holes in it may be placed into your ureter, the tube that connects the kidney and the bladder. This helps keep urine flowing from the kidney.    Your healthcare provider then uses X-ray or ultrasound to find the exact location of the kidney stone.    Sound waves are aimed at the stone and sent at high speed. If you re awake, you may feel a tapping as they pass through your body.  After the procedure    You ll be closely watched in a recovery room for about 1 to 3 hours. Antibiotics and pain medicine may be prescribed before you leave.    You ll have a follow-up visit in a few weeks. If you received a stent, it will be removed. Your healthcare provider will also check for pieces of stone. If large pieces remain, you may need a second  lithotripsy or another procedure.  Possible risks and complications    Infection    Bleeding in the kidney    Bruising of the kidney or skin    Blockage (obstruction) of the ureter    Failure to break up the stone (other procedures may be needed)   Passing the stone  It can take a day to several weeks for the pieces of stone to leave your body. Drink plenty of liquids to help flush your system. During this time:    Your urine may be cloudy or slightly bloody. You may even see small pieces of stone.    You may have a slight fever and some pain. Take prescribed or over-the-counter pain medicine as instructed by your healthcare provider.    You may be asked to strain your urine to collect some stone particles. These will be studied in the lab.  When to call your healthcare provider   Call your healthcare provider right away if you have any of the following:    Fever of 100.4 F (38 C) or higher, or as directed by your healthcare provider    Heavy bleeding    Pain that doesn t go away with medicine    Upset stomach and vomiting    Problems urinating   Date Last Reviewed: 1/1/2017 2000-2017 [employer]. [address] [place] [address]. All rights reserved. This information is not intended as a substitute for professional medical care. Always follow your healthcare professional's instructions.        Ureteral Stents  A ureteral stent is a soft plastic tube with holes in it. It s temporarily inserted into a ureter to help drain urine into the bladder. One end goes in the kidney. The other end goes in the bladder. A coil on each end holds the stent in place. The stent can t be seen from outside the body. It shouldn t interfere with your normal routine. Your stent will be put in by a doctor trained in treating the urinary tract (a urologist) or another specialist. The procedure is done in a hospital or surgery center. You ll likely go home the same day.  When is a ureteral stent used?  A ureteral  stent may be used:    To bypass a blockage in a kidney or ureter.    During kidney stone removal.    To let a ureter heal after surgery.    Before the Procedure  Your healthcare provider will give you instructions to prepare for the procedure. X-rays or other imaging tests of your kidneys and ureters may be done beforehand.  During the procedure    You receive medicine to prevent pain and help you relax or sleep during the procedure. Once this takes effect, the procedure starts.    The doctor inserts a cystoscope (lighted instrument) through the urethra and into the bladder. This shows the opening to the ureter.    A thin wire is carefully threaded through the cystoscope, up the ureter, and into the kidney. The stent is inserted over the wire.    A fluoroscope (special X-ray machine) is used to help position the stent. When the stent is in place, the wire and cystoscope are removed.  While you have a stent    Some discomfort is normal. Certain movements may trigger pain or a feeling that you need to urinate. You may also feel mild soreness or pressure before or during urination. These symptoms will go away a few days after the stent is removed.    Medicine to control pain or bladder spasms or to prevent infection may be prescribed. Take this as directed.    Drink plenty of fluids to help flush out your urinary tract.    Your urine may be slightly pink or red. This is due to bleeding caused by minor irritation from the stent. This may happen on and off while you have the stent.    As with any synthetic device placed in the body, there is a risk of infection. The stent may have to be removed if this happens.   How long will you need a stent?  The stent is often taken out after the blockage in the ureter is treated or the ureter has healed. This may take 1 week to 2 weeks, or longer. If a stent is needed for a long time, it may need to be changed every few months.  When to call your healthcare provider  Contact your  healthcare provider right away if:    Your urine contains blood clots or you see a large amount of blood-tinged urine    You have symptoms similar to those you had before the stent was placed    You constantly leak urine    You have a fever over 100.4 F (38 C), chills, nausea, or vomiting    Your pain is not relieved with medicine    The end of the stent comes out of the urethra   Date Last Reviewed: 1/1/2017 2000-2017 [employer]. [address] [place] [address]. All rights reserved. This information is not intended as a substitute for professional medical care. Always follow your healthcare professional's instructions.                Follow-ups after your visit        Your next 10 appointments already scheduled     Nov 05, 2018 11:30 AM CST   [facility] Spine with Dmitry Padilla, PT   [facility] ([facility])    [address] [address]   [phone]            Nov 12, 2018 11:30 AM CST   [facility] Spine with Dmitry Padilla, PT   [facility] ([facility])    [address] [address]   [phone]            Apr 12, 2019 10:30 AM CDT   Return Visit with Jitendra Fernandez MD   [facility] PHYSICIANS HEART AT [facility] ([facility])    [address] [place] [address]   [phone]              Who to contact     If you have questions or need follow up information about today's clinic visit or your schedule please contact [facility] directly at [phone].  Normal or non-critical lab and imaging results will be communicated to you by MyChart, letter or phone within 4 business days after the clinic has received the results. If you do not hear from us within 7 days, please contact the clinic through MyChart or phone. If you have a critical or abnormal lab result, we will notify you by phone as soon as possible.  Submit refill requests through [employer] or call your  pharmacy and they will forward the refill request to us. Please allow 3 business days for your refill to be completed.          Additional Information About Your Visit        Care EveryWhere ID     This is your Care EveryWhere ID. This could be used by other organizations to access your [place] medical records  [id number]         Blood Pressure from Last 3 Encounters:   10/12/18 165/89   10/08/18 134/80   09/21/18 (!) 160/94    Weight from Last 3 Encounters:   10/12/18 91.6 kg (202 lb)   10/08/18 94.3 kg (207 lb 12.8 oz)   09/07/18 93.4 kg (206 lb)              We Performed the Following     CYSTOURETHROSCOPY ([id number])        Primary Care Provider Office Phone # Fax #    Marlys RatliffTESHA [facility] [phone] [address] [facility] [address]        Equal Access to Services     [facility]: Hadii aad ku hadasho Soomaali, waaxda luqadaha, qaybta kaalmada adeegyada, waxay idiin hayaan adecarlos enrique kharash laYawaan . So [facility] [place] [facility] [phone].    ATENCIÓN: Si habla español, tiene a thomas disposición servicios gratuitos de asistencia lingüística. Igor al [phone].    We comply with applicable federal civil rights laws and [place] laws. We do not discriminate on the basis of race, color, national origin, age, disability, sex, sexual orientation, or gender identity.            Thank you!     Thank you for choosing [facility]  for your care. Our goal is always to provide you with excellent care. Hearing back from our patients is one way we can continue to improve our services. Please take a few minutes to complete the written survey that you may receive in the mail after your visit with us. Thank you!             Your Updated Medication List - Protect others around you: Learn how to safely use, store and throw away your medicines at www.disposemymeds.org.          This list is accurate as of 10/30/18 10:44 AM.  Always use your most recent med list.                   Brand Name  Dispense Instructions for use Diagnosis    acetaminophen 325 MG tablet    TYLENOL    100 tablet    Take 2 tablets (650 mg) by mouth every 4 hours as needed for other (surgical pain)    S/P ascending aortic aneurysm repair, Acute post-operative pain       aspirin 81 MG EC tablet     30 tablet    Take 1 tablet (81 mg) by mouth daily    S/P ascending aortic aneurysm repair       atorvastatin 20 MG tablet    LIPITOR    90 tablet    Take 1 tablet (20 mg) by mouth daily    CARDIOVASCULAR SCREENING; LDL GOAL LESS THAN 160       HYDROcodone-acetaminophen 5-325 MG per tablet    NORCO    15 tablet    TAKE ONE TABLET BY MOUTH EVERY SIX HOURS AS NEEDED FOR SEVERE PAIN    Pelvic pain in female       lisinopril 10 MG tablet    PRINIVIL/ZESTRIL    120 tablet    Take 1 tablet (10 mg) by mouth daily Please take 5mg in the AM and 10mg in the evening.    Ascending aortic aneurysm (H), Benign essential hypertension       melatonin 3 MG tablet     30 tablet    Take 1 tablet (3 mg) by mouth nightly as needed for sleep    Ascending aortic aneurysm (H), S/P ascending aortic aneurysm repair       methylPREDNISolone 4 MG tablet    MEDROL DOSEPAK    21 tablet    Follow package instructions    Lumbar radiculopathy       metoprolol tartrate 50 MG tablet    LOPRESSOR    180 tablet    Take 1 tablet (50 mg) by mouth 2 times daily    Palpitations       ZANTAC PO      Take 1 tablet by mouth daily

## 2018-11-05 ENCOUNTER — THERAPY VISIT (OUTPATIENT)
Dept: PHYSICAL THERAPY | Facility: CLINIC | Age: 63
End: 2018-11-05
Payer: COMMERCIAL

## 2018-11-05 DIAGNOSIS — M54.59 MECHANICAL LOW BACK PAIN: ICD-10-CM

## 2018-11-05 PROCEDURE — 97140 MANUAL THERAPY 1/> REGIONS: CPT | Mod: GP | Performed by: PHYSICAL THERAPIST

## 2018-11-05 PROCEDURE — 97110 THERAPEUTIC EXERCISES: CPT | Mod: GP | Performed by: PHYSICAL THERAPIST

## 2018-11-12 ENCOUNTER — OFFICE VISIT (OUTPATIENT)
Dept: FAMILY MEDICINE | Facility: CLINIC | Age: 63
End: 2018-11-12
Payer: COMMERCIAL

## 2018-11-12 ENCOUNTER — THERAPY VISIT (OUTPATIENT)
Dept: PHYSICAL THERAPY | Facility: CLINIC | Age: 63
End: 2018-11-12
Payer: COMMERCIAL

## 2018-11-12 VITALS
BODY MASS INDEX: 32.49 KG/M2 | RESPIRATION RATE: 20 BRPM | HEIGHT: 67 IN | WEIGHT: 207 LBS | TEMPERATURE: 96.8 F | DIASTOLIC BLOOD PRESSURE: 70 MMHG | OXYGEN SATURATION: 100 % | HEART RATE: 65 BPM | SYSTOLIC BLOOD PRESSURE: 124 MMHG

## 2018-11-12 DIAGNOSIS — M54.59 MECHANICAL LOW BACK PAIN: ICD-10-CM

## 2018-11-12 DIAGNOSIS — R10.2 PELVIC PAIN IN FEMALE: ICD-10-CM

## 2018-11-12 DIAGNOSIS — Z01.818 PREOP GENERAL PHYSICAL EXAM: Primary | ICD-10-CM

## 2018-11-12 DIAGNOSIS — N20.0 RENAL CALCULUS, LEFT: ICD-10-CM

## 2018-11-12 LAB
CREAT SERPL-MCNC: 0.56 MG/DL (ref 0.52–1.04)
GFR SERPL CREATININE-BSD FRML MDRD: >90 ML/MIN/1.7M2
HGB BLD-MCNC: 14.9 G/DL (ref 11.7–15.7)
POTASSIUM SERPL-SCNC: 4.6 MMOL/L (ref 3.4–5.3)

## 2018-11-12 PROCEDURE — 36415 COLL VENOUS BLD VENIPUNCTURE: CPT | Performed by: NURSE PRACTITIONER

## 2018-11-12 PROCEDURE — 97140 MANUAL THERAPY 1/> REGIONS: CPT | Mod: GP | Performed by: PHYSICAL THERAPIST

## 2018-11-12 PROCEDURE — 97110 THERAPEUTIC EXERCISES: CPT | Mod: GP | Performed by: PHYSICAL THERAPIST

## 2018-11-12 PROCEDURE — 82565 ASSAY OF CREATININE: CPT | Performed by: NURSE PRACTITIONER

## 2018-11-12 PROCEDURE — 93000 ELECTROCARDIOGRAM COMPLETE: CPT | Performed by: NURSE PRACTITIONER

## 2018-11-12 PROCEDURE — 84132 ASSAY OF SERUM POTASSIUM: CPT | Performed by: NURSE PRACTITIONER

## 2018-11-12 PROCEDURE — 99215 OFFICE O/P EST HI 40 MIN: CPT | Performed by: NURSE PRACTITIONER

## 2018-11-12 PROCEDURE — 85018 HEMOGLOBIN: CPT | Performed by: NURSE PRACTITIONER

## 2018-11-12 RX ORDER — HYDROCODONE BITARTRATE AND ACETAMINOPHEN 5; 325 MG/1; MG/1
TABLET ORAL
Qty: 15 TABLET | Refills: 0 | Status: SHIPPED | OUTPATIENT
Start: 2018-11-12 | End: 2018-11-19

## 2018-11-12 ASSESSMENT — PAIN SCALES - GENERAL: PAINLEVEL: NO PAIN (0)

## 2018-11-12 NOTE — MR AVS SNAPSHOT
After Visit Summary   11/12/2018    Georgie Patino    MRN: [id number]           Patient Information     Date Of Birth          1955        Visit Information        Provider Department      11/12/2018 11:30 AM Dmitry Padilla, PT [facility]        Today's Diagnoses     Mechanical low back pain           Follow-ups after your visit        Your next 10 appointments already scheduled     Nov 19, 2018   Procedure with Tavo Saavedra MD   [facility] (--)    [address]  [facility] [address]   [phone]            Nov 26, 2018  9:30 AM CST   Return Visit with Tavo Saavedra MD, [place] [facility]   [facility]    [address] [place] [address]   [phone]            Nov 26, 2018 11:40 AM CST   [facility] Spine with Luis Mullen, PT   [facility] ([facility])    [address] [facility]  [address]  [facility] [address]   [phone]            Dec 03, 2018 10:30 AM CST   Return Visit with Tavo Saavedra MD, [place] [facility] PROC ROOM   [facility]    [address] [facility] [address] [phone]            Apr 12, 2019 10:30 AM CDT   Return Visit with Jitendra Fernandez MD   [facility] PHYSICIANS HEART AT [facility] ([facility])    [address] [place] [address] 2nd Floor  [facility] [address]   [phone]              Who to contact     If you have questions or need follow up information about today's clinic visit or your schedule please contact [facility] directly at [phone].  Normal or non-critical lab and imaging results will be communicated to you by MyChart, letter or phone within 4 business days after the clinic has received the results. If you do not hear from us within 7 days, please contact the clinic through MyChart or phone. If you have a critical or abnormal lab result, we will notify you by phone as soon  as possible.  Submit refill requests through [employer] or call your pharmacy and they will forward the refill request to us. Please allow 3 business days for your refill to be completed.          Additional Information About Your Visit        Care EveryWhere ID     This is your Care EveryWhere ID. This could be used by other organizations to access your [place] medical records  [id number]         Blood Pressure from Last 3 Encounters:   11/12/18 124/70   10/12/18 165/89   10/08/18 134/80    Weight from Last 3 Encounters:   11/12/18 93.9 kg (207 lb)   10/12/18 91.6 kg (202 lb)   10/08/18 94.3 kg (207 lb 12.8 oz)              We Performed the Following     MANUAL THER TECH,1+REGIONS,EA 15 MIN     THERAPEUTIC EXERCISES          Where to get your medicines      Some of these will need a paper prescription and others can be bought over the counter.  Ask your nurse if you have questions.     Bring a paper prescription for each of these medications     HYDROcodone-acetaminophen 5-325 MG per tablet          Primary Care Provider Office Phone # Fax #    Marlys Juany Wade, TESHA [facility] [phone] [address] [facility]  [place] [address]        Equal Access to Services     BERTHA LOVELL : Hadii aad ku hadasho Soomaali, waaxda luqadaha, qaybta kaalmada adeegyada, humza garza. So [facility] [phone].    ATENCIÓN: Si habla español, tiene a thomas disposición servicios gratuitos de asistencia lingüística. [facility] [phone].    We comply with applicable federal civil rights laws and [place] laws. We do not discriminate on the basis of race, color, national origin, age, disability, sex, sexual orientation, or gender identity.            Thank you!     Thank you for choosing [facility]  for your care. Our goal is always to provide you with excellent care. Hearing back from our patients is one way we can continue to improve our services. Please take a few minutes to complete the  written survey that you may receive in the mail after your visit with us. Thank you!             Your Updated Medication List - Protect others around you: Learn how to safely use, store and throw away your medicines at www.disposemymeds.org.          This list is accurate as of 11/12/18  5:40 PM.  Always use your most recent med list.                   Brand Name Dispense Instructions for use Diagnosis    acetaminophen 325 MG tablet    TYLENOL    100 tablet    Take 2 tablets (650 mg) by mouth every 4 hours as needed for other (surgical pain)    S/P ascending aortic aneurysm repair, Acute post-operative pain       aspirin 81 MG EC tablet     30 tablet    Take 1 tablet (81 mg) by mouth daily    S/P ascending aortic aneurysm repair       atorvastatin 20 MG tablet    LIPITOR    90 tablet    Take 1 tablet (20 mg) by mouth daily    CARDIOVASCULAR SCREENING; LDL GOAL LESS THAN 160       HYDROcodone-acetaminophen 5-325 MG per tablet    NORCO    15 tablet    TAKE ONE TABLET BY MOUTH EVERY SIX HOURS AS NEEDED FOR SEVERE PAIN    Pelvic pain in female       lisinopril 10 MG tablet    PRINIVIL/ZESTRIL    120 tablet    Take 1 tablet (10 mg) by mouth daily Please take 5mg in the AM and 10mg in the evening.    Ascending aortic aneurysm (H), Benign essential hypertension       melatonin 3 MG tablet     30 tablet    Take 1 tablet (3 mg) by mouth nightly as needed for sleep    Ascending aortic aneurysm (H), S/P ascending aortic aneurysm repair       metoprolol tartrate 50 MG tablet    LOPRESSOR    180 tablet    Take 1 tablet (50 mg) by mouth 2 times daily    Palpitations       ZANTAC PO      Take 1 tablet by mouth daily

## 2018-11-12 NOTE — PATIENT INSTRUCTIONS
Stop aspirin, ibuprofen, aleve, fish oil 7 days before surgery.   Hold lisinopril the morning of procedure.  Okay to take metoprolol and ranitidine the morning of surgery with small sips of water.      Before Your Surgery      Call your surgeon if there is any change in your health. This includes signs of a cold or flu (such as a sore throat, runny nose, cough, rash or fever).    Do not smoke, drink alcohol or take over the counter medicine (unless your surgeon or primary care doctor tells you to) for the 24 hours before and after surgery.    If you take prescribed drugs: Follow your doctor s orders about which medicines to take and which to stop until after surgery.    Eating and drinking prior to surgery: follow the instructions from your surgeon    Take a shower or bath the night before surgery. Use the soap your surgeon gave you to gently clean your skin. If you do not have soap from your surgeon, use your regular soap. Do not shave or scrub the surgery site.  Wear clean pajamas and have clean sheets on your bed.   [facility]    If you have any questions regarding to your visit please contact your care team:     Team Pink:   Clinic Hours Telephone Number   Internal Medicine:  Dr. Helen Wade, NP 7am-7pm  Monday - Thursday   7am-5pm  Fridays  [phone]  (Appointment scheduling available 24/7)   [facility] - [place] - 11am-9pm Monday-Friday Saturday-Sunday- 9am-5pm   [place] - 5pm-9pm Monday-Friday Saturday-Sunday- 9am-5pm  [phone] - [place]  [phone] - [place]       What options do I have for a visit other than an office visit? We offer electronic visits (e-visits) and telephone visits, when medically appropriate.  Please check with your medical insurance to see if these types of visits are covered, as you will be responsible for any charges that are not paid by your insurance.      You can use MyChart (secure  electronic communication) to access to your chart, send your primary care provider a message, or make an appointment. Ask a team member how to get started.       For a price quote for your services, please call our Consumer Price Line at [phone] or our Imaging Cost estimation line at [phone] (for imaging tests).    Val GOMEZ CMA

## 2018-11-12 NOTE — MR AVS SNAPSHOT
After Visit Summary   11/12/2018    Georgie Patino    MRN: [id number]           Patient Information     Date Of Birth          1955        Visit Information        Provider Department      11/12/2018 10:20 AM Marlys Wade APRN [facility]        Today's Diagnoses     Preop general physical exam    -  1    Renal calculus, left        Pelvic pain in female          Care Instructions    Stop aspirin, ibuprofen, aleve, fish oil 7 days before surgery.   Hold lisinopril the morning of procedure.  Okay to take metoprolol and ranitidine the morning of surgery with small sips of water.      Before Your Surgery      Call your surgeon if there is any change in your health. This includes signs of a cold or flu (such as a sore throat, runny nose, cough, rash or fever).    Do not smoke, drink alcohol or take over the counter medicine (unless your surgeon or primary care doctor tells you to) for the 24 hours before and after surgery.    If you take prescribed drugs: Follow your doctor s orders about which medicines to take and which to stop until after surgery.    Eating and drinking prior to surgery: follow the instructions from your surgeon    Take a shower or bath the night before surgery. Use the soap your surgeon gave you to gently clean your skin. If you do not have soap from your surgeon, use your regular soap. Do not shave or scrub the surgery site.  Wear clean pajamas and have clean sheets on your bed.   [facility]    If you have any questions regarding to your visit please contact your care team:     Team Pink:   Clinic Hours Telephone Number   Internal Medicine:  Dr. Helen Wade NP 7am-7pm  Monday - Thursday   7am-5pm  Fridays  [phone]  (Appointment scheduling available 24/7)   Urgent Care - [place] - 11am-9pm Monday-Friday Saturday-Sunday- 9am-5pm   [place] - 5pm-9pm  Monday-Friday Saturday-Sunday- 9am-5pm  [phone] - [place]  [phone] - [place]       What options do I have for a visit other than an office visit? We offer electronic visits (e-visits) and telephone visits, when medically appropriate.  Please check with your medical insurance to see if these types of visits are covered, as you will be responsible for any charges that are not paid by your insurance.      You can use [employer] (secure electronic communication) to access to your chart, send your primary care provider a message, or make an appointment. Ask a team member how to get started.       For a price quote for your services, please call our Consumer Price Line at [phone] or our Imaging Cost estimation line at [phone] (for imaging tests).    Val GOMEZ CMA            Follow-ups after your visit        Your next 10 appointments already scheduled     Nov 12, 2018 11:30 AM CST   [facility] with Dmitry Padilla, PT   [facility] ([facility] [place])    [address]  [address]  [facility] [address]   [phone]            Nov 19, 2018   Procedure with Tavo Saavedra MD   [facility])    [address]  [facility] [address]   [phone]            Nov 26, 2018  9:30 AM CST   Return Visit with Tavo Saavedra MD, YENNY CYSTO PROC ROOM   [facility] ([facility]    [address] [facility] [address]   [phone]            Dec 03, 2018 10:30 AM CST   Return Visit with Tavo Saavedra MD, YENNY CYSTO PROC ROOM   [facility] ([facility])    [address] [address]   [phone]            Apr 12, 2019 10:30 AM CDT   Return Visit with Jitendra Fernandez MD   [facility] ([facility])    [address]  [facility] [address]   [phone]              Who to contact     If you have questions or need  "follow up information about today's clinic visit or your schedule please contact [facility] YENNY directly at [phone].  Normal or non-critical lab and imaging results will be communicated to you by MyChart, letter or phone within 4 business days after the clinic has received the results. If you do not hear from us within 7 days, please contact the clinic through MyChart or phone. If you have a critical or abnormal lab result, we will notify you by phone as soon as possible.  Submit refill requests through [employer] or call your pharmacy and they will forward the refill request to us. Please allow 3 business days for your refill to be completed.          Additional Information About Your Visit        Care EveryWhere ID     This is your Care EveryWhere ID. This could be used by other organizations to access your [place] medical records  [id number]        Your Vitals Were     Pulse Temperature Respirations Height Pulse Oximetry BMI (Body Mass Index)    65 96.8  F (36  C) (Oral) 20 5' 7\" (1.702 m) 100% 32.42 kg/m2       Blood Pressure from Last 3 Encounters:   11/12/18 124/70   10/12/18 165/89   10/08/18 134/80    Weight from Last 3 Encounters:   11/12/18 207 lb (93.9 kg)   10/12/18 202 lb (91.6 kg)   10/08/18 207 lb 12.8 oz (94.3 kg)              We Performed the Following     Creatinine     EKG 12-lead complete w/read - Clinics     Hemoglobin     Potassium          Where to get your medicines      Some of these will need a paper prescription and others can be bought over the counter.  Ask your nurse if you have questions.     Bring a paper prescription for each of these medications     HYDROcodone-acetaminophen 5-325 MG per tablet         Information about OPIOIDS     PRESCRIPTION OPIOIDS: WHAT YOU NEED TO KNOW   We gave you an opioid (narcotic) pain medicine. It is important to manage your pain, but opioids are not always the best choice. You should first try all the other options your care team gave " you. Take this medicine for as short a time (and as few doses) as possible.    Some activities can increase your pain, such as bandage changes or therapy sessions. It may help to take your pain medicine 30 to 60 minutes before these activities. Reduce your stress by getting enough sleep, working on hobbies you enjoy and practicing relaxation or meditation. Talk to your care team about ways to manage your pain beyond prescription opioids.    These medicines have risks:    DO NOT drive when on new or higher doses of pain medicine. These medicines can affect your alertness and reaction times, and you could be arrested for driving under the influence (DUI). If you need to use opioids long-term, talk to your care team about driving.    DO NOT operate heavy machinery    DO NOT do any other dangerous activities while taking these medicines.    DO NOT drink any alcohol while taking these medicines.     If the opioid prescribed includes acetaminophen, DO NOT take with any other medicines that contain acetaminophen. Read all labels carefully. Look for the word  acetaminophen  or  Tylenol.  Ask your pharmacist if you have questions or are unsure.    You can get addicted to pain medicines, especially if you have a history of addiction (chemical, alcohol or substance dependence). Talk to your care team about ways to reduce this risk.    All opioids tend to cause constipation. Drink plenty of water and eat foods that have a lot of fiber, such as fruits, vegetables, prune juice, apple juice and high-fiber cereal. Take a laxative (Miralax, milk of magnesia, Colace, Senna) if you don t move your bowels at least every other day. Other side effects include upset stomach, sleepiness, dizziness, throwing up, tolerance (needing more of the medicine to have the same effect), physical dependence and slowed breathing.    Store your pills in a secure place, locked if possible. We will not replace any lost or stolen medicine. If you don t  finish your medicine, please throw away (dispose) as directed by your pharmacist. The [place] Pollution Control Agency has more information about safe disposal: https://www.pca.[facility].[place].us/living-green/managing-unwanted-medications         Primary Care Provider Office Phone # Fax #    TESHA Dos Santos [phone]       [address] [facility] [address]        Equal Access to Services     CAROLINA LOVELL : Hadii aad ku hadasho Soomaali, waaxda luqadaha, qaybta kaalmada adeegyada, waxay idiin hayaan adeeg kharash la'aan . So [facility] [phone].    ATENCIÓN: Si sonia valle, tiene a thomas disposición servicios gratuitos de asistencia lingüística. Igor al [phone].    We comply with applicable federal civil rights laws and [place] laws. We do not discriminate on the basis of race, color, national origin, age, disability, sex, sexual orientation, or gender identity.            Thank you!     Thank you for choosing [facility]  for your care. Our goal is always to provide you with excellent care. Hearing back from our patients is one way we can continue to improve our services. Please take a few minutes to complete the written survey that you may receive in the mail after your visit with us. Thank you!             Your Updated Medication List - Protect others around you: Learn how to safely use, store and throw away your medicines at www.disposemymeds.org.          This list is accurate as of 11/12/18 11:21 AM.  Always use your most recent med list.                   Brand Name Dispense Instructions for use Diagnosis    acetaminophen 325 MG tablet    TYLENOL    100 tablet    Take 2 tablets (650 mg) by mouth every 4 hours as needed for other (surgical pain)    S/P ascending aortic aneurysm repair, Acute post-operative pain       aspirin 81 MG EC tablet     30 tablet    Take 1 tablet (81 mg) by mouth daily    S/P ascending aortic aneurysm repair       atorvastatin 20 MG  tablet    LIPITOR    90 tablet    Take 1 tablet (20 mg) by mouth daily    CARDIOVASCULAR SCREENING; LDL GOAL LESS THAN 160       HYDROcodone-acetaminophen 5-325 MG per tablet    NORCO    15 tablet    TAKE ONE TABLET BY MOUTH EVERY SIX HOURS AS NEEDED FOR SEVERE PAIN    Pelvic pain in female       lisinopril 10 MG tablet    PRINIVIL/ZESTRIL    120 tablet    Take 1 tablet (10 mg) by mouth daily Please take 5mg in the AM and 10mg in the evening.    Ascending aortic aneurysm (H), Benign essential hypertension       melatonin 3 MG tablet     30 tablet    Take 1 tablet (3 mg) by mouth nightly as needed for sleep    Ascending aortic aneurysm (H), S/P ascending aortic aneurysm repair       metoprolol tartrate 50 MG tablet    LOPRESSOR    180 tablet    Take 1 tablet (50 mg) by mouth 2 times daily    Palpitations       ZANTAC PO      Take 1 tablet by mouth daily

## 2018-11-12 NOTE — PROGRESS NOTES
[facility]  [address] [place] [address]  [phone]  Dept: [phone]    PRE-OP EVALUATION:  Today's date: 2018    Georgie Patino (: 1955) presents for pre-operative evaluation assessment as requested by Dr. Saavedra.  She requires evaluation and anesthesia risk assessment prior to undergoing surgery/procedure for treatment of Ureters .    Proposed Surgery/ Procedure: removal of kidney stone  Date of Surgery/ Procedure: 18  Time of Surgery/ Procedure: 12:30pm  Hospital[facility]  Fax number for surgical facility:   Primary Physician: Marlys Wade  Type of Anesthesia Anticipated: General    Patient has a Health Care Directive or Living Will:  NO    1. YES - DO YOU HAVE A HISTORY OF HEART ATTACK, STROKE, STENT, BYPASS OR SURGERY ON AN ARTERY IN THE HEAD, NECK, HEART OR LEG? Ascending aortic aneurysm repair .  2. NO - Do you ever have any pain or discomfort in your chest?  3. NO - Do you have a history of  Heart Failure?  4. NO - Are you troubled by shortness of breath when: walking on the level, up a slight hill or at night?  5. NO - Do you currently have a cold, bronchitis or other respiratory infection?  6. NO - Do you have a cough, shortness of breath or wheezing?  7. YES - DO YOU SOMETIMES GET PAINS IN THE CALVES OF YOUR LEGS WHEN YOU WALK? Due to knees  8. NO - Do you or anyone in your family have previous history of blood clots?  9. NO - Do you or does anyone in your family have a serious bleeding problem such as prolonged bleeding following surgeries or cuts?  10. NO - Have you ever had problems with anemia or been told to take iron pills?  11. NO - Have you had any abnormal blood loss such as black, tarry or bloody stools, or abnormal vaginal bleeding?  12. YES - HAVE YOU EVER HAD A BLOOD TRANSFUSION? 1976 following D &C  13. NO - Have you or any of your relatives ever had problems with anesthesia?  14. NO - Do  you have sleep apnea, excessive snoring or daytime drowsiness?  15. NO - Do you have any prosthetic heart valves?  16. NO - Do you have prosthetic joints?  17. NO - Is there any chance that you may be pregnant?    Marlys Wade CNP     HPI:     HPI related to upcoming procedure: Patient has 11 mm and 13 mm stones noted to left kidney.  She will undergo lithotripsy, cystoscopy and ureteral stent placement to remove stones.      HYPERLIPIDEMIA - Patient has a long history of significant Hyperlipidemia requiring medication for treatment with recent good control. Patient reports no problems or side effects with the medication.                                                                                                                                                       .  HYPERTENSION - Patient has longstanding history of HTN , currently denies any symptoms referable to elevated blood pressure. Specifically denies chest pain, palpitations, dyspnea, orthopnea, PND or peripheral edema. Blood pressure readings have been in normal range. Current medication regimen is as listed below. Patient denies any side effects of medication.                                                                                                                                                                                          .    MEDICAL HISTORY:     Patient Active Problem List    Diagnosis Date Noted     Mechanical low back pain 10/22/2018     Priority: Medium     Thyroid nodule 11/20/2017     Priority: Medium     S/P ascending aortic aneurysm repair 11/07/2017     Priority: Medium     Former smoker 11/07/2017     Priority: Medium     Anxiety 10/17/2017     Priority: Medium     Hypertension 09/21/2017     Priority: Medium     Family history of sudden cardiac death 08/15/2017     Priority: Medium     Benign essential hypertension 01/27/2016     Priority: Medium     CARDIOVASCULAR SCREENING; LDL GOAL LESS THAN 160 05/30/2012      Priority: Medium     Obesity 05/30/2012     Priority: Medium      Past Medical History:   Diagnosis Date     Ascending aortic aneurysm (H) 09/21/2017     Blood transfusions age 19    due to menorhagia     Hypertension 09/21/2017     Thyroid nodule 11/20/2017     Tobacco abuse      Past Surgical History:   Procedure Laterality Date     COLONOSCOPY WITH CO2 INSUFFLATION N/A 3/15/2017    Procedure: COLONOSCOPY WITH CO2 INSUFFLATION;  Surgeon: Duane, William Charles, MD;  Location: [facility] OR     DILATION AND CURETTAGE  age 20     REPAIR ANEURYSM ASCENDING AORTA N/A 10/27/2017    Procedure: REPAIR ANEURYSM ASCENDING AORTA;  Median Sternotomy, Cardiopulmonary bypass, Ascending Aorta Aneurysm Repair using Hemashield Platinum Woven Double Velour Graft 34cm X 30cm.;  Surgeon: Rubio Piña MD;  Location: [facility] OR     Lula teeth removed       Current Outpatient Prescriptions   Medication Sig Dispense Refill     acetaminophen (TYLENOL) 325 MG tablet Take 2 tablets (650 mg) by mouth every 4 hours as needed for other (surgical pain) 100 tablet 0     aspirin EC 81 MG EC tablet Take 1 tablet (81 mg) by mouth daily 30 tablet 0     atorvastatin (LIPITOR) 20 MG tablet Take 1 tablet (20 mg) by mouth daily 90 tablet 3     HYDROcodone-acetaminophen (NORCO) 5-325 MG per tablet TAKE ONE TABLET BY MOUTH EVERY SIX HOURS AS NEEDED FOR SEVERE PAIN 15 tablet 0     lisinopril (PRINIVIL/ZESTRIL) 10 MG tablet Take 1 tablet (10 mg) by mouth daily Please take 5mg in the AM and 10mg in the evening. 120 tablet 3     melatonin 3 MG tablet Take 1 tablet (3 mg) by mouth nightly as needed for sleep 30 tablet 0     metoprolol tartrate (LOPRESSOR) 50 MG tablet Take 1 tablet (50 mg) by mouth 2 times daily 180 tablet 3     RaNITidine HCl (ZANTAC PO) Take 1 tablet by mouth daily       OTC products: None, except as noted above    Allergies   Allergen Reactions     Blood Transfusion Related (Informational Only) Other (See Comments)     Patient has a  "history of a clinically significant antibody against RBC antigens.  A delay in compatible RBCs may occur.      Latex Allergy: NO    Social History   Substance Use Topics     Smoking status: Former Smoker     Packs/day: 0.05     Years: 40.00     Types: Cigarettes     Quit date: 10/27/2017     Smokeless tobacco: Never Used      Comment: 2 cigs daily     Alcohol use 0.5 - 1.0 oz/week     1 - 2 Standard drinks or equivalent per week      Comment: 3-4 drinks per week      History   Drug Use No       REVIEW OF SYSTEMS:   Constitutional, neuro, ENT, endocrine, pulmonary, cardiac, gastrointestinal, genitourinary, musculoskeletal, integument and psychiatric systems are negative, except as otherwise noted.    EXAM:   /70  Pulse 65  Temp 96.8  F (36  C) (Oral)  Resp 20  Ht 5' 7\" (1.702 m)  Wt 207 lb (93.9 kg)  SpO2 100%  BMI 32.42 kg/m2    GENERAL APPEARANCE: healthy, alert and no distress     EYES: EOMI, PERRL     HENT: ear canals and TM's normal and nose and mouth without ulcers or lesions     NECK: no adenopathy, no asymmetry, masses, or scars and thyroid normal to palpation     RESP: lungs clear to auscultation - no rales, rhonchi or wheezes     CV: regular rates and rhythm, normal S1 S2, no S3 or S4 and no murmur, click or rub     ABDOMEN:  soft, nontender, no HSM or masses and bowel sounds normal     MS: extremities normal- no gross deformities noted, no evidence of inflammation in joints, FROM in all extremities.     SKIN: no suspicious lesions or rashes     NEURO: Normal strength and tone, sensory exam grossly normal, mentation intact and speech normal     PSYCH: mentation appears normal. and affect normal/bright     LYMPHATICS: No cervical adenopathy    DIAGNOSTICS:     EKG: appears normal, NSR, normal axis, normal intervals, no acute ST/T changes c/w ischemia, no LVH by voltage criteria, unchanged from previous tracings  Labs Resulted Today:   Results for orders placed or performed in visit on 11/12/18 "   Hemoglobin   Result Value Ref Range    Hemoglobin 14.9 11.7 - 15.7 g/dL   Creatinine   Result Value Ref Range    Creatinine 0.56 0.52 - 1.04 mg/dL    GFR Estimate >90 >60 mL/min/1.7m2    GFR Estimate If Black >90 >60 mL/min/1.7m2   Potassium   Result Value Ref Range    Potassium 4.6 3.4 - 5.3 mmol/L       Recent Labs   Lab Test  09/07/18   0916  11/10/17   1527  11/01/17   0640   10/29/17   0811   10/28/17   0341   10/27/17   1402   HGB   --   12.0  10.8*   < >  11.1*   --   12.0   --   13.6   PLT   --   309  127*   < >  69*   --   75*   --   95*   INR   --    --    --    --   1.21*   --    --    --   1.23*   NA  141  140  135   < >  138   < >  147*   --   147*   POTASSIUM  4.3  3.8  3.8   < >  3.8   < >  4.4   < >  3.8   CR  0.53  0.53  0.51*   < >  0.47*   < >  0.58   --   0.63   A1C   --    --    --    --    --    --   5.5   --    --     < > = values in this interval not displayed.        IMPRESSION:   Reason for surgery/procedure: Left kidney stones  Diagnosis/reason for consult: Management of comorbid conditions and preoperative exam.      The proposed surgical procedure is considered INTERMEDIATE risk.    REVISED CARDIAC RISK INDEX  The patient has the following serious cardiovascular risks for perioperative complications such as (MI, PE, VFib and 3  AV Block):  No serious cardiac risks  INTERPRETATION: 0 risks: Class I (very low risk - 0.4% complication rate)    The patient has the following additional risks for perioperative complications:  No identified additional risks      ICD-10-CM    1. Preop general physical exam Z01.818 EKG 12-lead complete w/read - Clinics   2. Renal calculus, left N20.0 Hemoglobin     Creatinine     Potassium   3. Pelvic pain in female R10.2 HYDROcodone-acetaminophen (NORCO) 5-325 MG per tablet       RECOMMENDATIONS:       Cardiovascular Risk  Performs 4 METs exercise without symptoms (Light housework (dusting, washing dishes) and Climb a flight of stairs) .   Patient is already  on a Beta Blocker. Continue Betablocker therapy after surgery, using Beta blocker order set as necessary for NPO status.      --Patient is to take all scheduled medications on the day of surgery EXCEPT for modifications listed below.    Anticoagulant or Antiplatelet Medication Use  ASPIRIN: Discontinue ASA 7-10 days prior to procedure to reduce bleeding risk.  It should be resumed post-operatively.        ACE Inhibitor or Angiotensin Receptor Blocker (ARB) Use  Ace inhibitor or Angiotensin Receptor Blocker (ARB) and should HOLD this medication for the 24 hours prior to surgery.      APPROVAL GIVEN to proceed with proposed procedure, without further diagnostic evaluation       Signed Electronically by: TESHA Chanel CNP    Copy of this evaluation report is provided to requesting physician.    [facility] Preop Guidelines    Revised Cardiac Risk Index

## 2018-11-12 NOTE — LETTER
[facility]  [address] [place] [address]. [place] [address]    November 13, 2018    Georgie Patino  [address] [facility] [address]          Dear Georgie,    No anemia.   Normal kidney function and electrolytes.       If you have any questions please feel free to contact [phone] or myself via [employer]    Enclosed is a copy of your results.     Results for orders placed or performed in visit on 11/12/18   Hemoglobin   Result Value Ref Range    Hemoglobin 14.9 11.7 - 15.7 g/dL   Creatinine   Result Value Ref Range    Creatinine 0.56 0.52 - 1.04 mg/dL    GFR Estimate >90 >60 mL/min/1.7m2    GFR Estimate If Black >90 >60 mL/min/1.7m2   Potassium   Result Value Ref Range    Potassium 4.6 3.4 - 5.3 mmol/L       If you have any questions or concerns, please call myself or my nurse at [phone].      Sincerely,        Marlys Wade CNP/guzman

## 2018-11-13 NOTE — PROGRESS NOTES
Dear Georgie,    Your recent test results are attached.      No anemia.  Normal kidney function and electrolytes.      If you have any questions please feel free to contact [phone] or myself via [employer].    Sincerely,  Marlys Wade, CNP

## 2018-11-15 ENCOUNTER — ANESTHESIA EVENT (OUTPATIENT)
Dept: SURGERY | Facility: AMBULATORY SURGERY CENTER | Age: 63
End: 2018-11-15

## 2018-11-19 ENCOUNTER — SURGERY (OUTPATIENT)
Age: 63
End: 2018-11-19

## 2018-11-19 ENCOUNTER — HOSPITAL ENCOUNTER (OUTPATIENT)
Facility: AMBULATORY SURGERY CENTER | Age: 63
Discharge: HOME | End: 2018-11-19
Attending: UROLOGY | Admitting: UROLOGY
Payer: COMMERCIAL

## 2018-11-19 ENCOUNTER — ANESTHESIA (OUTPATIENT)
Dept: SURGERY | Facility: AMBULATORY SURGERY CENTER | Age: 63
End: 2018-11-19
Payer: COMMERCIAL

## 2018-11-19 VITALS
OXYGEN SATURATION: 95 % | DIASTOLIC BLOOD PRESSURE: 65 MMHG | TEMPERATURE: 98.3 F | SYSTOLIC BLOOD PRESSURE: 135 MMHG | RESPIRATION RATE: 22 BRPM

## 2018-11-19 DIAGNOSIS — N20.0 RENAL STONES: ICD-10-CM

## 2018-11-19 PROCEDURE — 52332 CYSTOSCOPY AND TREATMENT: CPT | Mod: LT | Performed by: UROLOGY

## 2018-11-19 PROCEDURE — 52332 CYSTOSCOPY AND TREATMENT: CPT | Mod: LT

## 2018-11-19 PROCEDURE — G8916 PT W IV AB GIVEN ON TIME: HCPCS

## 2018-11-19 PROCEDURE — 50590 FRAGMENTING OF KIDNEY STONE: CPT | Mod: 50 | Performed by: UROLOGY

## 2018-11-19 PROCEDURE — 50590 FRAGMENTING OF KIDNEY STONE: CPT | Mod: 50

## 2018-11-19 PROCEDURE — G8907 PT DOC NO EVENTS ON DISCHARG: HCPCS

## 2018-11-19 DEVICE — IMPLANTABLE DEVICE: Type: IMPLANTABLE DEVICE | Site: URETER | Status: FUNCTIONAL

## 2018-11-19 RX ORDER — GLYCOPYRROLATE 0.2 MG/ML
INJECTION, SOLUTION INTRAMUSCULAR; INTRAVENOUS PRN
Status: DISCONTINUED | OUTPATIENT
Start: 2018-11-19 | End: 2018-11-19

## 2018-11-19 RX ORDER — CEPHALEXIN 500 MG/1
500 CAPSULE ORAL 3 TIMES DAILY
Qty: 9 CAPSULE | Refills: 0 | Status: SHIPPED | OUTPATIENT
Start: 2018-11-19 | End: 2018-11-22

## 2018-11-19 RX ORDER — FENTANYL CITRATE 50 UG/ML
25-50 INJECTION, SOLUTION INTRAMUSCULAR; INTRAVENOUS
Status: DISCONTINUED | OUTPATIENT
Start: 2018-11-19 | End: 2018-11-20 | Stop reason: HOSPADM

## 2018-11-19 RX ORDER — ACETAMINOPHEN 325 MG/1
975 TABLET ORAL ONCE
Status: COMPLETED | OUTPATIENT
Start: 2018-11-19 | End: 2018-11-19

## 2018-11-19 RX ORDER — DEXAMETHASONE SODIUM PHOSPHATE 4 MG/ML
INJECTION, SOLUTION INTRA-ARTICULAR; INTRALESIONAL; INTRAMUSCULAR; INTRAVENOUS; SOFT TISSUE PRN
Status: DISCONTINUED | OUTPATIENT
Start: 2018-11-19 | End: 2018-11-19

## 2018-11-19 RX ORDER — SODIUM CHLORIDE, SODIUM LACTATE, POTASSIUM CHLORIDE, CALCIUM CHLORIDE 600; 310; 30; 20 MG/100ML; MG/100ML; MG/100ML; MG/100ML
INJECTION, SOLUTION INTRAVENOUS CONTINUOUS PRN
Status: DISCONTINUED | OUTPATIENT
Start: 2018-11-19 | End: 2018-11-19

## 2018-11-19 RX ORDER — VASOPRESSIN 20 U/ML
INJECTION PARENTERAL PRN
Status: DISCONTINUED | OUTPATIENT
Start: 2018-11-19 | End: 2018-11-19

## 2018-11-19 RX ORDER — HYDROMORPHONE HYDROCHLORIDE 1 MG/ML
.3-.5 INJECTION, SOLUTION INTRAMUSCULAR; INTRAVENOUS; SUBCUTANEOUS EVERY 10 MIN PRN
Status: DISCONTINUED | OUTPATIENT
Start: 2018-11-19 | End: 2018-11-20 | Stop reason: HOSPADM

## 2018-11-19 RX ORDER — GABAPENTIN 300 MG/1
300 CAPSULE ORAL ONCE
Status: COMPLETED | OUTPATIENT
Start: 2018-11-19 | End: 2018-11-19

## 2018-11-19 RX ORDER — PROPOFOL 10 MG/ML
INJECTION, EMULSION INTRAVENOUS PRN
Status: DISCONTINUED | OUTPATIENT
Start: 2018-11-19 | End: 2018-11-19

## 2018-11-19 RX ORDER — LIDOCAINE HYDROCHLORIDE 20 MG/ML
INJECTION, SOLUTION INFILTRATION; PERINEURAL PRN
Status: DISCONTINUED | OUTPATIENT
Start: 2018-11-19 | End: 2018-11-19

## 2018-11-19 RX ORDER — OXYCODONE HYDROCHLORIDE 5 MG/1
5 TABLET ORAL EVERY 4 HOURS PRN
Status: DISCONTINUED | OUTPATIENT
Start: 2018-11-19 | End: 2018-11-20 | Stop reason: HOSPADM

## 2018-11-19 RX ORDER — MEPERIDINE HYDROCHLORIDE 25 MG/ML
12.5 INJECTION INTRAMUSCULAR; INTRAVENOUS; SUBCUTANEOUS
Status: DISCONTINUED | OUTPATIENT
Start: 2018-11-19 | End: 2018-11-20 | Stop reason: HOSPADM

## 2018-11-19 RX ORDER — ONDANSETRON 4 MG/1
4 TABLET, ORALLY DISINTEGRATING ORAL EVERY 30 MIN PRN
Status: DISCONTINUED | OUTPATIENT
Start: 2018-11-19 | End: 2018-11-20 | Stop reason: HOSPADM

## 2018-11-19 RX ORDER — CEFAZOLIN SODIUM 2 G/100ML
2 INJECTION, SOLUTION INTRAVENOUS
Status: COMPLETED | OUTPATIENT
Start: 2018-11-19 | End: 2018-11-19

## 2018-11-19 RX ORDER — EPHEDRINE SULFATE 50 MG/ML
INJECTION, SOLUTION INTRAMUSCULAR; INTRAVENOUS; SUBCUTANEOUS PRN
Status: DISCONTINUED | OUTPATIENT
Start: 2018-11-19 | End: 2018-11-19

## 2018-11-19 RX ORDER — CEFAZOLIN SODIUM 1 G/3ML
1 INJECTION, POWDER, FOR SOLUTION INTRAMUSCULAR; INTRAVENOUS SEE ADMIN INSTRUCTIONS
Status: DISCONTINUED | OUTPATIENT
Start: 2018-11-19 | End: 2018-11-20 | Stop reason: HOSPADM

## 2018-11-19 RX ORDER — FENTANYL CITRATE 50 UG/ML
INJECTION, SOLUTION INTRAMUSCULAR; INTRAVENOUS PRN
Status: DISCONTINUED | OUTPATIENT
Start: 2018-11-19 | End: 2018-11-19

## 2018-11-19 RX ORDER — HYDROCODONE BITARTRATE AND ACETAMINOPHEN 5; 325 MG/1; MG/1
TABLET ORAL
Qty: 30 TABLET | Refills: 0 | Status: SHIPPED | OUTPATIENT
Start: 2018-11-19 | End: 2018-12-03

## 2018-11-19 RX ORDER — ONDANSETRON 2 MG/ML
INJECTION INTRAMUSCULAR; INTRAVENOUS PRN
Status: DISCONTINUED | OUTPATIENT
Start: 2018-11-19 | End: 2018-11-19

## 2018-11-19 RX ORDER — SODIUM CHLORIDE, SODIUM LACTATE, POTASSIUM CHLORIDE, CALCIUM CHLORIDE 600; 310; 30; 20 MG/100ML; MG/100ML; MG/100ML; MG/100ML
INJECTION, SOLUTION INTRAVENOUS CONTINUOUS
Status: DISCONTINUED | OUTPATIENT
Start: 2018-11-19 | End: 2018-11-20 | Stop reason: HOSPADM

## 2018-11-19 RX ORDER — NALOXONE HYDROCHLORIDE 0.4 MG/ML
.1-.4 INJECTION, SOLUTION INTRAMUSCULAR; INTRAVENOUS; SUBCUTANEOUS
Status: DISCONTINUED | OUTPATIENT
Start: 2018-11-19 | End: 2018-11-20 | Stop reason: HOSPADM

## 2018-11-19 RX ORDER — METOPROLOL TARTRATE 50 MG
50 TABLET ORAL ONCE
Status: COMPLETED | OUTPATIENT
Start: 2018-11-19 | End: 2018-11-19

## 2018-11-19 RX ORDER — ONDANSETRON 2 MG/ML
4 INJECTION INTRAMUSCULAR; INTRAVENOUS EVERY 30 MIN PRN
Status: DISCONTINUED | OUTPATIENT
Start: 2018-11-19 | End: 2018-11-20 | Stop reason: HOSPADM

## 2018-11-19 RX ADMIN — OXYCODONE HYDROCHLORIDE 5 MG: 5 TABLET ORAL at 15:15

## 2018-11-19 RX ADMIN — GLYCOPYRROLATE 0.2 MG: 0.2 INJECTION, SOLUTION INTRAMUSCULAR; INTRAVENOUS at 13:15

## 2018-11-19 RX ADMIN — EPHEDRINE SULFATE 5 MG: 50 INJECTION, SOLUTION INTRAMUSCULAR; INTRAVENOUS; SUBCUTANEOUS at 13:19

## 2018-11-19 RX ADMIN — FENTANYL CITRATE 50 MCG: 50 INJECTION, SOLUTION INTRAMUSCULAR; INTRAVENOUS at 12:58

## 2018-11-19 RX ADMIN — VASOPRESSIN 2 UNITS: 20 INJECTION PARENTERAL at 13:31

## 2018-11-19 RX ADMIN — PROPOFOL 200 MG: 10 INJECTION, EMULSION INTRAVENOUS at 12:44

## 2018-11-19 RX ADMIN — ONDANSETRON 4 MG: 2 INJECTION INTRAMUSCULAR; INTRAVENOUS at 13:11

## 2018-11-19 RX ADMIN — FENTANYL CITRATE 50 MCG: 50 INJECTION, SOLUTION INTRAMUSCULAR; INTRAVENOUS at 12:44

## 2018-11-19 RX ADMIN — DEXAMETHASONE SODIUM PHOSPHATE 4 MG: 4 INJECTION, SOLUTION INTRA-ARTICULAR; INTRALESIONAL; INTRAMUSCULAR; INTRAVENOUS; SOFT TISSUE at 13:10

## 2018-11-19 RX ADMIN — EPHEDRINE SULFATE 5 MG: 50 INJECTION, SOLUTION INTRAMUSCULAR; INTRAVENOUS; SUBCUTANEOUS at 13:23

## 2018-11-19 RX ADMIN — VASOPRESSIN 2 UNITS: 20 INJECTION PARENTERAL at 14:22

## 2018-11-19 RX ADMIN — CEFAZOLIN SODIUM 2 G: 2 INJECTION, SOLUTION INTRAVENOUS at 12:48

## 2018-11-19 RX ADMIN — Medication 50 MG: at 12:35

## 2018-11-19 RX ADMIN — GABAPENTIN 300 MG: 300 CAPSULE ORAL at 11:40

## 2018-11-19 RX ADMIN — LIDOCAINE HYDROCHLORIDE 100 MG: 20 INJECTION, SOLUTION INFILTRATION; PERINEURAL at 12:44

## 2018-11-19 RX ADMIN — PROPOFOL 30 MG: 10 INJECTION, EMULSION INTRAVENOUS at 14:02

## 2018-11-19 RX ADMIN — PROPOFOL 40 MG: 10 INJECTION, EMULSION INTRAVENOUS at 13:58

## 2018-11-19 RX ADMIN — SODIUM CHLORIDE, SODIUM LACTATE, POTASSIUM CHLORIDE, CALCIUM CHLORIDE: 600; 310; 30; 20 INJECTION, SOLUTION INTRAVENOUS at 13:17

## 2018-11-19 RX ADMIN — SODIUM CHLORIDE, SODIUM LACTATE, POTASSIUM CHLORIDE, CALCIUM CHLORIDE: 600; 310; 30; 20 INJECTION, SOLUTION INTRAVENOUS at 12:15

## 2018-11-19 RX ADMIN — ACETAMINOPHEN 975 MG: 325 TABLET ORAL at 11:40

## 2018-11-19 ASSESSMENT — LIFESTYLE VARIABLES: TOBACCO_USE: 1

## 2018-11-19 NOTE — BRIEF OP NOTE
[facility]  Brief Operative Note    Pre-operative diagnosis: Bilateral renal stones   Post-operative diagnosis same   Procedure: Cysto and left stent placement  Bilateral ESWL   Surgeon: Tavo Saavedra MD   Assistants(s): None   Anesthesia: General    Estimated blood loss: minimal                   Specimens: None   Implants: stent       Complications: None   Condition on discharge: Stable   Findings: See dictated operative report for full details

## 2018-11-19 NOTE — OP NOTE
PREOPERATIVE DIAGNOSIS:  Bilateral renal stones      POSTOPERATIVE DIAGNOSIS:  same      PROCEDURE:  cystoscopy and left stent placement.  bilateral extracorporeal shockwave lithotripsy.       DESCRIPTION OF PROCEDURE:  Patient was brought to the operating room and after general anesthesia was induced, she was placed supine in a frog legged position.  Preop antibiotic was given.  A flexible cystoscope was then placed into the bladder.  The left ureteral orifice was then identified.  A 0.035 Short wire then placed.  This was followed by a 4.7 F by 24 cm ureteral stent.  Under fluoroscopy, the left renal stones were identified.  A total of 3600 shocks was given to the stones.  The right renal stone was then identified and again 3600 shocks given.  The patient tolerated the procedure well.  No complication identified during the procedure.           MARION DENNY MD

## 2018-11-19 NOTE — ANESTHESIA POSTPROCEDURE EVALUATION
Anesthesia POST Procedure Evaluation    Patient: Georgie Patino   MRN:     [id number] Gender:   female   Age:    63 year old :      1955        Preoperative Diagnosis: Renal stones   Procedure(s):  BILATERAL EXTRACORPOREAL SHOCK WAVE LITHOTRIPSY, CYSTOSCOPY, LEFT STENT PLACEMENT   Postop Comments: No value filed.       Anesthesia Type:  General    Reportable Event: NO     PAIN: Uncomplicated   Sign Out status: Comfortable, Well controlled pain     PONV: No PONV   Sign Out status:  No Nausea or Vomiting     Neuro/Psych: Uneventful perioperative course   Sign Out Status: Preoperative baseline; Age appropriate mentation     Airway/Resp.: Uneventful perioperative course   Sign Out Status: Non labored breathing, age appropriate RR; Resp. Status within EXPECTED Parameters     CV: Uneventful perioperative course   Sign Out status: Appropriate BP and perfusion indices; Appropriate HR/Rhythm     Disposition:   Sign Out in:  PACU  Disposition:  Phase II; Home  Recovery Course: Uneventful  Follow-Up: Not required           Last Anesthesia Record Vitals:  CRNA VITALS  2018 1426 - 2018 1526      2018             Pulse: 80    SpO2: 100 %          Last PACU/Preop Vitals:  Vitals:    18 1505 18 1515 18 1520   BP: 116/49 132/76 135/65   Resp: 22 12 22   Temp:   98.3  F (36.8  C)   SpO2: 97% 91% 95%         Electronically Signed By: James Vizcaino MD, 2018, 4:00 PM

## 2018-11-19 NOTE — DISCHARGE INSTRUCTIONS
[facility]  Same-Day Surgery   Adult Discharge Orders & Instructions   For 24 hours after surgery  1. Get plenty of rest.  A responsible adult must stay with you for at least 24 hours after you leave the hospital.   2. Do not drive or use heavy equipment.  If you have weakness or tingling, don't drive or use heavy equipment until this feeling goes away.  3. Do not drink alcohol.  4. Avoid strenuous or risky activities.  Ask for help when climbing stairs.   5. You may feel lightheaded.  IF so, sit for a few minutes before standing.  Have someone help you get up.   6. If you have nausea (feel sick to your stomach): Drink only clear liquids such as apple juice, ginger ale, broth or 7-Up.  Rest may also help.  Be sure to drink enough fluids.  Move to a regular diet as you feel able.  7. You may have a slight fever. Call the doctor if your fever is over 100 F (37.7 C) (taken under the tongue) or lasts longer than 24 hours.  8. You may have a dry mouth, a sore throat, muscle aches or trouble sleeping.  These should go away after 24 hours.  9. Do not make important or legal decisions.   Call your doctor for any of the followin.  Signs of infection (fever, growing tenderness at the surgery site, a large amount of drainage or bleeding, severe pain, foul-smelling drainage, redness, swelling).    2. It has been over 8 to 10 hours since surgery and you are still not able to urinate (pass water).    3.  Headache for over 24 hours.  To contact Dr Saavedra call:  [phone]    Tylenol was given at 11:45am.

## 2018-11-19 NOTE — ANESTHESIA CARE TRANSFER NOTE
Patient: Georgie SEVERINO Peel    Procedure(s):  BILATERAL EXTRACORPOREAL SHOCK WAVE LITHOTRIPSY, CYSTOSCOPY, LEFT STENT PLACEMENT    Diagnosis: Renal stones  Diagnosis Additional Information: No value filed.    Anesthesia Type:   General     Note:  Airway :Nasal Cannula  Patient transferred to:PACU  Handoff Report: Identifed the Patient, Identified the Reponsible Provider, Reviewed the pertinent medical history, Discussed the surgical course, Reviewed Intra-OP anesthesia mangement and issues during anesthesia, Set expectations for post-procedure period and Allowed opportunity for questions and acknowledgement of understanding      Vitals: (Last set prior to Anesthesia Care Transfer)    CRNA VITALS  11/19/2018 1426 - 11/19/2018 1502      11/19/2018             Pulse: 80    SpO2: 100 %                Electronically Signed By: TESHA Kerr CRNA  November 19, 2018  3:02 PM

## 2018-11-19 NOTE — IP AVS SNAPSHOT
[facility]    [address] [place] [address]    Phone:  [phone]                                       After Visit Summary   11/19/2018    Georgie Patino    MRN: [id number]           After Visit Summary Signature Page     I have received my discharge instructions, and my questions have been answered. I have discussed any challenges I see with this plan with the nurse or doctor.    ..........................................................................................................................................  Patient/Patient Representative Signature      ..........................................................................................................................................  Patient Representative Print Name and Relationship to Patient    ..................................................               ................................................  Date                                   Time    ..........................................................................................................................................  Reviewed by Signature/Title    ...................................................              ..............................................  Date                                               Time          22EPIC Rev 08/18

## 2018-11-19 NOTE — IP AVS SNAPSHOT
MRN:[id number]                      After Visit Summary   11/19/2018    Georgie Patino    MRN: [id number]           Thank you!     Thank you for choosing [facility] for your care. Our goal is always to provide you with excellent care. Hearing back from our patients is one way we can continue to improve our services. Please take a few minutes to complete the written survey that you may receive in the mail after you visit with us. Thank you!        Patient Information     Date Of Birth          1955        About your hospital stay     You were admitted on:  November 19, 2018 You last received care in the:  [facility]    You were discharged on:  November 19, 2018       Who to Call     For medical emergencies, please call 911.  For non-urgent questions about your medical care, please call your primary care provider or clinic, [phone]  For questions related to your surgery, please call your surgery clinic        Attending Provider     Provider Specialty    Tavo Saavedra MD Urology       Primary Care Provider Office Phone # Fax #    Marlys Harrington Jesusita Tapia, TESHA [facility] [phone]      After Care Instructions     Diet Instructions       Resume pre procedure diet            Discharge Instructions       Patient to arrange for follow up appointment in 1-2 weeks            No Alcohol       for 24 hours post procedure            No driving or operating machinery        until the day after procedure                  Your next 10 appointments already scheduled     Nov 26, 2018  9:30 AM CST   Return Visit with Tavo Saavedra MD, [place] CYST[facility] PROC ROOM   [facility] ([facility]    [address] [facility] [address]   [phone]            Nov 26, 2018 11:40 AM CST   [facility] with Luis Mullen, PT   [facility] ([facility] [place])    [address]  [address]  [facility] [address]   [phone]            Dec 03,   10:30 AM CST   Return Visit with Tavo Saavedra MD, [place] CYST[facility] PROC ROOM   [facility] ([facility])    [address] [place] [address]  [place] [address]   [phone]            2019 10:30 AM CDT   Return Visit with Jitendra Fernandez MD   [facility] ([facility])    [address] [place] [address] [address]nd [address]  [place] [address]   [phone]              Further instructions from your care team       [facility]  Same-Day Surgery   Adult Discharge Orders & Instructions   For 24 hours after surgery  1. Get plenty of rest.  A responsible adult must stay with you for at least 24 hours after you leave the hospital.   2. Do not drive or use heavy equipment.  If you have weakness or tingling, don't drive or use heavy equipment until this feeling goes away.  3. Do not drink alcohol.  4. Avoid strenuous or risky activities.  Ask for help when climbing stairs.   5. You may feel lightheaded.  IF so, sit for a few minutes before standing.  Have someone help you get up.   6. If you have nausea (feel sick to your stomach): Drink only clear liquids such as apple juice, ginger ale, broth or 7-Up.  Rest may also help.  Be sure to drink enough fluids.  Move to a regular diet as you feel able.  7. You may have a slight fever. Call the doctor if your fever is over 100 F (37.7 C) (taken under the tongue) or lasts longer than 24 hours.  8. You may have a dry mouth, a sore throat, muscle aches or trouble sleeping.  These should go away after 24 hours.  9. Do not make important or legal decisions.   Call your doctor for any of the followin.  Signs of infection (fever, growing tenderness at the surgery site, a large amount of drainage or bleeding, severe pain, foul-smelling drainage, redness, swelling).    2. It has been over 8 to 10 hours since surgery and you are still not able to urinate (pass water).    3.  Headache for  over 24 hours.  To contact Dr Saavedra call:  [phone]    Tylenol was given at 11:45am.         Pending Results     No orders found from 11/17/2018 to 11/20/2018.            Admission Information     Date & Time Provider Department Dept. Phone    11/19/2018 Tavo Saavedra MD [facility] [phone]      Your Vitals Were     Blood Pressure Temperature Respirations Pulse Oximetry          114/60 98.7  F (37.1  C) (Temporal) 20 97%        Care EveryWhere ID     This is your Care EveryWhere ID. This could be used by other organizations to access your [facility] medical records  [id number]        Equal Access to Services     [facility] : Hadii ana Hobson, wacorona fernandes, qasarthak kaalmada yung, humza garza. So [facility] [phone].    ATENCIÓN: Si habla español, tiene a thomas disposición servicios gratuitos de asistencia lingüística. [facility] [phone].    We comply with applicable federal civil rights laws and [place] laws. We do not discriminate on the basis of race, color, national origin, age, disability, sex, sexual orientation, or gender identity.               Review of your medicines      UNREVIEWED medicines. Ask your doctor about these medicines        Dose / Directions    acetaminophen 325 MG tablet   Commonly known as:  TYLENOL   Used for:  S/P ascending aortic aneurysm repair, Acute post-operative pain        Dose:  650 mg   Take 2 tablets (650 mg) by mouth every 4 hours as needed for other (surgical pain)   Quantity:  100 tablet   Refills:  0       aspirin 81 MG EC tablet   Used for:  S/P ascending aortic aneurysm repair        Dose:  81 mg   Take 1 tablet (81 mg) by mouth daily   Quantity:  30 tablet   Refills:  0       atorvastatin 20 MG tablet   Commonly known as:  LIPITOR   Used for:  CARDIOVASCULAR SCREENING; LDL GOAL LESS THAN 160        Dose:  20 mg   Take 1 tablet (20 mg) by mouth daily   Quantity:  90 tablet   Refills:  3        lisinopril 10 MG tablet   Commonly known as:  PRINIVIL/ZESTRIL   Used for:  Ascending aortic aneurysm (H), Benign essential hypertension        Dose:  10 mg   Take 1 tablet (10 mg) by mouth daily Please take 5mg in the AM and 10mg in the evening.   Quantity:  120 tablet   Refills:  3       melatonin 3 MG tablet   Used for:  Ascending aortic aneurysm (H), S/P ascending aortic aneurysm repair        Dose:  3 mg   Take 1 tablet (3 mg) by mouth nightly as needed for sleep   Quantity:  30 tablet   Refills:  0       metoprolol tartrate 50 MG tablet   Commonly known as:  LOPRESSOR   Used for:  Palpitations        Dose:  50 mg   Take 1 tablet (50 mg) by mouth 2 times daily   Quantity:  180 tablet   Refills:  3       ZANTAC PO        Dose:  1 tablet   Take 1 tablet by mouth daily   Refills:  0         START taking        Dose / Directions    cephALEXin 500 MG capsule   Commonly known as:  KEFLEX   Used for:  Renal stones        Dose:  500 mg   Take 1 capsule (500 mg) by mouth 3 times daily for 3 days Start day  of procedure   Quantity:  9 capsule   Refills:  0         CONTINUE these medicines which have NOT CHANGED        Dose / Directions    HYDROcodone-acetaminophen 5-325 MG per tablet   Commonly known as:  NORCO   Used for:  Renal stones        TAKE ONE TABLET BY MOUTH EVERY SIX HOURS AS NEEDED FOR SEVERE PAIN   Quantity:  30 tablet   Refills:  0            Where to get your medicines      These medications were sent to [facility] [place], [place] - [address], [address]  [address], [facility] [address]     Phone:  [phone]     cephALEXin 500 MG capsule         Some of these will need a paper prescription and others can be bought over the counter. Ask your nurse if you have questions.     Bring a paper prescription for each of these medications     HYDROcodone-acetaminophen 5-325 MG per tablet                Protect others around you: Learn how to safely use, store and  throw away your medicines at www.disposemymeds.org.        ANTIBIOTIC INSTRUCTION     You've Been Prescribed an Antibiotic - Now What?  Your healthcare team thinks that you or your loved one might have an infection. Some infections can be treated with antibiotics, which are powerful, life-saving drugs. Like all medications, antibiotics have side effects and should only be used when necessary. There are some important things you should know about your antibiotic treatment.      Your healthcare team may run tests before you start taking an antibiotic.    Your team may take samples (e.g., from your blood, urine or other areas) to run tests to look for bacteria. These test can be important to determine if you need an antibiotic at all and, if you do, which antibiotic will work best.      Within a few days, your healthcare team might change or even stop your antibiotic.    Your team may start you on an antibiotic while they are working to find out what is making you sick.    Your team might change your antibiotic because test results show that a different antibiotic would be better to treat your infection.    In some cases, once your team has more information, they learn that you do not need an antibiotic at all. They may find out that you don't have an infection, or that the antibiotic you're taking won't work against your infection. For example, an infection caused by a virus can't be treated with antibiotics. Staying on an antibiotic when you don't need it is more likely to be harmful than helpful.      You may experience side effects from your antibiotic.    Like all medications, antibiotics have side effects. Some of these can be serious.    Let you healthcare team know if you have any known allergies when you are admitted to the hospital.    One significant side effect of nearly all antibiotics is the risk of severe and sometimes deadly diarrhea caused by Clostridium difficile (C. Difficile). This occurs when a  person takes antibiotics because some good germs are destroyed. Antibiotic use allows C. diificile to take over, putting patients at high risk for this serious infection.    As a patient or caregiver, it is important to understand your or your loved one's antibiotic treatment. It is especially important for caregivers to speak up when patients can't speak for themselves. Here are some important questions to ask your healthcare team.    What infection is this antibiotic treating and how do you know I have that infection?    What side effects might occur from this antibiotic?    How long will I need to take this antibiotic?    Is it safe to take this antibiotic with other medications or supplements (e.g., vitamins) that I am taking?     Are there any special directions I need to know about taking this antibiotic? For example, should I take it with food?    How will I be monitored to know whether my infection is responding to the antibiotic?    What tests may help to make sure the right antibiotic is prescribed for me?      Information provided by:  www.cdc.gov/getsmart  U.S. Department of Health and Human Services  Centers for disease Control and Prevention  National Center for Emerging and Zoonotic Infectious Diseases  Division of Healthcare Quality Promotion        Information about OPIOIDS     PRESCRIPTION OPIOIDS: WHAT YOU NEED TO KNOW   We gave you an opioid (narcotic) pain medicine. It is important to manage your pain, but opioids are not always the best choice. You should first try all the other options your care team gave you. Take this medicine for as short a time (and as few doses) as possible.    Some activities can increase your pain, such as bandage changes or therapy sessions. It may help to take your pain medicine 30 to 60 minutes before these activities. Reduce your stress by getting enough sleep, working on hobbies you enjoy and practicing relaxation or meditation. Talk to your care team about ways to  manage your pain beyond prescription opioids.    These medicines have risks:    DO NOT drive when on new or higher doses of pain medicine. These medicines can affect your alertness and reaction times, and you could be arrested for driving under the influence (DUI). If you need to use opioids long-term, talk to your care team about driving.    DO NOT operate heavy machinery    DO NOT do any other dangerous activities while taking these medicines.    DO NOT drink any alcohol while taking these medicines.     If the opioid prescribed includes acetaminophen, DO NOT take with any other medicines that contain acetaminophen. Read all labels carefully. Look for the word  acetaminophen  or  Tylenol.  Ask your pharmacist if you have questions or are unsure.    You can get addicted to pain medicines, especially if you have a history of addiction (chemical, alcohol or substance dependence). Talk to your care team about ways to reduce this risk.    All opioids tend to cause constipation. Drink plenty of water and eat foods that have a lot of fiber, such as fruits, vegetables, prune juice, apple juice and high-fiber cereal. Take a laxative (Miralax, milk of magnesia, Colace, Senna) if you don t move your bowels at least every other day. Other side effects include upset stomach, sleepiness, dizziness, throwing up, tolerance (needing more of the medicine to have the same effect), physical dependence and slowed breathing.    Store your pills in a secure place, locked if possible. We will not replace any lost or stolen medicine. If you don t finish your medicine, please throw away (dispose) as directed by your pharmacist. The [place] Pollution Control Agency has more information about safe disposal: https://www.pca.[facility].[place].us/living-green/managing-unwanted-medications             Medication List: This is a list of all your medications and when to take them. Check marks below indicate your daily home schedule. Keep this list as a  reference.      Medications           Morning Afternoon Evening Bedtime As Needed    acetaminophen 325 MG tablet   Commonly known as:  TYLENOL   Take 2 tablets (650 mg) by mouth every 4 hours as needed for other (surgical pain)   Last time this was given:  975 mg on 11/19/2018 11:40 AM                                aspirin 81 MG EC tablet   Take 1 tablet (81 mg) by mouth daily                                atorvastatin 20 MG tablet   Commonly known as:  LIPITOR   Take 1 tablet (20 mg) by mouth daily                                cephALEXin 500 MG capsule   Commonly known as:  KEFLEX   Take 1 capsule (500 mg) by mouth 3 times daily for 3 days Start day  of procedure                                HYDROcodone-acetaminophen 5-325 MG per tablet   Commonly known as:  NORCO   TAKE ONE TABLET BY MOUTH EVERY SIX HOURS AS NEEDED FOR SEVERE PAIN                                lisinopril 10 MG tablet   Commonly known as:  PRINIVIL/ZESTRIL   Take 1 tablet (10 mg) by mouth daily Please take 5mg in the AM and 10mg in the evening.                                melatonin 3 MG tablet   Take 1 tablet (3 mg) by mouth nightly as needed for sleep                                metoprolol tartrate 50 MG tablet   Commonly known as:  LOPRESSOR   Take 1 tablet (50 mg) by mouth 2 times daily   Last time this was given:  50 mg on 11/19/2018 12:35 PM                                ZANTAC PO   Take 1 tablet by mouth daily

## 2018-11-26 ENCOUNTER — TELEPHONE (OUTPATIENT)
Dept: UROLOGY | Facility: CLINIC | Age: 63
End: 2018-11-26

## 2018-11-26 ENCOUNTER — OFFICE VISIT (OUTPATIENT)
Dept: UROLOGY | Facility: CLINIC | Age: 63
End: 2018-11-26
Payer: COMMERCIAL

## 2018-11-26 ENCOUNTER — RADIANT APPOINTMENT (OUTPATIENT)
Dept: GENERAL RADIOLOGY | Facility: CLINIC | Age: 63
End: 2018-11-26
Attending: UROLOGY
Payer: COMMERCIAL

## 2018-11-26 VITALS — OXYGEN SATURATION: 96 % | HEART RATE: 49 BPM | SYSTOLIC BLOOD PRESSURE: 165 MMHG | DIASTOLIC BLOOD PRESSURE: 68 MMHG

## 2018-11-26 DIAGNOSIS — I10 BENIGN ESSENTIAL HYPERTENSION: ICD-10-CM

## 2018-11-26 DIAGNOSIS — N20.0 CALCULUS OF KIDNEY: Primary | ICD-10-CM

## 2018-11-26 PROCEDURE — 99024 POSTOP FOLLOW-UP VISIT: CPT | Performed by: UROLOGY

## 2018-11-26 PROCEDURE — 74019 RADEX ABDOMEN 2 VIEWS: CPT

## 2018-11-26 NOTE — PATIENT INSTRUCTIONS
Surgery Preparation    You will receive a phone call from the [facility] within 1-2 days. If you do not receive a call within 2 days, contact [phone] to schedule the procedure. You can write the location/date/time of surgery in the space provided below for your records.    Location of Surgery:                                          Date of Surgery:                                                 Time of Arrival:                                                   Time of Surgery:                                                   Please arrange an appointment with a primary care provider for a pre-op physical. This is a mandatory appointment that needs to be completed within the two weeks prior to your surgery date. This gives clearance for you to receive anesthesia during your procedure. If you have had a pre-op physical within 30 days of your surgery date, you may not need another one. Check with your provider.    If you are having a Same Day Surgery, you must have a  to and from the procedure. Someone needs to stay with you post-operatively for 12 hours.     Do not eat or drink any solids, milk products, or pulpy juices after midnight the night before your surgery. You may have clear liquids up to 8 hours prior to the surgery. This would include water, soda, coffee (without cream), tea, broth, and jello.    Please stop all over the counter blood thinners such as Aspirin, Advil, Aleve, Ibuprofen, Motrin, and Excedrin one week prior to surgery. Please discontinue prescription blood thinners 5-7 days prior to surgery, per your primary physician's orders.     Please check with your insurance company to confirm that your procedure is covered, and that the facility is in-network.     Call the Urology Department @ [phone] if you have questions about your surgery, or if you need to reschedule your procedure.     Treating Kidney Stones: Ureteroscopic Stone Removal    Ureteroscopic stone  removal may be done before, after, or instead of other treatments. If you need this procedure, your healthcare provider will discuss its risks and possible complications. You will be told how to prepare. And you will be told about anesthesia that will keep you pain-free during treatment.     A ureteroscope lets your doctor see your stone before removing it.   Removing the stone through the ureter  Ureteroscopic stone removal extracts a small stone in your ureter without an incision. Your doctor places a viewing tube (ureteroscope) in your ureter. A wire basket inserted through the tube removes the stone. Sometimes, a laser or a mechanical device is used to break up the stone. A soft tube may be left in your ureter briefly to drain urine.     The stone may be fragmented. The stone is then withdrawn or passed.     Your recovery  This is an outpatient or overnight procedure. For a few days after surgery, you may feel some pain when you urinate. Or you may need to urinate more often, or have bloody urine. You may have a ureteral stent. This is a soft tube that prevents blockage from swelling after the procedure. The stent is removed when the swelling goes down, often within days. Follow up as instructed to check for any new stones.  When to call your healthcare provider  Call your healthcare provider right away if:    You have sudden pain or flank pain    You have a fever over 100.4 F (38 C)    You have nausea that lasts for days    You have heavy bleeding when you urinate    You have heavy bleeding through your drainage tube    You have swelling or redness around your incision   Date Last Reviewed: 2/1/2017 2000-2018 [employer]. [address] [place] [address], [place] [address]. All rights reserved. This information is not intended as a substitute for professional medical care. Always follow your healthcare professional's instructions.        Ureteral Stents    A ureteral stent is a soft plastic tube with  holes in it. It s temporarily inserted into a ureter to help drain urine into the bladder. One end goes in the kidney. The other end goes in the bladder. A coil on each end holds the stent in place. The stent can t be seen from outside the body. It shouldn t interfere with your normal routine. Your stent will be put in by a doctor trained in treating the urinary tract (a urologist) or another specialist. The procedure is done in a hospital or surgery center. You ll likely go home the same day.  When is a ureteral stent used?  A ureteral stent may be used:    To bypass a blockage in a kidney or ureter.    During kidney stone removal.    To let a ureter heal after surgery.  Before the Procedure  Your healthcare provider will give you instructions to prepare for the procedure. X-rays or other imaging tests of your kidneys and ureters may be done beforehand.  During the procedure    You receive medicine to prevent pain and help you relax or sleep during the procedure. Once this takes effect, the procedure starts.    The doctor inserts a cystoscope (lighted instrument) through the urethra and into the bladder. This shows the opening to the ureter.    A thin wire is carefully threaded through the cystoscope, up the ureter, and into the kidney. The stent is inserted over the wire.    A fluoroscope (special X-ray machine) is used to help position the stent. When the stent is in place, the wire and cystoscope are removed.  While you have a stent    Some discomfort is normal. Certain movements may trigger pain or a feeling that you need to urinate. You may also feel mild soreness or pressure before or during urination. These symptoms will go away a few days after the stent is removed.    Medicine to control pain or bladder spasms or to prevent infection may be prescribed. Take this as directed.    Drink plenty of fluids to help flush out your urinary tract.    Your urine may be slightly pink or red. This is due to bleeding  caused by minor irritation from the stent. This may happen on and off while you have the stent.    As with any synthetic device placed in the body, there is a risk of infection. The stent may have to be removed if this happens.   How long will you need a stent?  The stent is often taken out after the blockage in the ureter is treated or the ureter has healed. This may take 1 week to 2 weeks, or longer. If a stent is needed for a long time, it may need to be changed every few months.  When to call your healthcare provider  Contact your healthcare provider right away if:    Your urine contains blood clots or you see a large amount of blood-tinged urine    You have symptoms similar to those you had before the stent was placed    You constantly leak urine    You have a fever over 100.4 F (38 C), chills, nausea, or vomiting    Your pain is not relieved with medicine    The end of the stent comes out of the urethra   Date Last Reviewed: 1/1/2017 2000-2018 [employer]). [address] [place] [address], [place] [id number]. All rights reserved. This information is not intended as a substitute for professional medical care. Always follow your healthcare professional's instructions.

## 2018-11-26 NOTE — PROGRESS NOTES
Chief Complaint   Patient presents with     RECHECK     post op eswl kub       Georgie Patino is a 63 year old female who presents today for follow up of   Chief Complaint   Patient presents with     RECHECK     post op eswl kub    f/u post bilateral ESWL.  She is without any complaints.    Current Outpatient Prescriptions   Medication Sig Dispense Refill     acetaminophen (TYLENOL) 325 MG tablet Take 2 tablets (650 mg) by mouth every 4 hours as needed for other (surgical pain) 100 tablet 0     aspirin EC 81 MG EC tablet Take 1 tablet (81 mg) by mouth daily 30 tablet 0     atorvastatin (LIPITOR) 20 MG tablet Take 1 tablet (20 mg) by mouth daily 90 tablet 3     HYDROcodone-acetaminophen (NORCO) 5-325 MG per tablet TAKE ONE TABLET BY MOUTH EVERY SIX HOURS AS NEEDED FOR SEVERE PAIN 30 tablet 0     lisinopril (PRINIVIL/ZESTRIL) 10 MG tablet Take 1 tablet (10 mg) by mouth daily Please take 5mg in the AM and 10mg in the evening. 120 tablet 3     melatonin 3 MG tablet Take 1 tablet (3 mg) by mouth nightly as needed for sleep 30 tablet 0     metoprolol tartrate (LOPRESSOR) 50 MG tablet Take 1 tablet (50 mg) by mouth 2 times daily 180 tablet 3     RaNITidine HCl (ZANTAC PO) Take 1 tablet by mouth daily       Allergies   Allergen Reactions     Blood Transfusion Related (Informational Only) Other (See Comments)     Patient has a history of a clinically significant antibody against RBC antigens.  A delay in compatible RBCs may occur.      Past Medical History:   Diagnosis Date     Ascending aortic aneurysm (H) 09/21/2017     Blood transfusions age 19    due to menorhagia     Hypertension 09/21/2017     Thyroid nodule 11/20/2017     Tobacco abuse      Past Surgical History:   Procedure Laterality Date     COLONOSCOPY WITH CO2 INSUFFLATION N/A 3/15/2017    Procedure: COLONOSCOPY WITH CO2 INSUFFLATION;  Surgeon: Duane, William Charles, MD;  Location: [facility] OR     DILATION AND CURETTAGE  age 20     REPAIR ANEURYSM ASCENDING AORTA  N/A 10/27/2017    Procedure: REPAIR ANEURYSM ASCENDING AORTA;  Median Sternotomy, Cardiopulmonary bypass, Ascending Aorta Aneurysm Repair using Hemashield Platinum Woven Double Velour Graft 34cm X 30cm.;  Surgeon: Rubio Piña MD;  Location: [facility] OR     [place] teeth removed       Family History   Problem Relation Age of Onset     Respiratory Mother      copd     Cancer Maternal Grandmother      Leg     Alzheimer Disease Maternal Grandfather      HEART DISEASE Paternal Grandfather      HEART DISEASE Brother 54     Heart Attack      Cancer Sister      Lung cancer age 55-smoker d age 57     Social History     Social History     Marital status:      Spouse name: N/A     Number of children: 3     Years of education: N/A     Occupational History     Day care      Social History Main Topics     Smoking status: Former Smoker     Packs/day: 0.05     Years: 40.00     Types: Cigarettes     Quit date: 10/27/2017     Smokeless tobacco: Never Used      Comment: 2 cigs daily     Alcohol use 0.5 - 1.0 oz/week     1 - 2 Standard drinks or equivalent per week      Comment: 3-4 drinks per week      Drug use: No     Sexual activity: Yes     Partners: Male     Birth control/ protection: Surgical     Other Topics Concern     Parent/Sibling W/ Cabg, Mi Or Angioplasty Before 65f 55m? Yes     brother w/ sudden cardiac arrest @ age 54     Social History Narrative       REVIEW OF SYSTEMS  =================  C: NEGATIVE for fever, chills, change in weight  I: NEGATIVE for worrisome rashes, moles or lesions  E/M: NEGATIVE for ear, mouth and throat problems  R: NEGATIVE for significant cough or SHORTNESS OF BREATH,   CV: NEGATIVE for chest pain, palpitations or peripheral edema  GI: NEGATIVE for nausea, abdominal pain, heartburn, or change in bowel habits  NEURO: NEGATIVE any motor/sensory changes  PSYCH: NEGATIVE for recent mood disorder    Physical Exam:  /68 (BP Location: Right arm, Patient Position: Chair, Cuff Size:  Adult Large)  Pulse (!) 49  SpO2 96%   Patient is pleasant, in no acute distress, good general condition.  Lung: no evidence of respiratory distress    Abdomen: Soft, nondistended, non tender. No masses. No rebound or guarding.   Exam: no cva tenderness  Skin: Warm and dry.  No redness.  Psych: normal mood and affect  Neuro: alert and oriented  Musculaskeletal: moving all extremities    Assessment/Plan:   (N20.0) Calculus of kidney  (primary encounter diagnosis)  Comment:   Plan: XR KUB - stone much smaller but still large on left side.  Schedule for left ureteroscopy and lithotripsy next.    HTN: Patient to follow up with Primary Care provider regarding elevated blood pressure.

## 2018-11-26 NOTE — TELEPHONE ENCOUNTER
Type of surgery: ureteroscopy with holmium lithrotripsy CPT code 23384  Calculus of kidney [N20.0]  - Primary   Location of surgery: [facility]  Date and time of surgery: 12-12-18  Surgeon: Dr Saavedra  Pre-Op Appt Date: 11-12-18  Post-Op Appt Date: 12-18-18   Packet sent out: Yes  Pre-cert/Authorization completed: No prior auth required per [facility] site   Date: 11/26/2018    Sent to  and was received.     Insurance valid.

## 2018-11-26 NOTE — MR AVS SNAPSHOT
After Visit Summary   11/26/2018    Georgie Patino    MRN: [id number]           Patient Information     Date Of Birth          1955        Visit Information        Provider Department      11/26/2018 9:30 AM Tavo Saavedra MD; YENNY CYSTO PROC ROOM [facility]        Today's Diagnoses     Calculus of kidney    -  1    Benign essential hypertension          Care Instructions    Surgery Preparation    You will receive a phone call from the [facility] within 1-2 days. If you do not receive a call within 2 days, contact [phone] to schedule the procedure. You can write the location/date/time of surgery in the space provided below for your records.    Location of Surgery:                                          Date of Surgery:                                                 Time of Arrival:                                                   Time of Surgery:                                                   Please arrange an appointment with a primary care provider for a pre-op physical. This is a mandatory appointment that needs to be completed within the two weeks prior to your surgery date. This gives clearance for you to receive anesthesia during your procedure. If you have had a pre-op physical within 30 days of your surgery date, you may not need another one. Check with your provider.    If you are having a Same Day Surgery, you must have a  to and from the procedure. Someone needs to stay with you post-operatively for 12 hours.     Do not eat or drink any solids, milk products, or pulpy juices after midnight the night before your surgery. You may have clear liquids up to 8 hours prior to the surgery. This would include water, soda, coffee (without cream), tea, broth, and jello.    Please stop all over the counter blood thinners such as Aspirin, Advil, Aleve, Ibuprofen, Motrin, and Excedrin one week prior to surgery. Please discontinue prescription  blood thinners 5-7 days prior to surgery, per your primary physician's orders.     Please check with your insurance company to confirm that your procedure is covered, and that the facility is in-network.     Call the Urology Department @ [phone] if you have questions about your surgery, or if you need to reschedule your procedure.     Treating Kidney Stones: Ureteroscopic Stone Removal    Ureteroscopic stone removal may be done before, after, or instead of other treatments. If you need this procedure, your healthcare provider will discuss its risks and possible complications. You will be told how to prepare. And you will be told about anesthesia that will keep you pain-free during treatment.     A ureteroscope lets your doctor see your stone before removing it.   Removing the stone through the ureter  Ureteroscopic stone removal extracts a small stone in your ureter without an incision. Your doctor places a viewing tube (ureteroscope) in your ureter. A wire basket inserted through the tube removes the stone. Sometimes, a laser or a mechanical device is used to break up the stone. A soft tube may be left in your ureter briefly to drain urine.     The stone may be fragmented. The stone is then withdrawn or passed.     Your recovery  This is an outpatient or overnight procedure. For a few days after surgery, you may feel some pain when you urinate. Or you may need to urinate more often, or have bloody urine. You may have a ureteral stent. This is a soft tube that prevents blockage from swelling after the procedure. The stent is removed when the swelling goes down, often within days. Follow up as instructed to check for any new stones.  When to call your healthcare provider  Call your healthcare provider right away if:    You have sudden pain or flank pain    You have a fever over 100.4 F (38 C)    You have nausea that lasts for days    You have heavy bleeding when you urinate    You have heavy bleeding through  your drainage tube    You have swelling or redness around your incision   Date Last Reviewed: 2/1/2017 2000-2018 [employer]. [address] [place] [address], [place] [address]. All rights reserved. This information is not intended as a substitute for professional medical care. Always follow your healthcare professional's instructions.        Ureteral Stents    A ureteral stent is a soft plastic tube with holes in it. It s temporarily inserted into a ureter to help drain urine into the bladder. One end goes in the kidney. The other end goes in the bladder. A coil on each end holds the stent in place. The stent can t be seen from outside the body. It shouldn t interfere with your normal routine. Your stent will be put in by a doctor trained in treating the urinary tract (a urologist) or another specialist. The procedure is done in a hospital or surgery center. You ll likely go home the same day.  When is a ureteral stent used?  A ureteral stent may be used:    To bypass a blockage in a kidney or ureter.    During kidney stone removal.    To let a ureter heal after surgery.  Before the Procedure  Your healthcare provider will give you instructions to prepare for the procedure. X-rays or other imaging tests of your kidneys and ureters may be done beforehand.  During the procedure    You receive medicine to prevent pain and help you relax or sleep during the procedure. Once this takes effect, the procedure starts.    The doctor inserts a cystoscope (lighted instrument) through the urethra and into the bladder. This shows the opening to the ureter.    A thin wire is carefully threaded through the cystoscope, up the ureter, and into the kidney. The stent is inserted over the wire.    A fluoroscope (special X-ray machine) is used to help position the stent. When the stent is in place, the wire and cystoscope are removed.  While you have a stent    Some discomfort is normal. Certain movements may trigger pain or a  feeling that you need to urinate. You may also feel mild soreness or pressure before or during urination. These symptoms will go away a few days after the stent is removed.    Medicine to control pain or bladder spasms or to prevent infection may be prescribed. Take this as directed.    Drink plenty of fluids to help flush out your urinary tract.    Your urine may be slightly pink or red. This is due to bleeding caused by minor irritation from the stent. This may happen on and off while you have the stent.    As with any synthetic device placed in the body, there is a risk of infection. The stent may have to be removed if this happens.   How long will you need a stent?  The stent is often taken out after the blockage in the ureter is treated or the ureter has healed. This may take 1 week to 2 weeks, or longer. If a stent is needed for a long time, it may need to be changed every few months.  When to call your healthcare provider  Contact your healthcare provider right away if:    Your urine contains blood clots or you see a large amount of blood-tinged urine    You have symptoms similar to those you had before the stent was placed    You constantly leak urine    You have a fever over 100.4 F (38 C), chills, nausea, or vomiting    Your pain is not relieved with medicine    The end of the stent comes out of the urethra   Date Last Reviewed: 1/1/2017 2000-2018 [employer]. [address] [place] [address]. All rights reserved. This information is not intended as a substitute for professional medical care. Always follow your healthcare professional's instructions.                Follow-ups after your visit        Your next 10 appointments already scheduled     Nov 26, 2018 11:40 AM CST   [facility] Sp[facility] with Luis Mullen, PT   [facility] ([facility])    [address] [facility]  [address]  [facility] [address]   [phone]            Dec 03, 2018 10:30 AM CST   Return Visit with Tavo  Brice Saavedra MD, YENNY CYSTO PROC ROOM   [facility] ([facility]    [address] [facility] [id number] [phone]            Apr 12, 2019 10:30 AM CDT   Return Visit with Jitendra Fernandez MD   [facility] ([facility])    [address] [facility] 2[address]  [facility] [id number] [phone]              Who to contact     If you have questions or need follow up information about today's clinic visit or your schedule please contact [facility] directly at [phone].  Normal or non-critical lab and imaging results will be communicated to you by MyChart, letter or phone within 4 business days after the clinic has received the results. If you do not hear from us within 7 days, please contact the clinic through MyChart or phone. If you have a critical or abnormal lab result, we will notify you by phone as soon as possible.  Submit refill requests through [employer] or call your pharmacy and they will forward the refill request to us. Please allow 3 business days for your refill to be completed.          Additional Information About Your Visit        Care EveryWhere ID     This is your Care EveryWhere ID. This could be used by other organizations to access your [facility] medical records  [id number]        Your Vitals Were     Pulse Pulse Oximetry                49 96%           Blood Pressure from Last 3 Encounters:   11/26/18 165/68   11/19/18 135/65   11/12/18 124/70    Weight from Last 3 Encounters:   11/12/18 93.9 kg (207 lb)   10/12/18 91.6 kg (202 lb)   10/08/18 94.3 kg (207 lb 12.8 oz)              We Performed the Following     Agustina-Operative Worksheet Urology     XR KUB        Primary Care Provider Office Phone # Fax #    TESHA [facility] [phone]       [address] [facility] [address]        Equal Access to Services     CAROLINA LOVELL AH: Renata Hobson  wavalenciada lucodyadaha, qaybta kaanahy barragan, humza padgettaademetrius ah. So [facility] [phone].    ATENCIÓN: Si sonia valle, tiene a thomas disposición servicios gratuitos de asistencia lingüística. Igor al [phone].    We comply with applicable federal civil rights laws and [place] laws. We do not discriminate on the basis of race, color, national origin, age, disability, sex, sexual orientation, or gender identity.            Thank you!     Thank you for choosing [facility]  for your care. Our goal is always to provide you with excellent care. Hearing back from our patients is one way we can continue to improve our services. Please take a few minutes to complete the written survey that you may receive in the mail after your visit with us. Thank you!             Your Updated Medication List - Protect others around you: Learn how to safely use, store and throw away your medicines at www.disposemymeds.org.          This list is accurate as of 11/26/18 10:10 AM.  Always use your most recent med list.                   Brand Name Dispense Instructions for use Diagnosis    acetaminophen 325 MG tablet    TYLENOL    100 tablet    Take 2 tablets (650 mg) by mouth every 4 hours as needed for other (surgical pain)    S/P ascending aortic aneurysm repair, Acute post-operative pain       aspirin 81 MG EC tablet    ASA    30 tablet    Take 1 tablet (81 mg) by mouth daily    S/P ascending aortic aneurysm repair       atorvastatin 20 MG tablet    LIPITOR    90 tablet    Take 1 tablet (20 mg) by mouth daily    CARDIOVASCULAR SCREENING; LDL GOAL LESS THAN 160       HYDROcodone-acetaminophen 5-325 MG tablet    NORCO    30 tablet    TAKE ONE TABLET BY MOUTH EVERY SIX HOURS AS NEEDED FOR SEVERE PAIN    Renal stones       lisinopril 10 MG tablet    PRINIVIL/ZESTRIL    120 tablet    Take 1 tablet (10 mg) by mouth daily Please take 5mg in the AM and 10mg in the evening.    Ascending aortic aneurysm (H),  Benign essential hypertension       melatonin 3 MG tablet     30 tablet    Take 1 tablet (3 mg) by mouth nightly as needed for sleep    Ascending aortic aneurysm (H), S/P ascending aortic aneurysm repair       metoprolol tartrate 50 MG tablet    LOPRESSOR    180 tablet    Take 1 tablet (50 mg) by mouth 2 times daily    Palpitations       ZANTAC PO      Take 1 tablet by mouth daily

## 2018-12-03 DIAGNOSIS — N20.0 RENAL STONES: ICD-10-CM

## 2018-12-03 RX ORDER — HYDROCODONE BITARTRATE AND ACETAMINOPHEN 5; 325 MG/1; MG/1
TABLET ORAL
Qty: 30 TABLET | Refills: 0 | Status: SHIPPED | OUTPATIENT
Start: 2018-12-03 | End: 2019-11-21

## 2018-12-10 ENCOUNTER — TELEPHONE (OUTPATIENT)
Dept: UROLOGY | Facility: CLINIC | Age: 63
End: 2018-12-10

## 2018-12-10 NOTE — TELEPHONE ENCOUNTER
Reason for Call:  Other appointment    Detailed comments: Patient having surgery 12/12/18 hospital needs copy of most recent pre op physical and most recent EKG tracing. Said pre op from 11/12/18 is ok    Phone Number Patient can be reached at: Other phone number:  [phone]    Best Time: ASAP    Can we leave a detailed message on this number? YES    Call taken on 12/10/2018 at 12:39 PM by Tiffany Causey

## 2018-12-18 ENCOUNTER — ANCILLARY PROCEDURE (OUTPATIENT)
Dept: GENERAL RADIOLOGY | Facility: CLINIC | Age: 63
End: 2018-12-18
Attending: UROLOGY
Payer: COMMERCIAL

## 2018-12-18 ENCOUNTER — OFFICE VISIT (OUTPATIENT)
Dept: UROLOGY | Facility: CLINIC | Age: 63
End: 2018-12-18
Payer: COMMERCIAL

## 2018-12-18 DIAGNOSIS — N20.0 KIDNEY STONE: Primary | ICD-10-CM

## 2018-12-18 PROCEDURE — 74019 RADEX ABDOMEN 2 VIEWS: CPT

## 2018-12-18 PROCEDURE — 52310 CYSTOSCOPY AND TREATMENT: CPT | Mod: 58 | Performed by: UROLOGY

## 2018-12-18 NOTE — PROGRESS NOTES
Patient returns to clinic for cysto and stent removal.  she is s/p eswl followed by ureteroscopy recently.  KUB today shows multiple small renal stones.    Procedure: patient was brought to the cysto suite.  she was draped and prepped in the usual surgical fashion.  Cystoscopy placed. Stent removed without problems.    Recommendation:  Return to Clinic: in 3 months with urorisks and KUB

## 2018-12-30 DIAGNOSIS — Z13.6 CARDIOVASCULAR SCREENING; LDL GOAL LESS THAN 160: ICD-10-CM

## 2018-12-31 NOTE — TELEPHONE ENCOUNTER
Pharmacy closed. Please call after 9 to confirm duplicate.    atorvastatin (LIPITOR) 20 MG tablet 90 tablet 3 9/11/2018  No   Sig - Route: Take 1 tablet (20 mg) by mouth daily - Oral   Sent to pharmacy as: atorvastatin (LIPITOR) 20 MG tablet   Class: E-Prescribe   Notes to Pharmacy: Due for fasting labs in 3 months   Order: [id number]   E-Prescribing Status: Receipt confirmed by pharmacy (9/11/2018 11:28 AM CDT)       Celsa CABRERA CMA ([facility])

## 2019-01-02 RX ORDER — ATORVASTATIN CALCIUM 40 MG/1
TABLET, FILM COATED ORAL
Qty: 30 TABLET | Refills: 2 | OUTPATIENT
Start: 2019-01-02

## 2019-01-02 NOTE — TELEPHONE ENCOUNTER
Spoke to pharmacy and confirmed duplicate request.  Celsa CABRERA CMA ([facility])

## 2019-01-02 NOTE — TELEPHONE ENCOUNTER
Refused Prescriptions:                       Disp   Refills    atorvastatin (LIPITOR) 40 MG tablet [Pharm*30 tab*2        Sig: TAKE ONE TABLET BY MOUTH ONE TIME DAILY   Refused By: CASH RSOEN  Reason for Refusal: Duplicate  Reason for Refusal Comment: See rx sent 9/11/18 for #90 with 3 refills    Cash Rosen RN

## 2019-02-25 PROBLEM — M54.59 MECHANICAL LOW BACK PAIN: Status: RESOLVED | Noted: 2018-10-22 | Resolved: 2019-02-25

## 2019-02-25 NOTE — PROGRESS NOTES
Subjective:  HPI                    Objective:  System    Physical Exam    General     ROS    Assessment/Plan:    DISCHARGE REPORT    Progress reporting period is from 10.15.19 to 2.25.19.     SUBJECTIVE  Subjective: pain has returned HORRIBLE!,    Current Pain level: 6/10   Initial Pain level: 7/10   Changes in function: No changes noted in function since last SOAP note   Adverse reactions: None;   ,     Patient has failed to return to therapy so current objective findings are unknown.    OBJECTIVE  Objective: varying responses to activity       ASSESSMENT/PLAN  Updated problem list and treatment plan: Diagnosis 1:  lbp    STG/LTGs have been met or progress has been made towards goals:  None  Assessment of Progress: Patient has not returned to therapy.  Current status is unknown and discharge G code cannot be reported.  Self Management Plans:  Patient has been instructed in a home treatment program.  Patient  has been instructed in self management of symptoms.    Georgie continues to require the following intervention to meet STG and LTG's:   The patient failed to complete scheduled/ordered appointments so current information is unknown.  We will discharge this patient from PT.    Recommendations:  This patient is ready to be discharged from therapy and continue their home treatment program.    Please refer to the daily flowsheet for treatment today, total treatment time and time spent performing 1:1 timed codes.

## 2019-05-09 DIAGNOSIS — R00.2 PALPITATIONS: ICD-10-CM

## 2019-05-10 RX ORDER — METOPROLOL TARTRATE 50 MG
50 TABLET ORAL 2 TIMES DAILY
Qty: 180 TABLET | Refills: 0 | Status: SHIPPED | OUTPATIENT
Start: 2019-05-10 | End: 2019-11-21

## 2019-05-10 NOTE — TELEPHONE ENCOUNTER
metoprolol tartrate (LOPRESSOR) 50 MG tablet     Last Written Prescription Date:  3/16/18  Last Fill Quantity: 180,   # refills: 3  Last Office Visit : 10/12/18  Future Office visit: 5/31/19      Routing refill request to provider for review/approval because:  bp > 140/90

## 2019-05-11 DIAGNOSIS — R00.2 PALPITATIONS: ICD-10-CM

## 2019-05-14 RX ORDER — METOPROLOL TARTRATE 50 MG
50 TABLET ORAL 2 TIMES DAILY
Qty: 60 TABLET | Refills: 2 | Status: SHIPPED | OUTPATIENT
Start: 2019-05-14 | End: 2019-11-21

## 2019-05-14 NOTE — TELEPHONE ENCOUNTER
Signed Prescriptions:                        Disp   Refills    metoprolol tartrate (LOPRESSOR) 50 MG tabl*60 tab*2        Sig: Take 1 tablet (50 mg) by mouth 2 times daily  Authorizing Provider: JOHNY LANDIS  Ordering User: JOCELIN KAISER    Rx filled per refill protocol.  Jocelin Kaiser RN

## 2019-05-31 ENCOUNTER — OFFICE VISIT (OUTPATIENT)
Dept: CARDIOLOGY | Facility: CLINIC | Age: 64
End: 2019-05-31
Payer: COMMERCIAL

## 2019-05-31 VITALS
DIASTOLIC BLOOD PRESSURE: 84 MMHG | WEIGHT: 208 LBS | HEART RATE: 63 BPM | OXYGEN SATURATION: 97 % | BODY MASS INDEX: 32.58 KG/M2 | SYSTOLIC BLOOD PRESSURE: 141 MMHG

## 2019-05-31 DIAGNOSIS — Z98.890 HISTORY OF ASCENDING AORTA REPAIR: ICD-10-CM

## 2019-05-31 DIAGNOSIS — I71.21 ASCENDING AORTIC ANEURYSM (H): ICD-10-CM

## 2019-05-31 DIAGNOSIS — I10 BENIGN ESSENTIAL HYPERTENSION: ICD-10-CM

## 2019-05-31 DIAGNOSIS — I10 BENIGN ESSENTIAL HYPERTENSION: Primary | ICD-10-CM

## 2019-05-31 DIAGNOSIS — E78.2 MIXED HYPERLIPIDEMIA: ICD-10-CM

## 2019-05-31 LAB
ANION GAP SERPL CALCULATED.3IONS-SCNC: 5 MMOL/L (ref 3–14)
BUN SERPL-MCNC: 27 MG/DL (ref 7–30)
CALCIUM SERPL-MCNC: 9.1 MG/DL (ref 8.5–10.1)
CHLORIDE SERPL-SCNC: 108 MMOL/L (ref 94–109)
CO2 SERPL-SCNC: 28 MMOL/L (ref 20–32)
CREAT SERPL-MCNC: 0.61 MG/DL (ref 0.52–1.04)
GFR SERPL CREATININE-BSD FRML MDRD: >90 ML/MIN/{1.73_M2}
GLUCOSE SERPL-MCNC: 101 MG/DL (ref 70–99)
MAGNESIUM SERPL-MCNC: 2.1 MG/DL (ref 1.6–2.3)
POTASSIUM SERPL-SCNC: 4.5 MMOL/L (ref 3.4–5.3)
SODIUM SERPL-SCNC: 141 MMOL/L (ref 133–144)

## 2019-05-31 PROCEDURE — 80048 BASIC METABOLIC PNL TOTAL CA: CPT | Performed by: INTERNAL MEDICINE

## 2019-05-31 PROCEDURE — 99214 OFFICE O/P EST MOD 30 MIN: CPT | Performed by: INTERNAL MEDICINE

## 2019-05-31 PROCEDURE — 83735 ASSAY OF MAGNESIUM: CPT | Performed by: INTERNAL MEDICINE

## 2019-05-31 PROCEDURE — 36415 COLL VENOUS BLD VENIPUNCTURE: CPT | Performed by: INTERNAL MEDICINE

## 2019-05-31 RX ORDER — LISINOPRIL 20 MG/1
20 TABLET ORAL DAILY
Qty: 90 TABLET | Refills: 3 | Status: SHIPPED | OUTPATIENT
Start: 2019-05-31 | End: 2019-11-21

## 2019-05-31 NOTE — LETTER
5/31/2019      RE: Georgie Patino  [address] [place] [id number]       Dear Colleague,    Thank you for the opportunity to participate in the care of your patient, Georgie Patino, at the [facility] AT [facility]. Please see a copy of my visit note below.    May 31, 2019  I had the pleasure of seeing Georgie Patino  in the [facility] for follow-up.  I seen him initially for episodes of palpitation however did an echocardiogram revealed significantly dilated ascending aorta up to 6.2 cm she was referred to surgery which was performed on October 27, 2017 successfully with no complications and PFO closure was performed at the same time.   Julianna continues to do very well since her surgery.  She offers no new complaints she is very active able to do whatever she just wants.  Her blood pressure continues to run on the higher than 130s 140s systolic.  She did not have her CTA yet.  No other complaints at this time.  Her daughter who is 35 years old was just recently diagnosed with uterine cancer and just underwent surgery so she is somewhat depressed about this understandably.     ROS otherwise negative      PAST MEDICAL HISTORY:  Past Medical History:   Diagnosis Date     Ascending aortic aneurysm (H) 09/21/2017     Blood transfusions age 19    due to menorhagia     Hypertension 09/21/2017     Thyroid nodule 11/20/2017     Tobacco abuse      FAMILY HISTORY:  Family History   Problem Relation Age of Onset     Respiratory Mother         copd     Cancer Maternal Grandmother         Leg     Alzheimer Disease Maternal Grandfather      Heart Disease Paternal Grandfather      Heart Disease Brother 54        Heart Attack      Cancer Sister         Lung cancer age 57-smoker d age 57     SOCIAL HISTORY:  Social History     Socioeconomic History     Marital status:      Spouse name: None     Number of  children: 3     Years of education: None     Highest education level: None   Occupational History     Occupation: Day care   Social Needs     Financial resource strain: None     Food insecurity:     Worry: None     Inability: None     Transportation needs:     Medical: None     Non-medical: None   Tobacco Use     Smoking status: Former Smoker     Packs/day: 0.05     Years: 40.00     Pack years: 2.00     Types: Cigarettes     Last attempt to quit: 10/27/2017     Years since quittin.5     Smokeless tobacco: Never Used     Tobacco comment: 2 cigs daily   Substance and Sexual Activity     Alcohol use: Yes     Alcohol/week: 0.5 - 1.0 oz     Types: 1 - 2 Standard drinks or equivalent per week     Comment: 3-4 drinks per week      Drug use: No     Sexual activity: Yes     Partners: Male     Birth control/protection: Surgical   Lifestyle     Physical activity:     Days per week: None     Minutes per session: None     Stress: None   Relationships     Social connections:     Talks on phone: None     Gets together: None     Attends Religion service: None     Active member of club or organization: None     Attends meetings of clubs or organizations: None     Relationship status: None     Intimate partner violence:     Fear of current or ex partner: None     Emotionally abused: None     Physically abused: None     Forced sexual activity: None   Other Topics Concern     Parent/sibling w/ CABG, MI or angioplasty before 65F 55M? Yes     Comment: brother w/ sudden cardiac arrest @ age 54   Social History Narrative     None     CURRENT MEDICATIONS:  Current Outpatient Medications   Medication     acetaminophen (TYLENOL) 325 MG tablet     aspirin EC 81 MG EC tablet     atorvastatin (LIPITOR) 20 MG tablet     HYDROcodone-acetaminophen (NORCO) 5-325 MG tablet     lisinopril (PRINIVIL/ZESTRIL) 10 MG tablet     melatonin 3 MG tablet     metoprolol tartrate (LOPRESSOR) 50 MG tablet     RaNITidine HCl (ZANTAC PO)     metoprolol  tartrate (LOPRESSOR) 50 MG tablet     No current facility-administered medications for this visit.       ROS:   Constitutional: No fever, chills, or sweats. Weight is 208 lbs 0 oz  ENT: No visual disturbance, ear ache, epistaxis, sore throat.   Allergies/Immunologic: Negative.   Respiratory: No cough, hemoptysis.   Cardiovascular: As per HPI.   GI: No nausea, vomiting, hematemesis, melena, or hematochezia.   : No urinary frequency, dysuria, or hematuria.   Integrument: Negative.   Psychiatric: No evidence of major depression  Neuro: No new neurological complaints at this time. Non focal  Endocrinology: Negative.   Musculoskeletal: As per HPI.      EXAM:  /84 (BP Location: Left arm, Patient Position: Chair, Cuff Size: Adult Large)   Pulse 63   Wt 94.3 kg (208 lb)   SpO2 97%   BMI 32.58 kg/m     General: appears comfortable, alert and oriented  Head: normocephalic, atraumatic  Eyes: anicteric sclera, EOMI , PERRL  Neck: no adenopathy  Orophyarynx: moist mucosa, no lesions noted  Heart: regular, S1/S2, no murmurs, rubs or gallop. Estimated JVP at 5 cmH2O. Well healed median sternotomy scar.  Lungs: CTAB, No wheezing.   Abdomen: soft, non-tender, bowel sounds present, no hepatosplenomegaly  Extremities: No LE edema today  Skin: no open lesions noted  Neuro: grossly non-focal  Psych: no evidence of depression noted     Labs:  Lab Results   Component Value Date    WBC 7.9 11/10/2017    HGB 14.9 11/12/2018    HCT 36.8 11/10/2017     11/10/2017     09/07/2018    POTASSIUM 4.6 11/12/2018    CHLORIDE 107 09/07/2018    CO2 25 09/07/2018    BUN 19 09/07/2018    CR 0.56 11/12/2018     (H) 09/07/2018    INR 1.21 (H) 10/29/2017       ECHO: 09/01/17  Global and regional left ventricular function is normal with an EF of 55-60%. The right ventricle is normal size. Global right ventricular function is normal. Severe dilatation of the ascending aorta (6.2 cm at the mid ascending aorta). The sinotubular  ridge is effaced, however there is no evidence of dissection. The aortic arch and descending thoracic aorta appear normal in caliber.  Mild aortic regurgitation. Right ventricular systolic pressure is 26mmHg above the right atrial pressure. The inferior vena cava was normal in size with preserved respiratory variability. Estimated mean right atrial pressure is 3 mmHg. No pericardial effusion is present. Previous study not available for comparison.      CT chest 9/5/17:  Aneurysmal fusiform dilatation involving the ascending thoracic aorta, with involvement of the innominate artery, the left common carotid artery, with aneurysm ending at the level of the left subclavian artery. The aneurysm begins at the sinus of Valsalva and extends to the level of the proximal aortic arch. Additionally the the descending thoracic aorta is mildly ectatic. There is mild atherosclerotic plaque, primarily at the aortic arch.  Thoracic aortic diameters:  Aortic annulus: 3.6 x 3.8 x 3.9 cm.   Sinuses of Valsalva: 4.9 x 4.8 cm.   Ascending aorta: 6.1 x 6.2 cm.   Aortic arch: 3.6 x 3.6 cm.   Proximal thoracic aorta: 3.6 x 3.8 cm.   Mid thoracic aorta: 3.4 x 3.3 cm.   Descending thoracic aorta: 3.5 x 3.6 cm.     LHC 9/25/17: Minimal luminal irregularities, otherwise normal coronary angiogram     TTE 10/12/18: Official read pending, reviewed in person.  Ejection fraction appears to be in the normal range ascending aortic graft is measured at 3.6 cm just distal to the aortic valve.  The rest of the graft is not well visualized.  Normal RV function.  IVC is collapsible and normal size.      Ascending aorta repair: 10/27/2017 Dr. Rubio Piña  Ascending Aorta Aneurysm Repair using Hemashield Platinum Woven Double Velour Graft 34cm X 30cm, PFO closure     ASSESSMENT AND PLAN:  In summary, patient is a 62 year old lady status post ascending aortic repair as well as PFO closure in October 2017 by Dr. Piña who presents for follow-up.    She has  been doing very well for the past couple of month her incisional pain has completely resolved and finally she did quit smoking.  She takes all her medication as prescribed and offers no issues there.  Transthoracic echocardiogram from today indicates normal function and expected graft size.  Today she again remains hypertensive with systolic blood pressure in the 140s.  At this time we will increase her lisinopril to 20 mg and she is going to take it once a day rather than the twice daily dosing of the 15 total.  In addition she will get some lab work done today on the way out and she is going to get her CTA done to ensure normal aortic graft function and size within the next couple of days.  No further medication changes at this time.  We will see her back in 6 months and reevaluate.    Follow up:   6 months     I appreciate the opportunity to participate in the care of Georgie Patino . Please do not hesitate to contact me with any further questions.    Sincerely,   Jitendra Fernandez MD     [facility] Division of Cardiology

## 2019-05-31 NOTE — NURSING NOTE
"Chief Complaint   Patient presents with     RECHECK     OV with Dr. Jitendra Fernandez for 6 month follow up for mixed hyperlipidemia. Pt reports no cardiac symptoms.       Initial /84 (BP Location: Left arm, Patient Position: Chair, Cuff Size: Adult Large)   Pulse 63   Wt 94.3 kg (208 lb)   SpO2 97%   BMI 32.58 kg/m   Estimated body mass index is 32.58 kg/m  as calculated from the following:    Height as of 11/12/18: 1.702 m (5' 7\").    Weight as of this encounter: 94.3 kg (208 lb)..  BP completed using cuff size: millie Shen MA    "

## 2019-05-31 NOTE — PROGRESS NOTES
May 31, 2019  I had the pleasure of seeing Georgie Patino  in the [facility] for follow-up.  I seen him initially for episodes of palpitation however did an echocardiogram revealed significantly dilated ascending aorta up to 6.2 cm she was referred to surgery which was performed on October 27, 2017 successfully with no complications and PFO closure was performed at the same time.   Julianna continues to do very well since her surgery.  She offers no new complaints she is very active able to do whatever she just wants.  Her blood pressure continues to run on the higher than 130s 140s systolic.  She did not have her CTA yet.  No other complaints at this time.  Her daughter who is 35 years old was just recently diagnosed with uterine cancer and just underwent surgery so she is somewhat depressed about this understandably.     ROS otherwise negative      PAST MEDICAL HISTORY:  Past Medical History:   Diagnosis Date     Ascending aortic aneurysm (H) 09/21/2017     Blood transfusions age 19    due to menorhagia     Hypertension 09/21/2017     Thyroid nodule 11/20/2017     Tobacco abuse      FAMILY HISTORY:  Family History   Problem Relation Age of Onset     Respiratory Mother         copd     Cancer Maternal Grandmother         Leg     Alzheimer Disease Maternal Grandfather      Heart Disease Paternal Grandfather      Heart Disease Brother 54        Heart Attack      Cancer Sister         Lung cancer age 57-smoker d age 57     SOCIAL HISTORY:  Social History     Socioeconomic History     Marital status:      Spouse name: None     Number of children: 3     Years of education: None     Highest education level: None   Occupational History     Occupation: Day care   Social Needs     Financial resource strain: None     Food insecurity:     Worry: None     Inability: None     Transportation needs:     Medical: None     Non-medical: None   Tobacco Use     Smoking status: Former Smoker     Packs/day:  0.05     Years: 40.00     Pack years: 2.00     Types: Cigarettes     Last attempt to quit: 10/27/2017     Years since quittin.5     Smokeless tobacco: Never Used     Tobacco comment: 2 cigs daily   Substance and Sexual Activity     Alcohol use: Yes     Alcohol/week: 0.5 - 1.0 oz     Types: 1 - 2 Standard drinks or equivalent per week     Comment: 3-4 drinks per week      Drug use: No     Sexual activity: Yes     Partners: Male     Birth control/protection: Surgical   Lifestyle     Physical activity:     Days per week: None     Minutes per session: None     Stress: None   Relationships     Social connections:     Talks on phone: None     Gets together: None     Attends [employer] service: None     Active member of club or organization: None     Attends meetings of clubs or organizations: None     Relationship status: None     Intimate partner violence:     Fear of current or ex partner: None     Emotionally abused: None     Physically abused: None     Forced sexual activity: None   Other Topics Concern     Parent/sibling w/ CABG, MI or angioplasty before 65F 55M? Yes     Comment: brother w/ sudden cardiac arrest @ age 54   Social History Narrative     None     CURRENT MEDICATIONS:  Current Outpatient Medications   Medication     acetaminophen (TYLENOL) 325 MG tablet     aspirin EC 81 MG EC tablet     atorvastatin (LIPITOR) 20 MG tablet     HYDROcodone-acetaminophen (NORCO) 5-325 MG tablet     lisinopril (PRINIVIL/ZESTRIL) 10 MG tablet     melatonin 3 MG tablet     metoprolol tartrate (LOPRESSOR) 50 MG tablet     RaNITidine HCl (ZANTAC PO)     metoprolol tartrate (LOPRESSOR) 50 MG tablet     No current facility-administered medications for this visit.       ROS:   Constitutional: No fever, chills, or sweats. Weight is 208 lbs 0 oz  ENT: No visual disturbance, ear ache, epistaxis, sore throat.   Allergies/Immunologic: Negative.   Respiratory: No cough, hemoptysis.   Cardiovascular: As per HPI.   GI: No nausea,  vomiting, hematemesis, melena, or hematochezia.   : No urinary frequency, dysuria, or hematuria.   Integrument: Negative.   Psychiatric: No evidence of major depression  Neuro: No new neurological complaints at this time. Non focal  Endocrinology: Negative.   Musculoskeletal: As per HPI.      EXAM:  /84 (BP Location: Left arm, Patient Position: Chair, Cuff Size: Adult Large)   Pulse 63   Wt 94.3 kg (208 lb)   SpO2 97%   BMI 32.58 kg/m    General: appears comfortable, alert and oriented  Head: normocephalic, atraumatic  Eyes: anicteric sclera, EOMI , PERRL  Neck: no adenopathy  Orophyarynx: moist mucosa, no lesions noted  Heart: regular, S1/S2, no murmurs, rubs or gallop. Estimated JVP at 5 cmH2O. Well healed median sternotomy scar.  Lungs: CTAB, No wheezing.   Abdomen: soft, non-tender, bowel sounds present, no hepatosplenomegaly  Extremities: No LE edema today  Skin: no open lesions noted  Neuro: grossly non-focal  Psych: no evidence of depression noted     Labs:  Lab Results   Component Value Date    WBC 7.9 11/10/2017    HGB 14.9 11/12/2018    HCT 36.8 11/10/2017     11/10/2017     09/07/2018    POTASSIUM 4.6 11/12/2018    CHLORIDE 107 09/07/2018    CO2 25 09/07/2018    BUN 19 09/07/2018    CR 0.56 11/12/2018     (H) 09/07/2018    INR 1.21 (H) 10/29/2017       ECHO: 09/01/17  Global and regional left ventricular function is normal with an EF of 55-60%. The right ventricle is normal size. Global right ventricular function is normal. Severe dilatation of the ascending aorta (6.2 cm at the mid ascending aorta). The sinotubular ridge is effaced, however there is no evidence of dissection. The aortic arch and descending thoracic aorta appear normal in caliber.  Mild aortic regurgitation. Right ventricular systolic pressure is 26mmHg above the right atrial pressure. The inferior vena cava was normal in size with preserved respiratory variability. Estimated mean right atrial pressure is 3  mmHg. No pericardial effusion is present. Previous study not available for comparison.      CT chest 9/5/17:  Aneurysmal fusiform dilatation involving the ascending thoracic aorta, with involvement of the innominate artery, the left common carotid artery, with aneurysm ending at the level of the left subclavian artery. The aneurysm begins at the sinus of Valsalva and extends to the level of the proximal aortic arch. Additionally the the descending thoracic aorta is mildly ectatic. There is mild atherosclerotic plaque, primarily at the aortic arch.  Thoracic aortic diameters:  Aortic annulus: 3.6 x 3.8 x 3.9 cm.   Sinuses of Valsalva: 4.9 x 4.8 cm.   Ascending aorta: 6.1 x 6.2 cm.   Aortic arch: 3.6 x 3.6 cm.   Proximal thoracic aorta: 3.6 x 3.8 cm.   Mid thoracic aorta: 3.4 x 3.3 cm.   Descending thoracic aorta: 3.5 x 3.6 cm.     LHC 9/25/17: Minimal luminal irregularities, otherwise normal coronary angiogram     TTE 10/12/18: Official read pending, reviewed in person.  Ejection fraction appears to be in the normal range ascending aortic graft is measured at 3.6 cm just distal to the aortic valve.  The rest of the graft is not well visualized.  Normal RV function.  IVC is collapsible and normal size.      Ascending aorta repair: 10/27/2017 Dr. Rubio Piña  Ascending Aorta Aneurysm Repair using Hemashield Platinum Woven Double Velour Graft 34cm X 30cm, PFO closure     ASSESSMENT AND PLAN:  In summary, patient is a 62 year old lady status post ascending aortic repair as well as PFO closure in October 2017 by Dr. Piña who presents for follow-up.    She has been doing very well for the past couple of month her incisional pain has completely resolved and finally she did quit smoking.  She takes all her medication as prescribed and offers no issues there.  Transthoracic echocardiogram from today indicates normal function and expected graft size.  Today she again remains hypertensive with systolic blood pressure in the  140s.  At this time we will increase her lisinopril to 20 mg and she is going to take it once a day rather than the twice daily dosing of the 15 total.  In addition she will get some lab work done today on the way out and she is going to get her CTA done to ensure normal aortic graft function and size within the next couple of days.  No further medication changes at this time.  We will see her back in 6 months and reevaluate.    Follow up:   6 months     I appreciate the opportunity to participate in the care of Georgie MERE Patino . Please do not hesitate to contact me with any further questions.    Sincerely,   Jitendra Fernandez MD     [facility] Division of Cardiology

## 2019-05-31 NOTE — PATIENT INSTRUCTIONS
Thank you for coming to the [facility] @ [place]; please note the following instructions:    1. Labs today.    2. 6 month follow up. please see following pages for appointment detail information.    3. *INCREASE: Lisinopril to 20mg daily.          If you have any questions regarding your visit please contact your care team:     Cardiology  Telephone Number   Jocelin H., RN  Charmaine NO, RN   Mercedez LUKE, RMA  Brooklyn DAVIS, RMA  Ang ALANIZ, [facility]   [phone] (option 1)   For scheduling appts:     [phone] (select option 1)       For the Device Clinic (Pacemakers and ICD's)  RN's :  Marisabel Kaiser   During business hours: [phone]    *After business hours:  [phone] (select option 4)      Normal test result notifications will be released via [employer] or mailed within 7 business days.  All other test results, will be communicated via telephone once reviewed by your cardiologist.    If you need a medication refill please contact your pharmacy.  Please allow 3 business days for your refill to be completed.    As always, thank you for trusting us with your health care needs!

## 2019-06-06 ENCOUNTER — TELEPHONE (OUTPATIENT)
Dept: CARDIOLOGY | Facility: CLINIC | Age: 64
End: 2019-06-06

## 2019-06-06 NOTE — LETTER
June 6, 2019       TO: Georgie Patino  [address] [place] [address]  [place] [address]       Dear Ms. Patino,    We are writing to inform you of your test results.    All are within normal limits.     Component      Latest Ref Rng & Units 5/31/2019   Sodium      133 - 144 mmol/L 141   Potassium      3.4 - 5.3 mmol/L 4.5   Chloride      94 - 109 mmol/L 108   Carbon Dioxide      20 - 32 mmol/L 28   Anion Gap      3 - 14 mmol/L 5   Glucose      70 - 99 mg/dL 101 (H)   Urea Nitrogen      7 - 30 mg/dL 27   Creatinine      0.52 - 1.04 mg/dL 0.61   GFR Estimate      >60 mL/min/1.73:m2 >90   GFR Estimate If Black      >60 mL/min/1.73:m2 >90   Calcium      8.5 - 10.1 mg/dL 9.1   Magnesium      1.6 - 2.3 mg/dL 2.1

## 2019-06-06 NOTE — TELEPHONE ENCOUNTER
Call to pt: left vm informing of normal labs. Letter to follow   Contact information for questions

## 2019-06-07 ENCOUNTER — TELEPHONE (OUTPATIENT)
Dept: CARDIOLOGY | Facility: CLINIC | Age: 64
End: 2019-06-07

## 2019-06-07 NOTE — TELEPHONE ENCOUNTER
Pt was seen by Dr Fernandez 5/31/19, she did not have her CT Angiogram done prior to visit as expected.   Dr Fernandez would like us to contact pt and schedule. Order is in EMR

## 2019-09-23 ENCOUNTER — TELEPHONE (OUTPATIENT)
Dept: FAMILY MEDICINE | Facility: CLINIC | Age: 64
End: 2019-09-23

## 2019-09-23 NOTE — TELEPHONE ENCOUNTER
Panel Management Review      Patient has the following on her problem list:     Hypertension   Last three blood pressure readings:  BP Readings from Last 3 Encounters:   05/31/19 141/84   11/26/18 165/68   11/19/18 135/65     Blood pressure: FAILED    HTN Guidelines:  Less than 140/90      Composite cancer screening  Chart review shows that this patient is due/due soon for the following Mammogram  Summary:    Patient is due/failing the following:   BP CHECK and PHYSICAL    Action needed:   Patient needs office visit for physical and BP recheck.    Type of outreach:    Sent letter.    Questions for provider review:    None                                                                                                                                    Val GOMEZ CMA       Chart routed to none .

## 2019-09-23 NOTE — LETTER
September 23, 2019          Georgie Patino,  [address] [place] [id number]        Dear Georgie Patino      Monitoring and managing your preventative and chronic health conditions are very important to us. Our records indicate that you have not scheduled for Appointment with your provider, Annual Female Exam and Mammogram  which was recommended by Marlys Wade CNP.        If you have received your health care elsewhere, please call the clinic so the information can be documented in your chart.    Please call [phone] or message us through your [employer] account to schedule an appointment or provide information for your chart.     Feel free to contact us if you have any questions or concerns!    I look forward to seeing you and working with you on your health care needs.     Sincerely,       Your [facility] Team/arelis

## 2019-10-24 ENCOUNTER — DOCUMENTATION ONLY (OUTPATIENT)
Dept: FAMILY MEDICINE | Facility: CLINIC | Age: 64
End: 2019-10-24

## 2019-10-24 NOTE — PROGRESS NOTES
This patient has overdue labs. A letter was sent on 9/19/2019 and there has been no lab appointment made. If you still want these labs done, please have your care team contact the patient to make a lab appointment. Otherwise, please have the labs discontinued and close the encounter.    Thank you,  [facility]

## 2019-10-25 ENCOUNTER — ANCILLARY PROCEDURE (OUTPATIENT)
Dept: CT IMAGING | Facility: CLINIC | Age: 64
End: 2019-10-25
Attending: INTERNAL MEDICINE
Payer: COMMERCIAL

## 2019-10-25 DIAGNOSIS — I71.21 ASCENDING AORTIC ANEURYSM (H): ICD-10-CM

## 2019-10-25 PROCEDURE — 71275 CT ANGIOGRAPHY CHEST: CPT | Performed by: RADIOLOGY

## 2019-10-25 RX ORDER — IOPAMIDOL 755 MG/ML
100 INJECTION, SOLUTION INTRAVASCULAR ONCE
Status: COMPLETED | OUTPATIENT
Start: 2019-10-25 | End: 2019-10-25

## 2019-10-25 RX ADMIN — IOPAMIDOL 100 ML: 755 INJECTION, SOLUTION INTRAVASCULAR at 09:26

## 2019-11-21 ENCOUNTER — OFFICE VISIT (OUTPATIENT)
Dept: FAMILY MEDICINE | Facility: CLINIC | Age: 64
End: 2019-11-21
Payer: COMMERCIAL

## 2019-11-21 ENCOUNTER — ANCILLARY PROCEDURE (OUTPATIENT)
Dept: GENERAL RADIOLOGY | Facility: CLINIC | Age: 64
End: 2019-11-21
Attending: NURSE PRACTITIONER
Payer: COMMERCIAL

## 2019-11-21 VITALS
WEIGHT: 211 LBS | HEART RATE: 72 BPM | OXYGEN SATURATION: 96 % | BODY MASS INDEX: 35.16 KG/M2 | RESPIRATION RATE: 18 BRPM | TEMPERATURE: 97.8 F | DIASTOLIC BLOOD PRESSURE: 70 MMHG | HEIGHT: 65 IN | SYSTOLIC BLOOD PRESSURE: 130 MMHG

## 2019-11-21 DIAGNOSIS — M25.562 CHRONIC PAIN OF BOTH KNEES: ICD-10-CM

## 2019-11-21 DIAGNOSIS — M25.561 CHRONIC PAIN OF BOTH KNEES: ICD-10-CM

## 2019-11-21 DIAGNOSIS — G89.29 CHRONIC PAIN OF BOTH KNEES: ICD-10-CM

## 2019-11-21 DIAGNOSIS — Z00.00 ROUTINE GENERAL MEDICAL EXAMINATION AT A HEALTH CARE FACILITY: Primary | ICD-10-CM

## 2019-11-21 DIAGNOSIS — I71.21 ASCENDING AORTIC ANEURYSM (H): ICD-10-CM

## 2019-11-21 DIAGNOSIS — Z12.31 ENCOUNTER FOR SCREENING MAMMOGRAM FOR BREAST CANCER: ICD-10-CM

## 2019-11-21 DIAGNOSIS — F43.21 ADJUSTMENT DISORDER WITH DEPRESSED MOOD: ICD-10-CM

## 2019-11-21 DIAGNOSIS — I10 BENIGN ESSENTIAL HYPERTENSION: ICD-10-CM

## 2019-11-21 DIAGNOSIS — Z23 NEED FOR PROPHYLACTIC VACCINATION AND INOCULATION AGAINST INFLUENZA: ICD-10-CM

## 2019-11-21 DIAGNOSIS — E04.1 THYROID NODULE: ICD-10-CM

## 2019-11-21 DIAGNOSIS — Z23 NEED FOR PNEUMOCOCCAL VACCINATION: ICD-10-CM

## 2019-11-21 DIAGNOSIS — R00.2 PALPITATIONS: ICD-10-CM

## 2019-11-21 LAB
ANION GAP SERPL CALCULATED.3IONS-SCNC: 4 MMOL/L (ref 3–14)
BUN SERPL-MCNC: 28 MG/DL (ref 7–30)
CALCIUM SERPL-MCNC: 9.4 MG/DL (ref 8.5–10.1)
CHLORIDE SERPL-SCNC: 109 MMOL/L (ref 94–109)
CHOLEST SERPL-MCNC: 214 MG/DL
CO2 SERPL-SCNC: 28 MMOL/L (ref 20–32)
CREAT SERPL-MCNC: 0.49 MG/DL (ref 0.52–1.04)
GFR SERPL CREATININE-BSD FRML MDRD: >90 ML/MIN/{1.73_M2}
GLUCOSE SERPL-MCNC: 108 MG/DL (ref 70–99)
HDLC SERPL-MCNC: 67 MG/DL
LDLC SERPL CALC-MCNC: 134 MG/DL
NONHDLC SERPL-MCNC: 147 MG/DL
POTASSIUM SERPL-SCNC: 4.1 MMOL/L (ref 3.4–5.3)
SODIUM SERPL-SCNC: 141 MMOL/L (ref 133–144)
TRIGL SERPL-MCNC: 66 MG/DL
TSH SERPL DL<=0.005 MIU/L-ACNC: 0.57 MU/L (ref 0.4–4)

## 2019-11-21 PROCEDURE — 99214 OFFICE O/P EST MOD 30 MIN: CPT | Mod: 25 | Performed by: NURSE PRACTITIONER

## 2019-11-21 PROCEDURE — 90732 PPSV23 VACC 2 YRS+ SUBQ/IM: CPT | Performed by: NURSE PRACTITIONER

## 2019-11-21 PROCEDURE — 90472 IMMUNIZATION ADMIN EACH ADD: CPT | Performed by: NURSE PRACTITIONER

## 2019-11-21 PROCEDURE — 73562 X-RAY EXAM OF KNEE 3: CPT | Mod: RT

## 2019-11-21 PROCEDURE — 90471 IMMUNIZATION ADMIN: CPT | Performed by: NURSE PRACTITIONER

## 2019-11-21 PROCEDURE — 36415 COLL VENOUS BLD VENIPUNCTURE: CPT | Performed by: NURSE PRACTITIONER

## 2019-11-21 PROCEDURE — 84443 ASSAY THYROID STIM HORMONE: CPT | Performed by: NURSE PRACTITIONER

## 2019-11-21 PROCEDURE — 80061 LIPID PANEL: CPT | Performed by: NURSE PRACTITIONER

## 2019-11-21 PROCEDURE — 99396 PREV VISIT EST AGE 40-64: CPT | Mod: 25 | Performed by: NURSE PRACTITIONER

## 2019-11-21 PROCEDURE — 80048 BASIC METABOLIC PNL TOTAL CA: CPT | Performed by: NURSE PRACTITIONER

## 2019-11-21 PROCEDURE — 90682 RIV4 VACC RECOMBINANT DNA IM: CPT | Performed by: NURSE PRACTITIONER

## 2019-11-21 RX ORDER — BUPROPION HYDROCHLORIDE 150 MG/1
150 TABLET ORAL EVERY MORNING
Qty: 30 TABLET | Refills: 1 | Status: SHIPPED | OUTPATIENT
Start: 2019-11-21 | End: 2021-03-25

## 2019-11-21 RX ORDER — METOPROLOL TARTRATE 50 MG
50 TABLET ORAL 2 TIMES DAILY
Qty: 180 TABLET | Refills: 3 | Status: SHIPPED | OUTPATIENT
Start: 2019-11-21 | End: 2020-11-28

## 2019-11-21 RX ORDER — LISINOPRIL 20 MG/1
20 TABLET ORAL DAILY
Qty: 90 TABLET | Refills: 3 | Status: SHIPPED | OUTPATIENT
Start: 2019-11-21 | End: 2020-11-28

## 2019-11-21 SDOH — HEALTH STABILITY: MENTAL HEALTH: HOW MANY STANDARD DRINKS CONTAINING ALCOHOL DO YOU HAVE ON A TYPICAL DAY?: 3 OR 4

## 2019-11-21 ASSESSMENT — PATIENT HEALTH QUESTIONNAIRE - PHQ9: SUM OF ALL RESPONSES TO PHQ QUESTIONS 1-9: 7

## 2019-11-21 ASSESSMENT — MIFFLIN-ST. JEOR: SCORE: 1521.93

## 2019-11-21 NOTE — LETTER
[facility]  [address] [place] [address]    November 22, 2019    Georgie Patino  [address] [facility] [place] [address]    Dear Georgie,    You have severe arthritis to your knee.  Please follow-up with orthopedics as planned.     If you have any questions please feel free to contact [phone] or myself via [email or web address].     Enclosed is a copy of your results.     Results for orders placed or performed in visit on 11/21/19   Lipid panel reflex to direct LDL Fasting     Status: Abnormal   Result Value Ref Range    Cholesterol 214 (H) <200 mg/dL    Triglycerides 66 <150 mg/dL    HDL Cholesterol 67 >49 mg/dL    LDL Cholesterol Calculated 134 (H) <100 mg/dL    Non HDL Cholesterol 147 (H) <130 mg/dL   TSH with free T4 reflex     Status: None   Result Value Ref Range    TSH 0.57 0.40 - 4.00 mU/L   Basic metabolic panel     Status: Abnormal   Result Value Ref Range    Sodium 141 133 - 144 mmol/L    Potassium 4.1 3.4 - 5.3 mmol/L    Chloride 109 94 - 109 mmol/L    Carbon Dioxide 28 20 - 32 mmol/L    Anion Gap 4 3 - 14 mmol/L    Glucose 108 (H) 70 - 99 mg/dL    Urea Nitrogen 28 7 - 30 mg/dL    Creatinine 0.49 (L) 0.52 - 1.04 mg/dL    GFR Estimate >90 >60 mL/min/[1.73_m2]    GFR Estimate If Black >90 >60 mL/min/[1.73_m2]    Calcium 9.4 8.5 - 10.1 mg/dL   If you have any questions or concerns, please call myself or my nurse at [phone].      Sincerely,    Marlys Wade, AZIZA /guzman

## 2019-11-21 NOTE — PROGRESS NOTES
SUBJECTIVE:   CC: Georgie Patino is an 64 year old woman who presents for preventive health visit.     Healthy Habits:     Getting at least 3 servings of Calcium per day:  Yes    Bi-annual eye exam:  Yes    Dental care twice a year:  NO    Sleep apnea or symptoms of sleep apnea:  None    Diet:  Regular (no restrictions)    Frequency of exercise:  2-3 days/week    Duration of exercise:  15-30 minutes    Taking medications regularly:  Yes    Barriers to taking medications:  None    Medication side effects:  Other    PHQ-2 Total Score: 3    Additional concerns today:  No    Patient has worsening bilateral knee pain.  She notes that symptoms worsen with prolonged walking.  She is not interested in therapy.  She wonders if she might need a knee replacement.  Right knee is worse than left knee.  Patient notes that her mood has worsened with decreased activity due to knee pain.  She feels depressed.  She would like to start wellbutrin for symptoms.  Her daughter takes it and thinks that it might be helpful for her.  She denies seizure history.  She denies suicidal ideation or plan.  She wonders what else she can take for pain.  She has been counseled to avoid NSAIDS in the past.    Patient is not taking statin due to stomach upset.  She would like to see results today and see if she should resume.    Patient is not checking her blood pressure at home.  She denies dizziness, chest pain.        Today's PHQ-2 Score:   PHQ-2 ( 1999 Pfizer) 9/7/2018   Q1: Little interest or pleasure in doing things 0   Q2: Feeling down, depressed or hopeless 0   PHQ-2 Score 0       Abuse: Current or Past(Physical, Sexual or Emotional)- No  Do you feel safe in your environment? Yes    Have you ever done Advance Care Planning? (For example, a Health Directive, POLST, or a discussion with a medical provider or your loved ones about your wishes): No, advance care planning information given to patient to review.  Patient declined advance care  planning discussion at this time.    Social History     Tobacco Use     Smoking status: Former Smoker     Packs/day: 0.05     Years: 40.00     Pack years: 2.00     Types: Cigarettes, Other     Last attempt to quit: 10/27/2017     Years since quittin.0     Smokeless tobacco: Never Used     Tobacco comment: 2 cigs daily   Substance Use Topics     Alcohol use: Yes     Alcohol/week: 0.8 - 1.7 standard drinks     Types: 1 - 2 Standard drinks or equivalent per week     Drinks per session: 3 or 4     Comment: 3-4 drinks per week      If you drink alcohol do you typically have >3 drinks per day or >7 drinks per week? Yes    Alcohol Use 2019   Prescreen: >3 drinks/day or >7 drinks/week? No       Reviewed orders with patient.  Reviewed health maintenance and updated orders accordingly - Yes  Lab work is in process  BP Readings from Last 3 Encounters:   19 130/70   19 141/84   18 165/68    Wt Readings from Last 3 Encounters:   19 95.7 kg (211 lb)   19 94.3 kg (208 lb)   18 93.9 kg (207 lb)                  Patient Active Problem List   Diagnosis     CARDIOVASCULAR SCREENING; LDL GOAL LESS THAN 160     Obesity     Benign essential hypertension     Family history of sudden cardiac death     Hypertension     Anxiety     S/P ascending aortic aneurysm repair     Former smoker     Thyroid nodule     Past Surgical History:   Procedure Laterality Date     COLONOSCOPY WITH CO2 INSUFFLATION N/A 3/15/2017    Procedure: COLONOSCOPY WITH CO2 INSUFFLATION;  Surgeon: Duane, William Charles, MD;  Location: [facility] OR     DILATION AND CURETTAGE  age 20     EXTRACORPOREAL SHOCK WAVE LITHOTRIPSY, CYSTOSCOPY, INSERT STENT URETER(S), COMBINED Bilateral 2018    Procedure: BILATERAL EXTRACORPOREAL SHOCK WAVE LITHOTRIPSY, CYSTOSCOPY, LEFT STENT PLACEMENT;  Surgeon: Tavo Saavedra MD;  Location: [facility] OR     REPAIR ANEURYSM ASCENDING AORTA N/A 10/27/2017    Procedure: REPAIR ANEURYSM ASCENDING AORTA;   Median Sternotomy, Cardiopulmonary bypass, Ascending Aorta Aneurysm Repair using Hemashield Platinum Woven Double Velour Graft 34cm X 30cm.;  Surgeon: Rubio Piña MD;  Location: [facility] OR     [place] teeth removed         Social History     Tobacco Use     Smoking status: Former Smoker     Packs/day: 0.05     Years: 40.00     Pack years: 2.00     Types: Cigarettes, Other     Last attempt to quit: 10/27/2017     Years since quittin.0     Smokeless tobacco: Never Used     Tobacco comment: 2 cigs daily   Substance Use Topics     Alcohol use: Yes     Alcohol/week: 0.8 - 1.7 standard drinks     Types: 1 - 2 Standard drinks or equivalent per week     Drinks per session: 3 or 4     Comment: 3-4 drinks per week      Family History   Problem Relation Age of Onset     Respiratory Mother         copd     Cancer Maternal Grandmother         Leg     Alzheimer Disease Maternal Grandfather      Heart Disease Paternal Grandfather      Heart Disease Brother 54        Heart Attack      Cancer Sister         Lung cancer age 55-smoker d age 57         Current Outpatient Medications   Medication Sig Dispense Refill     acetaminophen (TYLENOL) 325 MG tablet Take 2 tablets (650 mg) by mouth every 4 hours as needed for other (surgical pain) 100 tablet 0     buPROPion (WELLBUTRIN XL) 150 MG 24 hr tablet Take 1 tablet (150 mg) by mouth every morning 30 tablet 1     diclofenac (VOLTAREN) 1 % topical gel Place 4 g onto the skin 4 times daily 100 g 3     lisinopril (PRINIVIL/ZESTRIL) 20 MG tablet Take 1 tablet (20 mg) by mouth daily 90 tablet 3     melatonin 3 MG tablet Take 1 tablet (3 mg) by mouth nightly as needed for sleep 30 tablet 0     metoprolol tartrate (LOPRESSOR) 50 MG tablet Take 1 tablet (50 mg) by mouth 2 times daily 180 tablet 3     omeprazole (PRILOSEC) 20 MG DR capsule Take 20 mg by mouth daily       aspirin EC 81 MG EC tablet Take 1 tablet (81 mg) by mouth daily (Patient not taking: Reported on 2019) 30  "tablet 0     Allergies   Allergen Reactions     Blood Transfusion Related (Informational Only) Other (See Comments)     Patient has a history of a clinically significant antibody against RBC antigens.  A delay in compatible RBCs may occur.       Mammogram Screening: Patient over age 50, mutual decision to screen reflected in health maintenance.    Pertinent mammograms are reviewed under the imaging tab.  History of abnormal Pap smear: NO - age 30-65 PAP every 5 years with negative HPV co-testing recommended  PAP / HPV Latest Ref Rng & Units 3/14/2016 5/30/2012   PAP - NIL NIL   HPV 16 DNA NEG Negative -   HPV 18 DNA NEG Negative -   OTHER HR HPV NEG Negative -     Reviewed and updated as needed this visit by clinical staff  Tobacco  Allergies  Meds  Fam Hx  Soc Hx        Reviewed and updated as needed this visit by Provider            Review of Systems     CONSTITUTIONAL: NEGATIVE for fever, chills, change in weight  INTEGUMENTARY/SKIN: NEGATIVE for worrisome rashes, moles or lesions  EYES: NEGATIVE for vision changes or irritation  ENT: NEGATIVE for ear, mouth and throat problems  RESP: NEGATIVE for significant cough or SOB  BREAST: NEGATIVE for masses, tenderness or discharge  CV: NEGATIVE for chest pain, palpitations or peripheral edema  GI: NEGATIVE for nausea, abdominal pain, heartburn, or change in bowel habits  : NEGATIVE for unusual urinary or vaginal symptoms. No vaginal bleeding.  MUSCULOSKELETAL: NEGATIVE for significant arthralgias or myalgia  NEURO: NEGATIVE for weakness, dizziness or paresthesias  PSYCHIATRIC: NEGATIVE for changes in mood or affect      OBJECTIVE:   Pulse 72   Temp 97.8  F (36.6  C) (Oral)   Resp 18   Ht 1.657 m (5' 5.25\")   Wt 95.7 kg (211 lb)   SpO2 96%   BMI 34.84 kg/m    Physical Exam  GENERAL: healthy, alert and no distress  EYES: Eyes grossly normal to inspection, PERRL and conjunctivae and sclerae normal  HENT: ear canals and TM's normal, nose and mouth without " ulcers or lesions  NECK: no adenopathy, no asymmetry, masses, or scars and thyroid normal to palpation  RESP: lungs clear to auscultation - no rales, rhonchi or wheezes  BREAST: normal without masses, tenderness or nipple discharge and no palpable axillary masses or adenopathy  CV: regular rate and rhythm, normal S1 S2, no S3 or S4, no murmur, click or rub, no peripheral edema and peripheral pulses strong  ABDOMEN: soft, nontender, no hepatosplenomegaly, no masses and bowel sounds normal  MS: Bilateral knees: non-tender to palpation; full range of motion present  NEURO: Normal strength and tone, mentation intact and speech normal  PSYCH: mentation appears normal, affect flat, judgement and insight intact and appearance well groomed    Diagnostic Test Results:  In process    ASSESSMENT/PLAN:   1. Routine general medical examination at a health care facility  Patient notes that her insurance is ending in December and is unsure of what she needs to do to get health insurance for next year.  - Lipid panel reflex to direct LDL Fasting  - CARE COORDINATION REFERRAL    2. Thyroid nodule  Noted on CT scan in 2017.  - TSH with free T4 reflex  - US Thyroid; Future    3. Encounter for screening mammogram for breast cancer    - MA SCREENING DIGITAL BILAT - Future  (s+30); Future    4. Palpitations  Stable.  Continue current treatment plan and medications.   - metoprolol tartrate (LOPRESSOR) 50 MG tablet; Take 1 tablet (50 mg) by mouth 2 times daily  Dispense: 180 tablet; Refill: 3    5. Ascending aortic aneurysm (H)  Stable.  Continue current treatment plan and medications.   - lisinopril (PRINIVIL/ZESTRIL) 20 MG tablet; Take 1 tablet (20 mg) by mouth daily  Dispense: 90 tablet; Refill: 3    6. Benign essential hypertension  Stable.  Continue current treatment plan and medications.   - lisinopril (PRINIVIL/ZESTRIL) 20 MG tablet; Take 1 tablet (20 mg) by mouth daily  Dispense: 90 tablet; Refill: 3  - Basic metabolic panel    7.  "Need for pneumococcal vaccination    - Pneumococcal vaccine 23 valent PPSV23  (Pneumovax) [72448]    8. Chronic pain of both knees    - XR Knee Right 3 Views; Future  - diclofenac (VOLTAREN) 1 % topical gel; Place 4 g onto the skin 4 times daily  Dispense: 100 g; Refill: 3  - ORTHOPEDICS ADULT REFERRAL    9. Adjustment disorder with depressed mood    - buPROPion (WELLBUTRIN XL) 150 MG 24 hr tablet; Take 1 tablet (150 mg) by mouth every morning  Dispense: 30 tablet; Refill: 1    10. Need for prophylactic vaccination and inoculation against influenza    - INFLUENZA QUAD, RECOMBINANT, P-FREE (RIV4) (FLUBLOCK) [04176]    COUNSELING:  Reviewed preventive health counseling, as reflected in patient instructions       Regular exercise       Healthy diet/nutrition       Immunizations    Vaccinated for: Influenza and Pneumococcal          Estimated body mass index is 34.84 kg/m  as calculated from the following:    Height as of this encounter: 1.657 m (5' 5.25\").    Weight as of this encounter: 95.7 kg (211 lb).    Weight management plan: Discussed healthy diet and exercise guidelines     reports that she quit smoking about 2 years ago. Her smoking use included cigarettes and other. She has a 2.00 pack-year smoking history. She has never used smokeless tobacco.      Counseling Resources:  ATP IV Guidelines  Pooled Cohorts Equation Calculator  Breast Cancer Risk Calculator  FRAX Risk Assessment  ICSI Preventive Guidelines  Dietary Guidelines for Americans, 2010  USDA's MyPlate  ASA Prophylaxis  Lung CA Screening    TESHA Chanel CNP  [facility]  "

## 2019-11-21 NOTE — RESULT ENCOUNTER NOTE
Dear Georgie,    Your recent test results are attached.      You have severe arthritis to your knee.  Please follow-up with orthopedics as planned.    If you have any questions please feel free to contact [phone] or myself via [employer].    Sincerely,  Marlys Wade, CNP

## 2019-11-21 NOTE — LETTER
[facility]  [address] [place] [address]    November 22, 2019    Georgie Patino  [address]  [place] [address]    Dear Georgie,    All of these tests are within acceptable limits , things look good !     Enclosed is a copy of your results.     Results for orders placed or performed in visit on 11/21/19   Lipid panel reflex to direct LDL Fasting     Status: Abnormal   Result Value Ref Range    Cholesterol 214 (H) <200 mg/dL    Triglycerides 66 <150 mg/dL    HDL Cholesterol 67 >49 mg/dL    LDL Cholesterol Calculated 134 (H) <100 mg/dL    Non HDL Cholesterol 147 (H) <130 mg/dL   TSH with free T4 reflex     Status: None   Result Value Ref Range    TSH 0.57 0.40 - 4.00 mU/L   Basic metabolic panel     Status: Abnormal   Result Value Ref Range    Sodium 141 133 - 144 mmol/L    Potassium 4.1 3.4 - 5.3 mmol/L    Chloride 109 94 - 109 mmol/L    Carbon Dioxide 28 20 - 32 mmol/L    Anion Gap 4 3 - 14 mmol/L    Glucose 108 (H) 70 - 99 mg/dL    Urea Nitrogen 28 7 - 30 mg/dL    Creatinine 0.49 (L) 0.52 - 1.04 mg/dL    GFR Estimate >90 >60 mL/min/[1.73_m2]    GFR Estimate If Black >90 >60 mL/min/[1.73_m2]    Calcium 9.4 8.5 - 10.1 mg/dL   If you have any questions or concerns, please call myself or my nurse at [phone].      Sincerely,        Marlys Wade CNP/guzman

## 2019-11-22 ENCOUNTER — PATIENT OUTREACH (OUTPATIENT)
Dept: CARE COORDINATION | Facility: CLINIC | Age: 64
End: 2019-11-22

## 2019-11-22 NOTE — PROGRESS NOTES
The Clinic Community Health Worker spoke with  the patient today to discuss possible Clinic Care Coordination enrollment.  The service was described to the patient and immediate needs were discussed.  The patient agreed to enrollment and an assessment was scheduled.  The patient was provided with contact information for the clinic CHW.             Assessment date: 11/25/19 by PHONE w/ SW     Currently on minsure, income changed and no longer qualify for it starting in  Dec.       Reason for Referral: Financial Support: Insurance

## 2019-11-25 ENCOUNTER — PATIENT OUTREACH (OUTPATIENT)
Dept: NURSING | Facility: CLINIC | Age: 64
End: 2019-11-25
Attending: NURSE PRACTITIONER
Payer: COMMERCIAL

## 2019-11-25 ASSESSMENT — ACTIVITIES OF DAILY LIVING (ADL): DEPENDENT_IADLS:: INDEPENDENT

## 2019-11-25 NOTE — PROGRESS NOTES
Clinic Care Coordination Contact    Clinic Care Coordination Contact  OUTREACH    Referral Information: Patient desires alternate resources for [place] [employer]   Referral Source: PCP    Primary Diagnosis: Psychosocial    Chief Complaint   Patient presents with     Clinic Care Coordination - Initial             Universal Utilization: Patient reports she will see orthopedics tomorrow to discuss options for her pain (arthritis), she questions if she needs possible knee replacements.  She reports this is an aching constant pain.  She is not seen at the pain clinic at this time, reports she has had this pain for 7-8 years.  She reports this does not affect her sleep, she has no pain when resting/no activity.  She uses Tylenol and Voltaren cream ordered, in process of insurance payment/decision for payment.  Patient had recent physical with PCP, she is scheduled for cardiology follow up 12/6/2019.  She is also due to eye exam which she plans to schedule    Clinic Utilization  Difficulty keeping appointments:: No  Compliance Concerns: No  No-Show Concerns: No  No PCP office visit in Past Year: No  Utilization    Last refreshed: 11/25/2019  2:47 PM:  Hospital Admissions 0           Last refreshed: 11/25/2019  2:47 PM:  ED Visits 0           Last refreshed: 11/25/2019  2:47 PM:  No Show Count (past year) 1              Current as of: 11/25/2019  2:47 PM              Clinical Concerns: Patient reports she will see orthopedics tomorrow to discuss options for her pain (arthritis), she questions if she needs possible knee replacements.  She reports this is an aching constant pain.  She is not seen at the pain clinic at this time, reports she has had this pain for 7-8 years.  She reports this does not affect her sleep, she has no pain when resting/no activity.  She uses Tylenol and Voltaren cream ordered, in process of insurance payment/decision for payment.  Per office visit note, Patient is not taking stating due to stomach upset,  she does not check BP at home, this is stable per office visit note    Current Medical Concerns:     Patient Active Problem List   Diagnosis     CARDIOVASCULAR SCREENING; LDL GOAL LESS THAN 160     Obesity     Benign essential hypertension     Family history of sudden cardiac death     Hypertension     Anxiety     S/P ascending aortic aneurysm repair     Former smoker     Thyroid nodule     Current Behavioral Concerns:  None, Patient reports her mood is stable.  She states she does have periods of depression she attributes to discomfort from knee pain.  She reported she started taking a new antidepressant    Education Provided to patient:  Discussed the Financial Resource Worker (FRW) role and possible meeting to discuss alternate resources, declined for now, is accepting of CHW calling in 2 weeks to check and discuss resource again as needed     Pain  Pain (GOAL):: Yes  Type: Chronic (>3mo)  Location of chronic pain:: bilateral knees, right worse than left   Radiating: Yes  Location pain radiates to: back of your legs, not quite up to the thigh   Progression: Constant  Description of pain: Sharp  Chronic pain severity:: 7  Limitation of routine activities due to chronic pain:: Yes  Description: Able to do most things most days with some rest  Alleviating Factors: Rest  Aggravating Factors: Activity  Health Maintenance Reviewed: Due/Overdue   Health Maintenance Due   Topic Date Due     ZOSTER IMMUNIZATION (1 of 2) 07/03/2007     MAMMO SCREENING  09/12/2019     Clinical Pathway: None    Medication Management:  Patient reports she can afford medications at this time, takes out of bottles and takes as prescribed      Functional Status:  Dependent ADLs:: Independent  Dependent IADLs:: Independent  Bed or wheelchair confined:: No  Mobility Status: Independent  Fallen 2 or more times in the past year?: No  Any fall with injury in the past year?: No    Living Situation:  Current living arrangement:: I live in a private  home with spouse  Type of residence:: Private home - stairs    Diet/Exercise/Sleep:  Diet:: Regular  Inadequate nutrition (GOAL):: No  Food Insecurity: No  Tube Feeding: No  Inadequate activity/exercise (GOAL):: No  Significant changes in sleep pattern (GOAL): No    Transportation:  Transportation concerns (GOAL):: No  Transportation means:: Regular car     Psychosocial:  Denominational or spiritual beliefs that impact treatment:: No  Mental health DX:: Yes  Mental health DX how managed:: Medication  Mental health management concern (GOAL):: No  Informal Support system:: Spouse, Children     Financial/Insurance: Patient reported that she now [place] allowed income for [place], as she recently started [place] Social Security.  Discussed Financial Resource Worker referral, Patient would prefer to continue contacting [place] [employer] to discuss alternatives.  SW mentioned that it may be difficult to reach [place] [employer] in the next few days due to the holiday.  Patient prefers to keep trying     Financial/Insurance concerns (GOAL):: Yes       Resources and Interventions:  Discussed the Financial Resource Worker (FRW) role and possible meeting to discuss alternate resources, declined for now, is accepting of CHW calling in 2 weeks to check and discuss resource again as needed     Current Resources: [place] [employer]      Community Resources: None  Supplies used at home:: None  Equipment Currently Used at Home: none    Advance Care Plan/Directive: Discussed, Patient declined.  This was also recently discussed in office visit--per that note  Advanced Care Plans/Directives on file:: No  Advanced Care Plan/Directive Status: Not Applicable    Referrals Placed: Financial Services(alternative to [place] [employer] )     Goals:   Goals        General    1. Financial Wellbeing (pt-stated)     Notes - Note edited  11/25/2019  3:01 PM by Cheryl Shanks BSW    Goal Statement: I will call [place] [employer] within the next 2 weeks to discuss financial alternatives   Date goal  set: 11/25/2019   Measure of Success:  I will call [place] [employer] within the next 2 weeks to discuss financial alternatives   Barriers: I recently started obtaining Social Security which places me over income for current [place] benefits   Strengths: I am willing to research alternative insurance resources, she has been and will continue to call [place] [employer] and inquire on alternatives   Date to Achieve By: 12/31/2019   Patient expressed understanding of goal: Yes    Action steps to achieve this goal  1. I will continue to call [place] [employer] to discuss insurance alternatives  2. I will consider scheduling a meeting with Finance Resource Worker (FRW) if do not reach [place] [employer] within 2 weeks     CHW will:  CHW Delegation:   1)  Due Date:  12/9/2019        Delegation: Please call and check status of this goal in 2 weeks, please discuss FRW if does not receive needed insurance information from [place] [employer] in that time frame                       Patient/Caregiver understanding: Patient will call [place] [employer] within the next 2 weeks to discuss financial alternatives       Outreach Frequency: monthly  Future Appointments              [facility]    [id number]    In 1 week Jitendra Fernandez MD [facility]          Plan:   1) Patient will call [place] [employer] within the next 2 weeks to discuss financial alternatives   2) SW will send introduction letter and Complex Care Plan today  3) CHW will follow up on goal as above  4) SW will call Patient in 5-6 weeks for update and needs assessment     Cheryl Shanks, FRANK, MSW   [facility]  Care Coordination  [facility]   [phone]  11/25/2019 4:01 PM

## 2019-11-25 NOTE — PROGRESS NOTES
HISTORY OF PRESENT ILLNESS    Georgie Patino is a 64 year old female who is seen in consultation at the request of Marlys Ortiz CNP for evaluation of bilateral knee pain that has been present approximately 5 years. No known injury. She reports her right knee pain is worse than her left. She has trouble with stairs. She reports catching and clicking in both her knees. She denies locking but reports occasional episodes of giving way in the right knee. She has posterior knee pain and reports some swelling bilaterally. She denies any night pain.     Treatments tried so far: Physical therapy and antiinflammatories.     PMH:  has a past medical history of Ascending aortic aneurysm (H) (09/21/2017), Blood transfusions (age 19), Hypertension (09/21/2017), Localized osteoarthritis of both knees, Thyroid nodule (11/20/2017), and Tobacco abuse.     Surgical:  has a past surgical history that includes Colonoscopy with CO2 insufflation (N/A, 3/15/2017); Dilation and curettage (age 20); [place] teeth removed; Repair aneurysm ascending aorta (N/A, 10/27/2017); and Extracorporeal shock wave lithotripsy, cystoscopy, insert stent ureter(s), combined (Bilateral, 11/19/2018).    Family Hx:  family history includes Alzheimer Disease in her maternal grandfather; Cancer in her maternal grandmother and sister; Heart Disease in her paternal grandfather; Heart Disease (age of onset: 54) in her brother; Respiratory in her mother.    Social Hx:  reports that she quit smoking about 2 years ago. Her smoking use included cigarettes and other. She has a 2.00 pack-year smoking history. She has never used smokeless tobacco. She reports current alcohol use of about 0.8 - 1.7 standard drinks of alcohol per week. She reports that she does not use drugs.    REVIEW OF SYSTEMS:    CONSTITUTIONAL:  NEGATIVE for fever, chills, change in weight  INTEGUMENTARY/SKIN:  NEGATIVE for worrisome rashes, moles or lesions  EYES:  NEGATIVE for vision changes or  irritation  ENT/MOUTH:  NEGATIVE for ear, mouth and throat problems  RESP:  NEGATIVE for significant cough or SOB  BREAST:  NEGATIVE for masses, tenderness or discharge  CV:  NEGATIVE for chest pain, palpitations or peripheral edema  GI:  NEGATIVE for nausea, abdominal pain, heartburn, or change in bowel habits  :  Negative   MUSCULOSKELETAL:  See HPI above  NEURO:  NEGATIVE for weakness, dizziness or paresthesias  ENDOCRINE:  NEGATIVE for temperature intolerance, skin/hair changes  HEME/ALLERGY/IMMUNE:  NEGATIVE for bleeding problems  PSYCHIATRIC:  NEGATIVE for changes in mood or affect    This document serves as a record of the services and decisions personally performed and made by JUDIE Oro MD. It was created on his behalf by Clarke Moreau, a trained medical scribe. The creation of this document is based the provider's statements to the medical scribe.    Scribe Clarke Moreau 9:34 AM 11/26/2019       PHYSICAL EXAM:  BP (!) 141/80 (BP Location: Left arm, Patient Position: Sitting, Cuff Size: Adult Regular)   Pulse 64   SpO2 96%    GENERAL APPEARANCE: healthy, alert and no distress   SKIN: no suspicious lesions or rashes  NEURO: Normal strength and tone, mentation intact and speech normal  VASCULAR: good pulses, and cappillary refill   LYMPH: no lymphadenopathy   PSYCH:  mentation appears normal and affect normal/bright  RESP: no increased work of breathing    Right Knee Exam:  Gait: walks with antalgic gait favoring right side and pes planus  Squat: 50%, limited by pain  Alignment: demonstrates valgus deformity  Patellofemoral joint: minimal crepitations in the patellofemoral joint.  Effusion: Mild  ROM: 7-100*   Tender: Medial joint line and medial facet of the patella  Hip ROM: No pain  Ligaments:   Lachman's: stable   Anterior/Posterior drawer: stable,   Varus: some pseudo laxity    Valgus stress: stable    Left Knee Exam:  Gait: pes planus  Patellofemoral joint: minimal crepitations in the  patellofemoral joint.  Effusion: mild  ROM: 7-115*  Tender: lateral joint line  Ligaments:   Lachman's: stable   Anterior/Posterior drawer: stable,   Varus/Valgus stress: stable to varus and valgus stress      X-RAY:    Radiographs of the right knee from 11/21/2019 were reviewed with the patient, and demonstrate: shows severe lateral joint space narrowing with essentially bone-on-bone apposition. Moderate lateral osteophytes. Mild degenerative changes in the patellofemoral joint. Suspect some osteopenia.     Radiographs of the left knee from 10/8/2018 were reviewed with the patient, and demonstrate: shows moderate lateral joint space narrowing with small to moderate osteophytes laterally. Moderate degenerative disease in the patellofemoral joint.      Impression:   Bilateral knee osteoarthritis -- Severe on the right, moderate on the left.    Plan:  She will consider her options but she would like to have bilateral steroid injections today.   With the patient's consent, bilateral knee(s) injected intra-articularly with 80mg of Depomedrol and 4cc of local anesthetic after sterile prep.  She understands she needs to wait a minimum of three months after an injection to consider total knee arthroplasty.  Total Knee Arthroplasty: We talked about the patient's condition and diagnosis. Because of severe arthritis, and failure of conservative measures, I have suggested total knee arthroplasty of the right  knee(s). I've talked in depth about the procedure and the risks, which include, but are not limited to blood loss requiring transfusion, infection, neurovascular injury, DVT, PE, continued pain, (both perioperative and persistent post-recovery pain), numbness around the wound, instability, and potential anesthetic and perioperative medical complications, and the possibility of needing additional procedures. We also talked about recovery time, which differs from patient to patient. Medical clearance would need to be  obtained.      Return to clinic: AS NEEDED     The information in this document, created by a scribe for me, accurately reflects the services I personally performed and the decisions made by me. I have reviewed and approved this document for accuracy.     JUDIE Oro MD  Dept. Orthopedic Surgery  [facility]

## 2019-11-26 ENCOUNTER — OFFICE VISIT (OUTPATIENT)
Dept: ORTHOPEDICS | Facility: CLINIC | Age: 64
End: 2019-11-26
Payer: COMMERCIAL

## 2019-11-26 VITALS — DIASTOLIC BLOOD PRESSURE: 80 MMHG | SYSTOLIC BLOOD PRESSURE: 141 MMHG | OXYGEN SATURATION: 96 % | HEART RATE: 64 BPM

## 2019-11-26 DIAGNOSIS — M17.0 PRIMARY OSTEOARTHRITIS OF BOTH KNEES: Primary | ICD-10-CM

## 2019-11-26 PROCEDURE — 20610 DRAIN/INJ JOINT/BURSA W/O US: CPT | Mod: 50 | Performed by: ORTHOPAEDIC SURGERY

## 2019-11-26 PROCEDURE — 99243 OFF/OP CNSLTJ NEW/EST LOW 30: CPT | Mod: 25 | Performed by: ORTHOPAEDIC SURGERY

## 2019-11-26 RX ORDER — METHYLPREDNISOLONE ACETATE 80 MG/ML
80 INJECTION, SUSPENSION INTRA-ARTICULAR; INTRALESIONAL; INTRAMUSCULAR; SOFT TISSUE
Status: DISCONTINUED | OUTPATIENT
Start: 2019-11-26 | End: 2021-03-25

## 2019-11-26 RX ORDER — LIDOCAINE HYDROCHLORIDE 10 MG/ML
4 INJECTION, SOLUTION EPIDURAL; INFILTRATION; INTRACAUDAL; PERINEURAL
Status: DISCONTINUED | OUTPATIENT
Start: 2019-11-26 | End: 2021-03-25

## 2019-11-26 RX ADMIN — METHYLPREDNISOLONE ACETATE 80 MG: 80 INJECTION, SUSPENSION INTRA-ARTICULAR; INTRALESIONAL; INTRAMUSCULAR; SOFT TISSUE at 09:30

## 2019-11-26 RX ADMIN — LIDOCAINE HYDROCHLORIDE 4 ML: 10 INJECTION, SOLUTION EPIDURAL; INFILTRATION; INTRACAUDAL; PERINEURAL at 09:30

## 2019-11-26 ASSESSMENT — PAIN SCALES - GENERAL: PAINLEVEL: SEVERE PAIN (6)

## 2019-11-26 NOTE — NURSING NOTE
Julianna to follow up with Primary Care provider regarding elevated blood pressure.    Aki VERMA

## 2019-11-26 NOTE — PROGRESS NOTES
Large Joint Injection/Arthocentesis: bilateral knee  Date/Time: 11/26/2019 9:30 AM  Performed by: Ross Oro MD  Authorized by: Ross Oro MD     Indications:  Pain and osteoarthritis  Needle Size:  22 G  Guidance: landmark guided    Approach:  Anterolateral  Location:  Knee  Laterality:  Bilateral      Medications (Right):  80 mg methylPREDNISolone 80 MG/ML; 4 mL lidocaine (PF) 1 %  Medications (Left):  80 mg methylPREDNISolone 80 MG/ML; 4 mL lidocaine (PF) 1 %  Outcome:  Tolerated well, no immediate complications  Procedure discussed: discussed risks, benefits, and alternatives    Consent Given by:  Patient  Timeout: timeout called immediately prior to procedure    Prep: patient was prepped and draped in usual sterile fashion

## 2019-11-26 NOTE — LETTER
11/26/2019         RE: Georgie Patino  [address] [place] [id number]        Dear Colleague,    Thank you for referring your patient, Georgie Patino, to the [facility]. Please see a copy of my visit note below.    HISTORY OF PRESENT ILLNESS    Georgie Patino is a 64 year old female who is seen in consultation at the request of Marlys Ortiz CNP for evaluation of bilateral knee pain that has been present approximately 5 years. No known injury. She reports her right knee pain is worse than her left. She has trouble with stairs. She reports catching and clicking in both her knees. She denies locking but reports occasional episodes of giving way in the right knee. She has posterior knee pain and reports some swelling bilaterally. She denies any night pain.     Treatments tried so far: Physical therapy and antiinflammatories.     PMH:  has a past medical history of Ascending aortic aneurysm (H) (09/21/2017), Blood transfusions (age 19), Hypertension (09/21/2017), Localized osteoarthritis of both knees, Thyroid nodule (11/20/2017), and Tobacco abuse.     Surgical:  has a past surgical history that includes Colonoscopy with CO2 insufflation (N/A, 3/15/2017); Dilation and curettage (age 20); [place] teeth removed; Repair aneurysm ascending aorta (N/A, 10/27/2017); and Extracorporeal shock wave lithotripsy, cystoscopy, insert stent ureter(s), combined (Bilateral, 11/19/2018).    Family Hx:  family history includes Alzheimer Disease in her maternal grandfather; Cancer in her maternal grandmother and sister; Heart Disease in her paternal grandfather; Heart Disease (age of onset: 54) in her brother; Respiratory in her mother.    Social Hx:  reports that she quit smoking about 2 years ago. Her smoking use included cigarettes and other. She has a 2.00 pack-year smoking history. She has never used smokeless tobacco. She reports current alcohol use of about 0.8 - 1.7 standard drinks of alcohol per  week. She reports that she does not use drugs.    REVIEW OF SYSTEMS:    CONSTITUTIONAL:  NEGATIVE for fever, chills, change in weight  INTEGUMENTARY/SKIN:  NEGATIVE for worrisome rashes, moles or lesions  EYES:  NEGATIVE for vision changes or irritation  ENT/MOUTH:  NEGATIVE for ear, mouth and throat problems  RESP:  NEGATIVE for significant cough or SOB  BREAST:  NEGATIVE for masses, tenderness or discharge  CV:  NEGATIVE for chest pain, palpitations or peripheral edema  GI:  NEGATIVE for nausea, abdominal pain, heartburn, or change in bowel habits  :  Negative   MUSCULOSKELETAL:  See HPI above  NEURO:  NEGATIVE for weakness, dizziness or paresthesias  ENDOCRINE:  NEGATIVE for temperature intolerance, skin/hair changes  HEME/ALLERGY/IMMUNE:  NEGATIVE for bleeding problems  PSYCHIATRIC:  NEGATIVE for changes in mood or affect    This document serves as a record of the services and decisions personally performed and made by JUDIE Oro MD. It was created on his behalf by Clarke Moreau, a trained medical scribe. The creation of this document is based the provider's statements to the medical scribe.    Scribe Clarke Moreau 9:34 AM 11/26/2019       PHYSICAL EXAM:  BP (!) 141/80 (BP Location: Left arm, Patient Position: Sitting, Cuff Size: Adult Regular)   Pulse 64   SpO2 96%    GENERAL APPEARANCE: healthy, alert and no distress   SKIN: no suspicious lesions or rashes  NEURO: Normal strength and tone, mentation intact and speech normal  VASCULAR: good pulses, and cappillary refill   LYMPH: no lymphadenopathy   PSYCH:  mentation appears normal and affect normal/bright  RESP: no increased work of breathing    Right Knee Exam:  Gait: walks with antalgic gait favoring right side and pes planus  Squat: 50%, limited by pain  Alignment: demonstrates valgus deformity  Patellofemoral joint: minimal crepitations in the patellofemoral joint.  Effusion: Mild  ROM: 7-100*   Tender: Medial joint line and medial facet of  the patella  Hip ROM: No pain  Ligaments:   Lachman's: stable   Anterior/Posterior drawer: stable,   Varus: some pseudo laxity    Valgus stress: stable    Left Knee Exam:  Gait: pes planus  Patellofemoral joint: minimal crepitations in the patellofemoral joint.  Effusion: mild  ROM: 7-115*  Tender: lateral joint line  Ligaments:   Lachman's: stable   Anterior/Posterior drawer: stable,   Varus/Valgus stress: stable to varus and valgus stress      X-RAY:    Radiographs of the right knee from 11/21/2019 were reviewed with the patient, and demonstrate: shows severe lateral joint space narrowing with essentially bone-on-bone apposition. Moderate lateral osteophytes. Mild degenerative changes in the patellofemoral joint. Suspect some osteopenia.     Radiographs of the left knee from 10/8/2018 were reviewed with the patient, and demonstrate: shows moderate lateral joint space narrowing with small to moderate osteophytes laterally. Moderate degenerative disease in the patellofemoral joint.      Impression:   Bilateral knee osteoarthritis -- Severe on the right, moderate on the left.    Plan:  She will consider her options but she would like to have bilateral steroid injections today.   With the patient's consent, bilateral knee(s) injected intra-articularly with 80mg of Depomedrol and 4cc of local anesthetic after sterile prep.  She understands she needs to wait a minimum of three months after an injection to consider total knee arthroplasty.  Total Knee Arthroplasty: We talked about the patient's condition and diagnosis. Because of severe arthritis, and failure of conservative measures, I have suggested total knee arthroplasty of the right  knee(s). I've talked in depth about the procedure and the risks, which include, but are not limited to blood loss requiring transfusion, infection, neurovascular injury, DVT, PE, continued pain, (both perioperative and persistent post-recovery pain), numbness around the wound,  instability, and potential anesthetic and perioperative medical complications, and the possibility of needing additional procedures. We also talked about recovery time, which differs from patient to patient. Medical clearance would need to be obtained.      Return to clinic: AS NEEDED     The information in this document, created by a scribe for me, accurately reflects the services I personally performed and the decisions made by me. I have reviewed and approved this document for accuracy.     JUDIE Oro MD  Dept. Orthopedic Surgery  [facility]     Large Joint Injection/Arthocentesis: bilateral knee  Date/Time: 11/26/2019 9:30 AM  Performed by: Ross Oro MD  Authorized by: Ross Oro MD     Indications:  Pain and osteoarthritis  Needle Size:  22 G  Guidance: landmark guided    Approach:  Anterolateral  Location:  Knee  Laterality:  Bilateral      Medications (Right):  80 mg methylPREDNISolone 80 MG/ML; 4 mL lidocaine (PF) 1 %  Medications (Left):  80 mg methylPREDNISolone 80 MG/ML; 4 mL lidocaine (PF) 1 %  Outcome:  Tolerated well, no immediate complications  Procedure discussed: discussed risks, benefits, and alternatives    Consent Given by:  Patient  Timeout: timeout called immediately prior to procedure    Prep: patient was prepped and draped in usual sterile fashion            Again, thank you for allowing me to participate in the care of your patient.        Sincerely,        Ross Oro MD

## 2019-11-29 ENCOUNTER — TELEPHONE (OUTPATIENT)
Dept: FAMILY MEDICINE | Facility: CLINIC | Age: 64
End: 2019-11-29

## 2019-11-29 NOTE — TELEPHONE ENCOUNTER
Prior Authorization Retail Medication Request    Medication/Dose: Diclofenac sodium 1% gel  ICD code (if different than what is on RX):  Chronic pain of both knees [M25.561, M25.562, G89.29]  Previously Tried and Failed:    Rationale:      Insurance Name:  [facility]  Insurance ID:  [id number]      Pharmacy Information (if different than what is on RX)  Name:  [facility] pharmacy  Phone:  [phone]

## 2019-11-29 NOTE — TELEPHONE ENCOUNTER
Central Prior Authorization Team   Phone: [phone]      PA Initiation    Medication: Diclofenac sodium 1% gel -Initiated  Insurance Company: [employer] - Phone [phone] Fax [phone]  Pharmacy Filling the Rx: [id number] - [address]  Filling Pharmacy Phone: [phone]  Filling Pharmacy Fax:    Start Date: 11/29/2019

## 2019-12-01 NOTE — LETTER
[facility]  Complex Care Plan  About Me:    Patient Name:  Georgie Patino    YOB: 1955  Age:         64 year old   [place] MRN:    [id number] Telephone Information:  Home Phone [phone]   Mobile Not on file.       Address:  [address] [place] [address]  [place] [address] Email address:  No e-mail address on record      Emergency Contact(s)    Name Relationship Lgl Grd Work Phone Home Phone Mobile Phone   MANDA INGRAM Spouse   [phone] none   2. DECLINE,PER PT Declined   [phone]            Primary language:  English     needed? No   [place] [facility]:  [phone] op. 1  Other communication barriers: Visual impairment, Glasses  Preferred Method of Communication:  Mail, telephone   Current living arrangement: I live in a private home with spouse  Mobility Status/ Medical Equipment: Independent    Health Maintenance  Health Maintenance Reviewed: Due/Overdue   Health Maintenance Due   Topic Date Due     ZOSTER IMMUNIZATION (1 of 2) 07/03/2007     MAMMO SCREENING  09/12/2019       My Access Plan  Medical Emergency 911   Primary Clinic Line [facility] - [phone]   24 Hour Appointment Line [phone] or  [phone]) (toll-free)   24 Hour Nurse Line [phone] (toll-free)   Preferred Urgent Care [facility] [phone]   [facility]  [phone]   Preferred Pharmacy [id number]  [place], [address]     Behavioral Health Crisis Line The National Suicide Prevention Lifeline at 1-592.244.5056 or 911             My Care Team Members  Patient Care Team       Relationship Specialty Notifications Start End    Marlys Wade APRN CNP PCP - General Nurse Practitioner - Family  1/26/16     Phone: [phone] [address] [facility] [address]    Matilda Obando, RN Nurse Coordinator Thoracic Surgery  (Cardiothoracic Vascular Surgery) Admissions 9/22/17     Phone: [phone] Pager: [phone]        Jitendra Fernandez MD Assigned PCP   11/17/19     Phone: [phone] Fax: [phone] [address] [facility] [address]    Cheryl Shanks BSW Lead Care Coordinator Primary Care - CC  11/25/19      Care Coordination [facility]            My Care Plans  Self Management and Treatment Plan  Goals and (Comments)  Goals        General    1. Financial Wellbeing (pt-stated)     Notes - Note edited  11/25/2019  3:01 PM by Cheryl Shanks BSW    Goal Statement: I will call [place] [employer] within the next 2 weeks to discuss financial alternatives   Date goal set: 11/25/2019   Measure of Success:  I will call [place] [employer] within the next 2 weeks to discuss financial alternatives   Barriers: I recently started obtaining Social Security which places me over income for current [place]   Strengths: I am willing to research alternative insurance resources, she has been and will continue to call [place] [employer] and inquire on alternatives   Date to Achieve By: 12/31/2019   Patient expressed understanding of goal: Yes    Action steps to achieve this goal  1. I will continue to call [place] [employer] to discuss insurance alternatives  2. I will consider scheduling a meeting with Finance Resource Worker (FRW) if do not reach [place] [employer] within 2 weeks     CHW will:  CHW Delegation:   1)  Due Date:  12/9/2019        Delegation: Please call and check status of this goal in 2 weeks, please discuss FRW if does not receive needed insurance information from [place] [employer] in that time frame                      Action Plans on File: none                      Advance Care Plans/Directives Type: none, discussed        My Medical and Care Information  Problem List   Patient Active Problem List   Diagnosis     CARDIOVASCULAR SCREENING; LDL GOAL LESS THAN 160     Obesity     Benign essential hypertension     Family history of sudden cardiac  death     Hypertension     Anxiety     S/P ascending aortic aneurysm repair     Former smoker     Thyroid nodule        Care Coordination Start Date: 11/25/2019   Frequency of Care Coordination: monthly   Form Last Updated: 11/25/2019        Yes

## 2019-12-02 NOTE — TELEPHONE ENCOUNTER
Prior Authorization Approval    Authorization Effective Date: 10/30/2019  Authorization Expiration Date: 11/28/2020  Medication: Diclofenac sodium 1% gel -APPROVED  Approved Dose/Quantity:   Reference #:     Insurance Company: [employer] - Phone [phone] Fax [phone]  Expected CoPay:       CoPay Card Available:      Foundation Assistance Needed:    Which Pharmacy is filling the prescription (Not needed for infusion/clinic administered): [facility][id number] - [address]  Pharmacy Notified: Yes  Patient Notified: No    Pharmacy will notify patient when medication is ready.

## 2019-12-10 ENCOUNTER — PATIENT OUTREACH (OUTPATIENT)
Dept: CARE COORDINATION | Facility: CLINIC | Age: 64
End: 2019-12-10

## 2019-12-10 NOTE — PROGRESS NOTES
Spoke with patient today, [place], CHW did FRW sceening and sent it over. CHW is to call in one month to check the status.   Outreach:01/03/19  Outreach Interval: Blaire SIMON Delegation:   1)  Due Date:  12/9/2019        Delegation: Please call and check status of this goal in 2 weeks, please discuss FRW if does not receive needed insurance information from [place] [employer] in that time frame      Outreach:  Outreach Interval:

## 2019-12-11 ENCOUNTER — PATIENT OUTREACH (OUTPATIENT)
Dept: CARE COORDINATION | Facility: CLINIC | Age: 64
End: 2019-12-11

## 2019-12-11 NOTE — PROGRESS NOTES
Programs Applying For: None  County:  Case #:  [facility] Worker:   [place] #:   PMI #:   Tracking:   Date Applied:     Outreach:   12/12/19: FRW called pt, pt was originally on state insurance until her income increased. She now qualifies to buy a health plan with a tax credit. Pt has an appointment with a  on Monday to choose a plan. FRW did explain that once pt is on insurance, if she ever gets a bill from , she can apply for mo care. Pt understands and will let CHW know. No further needs from FRW.   12/11/19: FRW called pt and left VM. FRW will call again within one week. Attempt x 1.     Health Insurance Information:   [facility] with tax credit    Referral:   Program Interested in: [place]?? Possibly other insurances she wanted to speak to you about it   Have you recently applied? If so, when? Last week, did not quality   How many my people in the household? 2   Do you file taxes? Yes   Who do you claim on your taxes? Grand daughter   How many people receiving income in your household? (Social security, workers compensation, Employment) 2   What is the household Monthly Gross Income? 3,293 after taxes (she does not know before)

## 2020-01-03 ENCOUNTER — PATIENT OUTREACH (OUTPATIENT)
Dept: CARE COORDINATION | Facility: CLINIC | Age: 65
End: 2020-01-03

## 2020-01-03 NOTE — PROGRESS NOTES
Clinic Care Coordination Contact    Follow Up Progress Note      Assessment: [place], she reports [place] did and is waiting on details of her insurance.  She plans to call and inquire on insurance card next week.  We agreed on return call in 3 weeks to ensure coverage in place    Goals addressed this encounter:   Goals Addressed                 This Visit's Progress       General      1. Financial Wellbeing (pt-stated)   90%     Goal Statement: I will [place] within the next 2 weeks to discuss financial alternatives   Action steps to achieve this goal  1. I will continue [place] to discuss [place] alternatives  2. I will consider scheduling a meeting with Finance Resource Worker (FRW) if do not reach [place] within 2 weeks   Updated:  Date goal set: 11/25/2019   Measure of Success:  I [place] within the next 2 weeks to discuss financial alternatives   Barriers: I recently started obtaining Social Security which places me over [place]   Strengths: I am willing to research alternative insurance resources, she has been and will continue to [place] and [place] on alternatives   Date to Achieve By: 12/31/2019   Patient expressed understanding of goal: Yes    Action steps to achieve this goal  1. I will continue to [place] to discuss [place] alternatives  2. I will consider scheduling a meeting with Finance Resource Worker (FRW) if do not reach [place] within 2 weeks     CHW will:  CHW Delegation:   1)  Due Date:  12/9/2019        Delegation: Please call and check status of this goal in 2 weeks, please discuss FRW if does not receive needed [place] information [place] in that time frame     Patient reports having applied with a  at [facility] 12/19/2019.  She is still waiting for insurance details/card but is to effective January 1st    Cheryl Shanks, IVÁNW, MSW   [facility]  Care Coordination  [facility],  [facility]   [phone]  1/3/2020 3:05 PM                     Intervention/Education provided during outreach: None     Outreach Frequency: monthly    Plan:     Care Coordinator will follow up in 3 weeks to ensure coverage in place.  Will update PCP     FRANK Machado, MSW   [facility]  Care Coordination  [facility]   [phone]  1/3/2020 3:10 PM

## 2020-01-06 ENCOUNTER — PATIENT OUTREACH (OUTPATIENT)
Dept: CARE COORDINATION | Facility: CLINIC | Age: 65
End: 2020-01-06

## 2020-01-06 NOTE — PROGRESS NOTES
Scheduled Follow Up Call: Attempt 1 Community Health Worker called and left a message for the patient. If the patient is returning my call, please transfer the patient to Kyra at [phone]

## 2020-01-23 ENCOUNTER — PATIENT OUTREACH (OUTPATIENT)
Dept: CARE COORDINATION | Facility: CLINIC | Age: 65
End: 2020-01-23

## 2020-01-23 ENCOUNTER — PATIENT OUTREACH (OUTPATIENT)
Dept: FAMILY MEDICINE | Facility: CLINIC | Age: 65
End: 2020-01-23

## 2020-01-23 NOTE — PROGRESS NOTES
Clinic Care Coordination Contact    Situation: Patient chart reviewed by care coordinator.    Background: CHW was following Patient for goals    Assessment: CHW reported Patient met her goal and is requesting maintenance status.     Plan/Recommendations: This Patient can be placed on maintenance status, CHW will call in 2 months.  Will route to CHW     FRANK Machado, MSW   [facility]  Care Coordination  [facility]   [phone]  1/23/2020 3:59 PM

## 2020-01-23 NOTE — PROGRESS NOTES
Scheduled Follow Up Call: Attempt 1 Community Health Worker called and left a message for the patient. If the patient is returning my call, please transfer the patient to Chrissy WOLFE at [phone].    After chart reviewing CHW has noticed that the Patient has [facility] [place] for insurance. CHW did not get a hold of the patient today but will still talk with CC SW to see if we can move the patient to maintenance due to patient completing her goal.     Patient has completed all goals with CCC.  Please review the chart, confirm if Maintenance is approved.      Next Outreach: 3/26/20  Outreach Intervals: Monthly

## 2020-03-26 ENCOUNTER — PATIENT OUTREACH (OUTPATIENT)
Dept: CARE COORDINATION | Facility: CLINIC | Age: 65
End: 2020-03-26

## 2020-03-26 NOTE — PROGRESS NOTES
Scheduled Follow Up Call: Attempt 1 Community Health Worker called and left a message for the patient. If the patient is returning my call, please transfer the patient to Chrissy WOLFE at [phone].      -No goals at this time    -Patient in maintenance    -Anything new?    -How are you feeling?    Next Outreach: 4/9/2020  Outreach Interval: Monthly

## 2020-03-27 ENCOUNTER — PATIENT OUTREACH (OUTPATIENT)
Dept: CARE COORDINATION | Facility: CLINIC | Age: 65
End: 2020-03-27

## 2020-03-27 ASSESSMENT — ACTIVITIES OF DAILY LIVING (ADL): DEPENDENT_IADLS:: INDEPENDENT

## 2020-03-27 NOTE — LETTER
[facility]  Complex Care Plan  About Me:    Patient Name:  Georgie Patino    YOB: 1955  Age:         64 year old   [place] MRN:    [id number] Telephone Information:  Home Phone [phone]   Mobile Not on file.       Address:  [address] [place] [address]  [place] [address] Email address:  No e-mail address on record      Emergency Contact(s)    Name Relationship Lgl Grd Work Phone Home Phone Mobile Phone   MANDA INGRAM Spouse   [phone] none   2. DECLINE,PER PT Declined   [phone]            Primary language:  English     needed? No   [place] [facility]:  [phone] op. 1  Other communication barriers: Visual impairment, Glasses  Preferred Method of Communication:  Mail.  Telephone  Current living arrangement: I live in a private home with spouse  Mobility Status/ Medical Equipment: Independent    Health Maintenance  Health Maintenance Reviewed:    Health Maintenance Due   Topic Date Due     ZOSTER IMMUNIZATION (1 of 2) 07/03/2007     MAMMO SCREENING  09/12/2019     PHQ-2  01/01/2020     COLORECTAL CANCER SCREENING  03/15/2020       My Access Plan  Medical Emergency 911   Primary Clinic Line [facility] [phone]   24 Hour Appointment Line [phone] or  [phone] (toll-free)   24 Hour Nurse Line [phone] (toll-free)   Preferred Urgent Care [facility] [phone]   [facility]  [phone]   Preferred Pharmacy [id number]  [place]     Behavioral Health Crisis Line The National Suicide Prevention Lifeline at 1-844.479.1944 or 911             My Care Team Members  Patient Care Team       Relationship Specialty Notifications Start End    Marlys Wade APRN CNP PCP - General Nurse Practitioner - Family  1/26/16     Phone: [phone] Fax: [phone] [address] [facility]  [place] [address]    Matilda Obando, RN Nurse Coordinator Thoracic Surgery (Cardiothoracic Vascular Surgery) Admissions 9/22/17     Phone: [phone] Pager: [phone]        Cheryl Shanks BSW Lead Care Coordinator Primary Care - CC  11/25/19      Care Coordination [facility]    Marlys Wade APRN CNP Assigned PCP   12/1/19     Phone: [phone] Fax: [phone] [address] [facility] [place] [address]    Chrissy Herzog MA Community Health Worker Primary Care - CC Admissions 1/22/20             My Care Plans  Self Management and Treatment Plan  Goals and (Comments)       Action Plans on File: none                      Advance Care Plans/Directives Type: none       My Medical and Care Information  Problem List   Patient Active Problem List   Diagnosis     CARDIOVASCULAR SCREENING; LDL GOAL LESS THAN 160     Obesity     Benign essential hypertension     Family history of sudden cardiac death     Hypertension     Anxiety     S/P ascending aortic aneurysm repair     Former smoker     Thyroid nodule          Care Coordination Start Date: 11/25/2019   Frequency of Care Coordination: 2 months   Form Last Updated: 03/27/2020

## 2020-03-27 NOTE — PROGRESS NOTES
Clinic Care Coordination Contact     Situation: Patient chart reviewed by care coordinator.     Background:  CC's initial assessment and enrollment to Care Coordination was 11/25/2019. . Patient centered goals were developed with participation from patient.  SW CC handed patient off to CHW for continued outreach every 30 days.      Assessment: CHW has been in contact with patient monthly. Patient is on maintenance and all goals have been met.  Patient  due for updated Complex Care Plan.     Plan/Recommendations: CHW will involve  CC as needed or if patient is ready to move to maintenance. CC will continue to monitor progress to goals and CHW outreaches every 6 weeks.     Complex Care Plan updated and mailed to patient: yes    Cheryl Shanks, IVÁNW, MSW   [facility]  Care Coordination  [facility]   [phone]  3/27/2020 3:45 PM

## 2020-04-09 ENCOUNTER — PATIENT OUTREACH (OUTPATIENT)
Dept: CARE COORDINATION | Facility: CLINIC | Age: 65
End: 2020-04-09

## 2020-04-09 NOTE — PROGRESS NOTES
CHW spoke with patient today. Patient said she is doing really well. CHW asked if patient had any questions or concerns for us at this time and patient said no, just that her knees hurt and she has arthritis. Patient said she gets cortisol shots and these really help.    CHW will send note to LENNY CARRERO to see if patient can be moved forward to graduation.        Next Outreach: 5/8/2020  Outreach Interval: Monthly

## 2020-04-14 ENCOUNTER — PATIENT OUTREACH (OUTPATIENT)
Dept: CARE COORDINATION | Facility: CLINIC | Age: 65
End: 2020-04-14

## 2020-04-14 ASSESSMENT — ACTIVITIES OF DAILY LIVING (ADL): DEPENDENT_IADLS:: INDEPENDENT

## 2020-04-14 NOTE — LETTER
[facility] CARE COORDINATION  [address] [facility] [address]    April 22, 2020    Georgie Patino  [address] [facility] [address]    Dear Georgie,  Your Care Team congratulates you on your journey to maintain wellness. This document will help guide you on your journey to maintain a healthy lifestyle.  You can use this to help you overcome any barriers you may encounter.  If you should have any questions or concerns, you can contact the members of your Care Team or contact your Primary Care Clinic for assistance.     Health Maintenance  Health Maintenance Reviewed:    Health Maintenance Due   Topic Date Due     ZOSTER IMMUNIZATION (1 of 2) 07/03/2007     MAMMO SCREENING  09/12/2019     PHQ-2  01/01/2020     COLORECTAL CANCER SCREENING  03/15/2020       My Access Plan  Medical Emergency 911   Primary Clinic Line [facility] - [phone]   24 Hour Appointment Line [phone] or  [phone] (toll-free)   24 Hour Nurse Line [phone] (toll-free)   Preferred Urgent Care [facility] [phone]   Preferred Hospital [facility]  [phone]   Preferred Pharmacy [id number] - [place]     Behavioral Health Crisis Line The National Suicide Prevention Lifeline at 1-348.211.4434 or 911     My Care Team Members  Patient Care Team       Relationship Specialty Notifications Start End    Marlys Wade APRN CNP PCP - General Nurse Practitioner - Family  1/26/16     Phone: [phone] Fax: [phone] [address] [facility] [address]    Matilda Obando, RN Nurse Coordinator Thoracic Surgery (Cardiothoracic Vascular Surgery) Admissions 9/22/17     Phone: [phone] Pager: [phone]        Marlys Wade APRN CNP Assigned PCP   12/1/19     Phone: [phone] Fax: [phone] [address] [facility] [place] [address]               Goals        General      COMPLETED: 1. Financial Wellbeing (pt-stated)      Goal Statement: I would [place] within the next 2 weeks to discuss financial alternatives  Completed    Action steps to achieve this goal  1. I will continue [place] to discuss [place] alternatives  2. I will consider scheduling a meeting with Finance Resource Worker (FRW) if do not reach [place] within 2 weeks     Updated: 1/23/20 MINH LoweW - Patient has [employer] [place] insurance and has completed her goal  Date to Achieve By: 12/31/2019     Date goal set: 11/25/2019   Measure of Success:  I will [place] within the next 2 weeks to discuss financial alternatives   Barriers: I recently started obtaining Social Security which places me over income for [place]   Strengths: I am willing to research alternative insurance resources, she has been and will continue to call [place] and inquire on alternatives   Patient expressed understanding of goal: Yes    Action steps to achieve this goal  1. I will continue to [place] to discuss insurance alternatives  2. I will consider scheduling a meeting with Finance Resource Worker (FRW) if do not reach [place] within 2 weeks     CHW will:  CHW Delegation:   1)  Due Date:  12/9/2019        Delegation: Please call and check status of this goal in 2 weeks, please discuss FRW if does not receive needed insurance information from [facility] in that time frame   *Patient reports having applied with a  at [facility] 12/19/2019.  She is still waiting for insurance details/card but is to effective January 1st                         Advance Care Plans/Directives Type: not addressed         It has been your Clinic Care Team's pleasure to work with you on your goals.    Regards,  Your Clinic Care Team

## 2020-04-14 NOTE — PROGRESS NOTES
Clinic Care Coordination Contact  [facility]/Voicemail    Referral Source: PCP  Clinical Data: Care Coordinator .  SW calling to assess Patient needs with new pandemic  Patient currently on maintenance and CHW inquiring if ready for graduation     Outreach attempted x 1.  Left message on patient's voicemail with call back information and requested return call.  Plan: Care Coordinator will try to reach patient again in 3-5 business days.    FRANK Machado, MSW   [facility]  Care Coordination  [facility]   [phone]  4/14/2020 11:44 AM

## 2020-04-22 ASSESSMENT — ACTIVITIES OF DAILY LIVING (ADL): DEPENDENT_IADLS:: INDEPENDENT

## 2020-04-22 NOTE — PROGRESS NOTES
Clinic Care Coordination Contact    Assessment: CHW contacted patient for 2 month follow up.  Patient has continued to follow the plan of care and assessment is negative for any new needs or concerns.  SW attempted to contact Patient to review needs with kelvin but was not successful.      Enrollment status: Graduated. SW will send graduation letter with her contact information should Patient need resources     Plan: No further outreaches at this time.  Patient will continue to follow the plan of care.  If new needs arise a new Care Coordination referral may be placed.  FYI to PCP    FRANK Machado, MSW   [facility]  Care Coordination  [facility]   [phone]  4/22/2020 1:53 PM

## 2020-06-23 NOTE — PROGRESS NOTES
SUBJECTIVE:   Georgie Patino is here in follow up of Bilateral knee osteoarthritis -- Severe on the right, moderate on the left    Previous injections: 11/26/19.  Bilateral knees. Length of effectiveness: 3 months     Present symptoms: pain about the same as before.  Has been      Treatments up to this point: injection, physical therapy, nsaids    Review of Systems:  Constitutional/General: Negative for fever, chills, change in weight  Integumentary/Skin: Negative for worrisome rashes, moles, or lesions  Neuro: Negative for weakness, dizziness, or paresthesias   Psychiatric: negative for changes in mood or affect    X-rays:  From today, reviewed with the patient by phone:   Right knee: Advanced lateral and moderate to advanced patellofemoral  degenerative changes similar to the prior study.     Left knee: Moderate marginal osteophyte formation in the lateral  compartment without significant loss of joint space. Moderate to  advanced patellofemoral degenerative changes    Radiographs of the right knee from 11/21/2019 were reviewed with the patient, and demonstrate: shows severe lateral joint space narrowing with essentially bone-on-bone apposition. Moderate lateral osteophytes. Mild degenerative changes in the patellofemoral joint. Suspect some osteopenia.      Radiographs of the left knee from 10/8/2018 were reviewed with the patient, and demonstrate: shows moderate lateral joint space narrowing with small to moderate osteophytes laterally. Moderate degenerative disease in the patellofemoral joint    ASSESSMENT:   Bilateral knee osteoarthritis -- Severe on the right, moderate on the left    PLAN:    she would like repeat injections today.  With the patient's consent, bilateral knee(s) injected intra-articularly with 80mg of Depomedrol and 4cc of local anesthetic after sterile prep.    I have suggested total knee arthroplasty of the right  knee. I've talked in depth about the procedure and the risks, which include,  but are not limited to blood loss requiring transfusion, infection, neurovascular injury, DVT, PE, continued pain, (both perioperative and persistent post-recovery pain), numbness around the wound, instability, and potential anesthetic and perioperative medical complications, and the possibility of needing additional procedures. We also talked about recovery time, which differs from patient to patient. Medical clearance would need to be obtained.     She was hoping to hold off on total knee arthroplasty if she can, but we discussed the downsides of repeated injections, and when she returns here in October, she thinks she may be willing to discuss total knee arthroplasty at that time.    She understands she needs to wait a minimum of three months after an injection to consider total knee arthroplasty in the knee that had been injected.    Return to clinic: as needed     JUDIE Oro MD  Dept. Orthopedic Surgery  [facility]

## 2020-07-01 ENCOUNTER — OFFICE VISIT (OUTPATIENT)
Dept: ORTHOPEDICS | Facility: CLINIC | Age: 65
End: 2020-07-01
Payer: COMMERCIAL

## 2020-07-01 ENCOUNTER — ANCILLARY PROCEDURE (OUTPATIENT)
Dept: GENERAL RADIOLOGY | Facility: CLINIC | Age: 65
End: 2020-07-01
Attending: ORTHOPAEDIC SURGERY
Payer: COMMERCIAL

## 2020-07-01 VITALS
BODY MASS INDEX: 35.16 KG/M2 | HEART RATE: 59 BPM | OXYGEN SATURATION: 100 % | WEIGHT: 211 LBS | SYSTOLIC BLOOD PRESSURE: 185 MMHG | HEIGHT: 65 IN | DIASTOLIC BLOOD PRESSURE: 95 MMHG

## 2020-07-01 DIAGNOSIS — M17.0 PRIMARY OSTEOARTHRITIS OF BOTH KNEES: Primary | ICD-10-CM

## 2020-07-01 DIAGNOSIS — M17.0 PRIMARY OSTEOARTHRITIS OF BOTH KNEES: ICD-10-CM

## 2020-07-01 PROCEDURE — 73562 X-RAY EXAM OF KNEE 3: CPT | Mod: 59

## 2020-07-01 PROCEDURE — 20610 DRAIN/INJ JOINT/BURSA W/O US: CPT | Mod: 50 | Performed by: ORTHOPAEDIC SURGERY

## 2020-07-01 RX ORDER — LIDOCAINE HYDROCHLORIDE 10 MG/ML
4 INJECTION, SOLUTION EPIDURAL; INFILTRATION; INTRACAUDAL; PERINEURAL
Status: DISCONTINUED | OUTPATIENT
Start: 2020-07-01 | End: 2021-03-25

## 2020-07-01 RX ORDER — METHYLPREDNISOLONE ACETATE 80 MG/ML
80 INJECTION, SUSPENSION INTRA-ARTICULAR; INTRALESIONAL; INTRAMUSCULAR; SOFT TISSUE
Status: DISCONTINUED | OUTPATIENT
Start: 2020-07-01 | End: 2021-03-25

## 2020-07-01 RX ADMIN — METHYLPREDNISOLONE ACETATE 80 MG: 80 INJECTION, SUSPENSION INTRA-ARTICULAR; INTRALESIONAL; INTRAMUSCULAR; SOFT TISSUE at 09:41

## 2020-07-01 RX ADMIN — LIDOCAINE HYDROCHLORIDE 4 ML: 10 INJECTION, SOLUTION EPIDURAL; INFILTRATION; INTRACAUDAL; PERINEURAL at 09:41

## 2020-07-01 ASSESSMENT — MIFFLIN-ST. JEOR: SCORE: 1521.93

## 2020-07-01 NOTE — PROGRESS NOTES
Large Joint Injection/Arthocentesis: bilateral knee    Date/Time: 7/1/2020 9:41 AM  Performed by: Aki Butt  Authorized by: Ross Oro MD     Indications:  Pain and osteoarthritis  Needle Size:  22 G  Approach:  Anterolateral  Location:  Knee  Laterality:  Bilateral      Medications (Right):  80 mg methylPREDNISolone 80 MG/ML; 4 mL lidocaine (PF) 1 %  Medications (Left):  80 mg methylPREDNISolone 80 MG/ML; 4 mL lidocaine (PF) 1 %  Outcome:  Tolerated well, no immediate complications  Procedure discussed: discussed risks, benefits, and alternatives    Consent Given by:  Patient  Timeout: timeout called immediately prior to procedure    Prep: patient was prepped and draped in usual sterile fashion

## 2020-07-01 NOTE — LETTER
7/1/2020         RE: Georgie Patino  [address] [place] [id number]        Dear Colleague,    Thank you for referring your patient, Georgie Patino, to the [facility]. Please see a copy of my visit note below.    SUBJECTIVE:   Georgie Patino is here in follow up of Bilateral knee osteoarthritis -- Severe on the right, moderate on the left    Previous injections: 11/26/19.  Bilateral knees. Length of effectiveness: 3 months     Present symptoms: pain about the same as before.  Has been      Treatments up to this point: injection, physical therapy, nsaids    Review of Systems:  Constitutional/General: Negative for fever, chills, change in weight  Integumentary/Skin: Negative for worrisome rashes, moles, or lesions  Neuro: Negative for weakness, dizziness, or paresthesias   Psychiatric: negative for changes in mood or affect    X-rays:  From today, reviewed with the patient by phone:   Right knee: Advanced lateral and moderate to advanced patellofemoral  degenerative changes similar to the prior study.     Left knee: Moderate marginal osteophyte formation in the lateral  compartment without significant loss of joint space. Moderate to  advanced patellofemoral degenerative changes    Radiographs of the right knee from 11/21/2019 were reviewed with the patient, and demonstrate: shows severe lateral joint space narrowing with essentially bone-on-bone apposition. Moderate lateral osteophytes. Mild degenerative changes in the patellofemoral joint. Suspect some osteopenia.      Radiographs of the left knee from 10/8/2018 were reviewed with the patient, and demonstrate: shows moderate lateral joint space narrowing with small to moderate osteophytes laterally. Moderate degenerative disease in the patellofemoral joint    ASSESSMENT:   Bilateral knee osteoarthritis -- Severe on the right, moderate on the left    PLAN:    she would like repeat injections today.  With the patient's consent, bilateral  knee(s) injected intra-articularly with 80mg of Depomedrol and 4cc of local anesthetic after sterile prep.    I have suggested total knee arthroplasty of the right  knee. I've talked in depth about the procedure and the risks, which include, but are not limited to blood loss requiring transfusion, infection, neurovascular injury, DVT, PE, continued pain, (both perioperative and persistent post-recovery pain), numbness around the wound, instability, and potential anesthetic and perioperative medical complications, and the possibility of needing additional procedures. We also talked about recovery time, which differs from patient to patient. Medical clearance would need to be obtained.     She was hoping to hold off on total knee arthroplasty if she can, but we discussed the downsides of repeated injections, and when she returns here in October, she thinks she may be willing to discuss total knee arthroplasty at that time.    She understands she needs to wait a minimum of three months after an injection to consider total knee arthroplasty in the knee that had been injected.    Return to clinic: as needed     JUDIE Oro MD  Dept. Orthopedic Surgery  [facility]       Large Joint Injection/Arthocentesis: bilateral knee    Date/Time: 7/1/2020 9:41 AM  Performed by: Aki Butt  Authorized by: Ross Oro MD     Indications:  Pain and osteoarthritis  Needle Size:  22 G  Approach:  Anterolateral  Location:  Knee  Laterality:  Bilateral      Medications (Right):  80 mg methylPREDNISolone 80 MG/ML; 4 mL lidocaine (PF) 1 %  Medications (Left):  80 mg methylPREDNISolone 80 MG/ML; 4 mL lidocaine (PF) 1 %  Outcome:  Tolerated well, no immediate complications  Procedure discussed: discussed risks, benefits, and alternatives    Consent Given by:  Patient  Timeout: timeout called immediately prior to procedure    Prep: patient was prepped and draped in usual sterile fashion            Again, thank  you for allowing me to participate in the care of your patient.        Sincerely,        Ross Oro MD

## 2020-07-01 NOTE — PATIENT INSTRUCTIONS
Julianna to follow up with Primary Care provider regarding elevated blood pressure.  Cortisone Injections  Cortisone is a type of steroid. It can greatly reduce inflammation (swelling, redness, and irritation). Being injected with cortisone is simple and doesn t take long. But your doctor may ask you questions about your health. Certain medical conditions, such as diabetes, can be affected by cortisone.     Your pain may be relieved by a cortisone injection.    Why Have a Cortisone Injection?  Injecting cortisone can relieve pain for anything from a sports injury to arthritis. Your doctor may suggest an injection if rest, splints, or oral medication doesn t relieve your pain. Injecting cortisone is simpler than having surgery. And cortisone may provide the lasting pain relief that can help you get out and enjoy life again.  Getting the Injection  Your injection will  start by cleaning and numbing your skin at the injection site. Next, you ll be injected with local anesthetics (for short-term pain relief) and cortisone. The injection may last a few moments. A small bandage will be applied over the injection site. You ll then be ready to go home.  After Your Injection  After being injected, make sure you don t injure the treated region. But stay active. Enjoy a walk or some other mild activity. Just be careful not to strain the region that gave you trouble.  The Next Day or Two  Some patients feel more pain after being injected. This is normal, and it will go away soon. Applying ice for 20 minutes at a time to your injury may reduce the increased pain. Rest for the first day or two. You don t need to stay in bed. But avoid tasks that may strain the injured region.  If You Have Diabetes  Cortisone injections can cause blood sugar to be increased for several days after the injection. Follow your regular plan for what to do when your blood sugar is elevated.     You may have the area injected, in general, every three to  "four months.  This is the estimated amount of time that the body takes to metabolize the \"cortisone.\"  Getting injections too frequently may result in a softening of the cartilage and cause the joint to actually wear out more quickly.  "

## 2020-11-26 DIAGNOSIS — I10 BENIGN ESSENTIAL HYPERTENSION: ICD-10-CM

## 2020-11-26 DIAGNOSIS — I71.21 ASCENDING AORTIC ANEURYSM (H): ICD-10-CM

## 2020-11-26 DIAGNOSIS — R00.2 PALPITATIONS: ICD-10-CM

## 2020-11-28 RX ORDER — METOPROLOL TARTRATE 50 MG
50 TABLET ORAL 2 TIMES DAILY
Qty: 180 TABLET | Refills: 0 | Status: SHIPPED | OUTPATIENT
Start: 2020-11-28 | End: 2021-03-25

## 2020-11-28 RX ORDER — LISINOPRIL 20 MG/1
20 TABLET ORAL DAILY
Qty: 90 TABLET | Refills: 0 | Status: SHIPPED | OUTPATIENT
Start: 2020-11-28 | End: 2021-03-03

## 2020-12-14 NOTE — PROGRESS NOTES
Returned call code provided. Tell me how you have been doing since you were discharged from the hospital ?  Patient states she is doing well, she is able to complete her ADLs independently, us up walking around the house and sleeping fairly well.  Patient states she wakes ~ every 3 hours during the night.    ACTIVITY  How is your activity tolerance? Getting better every day.    POST OP MONITORING  How is your pain on a 0-10 scale, how are you managing your pain? Pain is tolerable, takes tylenol during the day and the dilaudid at night.    Are you still doing sternal precautions? Yes.     Do you hear any clicking when you are moving or taking a deep breath?  No    Are you weighing yourself daily?  Patient does not have a scale but is watching for S/Sx of fluid retention.  No swelling, coughing, bloated feeling or shortness of breath.        SIGNS AND SYMPTOMS OF INFECTION  1. INCREASE IN PAIN No  2. FEVER No  3. DRAINAGE No    If Yes, color:                 5. REDNESS No    6. SWELLING No     ASSISTANCE  Do you have someone at home to assist you with your daily activities?  Spouse and daughter are available      MEDICATIONS  Is someone helping you to set up your medications?  Pt is independent with meds.   Do you have any questions about your medications?  No     Are you on a blood thinner?  No  Who is managing your INRs?  N/A     FOLLOW UP  Are you scheduled for cardiac rehab? Not yet, patient is waiting for call from [facility], is she doesn't hear from them by Monday she will call them.  Instructed patient to have them call this writer if they need any records or orders.       You are scheduled to see our surgery advanced practice provider for post operative follow up on 11/14   You are scheduled to see your cardiologist on.  Patient will make an appointment in 6 - 8 weeks.   You are scheduled to see your primary care physician on.  Patient will make an appointment on Monday.  She was not aware she needed to see her PCP.        CONTACT  INFORMATION  Please feel free to call us with any other questions or symptoms that are concerning for you at [phone], if it is after 4:30 in the afternoon, or a weekend please call [phone] and ask for the on call specialist.  We want to do everything we can to help prevent you needing to return to the ED, so please do not hesitate to call us.        If you could change anything about the entire process start to finish what would it be?  Nothing, everyone was wonderful and she is happy with her care.

## 2021-03-02 DIAGNOSIS — I71.21 ASCENDING AORTIC ANEURYSM (H): ICD-10-CM

## 2021-03-02 DIAGNOSIS — I10 BENIGN ESSENTIAL HYPERTENSION: ICD-10-CM

## 2021-03-03 RX ORDER — LISINOPRIL 20 MG/1
20 TABLET ORAL DAILY
Qty: 90 TABLET | Refills: 0 | Status: SHIPPED | OUTPATIENT
Start: 2021-03-03 | End: 2021-03-25

## 2021-03-22 DIAGNOSIS — Z12.31 VISIT FOR SCREENING MAMMOGRAM: ICD-10-CM

## 2021-03-22 PROCEDURE — 77067 SCR MAMMO BI INCL CAD: CPT | Mod: TC | Performed by: RADIOLOGY

## 2021-03-22 NOTE — RESULT ENCOUNTER NOTE
Dear Georgie,    Your recent test results are attached.      Normal mammogram.    If you have any questions please feel free to contact [phone] or myself via [employer].    Sincerely,  Marlys Wade, CNP

## 2021-03-23 ENCOUNTER — IMMUNIZATION (OUTPATIENT)
Dept: NURSING | Facility: CLINIC | Age: 66
End: 2021-03-23
Payer: COMMERCIAL

## 2021-03-23 PROCEDURE — 0001A PR COVID VAC PFIZER DIL RECON 30 MCG/0.3 ML IM: CPT

## 2021-03-23 PROCEDURE — 91300 PR COVID VAC PFIZER DIL RECON 30 MCG/0.3 ML IM: CPT

## 2021-03-25 ENCOUNTER — OFFICE VISIT (OUTPATIENT)
Dept: FAMILY MEDICINE | Facility: CLINIC | Age: 66
End: 2021-03-25
Payer: COMMERCIAL

## 2021-03-25 VITALS
OXYGEN SATURATION: 95 % | TEMPERATURE: 98.3 F | WEIGHT: 212 LBS | HEART RATE: 94 BPM | HEIGHT: 65 IN | SYSTOLIC BLOOD PRESSURE: 152 MMHG | BODY MASS INDEX: 35.32 KG/M2 | DIASTOLIC BLOOD PRESSURE: 82 MMHG

## 2021-03-25 DIAGNOSIS — Z86.79 S/P ASCENDING AORTIC ANEURYSM REPAIR: ICD-10-CM

## 2021-03-25 DIAGNOSIS — I71.21 ASCENDING AORTIC ANEURYSM (H): ICD-10-CM

## 2021-03-25 DIAGNOSIS — Z12.4 SCREENING FOR CERVICAL CANCER: ICD-10-CM

## 2021-03-25 DIAGNOSIS — Z12.11 SCREEN FOR COLON CANCER: ICD-10-CM

## 2021-03-25 DIAGNOSIS — Z00.00 ROUTINE HISTORY AND PHYSICAL EXAMINATION OF ADULT: Primary | ICD-10-CM

## 2021-03-25 DIAGNOSIS — E04.1 THYROID NODULE: ICD-10-CM

## 2021-03-25 DIAGNOSIS — N81.9 FEMALE GENITAL PROLAPSE, UNSPECIFIED TYPE: ICD-10-CM

## 2021-03-25 DIAGNOSIS — Z98.890 S/P ASCENDING AORTIC ANEURYSM REPAIR: ICD-10-CM

## 2021-03-25 DIAGNOSIS — R35.0 URINARY FREQUENCY: ICD-10-CM

## 2021-03-25 DIAGNOSIS — I10 BENIGN ESSENTIAL HYPERTENSION: ICD-10-CM

## 2021-03-25 DIAGNOSIS — R82.90 NONSPECIFIC FINDING ON EXAMINATION OF URINE: ICD-10-CM

## 2021-03-25 DIAGNOSIS — R00.2 PALPITATIONS: ICD-10-CM

## 2021-03-25 DIAGNOSIS — N30.00 ACUTE CYSTITIS WITHOUT HEMATURIA: ICD-10-CM

## 2021-03-25 LAB
ALBUMIN UR-MCNC: NEGATIVE MG/DL
ANION GAP SERPL CALCULATED.3IONS-SCNC: 2 MMOL/L (ref 3–14)
APPEARANCE UR: ABNORMAL
BACTERIA #/AREA URNS HPF: ABNORMAL /HPF
BILIRUB UR QL STRIP: NEGATIVE
BUN SERPL-MCNC: 28 MG/DL (ref 7–30)
CALCIUM SERPL-MCNC: 9.5 MG/DL (ref 8.5–10.1)
CHLORIDE SERPL-SCNC: 106 MMOL/L (ref 94–109)
CHOLEST SERPL-MCNC: 227 MG/DL
CO2 SERPL-SCNC: 31 MMOL/L (ref 20–32)
COLOR UR AUTO: YELLOW
CREAT SERPL-MCNC: 0.62 MG/DL (ref 0.52–1.04)
GFR SERPL CREATININE-BSD FRML MDRD: >90 ML/MIN/{1.73_M2}
GLUCOSE SERPL-MCNC: 109 MG/DL (ref 70–99)
GLUCOSE UR STRIP-MCNC: NEGATIVE MG/DL
HDLC SERPL-MCNC: 61 MG/DL
HGB UR QL STRIP: ABNORMAL
KETONES UR STRIP-MCNC: NEGATIVE MG/DL
LDLC SERPL CALC-MCNC: 146 MG/DL
LEUKOCYTE ESTERASE UR QL STRIP: ABNORMAL
NITRATE UR QL: NEGATIVE
NON-SQ EPI CELLS #/AREA URNS LPF: ABNORMAL /LPF
NONHDLC SERPL-MCNC: 166 MG/DL
PH UR STRIP: 6 PH (ref 5–7)
POTASSIUM SERPL-SCNC: 4.3 MMOL/L (ref 3.4–5.3)
RBC #/AREA URNS AUTO: ABNORMAL /HPF
SODIUM SERPL-SCNC: 139 MMOL/L (ref 133–144)
SOURCE: ABNORMAL
SP GR UR STRIP: 1.02 (ref 1–1.03)
TRIGL SERPL-MCNC: 100 MG/DL
TSH SERPL DL<=0.005 MIU/L-ACNC: 0.44 MU/L (ref 0.4–4)
UROBILINOGEN UR STRIP-ACNC: 0.2 EU/DL (ref 0.2–1)
WBC #/AREA URNS AUTO: ABNORMAL /HPF

## 2021-03-25 PROCEDURE — 99396 PREV VISIT EST AGE 40-64: CPT | Performed by: NURSE PRACTITIONER

## 2021-03-25 PROCEDURE — 87186 SC STD MICRODIL/AGAR DIL: CPT | Performed by: NURSE PRACTITIONER

## 2021-03-25 PROCEDURE — G0145 SCR C/V CYTO,THINLAYER,RESCR: HCPCS | Performed by: NURSE PRACTITIONER

## 2021-03-25 PROCEDURE — 87624 HPV HI-RISK TYP POOLED RSLT: CPT | Performed by: NURSE PRACTITIONER

## 2021-03-25 PROCEDURE — 80061 LIPID PANEL: CPT | Performed by: NURSE PRACTITIONER

## 2021-03-25 PROCEDURE — 99213 OFFICE O/P EST LOW 20 MIN: CPT | Mod: 25 | Performed by: NURSE PRACTITIONER

## 2021-03-25 PROCEDURE — 81001 URINALYSIS AUTO W/SCOPE: CPT | Performed by: NURSE PRACTITIONER

## 2021-03-25 PROCEDURE — 80048 BASIC METABOLIC PNL TOTAL CA: CPT | Performed by: NURSE PRACTITIONER

## 2021-03-25 PROCEDURE — 84443 ASSAY THYROID STIM HORMONE: CPT | Performed by: NURSE PRACTITIONER

## 2021-03-25 PROCEDURE — 87086 URINE CULTURE/COLONY COUNT: CPT | Performed by: NURSE PRACTITIONER

## 2021-03-25 PROCEDURE — 87088 URINE BACTERIA CULTURE: CPT | Performed by: NURSE PRACTITIONER

## 2021-03-25 PROCEDURE — 36415 COLL VENOUS BLD VENIPUNCTURE: CPT | Performed by: NURSE PRACTITIONER

## 2021-03-25 RX ORDER — LISINOPRIL 30 MG/1
30 TABLET ORAL DAILY
Qty: 90 TABLET | Refills: 3 | Status: SHIPPED | OUTPATIENT
Start: 2021-03-25 | End: 2021-10-08

## 2021-03-25 RX ORDER — METOPROLOL TARTRATE 50 MG
50 TABLET ORAL 2 TIMES DAILY
Qty: 180 TABLET | Refills: 3 | Status: SHIPPED | OUTPATIENT
Start: 2021-03-25 | End: 2022-06-17

## 2021-03-25 ASSESSMENT — ENCOUNTER SYMPTOMS
HEMATURIA: 0
COUGH: 0
CONSTIPATION: 0
HEMATOCHEZIA: 0
ABDOMINAL PAIN: 0
CHILLS: 0
DIARRHEA: 0

## 2021-03-25 ASSESSMENT — PAIN SCALES - GENERAL: PAINLEVEL: SEVERE PAIN (6)

## 2021-03-25 ASSESSMENT — MIFFLIN-ST. JEOR: SCORE: 1525

## 2021-03-25 NOTE — RESULT ENCOUNTER NOTE
Dear Georgie,    Your recent test results are attached.      Your urine shows a possible bladder infection.  I will await the culture results and contact  You.    If you have any questions please feel free to contact [phone] or myself via [employer].    Sincerely,  Marlys Wade, CNP

## 2021-03-25 NOTE — PROGRESS NOTES
SUBJECTIVE:   CC: Georgie Patino is an 65 year old woman who presents for preventive health visit.       Patient has been advised of split billing requirements and indicates understanding: Yes  Healthy Habits:     Getting at least 3 servings of Calcium per day:  Yes    Bi-annual eye exam:  Yes    Dental care twice a year:  NO    Sleep apnea or symptoms of sleep apnea:  Daytime drowsiness    Diet:  Regular (no restrictions)    Frequency of exercise:  None    Taking medications regularly:  Yes    Medication side effects:  None    PHQ-2 Total Score: 2    Additional concerns today:  No    Patient notes increased urinary frequency and vaginal heaviness.  Patient denies vaginal bleeding.    Patient has not checked blood pressure at home.  She denies dizziness, chest pain, shortness of breath.      Today's PHQ-2 Score:   PHQ-2 ( 1999 Pfizer) 3/25/2021   Q1: Little interest or pleasure in doing things 1   Q2: Feeling down, depressed or hopeless 1   PHQ-2 Score 2   Q1: Little interest or pleasure in doing things Several days   Q2: Feeling down, depressed or hopeless Several days   PHQ-2 Score 2       Abuse: Current or Past (Physical, Sexual or Emotional) - No  Do you feel safe in your environment? Yes        Social History     Tobacco Use     Smoking status: Former Smoker     Packs/day: 0.05     Years: 40.00     Pack years: 2.00     Types: Cigarettes, Other     Quit date: 10/27/2017     Years since quitting: 3.4     Smokeless tobacco: Never Used     Tobacco comment: 2 cigs daily   Substance Use Topics     Alcohol use: Yes     Alcohol/week: 0.8 - 1.7 standard drinks     Types: 1 - 2 Standard drinks or equivalent per week     Drinks per session: 3 or 4     Comment: 3-4 drinks per week          Alcohol Use 3/25/2021   Prescreen: >3 drinks/day or >7 drinks/week? No   Prescreen: >3 drinks/day or >7 drinks/week? -         Reviewed orders with patient.  Reviewed health maintenance and updated orders accordingly - Yes  Lab work  is in process  BP Readings from Last 3 Encounters:   03/25/21 (!) 152/82   07/01/20 (!) 185/95   11/26/19 (!) 141/80    Wt Readings from Last 3 Encounters:   03/25/21 96.2 kg (212 lb)   07/01/20 95.7 kg (211 lb)   11/21/19 95.7 kg (211 lb)                  Patient Active Problem List   Diagnosis     CARDIOVASCULAR SCREENING; LDL GOAL LESS THAN 160     Obesity     Benign essential hypertension     Family history of sudden cardiac death     Hypertension     Anxiety     S/P ascending aortic aneurysm repair     Former smoker     Thyroid nodule     Past Surgical History:   Procedure Laterality Date     COLONOSCOPY WITH CO2 INSUFFLATION N/A 3/15/2017    Procedure: COLONOSCOPY WITH CO2 INSUFFLATION;  Surgeon: Duane, William Charles, MD;  Location: [facility] OR     DILATION AND CURETTAGE  age 20     EXTRACORPOREAL SHOCK WAVE LITHOTRIPSY, CYSTOSCOPY, INSERT STENT URETER(S), COMBINED Bilateral 11/19/2018    Procedure: BILATERAL EXTRACORPOREAL SHOCK WAVE LITHOTRIPSY, CYSTOSCOPY, LEFT STENT PLACEMENT;  Surgeon: Tavo Saavedra MD;  Location: [facility] OR     REPAIR ANEURYSM ASCENDING AORTA N/A 10/27/2017    Procedure: REPAIR ANEURYSM ASCENDING AORTA;  Median Sternotomy, Cardiopulmonary bypass, Ascending Aorta Aneurysm Repair using Hemashield Platinum Woven Double Velour Graft 34cm X 30cm.;  Surgeon: Rubio Piña MD;  Location: [facility] OR     [place] teeth removed         Social History     Tobacco Use     Smoking status: Former Smoker     Packs/day: 0.05     Years: 40.00     Pack years: 2.00     Types: Cigarettes, Other     Quit date: 10/27/2017     Years since quitting: 3.4     Smokeless tobacco: Never Used     Tobacco comment: 2 cigs daily   Substance Use Topics     Alcohol use: Yes     Alcohol/week: 0.8 - 1.7 standard drinks     Types: 1 - 2 Standard drinks or equivalent per week     Drinks per session: 3 or 4     Comment: 3-4 drinks per week      Family History   Problem Relation Age of Onset     Respiratory Mother         copd      Cancer Maternal Grandmother         Leg     Alzheimer Disease Maternal Grandfather      Heart Disease Paternal Grandfather      Heart Disease Brother 54        Heart Attack      Cancer Sister         Lung cancer age 55-smoker d age 57         Current Outpatient Medications   Medication Sig Dispense Refill     acetaminophen (TYLENOL) 325 MG tablet Take 2 tablets (650 mg) by mouth every 4 hours as needed for other (surgical pain) 100 tablet 0     aspirin EC 81 MG EC tablet Take 1 tablet (81 mg) by mouth daily 30 tablet 0     diclofenac (VOLTAREN) 1 % topical gel Place 4 g onto the skin 4 times daily 100 g 3     lisinopril (ZESTRIL) 30 MG tablet Take 1 tablet (30 mg) by mouth daily 90 tablet 3     melatonin 3 MG tablet Take 1 tablet (3 mg) by mouth nightly as needed for sleep 30 tablet 0     metoprolol tartrate (LOPRESSOR) 50 MG tablet Take 1 tablet (50 mg) by mouth 2 times daily 180 tablet 3     Allergies   Allergen Reactions     Blood Transfusion Related (Informational Only) Other (See Comments)     Patient has a history of a clinically significant antibody against RBC antigens.  A delay in compatible RBCs may occur.       Breast CA Risk Screening:  Breast CA Risk Assessment (FHS-7) 3/25/2021   Do you have a family history of breast, colon, or ovarian cancer? Yes   Did any of your first-degree relatives have breast or ovarian cancer? Yes   Did any of your relatives have bilateral breast cancer? Yes   Did any man in your family have breast cancer? No         Mammogram Screening: Recommended mammography every 1-2 years with patient discussion and risk factor consideration  Pertinent mammograms are reviewed under the imaging tab.    History of abnormal Pap smear: NO - age 30-65 PAP every 5 years with negative HPV co-testing recommended  PAP / HPV Latest Ref Rng & Units 3/14/2016 5/30/2012   PAP - NIL NIL   HPV 16 DNA NEG Negative -   HPV 18 DNA NEG Negative -   OTHER HR HPV NEG Negative -     Reviewed and updated as  needed this visit by clinical staff   Allergies  Meds              Reviewed and updated as needed this visit by Provider                    Review of Systems   Constitutional: Negative for chills.   HENT: Negative for congestion.    Respiratory: Negative for cough.    Cardiovascular: Negative for chest pain.   Gastrointestinal: Negative for abdominal pain, constipation, diarrhea and hematochezia.   Genitourinary: Negative for hematuria.          OBJECTIVE:   There were no vitals taken for this visit.  Physical Exam  GENERAL: healthy, alert and no distress  EYES: Eyes grossly normal to inspection, PERRL and conjunctivae and sclerae normal  HENT: ear canals and TM's normal, nose and mouth without ulcers or lesions  NECK: no adenopathy, no asymmetry, masses, or scars and thyroid normal to palpation  RESP: lungs clear to auscultation - no rales, rhonchi or wheezes  BREAST: normal without masses, tenderness or nipple discharge and no palpable axillary masses or adenopathy  CV: regular rate and rhythm, normal S1 S2, no S3 or S4, no murmur, click or rub, no peripheral edema and peripheral pulses strong  ABDOMEN: soft, nontender, no hepatosplenomegaly, no masses and bowel sounds normal   (female): normal female external genitalia, normal urethral meatus , vaginal mucosa pink, moist, well rugated, normal cervix, adnexae, and uterus without masses. and cystocele present  MS: no gross musculoskeletal defects noted, no edema  NEURO: Normal strength and tone, mentation intact and speech normal  PSYCH: mentation appears normal, affect normal/bright    Diagnostic Test Results:  pending    ASSESSMENT/PLAN:   1. Routine history and physical examination of adult    - Lipid panel reflex to direct LDL Fasting  - Urine Microscopic    2. S/P ascending aortic aneurysm repair  Patient due for follow-up.  - CARDIOLOGY EVAL ADULT REFERRAL; Future    3. Ascending aortic aneurysm (H)  As above.   - lisinopril (ZESTRIL) 30 MG tablet; Take 1  "tablet (30 mg) by mouth daily  Dispense: 90 tablet; Refill: 3    4. Benign essential hypertension  Increase lisinopril as below due to uncontrolled blood pressure.  - Basic metabolic panel  (Ca, Cl, CO2, Creat, Gluc, K, Na, BUN)  - lisinopril (ZESTRIL) 30 MG tablet; Take 1 tablet (30 mg) by mouth daily  Dispense: 90 tablet; Refill: 3    5. Palpitations  Stable.  Continue current treatment plan and medications.   - metoprolol tartrate (LOPRESSOR) 50 MG tablet; Take 1 tablet (50 mg) by mouth 2 times daily  Dispense: 180 tablet; Refill: 3    6. Thyroid nodule    - TSH with free T4 reflex    7. Urinary frequency  Consider uro/gyn follow-up pending results.  - *UA reflex to Microscopic and Culture ([facility] (except [place] and [facility])    8. Screening for cervical cancer    - Pap imaged thin layer screen with HPV - recommended age 30 - 65 years (select HPV order below)  - HPV High Risk Types DNA Cervical    9. Screen for colon cancer    - GASTROENTEROLOGY ADULT REF PROCEDURE ONLY; Future    10. Female genital prolapse, unspecified type  As above.     11. Nonspecific finding on examination of urine    - Urine Culture Aerobic Bacterial    Patient has been advised of split billing requirements and indicates understanding: Yes  COUNSELING:  Reviewed preventive health counseling, as reflected in patient instructions       Regular exercise       Healthy diet/nutrition       Colon cancer screening       Consider lung cancer screening for ages 55-80 years and 30 pack-year smoking history     Estimated body mass index is 34.84 kg/m  as calculated from the following:    Height as of 7/1/20: 1.657 m (5' 5.25\").    Weight as of 7/1/20: 95.7 kg (211 lb).    Weight management plan: Discussed healthy diet and exercise guidelines    She reports that she quit smoking about 3 years ago. Her smoking use included cigarettes and other. She has a 2.00 pack-year smoking history. She has never used smokeless " tobacco.      Counseling Resources:  ATP IV Guidelines  Pooled Cohorts Equation Calculator  Breast Cancer Risk Calculator  BRCA-Related Cancer Risk Assessment: FHS-7 Tool  FRAX Risk Assessment  ICSI Preventive Guidelines  Dietary Guidelines for Americans, 2010  USDA's MyPlate  ASA Prophylaxis  Lung CA Screening    TESHA Chanel CNP  [facility]

## 2021-03-27 LAB
BACTERIA SPEC CULT: ABNORMAL
Lab: ABNORMAL
SPECIMEN SOURCE: ABNORMAL

## 2021-03-29 LAB
COPATH REPORT: NORMAL
PAP: NORMAL

## 2021-03-29 RX ORDER — NITROFURANTOIN 25; 75 MG/1; MG/1
100 CAPSULE ORAL 2 TIMES DAILY
Qty: 14 CAPSULE | Refills: 0 | Status: SHIPPED | OUTPATIENT
Start: 2021-03-29 | End: 2021-04-05

## 2021-03-29 NOTE — RESULT ENCOUNTER NOTE
Dear Georgie,    Your recent test results are attached.      Normal thyroid.  Normal kidney function and electrolytes.  Elevated cholesterol.  We'll discuss at your follow-up.  My nurse will call you about treating your bladder infection.      If you have any questions please feel free to contact [phone] or myself via [employer].    Sincerely,  Marlys Wade, CNP

## 2021-03-30 LAB
FINAL DIAGNOSIS: NORMAL
HPV HR 12 DNA CVX QL NAA+PROBE: NEGATIVE
HPV16 DNA SPEC QL NAA+PROBE: NEGATIVE
HPV18 DNA SPEC QL NAA+PROBE: NEGATIVE
SPECIMEN DESCRIPTION: NORMAL
SPECIMEN SOURCE CVX/VAG CYTO: NORMAL

## 2021-04-01 PROBLEM — Z12.4 SCREENING FOR CERVICAL CANCER: Status: ACTIVE | Noted: 2021-04-01

## 2021-04-13 ENCOUNTER — IMMUNIZATION (OUTPATIENT)
Dept: NURSING | Facility: CLINIC | Age: 66
End: 2021-04-13
Attending: RADIOLOGY
Payer: COMMERCIAL

## 2021-04-13 PROCEDURE — 91300 PR COVID VAC PFIZER DIL RECON 30 MCG/0.3 ML IM: CPT

## 2021-04-13 PROCEDURE — 0002A PR COVID VAC PFIZER DIL RECON 30 MCG/0.3 ML IM: CPT

## 2021-04-17 ENCOUNTER — HEALTH MAINTENANCE LETTER (OUTPATIENT)
Age: 66
End: 2021-04-17

## 2021-05-05 ENCOUNTER — TELEPHONE (OUTPATIENT)
Dept: CARDIOLOGY | Facility: CLINIC | Age: 66
End: 2021-05-05

## 2021-05-05 DIAGNOSIS — I10 BENIGN ESSENTIAL HYPERTENSION: ICD-10-CM

## 2021-05-05 DIAGNOSIS — Z98.890 HISTORY OF ASCENDING AORTA REPAIR: Primary | ICD-10-CM

## 2021-05-05 NOTE — TELEPHONE ENCOUNTER
8/27/21 Jim Ham MD.,Cardiology  First visit .03/25/21 Marlys Wade APRN CNP. Z98.890, Z86.79 (ICD-10-CM) - S/P ascending aortic aneurysm repair work que  referral .   Patient reports no symptoms at this time and overdue for a 6 month visit  .  Should [facility]. Provider  see patient prior aug. .Ang Griffith L.P.N.,[facility]. Dept.

## 2021-05-05 NOTE — TELEPHONE ENCOUNTER
[facility] Call Center    Phone Message    May a detailed message be left on voicemail: no     Reason for Call: Other: Pt Julianna is returning a phone call regarding 5/11/2021 appointment, please reach back out to her at [phone].     Action Taken: Message routed to:  Other: [facility]    Travel Screening: Not Applicable

## 2021-05-07 NOTE — TELEPHONE ENCOUNTER
Denia Anderson PA-C.  May18, or Diane Wong APRN-CNP.  May 25 after echocardiogram noted in message  Left message to return clinic call to .  Ang Griffith L.P.N.

## 2021-05-28 ENCOUNTER — ANCILLARY PROCEDURE (OUTPATIENT)
Dept: CARDIOLOGY | Facility: CLINIC | Age: 66
End: 2021-05-28
Attending: INTERNAL MEDICINE
Payer: COMMERCIAL

## 2021-05-28 DIAGNOSIS — Z98.890 HISTORY OF ASCENDING AORTA REPAIR: ICD-10-CM

## 2021-05-28 DIAGNOSIS — I10 BENIGN ESSENTIAL HYPERTENSION: ICD-10-CM

## 2021-05-28 PROCEDURE — 93306 TTE W/DOPPLER COMPLETE: CPT | Performed by: STUDENT IN AN ORGANIZED HEALTH CARE EDUCATION/TRAINING PROGRAM

## 2021-06-29 NOTE — PATIENT INSTRUCTIONS
Cortisone Injections  Cortisone is a type of steroid. It can greatly reduce inflammation (swelling, redness, and irritation). Being injected with cortisone is simple and doesn t take long. But your doctor may ask you questions about your health. Certain medical conditions, such as diabetes, can be affected by cortisone.     Your pain may be relieved by a cortisone injection.    Why Have a Cortisone Injection?  Injecting cortisone can relieve pain for anything from a sports injury to arthritis. Your doctor may suggest an injection if rest, splints, or oral medication doesn t relieve your pain. Injecting cortisone is simpler than having surgery. And cortisone may provide the lasting pain relief that can help you get out and enjoy life again.  Getting the Injection  Your injection will  start by cleaning and numbing your skin at the injection site. Next, you ll be injected with local anesthetics (for short-term pain relief) and cortisone. The injection may last a few moments. A small bandage will be applied over the injection site. You ll then be ready to go home.  After Your Injection  After being injected, make sure you don t injure the treated region. But stay active. Enjoy a walk or some other mild activity. Just be careful not to strain the region that gave you trouble.  The Next Day or Two  Some patients feel more pain after being injected. This is normal, and it will go away soon. Applying ice for 20 minutes at a time to your injury may reduce the increased pain. Rest for the first day or two. You don t need to stay in bed. But avoid tasks that may strain the injured region.  If You Have Diabetes  Cortisone injections can cause blood sugar to be increased for several days after the injection. Follow your regular plan for what to do when your blood sugar is elevated.     You may have the area injected, in general, every three to four months.  This is the estimated amount of time that the body takes to metabolize  Department of Internal Medicine  Nephrology Attending Progress Note    Events reviewed. SUBJECTIVE: We are following [address] [place] [address] for ESRD on peritoneal dialysis. Reports no complaints.     PHYSICAL EXAM:      Vitals:    VITALS:  BP (!) 138/58   Pulse 102   Temp 97.9 °F (36.6 °C)   Resp 18   Ht 5' 4\" (1.626 m)   Wt 136 lb 11 oz (62 kg)   LMP 02/21/2021   SpO2 95%   BMI 23.46 kg/m²   24HR INTAKE/OUTPUT:      Intake/Output Summary (Last 24 hours) at 6/29/2021 1452  Last data filed at 6/29/2021 1445  Gross per 24 hour   Intake 570 ml   Output 1122 ml   Net -552 ml       PD Catheter Exam:  PD catheter exit site clean    Constitutional: Awake in no acute distress  HEENT:  Normocephalic, PERRL  Respiratory: Decreased breath sounds on the basis  Cardiovascular/Edema:  RRR, S1/S2  Gastrointestinal:  Soft, PD catheter exit site clean   Neurologic:  Nonfocal, AVELAR  Skin:  Warm, dry, no lesions  Other:  no edema    Scheduled Meds:   epoetin nena-epbx  3,000 Units Subcutaneous Once per day on Mon Wed Fri    famotidine (PEPCID) injection  10 mg Intravenous Daily    sodium chloride flush  10 mL Intravenous 2 times per day    sodium chloride  15 mL/kg Intravenous Once    heparin (porcine)  5,000 Units Subcutaneous 3 times per day    metoclopramide  5 mg Oral TID    rOPINIRole  0.25 mg Oral Nightly    mirtazapine  15 mg Oral Nightly    hydrOXYzine  25 mg Oral Daily    ARIPiprazole  5 mg Oral Nightly    sevelamer  800 mg Oral TID WC    hydrALAZINE  10 mg Oral BID    metoprolol tartrate  25 mg Oral Daily    pantoprazole  40 mg Oral QAM AC    levothyroxine  75 mcg Oral Daily    folic acid  1 mg Oral Daily    piperacillin-tazobactam  3,375 mg Intravenous Q12H    insulin glargine  7 Units Subcutaneous Daily    insulin lispro  0-12 Units Subcutaneous TID WC    insulin lispro  0-6 Units Subcutaneous Nightly    gentamicin   Topical Daily    sodium chloride flush  5-40 mL Intravenous BID     Continuous "the \"cortisone.\"  Getting injections too frequently may result in a softening of the cartilage and cause the joint to actually wear out more quickly.  " and having uncontrolled glucose levels. In the emergency room she was found to have a glucose level of 874, beta hydroxybutyrate of 3.94. She was admitted to MICU. Problems resolved. · Severe hypoalbuminemia/malnutrition      IMPRESSION/RECOMMENDATIONS:      1. ESRD on peritoneal dialysis, to continue CCPD 4 exchanges over 10 hours of 1.5% with long dwell of 2.5%  2. HTN, on hydralazine 10 mg twice daily and metoprolol 25 mg daily  -------------------------------------------------------  3. UTI, on piperacillin-tazobactam  4. MBD of CKD, on sevelamer  5.  Anemia of CKD, on epoetin alpha      Plan:    · Continue CCPD 4 exchanges over 10 hours of 1.5% with long dwell of 2.5%  · Continue to monitor electrolytes  · Continue epoetin alpha 3000 units 3 times a week  · Continue gentamycin cream to PD site  · Discharge planning

## 2021-07-22 DIAGNOSIS — I71.21 ASCENDING AORTIC ANEURYSM (H): ICD-10-CM

## 2021-07-22 DIAGNOSIS — I10 BENIGN ESSENTIAL HYPERTENSION: ICD-10-CM

## 2021-07-23 RX ORDER — LISINOPRIL 20 MG/1
TABLET ORAL
Qty: 90 TABLET | Refills: 0 | OUTPATIENT
Start: 2021-07-23

## 2021-08-13 ENCOUNTER — TELEPHONE (OUTPATIENT)
Dept: CARDIOLOGY | Facility: CLINIC | Age: 66
End: 2021-08-13

## 2021-08-13 DIAGNOSIS — E78.2 MIXED HYPERLIPIDEMIA: ICD-10-CM

## 2021-08-13 DIAGNOSIS — I10 BENIGN ESSENTIAL HYPERTENSION: ICD-10-CM

## 2021-08-13 DIAGNOSIS — Z98.890 HISTORY OF ASCENDING AORTA REPAIR: Primary | ICD-10-CM

## 2021-08-13 NOTE — TELEPHONE ENCOUNTER
Pt scheduled to see Dr Fernandez 8/27/21- last visit 2019- echo was done in May    Pt should have fasting labs prior to appt with Dr Fernandez.     Orders placed, will contact pt

## 2021-08-16 NOTE — TELEPHONE ENCOUNTER
Left message for patient to call back to schedule fasting labs 1-2 days prior to  appointment.     Brooklyn Shen A

## 2021-10-06 ENCOUNTER — LAB (OUTPATIENT)
Dept: LAB | Facility: CLINIC | Age: 66
End: 2021-10-06
Payer: COMMERCIAL

## 2021-10-06 DIAGNOSIS — E78.2 MIXED HYPERLIPIDEMIA: ICD-10-CM

## 2021-10-06 DIAGNOSIS — I10 BENIGN ESSENTIAL HYPERTENSION: ICD-10-CM

## 2021-10-06 DIAGNOSIS — Z98.890 HISTORY OF ASCENDING AORTA REPAIR: ICD-10-CM

## 2021-10-06 LAB
ALBUMIN SERPL-MCNC: 3.5 G/DL (ref 3.4–5)
ALP SERPL-CCNC: 90 U/L (ref 40–150)
ALT SERPL W P-5'-P-CCNC: 23 U/L (ref 0–50)
ANION GAP SERPL CALCULATED.3IONS-SCNC: 2 MMOL/L (ref 3–14)
AST SERPL W P-5'-P-CCNC: 17 U/L (ref 0–45)
BILIRUB SERPL-MCNC: 1.1 MG/DL (ref 0.2–1.3)
BUN SERPL-MCNC: 23 MG/DL (ref 7–30)
CALCIUM SERPL-MCNC: 8.9 MG/DL (ref 8.5–10.1)
CHLORIDE BLD-SCNC: 108 MMOL/L (ref 94–109)
CHOLEST SERPL-MCNC: 206 MG/DL
CO2 SERPL-SCNC: 31 MMOL/L (ref 20–32)
CREAT SERPL-MCNC: 0.66 MG/DL (ref 0.52–1.04)
ERYTHROCYTE [DISTWIDTH] IN BLOOD BY AUTOMATED COUNT: 13.7 % (ref 10–15)
FASTING STATUS PATIENT QL REPORTED: YES
GFR SERPL CREATININE-BSD FRML MDRD: >90 ML/MIN/1.73M2
GLUCOSE BLD-MCNC: 110 MG/DL (ref 70–99)
HCT VFR BLD AUTO: 45.3 % (ref 35–47)
HDLC SERPL-MCNC: 55 MG/DL
HGB BLD-MCNC: 14.7 G/DL (ref 11.7–15.7)
LDLC SERPL CALC-MCNC: 135 MG/DL
MCH RBC QN AUTO: 28.7 PG (ref 26.5–33)
MCHC RBC AUTO-ENTMCNC: 32.5 G/DL (ref 31.5–36.5)
MCV RBC AUTO: 89 FL (ref 78–100)
NONHDLC SERPL-MCNC: 151 MG/DL
PLATELET # BLD AUTO: 147 10E3/UL (ref 150–450)
POTASSIUM BLD-SCNC: 3.9 MMOL/L (ref 3.4–5.3)
PROT SERPL-MCNC: 7.2 G/DL (ref 6.8–8.8)
RBC # BLD AUTO: 5.12 10E6/UL (ref 3.8–5.2)
SODIUM SERPL-SCNC: 141 MMOL/L (ref 133–144)
TRIGL SERPL-MCNC: 82 MG/DL
WBC # BLD AUTO: 6.6 10E3/UL (ref 4–11)

## 2021-10-06 PROCEDURE — 36415 COLL VENOUS BLD VENIPUNCTURE: CPT

## 2021-10-06 PROCEDURE — 80061 LIPID PANEL: CPT

## 2021-10-06 PROCEDURE — 85027 COMPLETE CBC AUTOMATED: CPT

## 2021-10-06 PROCEDURE — 80053 COMPREHEN METABOLIC PANEL: CPT

## 2021-10-08 ENCOUNTER — OFFICE VISIT (OUTPATIENT)
Dept: CARDIOLOGY | Facility: CLINIC | Age: 66
End: 2021-10-08
Attending: NURSE PRACTITIONER
Payer: COMMERCIAL

## 2021-10-08 VITALS
HEART RATE: 49 BPM | BODY MASS INDEX: 34.77 KG/M2 | OXYGEN SATURATION: 94 % | WEIGHT: 212 LBS | SYSTOLIC BLOOD PRESSURE: 195 MMHG | DIASTOLIC BLOOD PRESSURE: 110 MMHG

## 2021-10-08 DIAGNOSIS — E78.2 MIXED HYPERLIPIDEMIA: ICD-10-CM

## 2021-10-08 DIAGNOSIS — I10 BENIGN ESSENTIAL HYPERTENSION: ICD-10-CM

## 2021-10-08 DIAGNOSIS — I71.21 ASCENDING AORTIC ANEURYSM (H): ICD-10-CM

## 2021-10-08 DIAGNOSIS — Z86.79 S/P ASCENDING AORTIC ANEURYSM REPAIR: Primary | ICD-10-CM

## 2021-10-08 DIAGNOSIS — Z98.890 S/P ASCENDING AORTIC ANEURYSM REPAIR: Primary | ICD-10-CM

## 2021-10-08 DIAGNOSIS — I77.810 ASCENDING AORTA DILATATION (H): ICD-10-CM

## 2021-10-08 PROCEDURE — 99214 OFFICE O/P EST MOD 30 MIN: CPT | Performed by: INTERNAL MEDICINE

## 2021-10-08 RX ORDER — LISINOPRIL 40 MG/1
40 TABLET ORAL DAILY
Qty: 90 TABLET | Refills: 3 | Status: SHIPPED | OUTPATIENT
Start: 2021-10-08 | End: 2022-10-05

## 2021-10-08 RX ORDER — AMLODIPINE BESYLATE 2.5 MG/1
2.5 TABLET ORAL DAILY
Qty: 90 TABLET | Refills: 1 | Status: SHIPPED | OUTPATIENT
Start: 2021-10-08 | End: 2021-11-01

## 2021-10-08 NOTE — NURSING NOTE
"Chief Complaint   Patient presents with     RECHECK     S/P ascending aortic aneurysm repair.        Initial BP (!) 195/110 (BP Location: Right arm, Patient Position: Chair, Cuff Size: Adult Large)   Pulse (!) 49   Wt 96.2 kg (212 lb)   SpO2 94%   BMI 34.77 kg/m   Estimated body mass index is 34.77 kg/m  as calculated from the following:    Height as of 3/25/21: 1.663 m (5' 5.47\").    Weight as of this encounter: 96.2 kg (212 lb)..  BP completed using cuff size: millie Shen MA  "

## 2021-10-08 NOTE — PATIENT INSTRUCTIONS
Thank you for coming to the [facility] @ [facility]; please note the following instructions:    1. Increase lisinopril to 40 mg daily    2. Start amlodipine 2.5 mg daily    3. Schedule you for CT of chest - in follow up from surgery    4. Follow up with JOSE, labs prior to manage blood pressure in 1 month.     5. Follow up with Dr Fernandez, echo and labs in 1 year        If you have any questions regarding your visit please contact your care team:     Cardiology  Telephone Number   Jocelin VIEYRA., RN  Charmaine ON, RN   Mercedez LUKE, RMA  Brooklyn DAVIS, RMA  Ang ALANIZ, LPN   [phone] (option 1)   For scheduling appts:     [phone] (select option 1)       For the Device Clinic (Pacemakers and ICD's)  RN's :  Marisabel Kaiser   During business hours: [phone]    *After business hours:  [phone] (select option 4)      Normal test result notifications will be released via Shopping Mail or mailed within 7 business days.  All other test results, will be communicated via telephone once reviewed by your cardiologist.    If you need a medication refill please contact your pharmacy.  Please allow 3 business days for your refill to be completed.    As always, thank you for trusting us with your health care needs!    Patient Education     Amlodipine Besylate Oral tablet  What is this medicine?  AMLODIPINE (am JOSE di peen) is a calcium-channel blocker. It affects the amount of calcium found in your heart and muscle cells. This relaxes your blood vessels, which can reduce the amount of work the heart has to do. This medicine is used to lower high blood pressure. It is also used to prevent chest pain.  This medicine may be used for other purposes; ask your health care provider or pharmacist if you have questions.  What should I tell my health care provider before I take this medicine?  They need to know if you have any of these conditions:    heart problems like heart failure or aortic stenosis    liver disease    an  unusual or allergic reaction to amlodipine, other medicines, foods, dyes, or preservatives    pregnant or trying to get pregnant    breast-feeding  How should I use this medicine?  Take this medicine by mouth with a glass of water. Follow the directions on the prescription label. Take your medicine at regular intervals. Do not take more medicine than directed.  Talk to your pediatrician regarding the use of this medicine in children. Special care may be needed. This medicine has been used in children as young as 6.  Persons over 65 years old may have a stronger reaction to this medicine and need smaller doses.  Overdosage: If you think you have taken too much of this medicine contact a poison control center or emergency room at once.  NOTE: This medicine is only for you. Do not share this medicine with others.  What if I miss a dose?  If you miss a dose, take it as soon as you can. If it is almost time for your next dose, take only that dose. Do not take double or extra doses.  What may interact with this medicine?    herbal or dietary supplements    local or general anesthetics    medicines for high blood pressure    medicines for prostate problems    rifampin  This list may not describe all possible interactions. Give your health care provider a list of all the medicines, herbs, non-prescription drugs, or dietary supplements you use. Also tell them if you smoke, drink alcohol, or use illegal drugs. Some items may interact with your medicine.  What should I watch for while using this medicine?  Visit your doctor or health care professional for regular check ups. Check your blood pressure and pulse rate regularly. Ask your health care professional what your blood pressure and pulse rate should be, and when you should contact him or her.  This medicine may make you feel confused, dizzy or lightheaded. Do not drive, use machinery, or do anything that needs mental alertness until you know how this medicine affects you.  To reduce the risk of dizzy or fainting spells, do not sit or stand up quickly, especially if you are an older patient. Avoid alcoholic drinks; they can make you more dizzy.  Do not suddenly stop taking amlodipine. Ask your doctor or health care professional how you can gradually reduce the dose.  What side effects may I notice from receiving this medicine?  Side effects that you should report to your doctor or health care professional as soon as possible:    allergic reactions like skin rash, itching or hives, swelling of the face, lips, or tongue    breathing problems    changes in vision or hearing    chest pain    fast, irregular heartbeat    swelling of legs or ankles  Side effects that usually do not require medical attention (report to your doctor or health care professional if they continue or are bothersome):    dry mouth    facial flushing    nausea, vomiting    stomach gas, pain    tired, weak    trouble sleeping  This list may not describe all possible side effects. Call your doctor for medical advice about side effects. You may report side effects to FDA at 2-999-HXE-1055.  Where should I keep my medicine?  Keep out of the reach of children.  Store at room temperature between 59 and 86 degrees F (15 and 30 degrees C). Protect from light. Keep container tightly closed. Throw away any unused medicine after the expiration date.  NOTE:This sheet is a summary. It may not cover all possible information. If you have questions about this medicine, talk to your doctor, pharmacist, or health care provider. Copyright  2016 Gold Standard

## 2021-10-08 NOTE — LETTER
10/8/2021      RE: Georgie Patino  [address] [place] [id number]       Dear Colleague,    Thank you for the opportunity to participate in the care of your patient, Georgie Patino, at the [facility]. Please see a copy of my visit note below.    October 8, 2021    I had the pleasure of seeing Georgie Patino  in the [facility] for follow-up.  I seen him initially for episodes of palpitation however did an echocardiogram revealed significantly dilated ascending aorta up to 6.2 cm she was referred to surgery which was performed on October 27, 2017 successfully with no complications and PFO closure was performed at the same time.   Julianna continues to do very well from the cardiac standpoint.  Her blood pressure remains elevated today and rates in the 190 mmHg range.  She notes that this is significantly higher than what she usually runs at but looking back she did have elevated pressures for a while.  She takes the medication as prescribed including the lisinopril 30 mg daily.  Otherwise denies any chest pain dizziness lightheadedness no palpitations and no cardiac symptoms.  No lower extremity edema.  CT has not been done yet.  No other complaints    ROS otherwise negative     PAST MEDICAL HISTORY:  Past Medical History:   Diagnosis Date     Ascending aortic aneurysm (H) 09/21/2017     Blood transfusions age 19    due to menorhagia     Hypertension 09/21/2017     Localized osteoarthritis of both knees      Thyroid nodule 11/20/2017     Tobacco abuse      FAMILY HISTORY:  Family History   Problem Relation Age of Onset     Respiratory Mother         copd     Cancer Maternal Grandmother         Leg     Alzheimer Disease Maternal Grandfather      Heart Disease Paternal Grandfather      Heart Disease Brother 54        Heart Attack      Cancer Sister         Lung cancer age 55-smoker d age 57     SOCIAL  HISTORY:  Social History     Socioeconomic History     Marital status:      Spouse name: Not on file     Number of children: 3     Years of education: Not on file     Highest education level: Not on file   Occupational History     Occupation: Day care   Tobacco Use     Smoking status: Former Smoker     Packs/day: 0.05     Years: 40.00     Pack years: 2.00     Types: Cigarettes, Other     Quit date: 10/27/2017     Years since quitting: 3.9     Smokeless tobacco: Never Used     Tobacco comment: 2 cigs daily   Substance and Sexual Activity     Alcohol use: Yes     Alcohol/week: 0.8 - 1.7 standard drinks     Types: 1 - 2 Standard drinks or equivalent per week     Comment: 3-4 drinks per week      Drug use: No     Sexual activity: Yes     Partners: Male     Birth control/protection: Surgical   Other Topics Concern     Parent/sibling w/ CABG, MI or angioplasty before 65F 55M? Yes     Comment: brother w/ sudden cardiac arrest @ age 54   Social History Narrative     Not on file     Social Determinants of Health     Financial Resource Strain:      Difficulty of Paying Living Expenses:    Food Insecurity:      Worried About Running Out of Food in the Last Year:      Ran Out of Food in the Last Year:    Transportation Needs:      Lack of Transportation (Medical):      Lack of Transportation (Non-Medical):    Physical Activity:      Days of Exercise per Week:      Minutes of Exercise per Session:    Stress:      Feeling of Stress :    Social Connections:      Frequency of Communication with Friends and Family:      Frequency of Social Gatherings with Friends and Family:      Attends Confucianism Services:      Active Member of Clubs or Organizations:      Attends Club or Organization Meetings:      Marital Status:    Intimate Partner Violence:      Fear of Current or Ex-Partner:      Emotionally Abused:      Physically Abused:      Sexually Abused:      CURRENT MEDICATIONS:  Current Outpatient Medications   Medication      acetaminophen (TYLENOL) 325 MG tablet     aspirin EC 81 MG EC tablet     diclofenac (VOLTAREN) 1 % topical gel     lisinopril (ZESTRIL) 30 MG tablet     melatonin 3 MG tablet     metoprolol tartrate (LOPRESSOR) 50 MG tablet     No current facility-administered medications for this visit.     ROS:   Constitutional: No fever, chills, or sweats.   ENT: No visual disturbance, ear ache, epistaxis, sore throat.   Allergies/Immunologic: Negative.   Respiratory: No cough, hemoptysis.   Cardiovascular: As per HPI.   GI: No nausea, vomiting, hematemesis, melena, or hematochezia.   : No urinary frequency, dysuria, or hematuria.   Integrument: Negative.   Psychiatric: No evidence of major depression  Neuro: No new neurological complaints at this time. Non focal  Endocrinology: Negative.   Musculoskeletal: As per HPI.      EXAM:  BP (!) 195/110 (BP Location: Right arm, Patient Position: Chair, Cuff Size: Adult Large)   Pulse (!) 49   Wt 96.2 kg (212 lb)   SpO2 94%   BMI 34.77 kg/m    General: appears comfortable, alert and oriented  Head: normocephalic, atraumatic  Eyes: anicteric sclera, EOMI , PERRL  Neck: no adenopathy  Orophyarynx: moist mucosa, no lesions noted  Heart: regular, S1/S2, no murmurs, rubs or gallop. Estimated JVP at 5 cmH2O. Well healed median sternotomy scar.  Lungs: CTAB, No wheezing.   Abdomen: soft, non-tender, bowel sounds present, no hepatosplenomegaly  Extremities: No LE edema today  Skin: no open lesions noted  Neuro: grossly non-focal  Psych: no evidence of depression noted     Labs:  Lab Results   Component Value Date    WBC 6.6 10/06/2021    HGB 14.7 10/06/2021    HCT 45.3 10/06/2021     (L) 10/06/2021     10/06/2021    POTASSIUM 3.9 10/06/2021    CHLORIDE 108 10/06/2021    CO2 31 10/06/2021    BUN 23 10/06/2021    CR 0.66 10/06/2021     (H) 10/06/2021    AST 17 10/06/2021    ALT 23 10/06/2021    ALKPHOS 90 10/06/2021    BILITOTAL 1.1 10/06/2021    INR 1.21 (H) 10/29/2017       ECHO: 09/01/17  Global and regional left ventricular function is normal with an EF of 55-60%. The right ventricle is normal size. Global right ventricular function is normal. Severe dilatation of the ascending aorta (6.2 cm at the mid ascending aorta). The sinotubular ridge is effaced, however there is no evidence of dissection. The aortic arch and descending thoracic aorta appear normal in caliber.  Mild aortic regurgitation. Right ventricular systolic pressure is 26mmHg above the right atrial pressure. The inferior vena cava was normal in size with preserved respiratory variability. Estimated mean right atrial pressure is 3 mmHg. No pericardial effusion is present. Previous study not available for comparison.      CT chest 9/5/17:  Aneurysmal fusiform dilatation involving the ascending thoracic aorta, with involvement of the innominate artery, the left common carotid artery, with aneurysm ending at the level of the left subclavian artery. The aneurysm begins at the sinus of Valsalva and extends to the level of the proximal aortic arch. Additionally the the descending thoracic aorta is mildly ectatic. There is mild atherosclerotic plaque, primarily at the aortic arch.  Thoracic aortic diameters:  Aortic annulus: 3.6 x 3.8 x 3.9 cm.   Sinuses of Valsalva: 4.9 x 4.8 cm.   Ascending aorta: 6.1 x 6.2 cm.   Aortic arch: 3.6 x 3.6 cm.   Proximal thoracic aorta: 3.6 x 3.8 cm.   Mid thoracic aorta: 3.4 x 3.3 cm.   Descending thoracic aorta: 3.5 x 3.6 cm.     C 9/25/17: Minimal luminal irregularities, otherwise normal coronary angiogram     TTE 10/12/18: Official read pending, reviewed in person.  Ejection fraction appears to be in the normal range ascending aortic graft is measured at 3.6 cm just distal to the aortic valve.  The rest of the graft is not well visualized.  Normal RV function.  IVC is collapsible and normal size.      Ascending aorta repair: 10/27/2017 Dr. Rubio Piña  Ascending Aorta Aneurysm Repair  using Hemashield Platinum Woven Double Velour Graft 34cm X 30cm, PFO closure    TTE 5/2021:  Status post ascending aorta aneurysm repair using 34 mm Hemashield graft with PFO closure (2017).  Sinuses of Valsalva 3.9 cm. Ascending aorta 3.6 cm. Global and regional left ventricular function is normal with an EF of 55-60%. Grade II or moderate diastolic dysfunction.  Global right ventricular function is normal. The right ventricle is normal size.  Trace aortic insufficiency is present.  The estimated PA systolic pressure is 32 mmHg.  This study was compared with the study from 10/12/2018. No significant changes in the ventricular function or aortic calibers.     ASSESSMENT AND PLAN:  In summary, patient is a 66 year old lady status post ascending aortic repair as well as PFO closure in October 2017 by Dr. Piña who presents for follow-up.    She has been doing very well for the past couple of month her incisional pain has completely resolved and finally she did quit smoking.  She takes all her medication as prescribed and offers no issues there.  Transthoracic echocardiogram had been stable in the past.  She is doing well from the cardiac standpoint however blood pressure remains severely elevated.  We will increase lisinopril to 40 mg once a day as well as add amlodipine 2.5 mg daily.  We will repeat the CTA of the chest to ensure that the graft looks okay.  Echocardiogram is normal as above.  From my standpoint otherwise no intervention needed however blood pressure needs to be much better controlled.  We will ensure that the blood pressure is controlled so we will set her up with the core clinic in about a month with blood pressure check as well as lab check.  Further medication adjustments may be necessary.  Continue other medications at this time.  I will see her back in about a year    I appreciate the opportunity to participate in the care of Georgie Patino . Please do not hesitate to contact me with any  further questions.     Sincerely,   Jitendra Fernandez MD     [facility] Division of Cardiology

## 2021-10-08 NOTE — PROGRESS NOTES
October 8, 2021    I had the pleasure of seeing Georgie Patino  in the [facility] for follow-up.  I seen him initially for episodes of palpitation however did an echocardiogram revealed significantly dilated ascending aorta up to 6.2 cm she was referred to surgery which was performed on October 27, 2017 successfully with no complications and PFO closure was performed at the same time.   Julianna continues to do very well from the cardiac standpoint.  Her blood pressure remains elevated today and rates in the 190 mmHg range.  She notes that this is significantly higher than what she usually runs at but looking back she did have elevated pressures for a while.  She takes the medication as prescribed including the lisinopril 30 mg daily.  Otherwise denies any chest pain dizziness lightheadedness no palpitations and no cardiac symptoms.  No lower extremity edema.  CT has not been done yet.  No other complaints    ROS otherwise negative     PAST MEDICAL HISTORY:  Past Medical History:   Diagnosis Date     Ascending aortic aneurysm (H) 09/21/2017     Blood transfusions age 19    due to menorhagia     Hypertension 09/21/2017     Localized osteoarthritis of both knees      Thyroid nodule 11/20/2017     Tobacco abuse      FAMILY HISTORY:  Family History   Problem Relation Age of Onset     Respiratory Mother         copd     Cancer Maternal Grandmother         Leg     Alzheimer Disease Maternal Grandfather      Heart Disease Paternal Grandfather      Heart Disease Brother 54        Heart Attack      Cancer Sister         Lung cancer age 55-smoker d age 57     SOCIAL HISTORY:  Social History     Socioeconomic History     Marital status:      Spouse name: Not on file     Number of children: 3     Years of education: Not on file     Highest education level: Not on file   Occupational History     Occupation: Day care   Tobacco Use     Smoking status: Former Smoker     Packs/day: 0.05     Years: 40.00      Pack years: 2.00     Types: Cigarettes, Other     Quit date: 10/27/2017     Years since quitting: 3.9     Smokeless tobacco: Never Used     Tobacco comment: 2 cigs daily   Substance and Sexual Activity     Alcohol use: Yes     Alcohol/week: 0.8 - 1.7 standard drinks     Types: 1 - 2 Standard drinks or equivalent per week     Comment: 3-4 drinks per week      Drug use: No     Sexual activity: Yes     Partners: Male     Birth control/protection: Surgical   Other Topics Concern     Parent/sibling w/ CABG, MI or angioplasty before 65F 55M? Yes     Comment: brother w/ sudden cardiac arrest @ age 54   Social History Narrative     Not on file     Social Determinants of Health     Financial Resource Strain:      Difficulty of Paying Living Expenses:    Food Insecurity:      Worried About Running Out of Food in the Last Year:      Ran Out of Food in the Last Year:    Transportation Needs:      Lack of Transportation (Medical):      Lack of Transportation (Non-Medical):    Physical Activity:      Days of Exercise per Week:      Minutes of Exercise per Session:    Stress:      Feeling of Stress :    Social Connections:      Frequency of Communication with Friends and Family:      Frequency of Social Gatherings with Friends and Family:      Attends Hinduism Services:      Active Member of Clubs or Organizations:      Attends Club or Organization Meetings:      Marital Status:    Intimate Partner Violence:      Fear of Current or Ex-Partner:      Emotionally Abused:      Physically Abused:      Sexually Abused:      CURRENT MEDICATIONS:  Current Outpatient Medications   Medication     acetaminophen (TYLENOL) 325 MG tablet     aspirin EC 81 MG EC tablet     diclofenac (VOLTAREN) 1 % topical gel     lisinopril (ZESTRIL) 30 MG tablet     melatonin 3 MG tablet     metoprolol tartrate (LOPRESSOR) 50 MG tablet     No current facility-administered medications for this visit.     ROS:   Constitutional: No fever, chills, or sweats.    ENT: No visual disturbance, ear ache, epistaxis, sore throat.   Allergies/Immunologic: Negative.   Respiratory: No cough, hemoptysis.   Cardiovascular: As per HPI.   GI: No nausea, vomiting, hematemesis, melena, or hematochezia.   : No urinary frequency, dysuria, or hematuria.   Integrument: Negative.   Psychiatric: No evidence of major depression  Neuro: No new neurological complaints at this time. Non focal  Endocrinology: Negative.   Musculoskeletal: As per HPI.      EXAM:  BP (!) 195/110 (BP Location: Right arm, Patient Position: Chair, Cuff Size: Adult Large)   Pulse (!) 49   Wt 96.2 kg (212 lb)   SpO2 94%   BMI 34.77 kg/m    General: appears comfortable, alert and oriented  Head: normocephalic, atraumatic  Eyes: anicteric sclera, EOMI , PERRL  Neck: no adenopathy  Orophyarynx: moist mucosa, no lesions noted  Heart: regular, S1/S2, no murmurs, rubs or gallop. Estimated JVP at 5 cmH2O. Well healed median sternotomy scar.  Lungs: CTAB, No wheezing.   Abdomen: soft, non-tender, bowel sounds present, no hepatosplenomegaly  Extremities: No LE edema today  Skin: no open lesions noted  Neuro: grossly non-focal  Psych: no evidence of depression noted     Labs:  Lab Results   Component Value Date    WBC 6.6 10/06/2021    HGB 14.7 10/06/2021    HCT 45.3 10/06/2021     (L) 10/06/2021     10/06/2021    POTASSIUM 3.9 10/06/2021    CHLORIDE 108 10/06/2021    CO2 31 10/06/2021    BUN 23 10/06/2021    CR 0.66 10/06/2021     (H) 10/06/2021    AST 17 10/06/2021    ALT 23 10/06/2021    ALKPHOS 90 10/06/2021    BILITOTAL 1.1 10/06/2021    INR 1.21 (H) 10/29/2017      ECHO: 09/01/17  Global and regional left ventricular function is normal with an EF of 55-60%. The right ventricle is normal size. Global right ventricular function is normal. Severe dilatation of the ascending aorta (6.2 cm at the mid ascending aorta). The sinotubular ridge is effaced, however there is no evidence of dissection. The aortic  arch and descending thoracic aorta appear normal in caliber.  Mild aortic regurgitation. Right ventricular systolic pressure is 26mmHg above the right atrial pressure. The inferior vena cava was normal in size with preserved respiratory variability. Estimated mean right atrial pressure is 3 mmHg. No pericardial effusion is present. Previous study not available for comparison.      CT chest 9/5/17:  Aneurysmal fusiform dilatation involving the ascending thoracic aorta, with involvement of the innominate artery, the left common carotid artery, with aneurysm ending at the level of the left subclavian artery. The aneurysm begins at the sinus of Valsalva and extends to the level of the proximal aortic arch. Additionally the the descending thoracic aorta is mildly ectatic. There is mild atherosclerotic plaque, primarily at the aortic arch.  Thoracic aortic diameters:  Aortic annulus: 3.6 x 3.8 x 3.9 cm.   Sinuses of Valsalva: 4.9 x 4.8 cm.   Ascending aorta: 6.1 x 6.2 cm.   Aortic arch: 3.6 x 3.6 cm.   Proximal thoracic aorta: 3.6 x 3.8 cm.   Mid thoracic aorta: 3.4 x 3.3 cm.   Descending thoracic aorta: 3.5 x 3.6 cm.     [facility] 9/25/17: Minimal luminal irregularities, otherwise normal coronary angiogram     TTE 10/12/18: Official read pending, reviewed in person.  Ejection fraction appears to be in the normal range ascending aortic graft is measured at 3.6 cm just distal to the aortic valve.  The rest of the graft is not well visualized.  Normal RV function.  IVC is collapsible and normal size.      Ascending aorta repair: 10/27/2017 Dr. Rubio Piña  Ascending Aorta Aneurysm Repair using Hemashield Platinum Woven Double Velour Graft 34cm X 30cm, PFO closure    TTE 5/2021:  Status post ascending aorta aneurysm repair using 34 mm Hemashield graft with PFO closure (2017).  Sinuses of Valsalva 3.9 cm. Ascending aorta 3.6 cm. Global and regional left ventricular function is normal with an EF of 55-60%. Grade II or moderate  diastolic dysfunction.  Global right ventricular function is normal. The right ventricle is normal size.  Trace aortic insufficiency is present.  The estimated PA systolic pressure is 32 mmHg.  This study was compared with the study from 10/12/2018. No significant changes in the ventricular function or aortic calibers.     ASSESSMENT AND PLAN:  In summary, patient is a 66 year old lady status post ascending aortic repair as well as PFO closure in October 2017 by Dr. Piña who presents for follow-up.    She has been doing very well for the past couple of month her incisional pain has completely resolved and finally she did quit smoking.  She takes all her medication as prescribed and offers no issues there.  Transthoracic echocardiogram had been stable in the past.  She is doing well from the cardiac standpoint however blood pressure remains severely elevated.  We will increase lisinopril to 40 mg once a day as well as add amlodipine 2.5 mg daily.  We will repeat the CTA of the chest to ensure that the graft looks okay.  Echocardiogram is normal as above.  From my standpoint otherwise no intervention needed however blood pressure needs to be much better controlled.  We will ensure that the blood pressure is controlled so we will set her up with the core clinic in about a month with blood pressure check as well as lab check.  Further medication adjustments may be necessary.  Continue other medications at this time.  I will see her back in about a year    I appreciate the opportunity to participate in the care of Georgie Patino . Please do not hesitate to contact me with any further questions.     Sincerely,   Jitendra Fernandez MD     [facility] Division of Cardiology

## 2021-10-19 ENCOUNTER — TELEPHONE (OUTPATIENT)
Dept: CARDIOLOGY | Facility: CLINIC | Age: 66
End: 2021-10-19

## 2021-10-19 NOTE — TELEPHONE ENCOUNTER
Pt was scheduled for CT on 10/11/21. Pt cancelled this appt via my chart approx 20 minutes later.       Letter sent to pt to reschedule CT ordered by Dr Fernandez.

## 2021-10-19 NOTE — TELEPHONE ENCOUNTER
----- Message from Charmaine Rodriguez RN sent at 10/8/2021 12:44 PM CDT -----  Regarding: colby escamilla pt. seeing PCP 11/1

## 2021-11-01 ENCOUNTER — OFFICE VISIT (OUTPATIENT)
Dept: FAMILY MEDICINE | Facility: CLINIC | Age: 66
End: 2021-11-01
Payer: COMMERCIAL

## 2021-11-01 VITALS
HEIGHT: 65 IN | HEART RATE: 58 BPM | OXYGEN SATURATION: 97 % | SYSTOLIC BLOOD PRESSURE: 144 MMHG | DIASTOLIC BLOOD PRESSURE: 72 MMHG | WEIGHT: 217 LBS | TEMPERATURE: 98.5 F | BODY MASS INDEX: 36.15 KG/M2

## 2021-11-01 DIAGNOSIS — Z23 HIGH PRIORITY FOR 2019-NCOV VACCINE: ICD-10-CM

## 2021-11-01 DIAGNOSIS — F43.21 ADJUSTMENT DISORDER WITH DEPRESSED MOOD: ICD-10-CM

## 2021-11-01 DIAGNOSIS — I71.21 ASCENDING AORTIC ANEURYSM (H): ICD-10-CM

## 2021-11-01 DIAGNOSIS — Z86.79 S/P ASCENDING AORTIC ANEURYSM REPAIR: ICD-10-CM

## 2021-11-01 DIAGNOSIS — E66.01 MORBID OBESITY (H): ICD-10-CM

## 2021-11-01 DIAGNOSIS — R06.00 DYSPNEA, UNSPECIFIED TYPE: ICD-10-CM

## 2021-11-01 DIAGNOSIS — Z23 NEED FOR PROPHYLACTIC VACCINATION AND INOCULATION AGAINST INFLUENZA: ICD-10-CM

## 2021-11-01 DIAGNOSIS — R35.0 FREQUENT URINATION: Primary | ICD-10-CM

## 2021-11-01 DIAGNOSIS — I10 BENIGN ESSENTIAL HYPERTENSION: ICD-10-CM

## 2021-11-01 DIAGNOSIS — Z98.890 S/P ASCENDING AORTIC ANEURYSM REPAIR: ICD-10-CM

## 2021-11-01 LAB
ALBUMIN UR-MCNC: ABNORMAL MG/DL
APPEARANCE UR: CLEAR
BILIRUB UR QL STRIP: NEGATIVE
COLOR UR AUTO: YELLOW
GLUCOSE UR STRIP-MCNC: NEGATIVE MG/DL
HGB UR QL STRIP: ABNORMAL
KETONES UR STRIP-MCNC: NEGATIVE MG/DL
LEUKOCYTE ESTERASE UR QL STRIP: ABNORMAL
NITRATE UR QL: NEGATIVE
PH UR STRIP: 6 [PH] (ref 5–7)
RBC #/AREA URNS AUTO: ABNORMAL /HPF
SP GR UR STRIP: 1.02 (ref 1–1.03)
SQUAMOUS #/AREA URNS AUTO: ABNORMAL /LPF
UROBILINOGEN UR STRIP-ACNC: 0.2 E.U./DL
WBC #/AREA URNS AUTO: ABNORMAL /HPF

## 2021-11-01 PROCEDURE — 90471 IMMUNIZATION ADMIN: CPT | Performed by: NURSE PRACTITIONER

## 2021-11-01 PROCEDURE — 87086 URINE CULTURE/COLONY COUNT: CPT | Performed by: NURSE PRACTITIONER

## 2021-11-01 PROCEDURE — 81001 URINALYSIS AUTO W/SCOPE: CPT | Performed by: NURSE PRACTITIONER

## 2021-11-01 PROCEDURE — 99214 OFFICE O/P EST MOD 30 MIN: CPT | Mod: 25 | Performed by: NURSE PRACTITIONER

## 2021-11-01 PROCEDURE — 0004A COVID-19,PF,PFIZER (12+ YRS): CPT | Performed by: NURSE PRACTITIONER

## 2021-11-01 PROCEDURE — 91300 COVID-19,PF,PFIZER (12+ YRS): CPT | Performed by: NURSE PRACTITIONER

## 2021-11-01 PROCEDURE — 90682 RIV4 VACC RECOMBINANT DNA IM: CPT | Performed by: NURSE PRACTITIONER

## 2021-11-01 RX ORDER — AMLODIPINE BESYLATE 5 MG/1
5 TABLET ORAL DAILY
Qty: 90 TABLET | Refills: 1 | Status: SHIPPED | OUTPATIENT
Start: 2021-11-01 | End: 2022-05-05

## 2021-11-01 RX ORDER — BUPROPION HYDROCHLORIDE 150 MG/1
150 TABLET ORAL EVERY MORNING
Qty: 90 TABLET | Refills: 0 | Status: SHIPPED | OUTPATIENT
Start: 2021-11-01 | End: 2022-02-01

## 2021-11-01 ASSESSMENT — PAIN SCALES - GENERAL: PAINLEVEL: SEVERE PAIN (7)

## 2021-11-01 ASSESSMENT — MIFFLIN-ST. JEOR: SCORE: 1542.65

## 2021-11-01 NOTE — PROGRESS NOTES
DME (Durable Medical Equipment) Orders and Documentation  Orders Placed This Encounter   Procedures     Home Blood Pressure Monitor Order      The patient was assessed and it was determined the patient is in need of the following listed DME Supplies/Equipment. Please complete supporting documentation below to demonstrate medical necessity.      DME All Other Item(s) Documentation    List reason for need and supporting documentation for medical necessity below for each DME item.     1. Hypertension.

## 2021-11-01 NOTE — RESULT ENCOUNTER NOTE
Dear Georgie,    Your recent test results are attached.      Your urine looks like it may have an infection.  I'll await your urine culture results and contact you.    If you have any questions please feel free to contact [phone] or myself via [email or web address].    Sincerely,  Marlys Wade, CNP

## 2021-11-01 NOTE — PROGRESS NOTES
Assessment & Plan     Frequent urination  Rule out infection.  Recent labs showed normal glucose.  - UA macro with reflex to Microscopic and Culture - Clinc Collect  - Urine Microscopic  - Urine Culture    Adjustment disorder with depressed mood  Patient to resume.  - buPROPion (WELLBUTRIN XL) 150 MG 24 hr tablet; Take 1 tablet (150 mg) by mouth every morning    S/P ascending aortic aneurysm repair    - amLODIPine (NORVASC) 5 MG tablet; Take 1 tablet (5 mg) by mouth daily    Ascending aortic aneurysm (H)  Continue to follow-up with cardiology  - amLODIPine (NORVASC) 5 MG tablet; Take 1 tablet (5 mg) by mouth daily    Benign essential hypertension  Patient to increase amlodipine due to uncontrolled blood pressure.  - Home Blood Pressure Monitor Order  - amLODIPine (NORVASC) 5 MG tablet; Take 1 tablet (5 mg) by mouth daily    Dyspnea, unspecified type  Evaluate for COPD.  Has Chest CTA soon and is also being evaluated for CHF per cardiology.  - General PFT Lab (Please always keep checked); Future  - Pulmonary Function Test; Future    High priority for 2019-nCoV vaccine    - COVID-19,PF,PFIZER (12+ Yrs)    Need for prophylactic vaccination and inoculation against influenza    - INFLUENZA QUAD, RECOMBINANT, P-FREE (RIV4) (FLUBLOK)    Morbid obesity (H)  Patient planning to get knee replacement in the future to improve her ability to exercise.      Ordering of each unique test  Prescription drug management        Return in about 4 weeks (around 11/29/2021).    TESHA Chanel CNP  [facility] YENNY Levine is a 66 year old who presents for the following health issues     HPI     Hypertension Follow-up      Do you check your blood pressure regularly outside of the clinic? No     Are you following a low salt diet? No    Are your blood pressures ever more than 140 on the top number (systolic) OR more   than 90 on the bottom number (diastolic), for example 140/90? Yes      How many  "servings of fruits and vegetables do you eat daily?  2    On average, how many sweetened beverages do you drink each day (Examples: soda, juice, sweet tea, etc.  Do NOT count diet or artificially sweetened beverages)?   0    How many days per week do you exercise enough to make your heart beat faster? none    How many minutes a day do you exercise enough to make your heart beat faster?     How many days per week do you miss taking your medication? 0    Patient denies side effects from amlodipine, increased dose of lisinopril.    Patient notes that her mood is depressed again.  She notes anhedonia.  She denies thoughts of suicide or self harm.  She would like to resume wellbutrin.    Patient notes that she again has frequent urination at night.  She would like to check for an infection again.    Patient has quit smoking, but sometimes notes dyspnea.    Review of Systems   Constitutional, HEENT, cardiovascular, pulmonary, gi and gu systems are negative, except as otherwise noted.      Objective    BP (!) 144/72   Pulse 58   Temp 98.5  F (36.9  C) (Oral)   Ht 1.663 m (5' 5.47\")   Wt 98.4 kg (217 lb)   SpO2 97%   BMI 35.59 kg/m    Body mass index is 35.59 kg/m .  Physical Exam   GENERAL: healthy, alert and no distress  RESP: rales bibasilar  CV: regular rate and rhythm, normal S1 S2, no S3 or S4, no murmur, click or rub, no peripheral edema and peripheral pulses strong  MS: no gross musculoskeletal defects noted, no edema                  "

## 2021-11-02 LAB — BACTERIA UR CULT: NORMAL

## 2021-11-02 NOTE — RESULT ENCOUNTER NOTE
Dear Georgie,    Your recent test results are attached.      Your urine culture does not show an infection.  You continue to have blood in your urine.  I would like to have you undergo a repeat CT scan of your abdomen and pelvis to check for stones and also to follow-up with urology regarding your symptoms.  If you're okay with this, please message me back and I will place the orders.    If you have any questions please feel free to contact [phone] or myself via [email or web address].    Sincerely,  Marlys Wade, CNP

## 2021-11-16 ENCOUNTER — ANCILLARY PROCEDURE (OUTPATIENT)
Dept: CT IMAGING | Facility: CLINIC | Age: 66
End: 2021-11-16
Attending: NURSE PRACTITIONER
Payer: COMMERCIAL

## 2021-11-16 DIAGNOSIS — R35.0 FREQUENT URINATION: ICD-10-CM

## 2021-11-16 DIAGNOSIS — R31.9 HEMATURIA, UNSPECIFIED TYPE: ICD-10-CM

## 2021-11-16 PROCEDURE — 74178 CT ABD&PLV WO CNTR FLWD CNTR: CPT | Mod: TC | Performed by: RADIOLOGY

## 2021-11-16 RX ORDER — IOPAMIDOL 755 MG/ML
100 INJECTION, SOLUTION INTRAVASCULAR ONCE
Status: COMPLETED | OUTPATIENT
Start: 2021-11-16 | End: 2021-11-16

## 2021-11-16 RX ADMIN — IOPAMIDOL 100 ML: 755 INJECTION, SOLUTION INTRAVASCULAR at 11:00

## 2021-11-16 NOTE — RESULT ENCOUNTER NOTE
Dear Georgie,    Your recent test results are attached.      Your CT urogram shows that your kidney stone burden is increasing.  It does not show any other significant findings.  Please schedule follow-up with urology as planned in clinic.    If you have any questions please feel free to contact [phone] or myself via [employer].    Sincerely,  Marlys Wade, CNP

## 2022-01-30 DIAGNOSIS — F43.21 ADJUSTMENT DISORDER WITH DEPRESSED MOOD: ICD-10-CM

## 2022-02-01 RX ORDER — BUPROPION HYDROCHLORIDE 150 MG/1
TABLET ORAL
Qty: 90 TABLET | Refills: 0 | Status: SHIPPED | OUTPATIENT
Start: 2022-02-01 | End: 2023-09-25

## 2022-02-01 ASSESSMENT — PATIENT HEALTH QUESTIONNAIRE - PHQ9: SUM OF ALL RESPONSES TO PHQ QUESTIONS 1-9: 1

## 2022-02-01 NOTE — TELEPHONE ENCOUNTER
Prescription approved per [facility] Refill Protocol.    Olga Lidia Villanueva RN  [facility]

## 2022-05-03 DIAGNOSIS — I71.21 ASCENDING AORTIC ANEURYSM (H): ICD-10-CM

## 2022-05-03 DIAGNOSIS — Z98.890 S/P ASCENDING AORTIC ANEURYSM REPAIR: ICD-10-CM

## 2022-05-03 DIAGNOSIS — Z86.79 S/P ASCENDING AORTIC ANEURYSM REPAIR: ICD-10-CM

## 2022-05-03 DIAGNOSIS — I10 BENIGN ESSENTIAL HYPERTENSION: ICD-10-CM

## 2022-05-03 NOTE — LETTER
May 10, 2022        Amy Patino  [address] [place] [id number]                Dear amy,       A 30 day turner refill of amLODIPine (NORVASC) 5 MG tablet . You are due for an appointment to establish care with an new provider for further refills. . Please contact the clinic to schedule an appointment for further refills.     Sincerely,     Your Care Team/sg                    
Yes

## 2022-05-05 RX ORDER — AMLODIPINE BESYLATE 5 MG/1
5 TABLET ORAL DAILY
Qty: 90 TABLET | Refills: 0 | Status: SHIPPED | OUTPATIENT
Start: 2022-05-05 | End: 2023-09-25

## 2022-05-05 NOTE — TELEPHONE ENCOUNTER
Medication is being filled for 1 time refill only due to:  Patient needs to be seen because needs recheck and establish care.   Please schedule.  Maria Alejandra Longoria RN

## 2022-05-08 ENCOUNTER — HEALTH MAINTENANCE LETTER (OUTPATIENT)
Age: 67
End: 2022-05-08

## 2022-06-03 ENCOUNTER — OFFICE VISIT (OUTPATIENT)
Dept: UROLOGY | Facility: CLINIC | Age: 67
End: 2022-06-03
Payer: COMMERCIAL

## 2022-06-03 VITALS
WEIGHT: 211 LBS | SYSTOLIC BLOOD PRESSURE: 173 MMHG | OXYGEN SATURATION: 98 % | DIASTOLIC BLOOD PRESSURE: 90 MMHG | HEART RATE: 57 BPM | BODY MASS INDEX: 34.61 KG/M2

## 2022-06-03 DIAGNOSIS — R31.0 GROSS HEMATURIA: ICD-10-CM

## 2022-06-03 DIAGNOSIS — N20.0 CALCULUS OF KIDNEY: Primary | ICD-10-CM

## 2022-06-03 LAB
ALBUMIN UR-MCNC: 100 MG/DL
APPEARANCE UR: CLEAR
BILIRUB UR QL STRIP: ABNORMAL
COLOR UR AUTO: YELLOW
GLUCOSE UR STRIP-MCNC: NEGATIVE MG/DL
HGB UR QL STRIP: ABNORMAL
KETONES UR STRIP-MCNC: ABNORMAL MG/DL
LEUKOCYTE ESTERASE UR QL STRIP: ABNORMAL
NITRATE UR QL: NEGATIVE
PH UR STRIP: 6 [PH] (ref 5–7)
RBC #/AREA URNS AUTO: >100 /HPF
SP GR UR STRIP: 1.02 (ref 1–1.03)
UROBILINOGEN UR STRIP-ACNC: 1 E.U./DL
WBC #/AREA URNS AUTO: ABNORMAL /HPF

## 2022-06-03 PROCEDURE — 52000 CYSTOURETHROSCOPY: CPT | Performed by: UROLOGY

## 2022-06-03 PROCEDURE — 99204 OFFICE O/P NEW MOD 45 MIN: CPT | Mod: 25 | Performed by: UROLOGY

## 2022-06-03 PROCEDURE — 81001 URINALYSIS AUTO W/SCOPE: CPT | Performed by: UROLOGY

## 2022-06-03 NOTE — PATIENT INSTRUCTIONS
Julianna to follow up with Primary Care provider regarding elevated blood pressure.   Please call [facility] to schedule a CT scan. [phone]/ Please arrange follow up with Dr. Saavedra to discuss the results.   Please contact your insurance company to make sure the CT scan is covered under your insurance plan.

## 2022-06-03 NOTE — PROGRESS NOTES
Urology Note            S:  Georgie Patino is a 66 year old female who was seen in a consultation at the request of Marlys Cueva for urinary complaints.   Patient has had gross hematuria for the last several days.  She has no other voiding complaints in addition to hematuria.  She has no flank pain.  She has  history of kidney stone.  She denies any trauma.  Recent UA showed many rbc/hpf rbc/hpf.  Urine culture was not done.  Past Medical History:   Diagnosis Date     Ascending aortic aneurysm (H) 09/21/2017     Blood transfusions age 19    due to menorhagia     Hypertension 09/21/2017     Localized osteoarthritis of both knees      Thyroid nodule 11/20/2017     Tobacco abuse      Current Outpatient Medications   Medication Sig Dispense Refill     acetaminophen (TYLENOL) 325 MG tablet Take 2 tablets (650 mg) by mouth every 4 hours as needed for other (surgical pain) 100 tablet 0     amLODIPine (NORVASC) 5 MG tablet Take 1 tablet (5 mg) by mouth daily +++NEED APPT+++ 90 tablet 0     aspirin EC 81 MG EC tablet Take 1 tablet (81 mg) by mouth daily 30 tablet 0     buPROPion (WELLBUTRIN XL) 150 MG 24 hr tablet TAKE ONE TABLET BY MOUTH IN THE MORNING 90 tablet 0     diclofenac (VOLTAREN) 1 % topical gel Place 4 g onto the skin 4 times daily 100 g 3     lisinopril (ZESTRIL) 40 MG tablet Take 1 tablet (40 mg) by mouth daily 90 tablet 3     melatonin 3 MG tablet Take 1 tablet (3 mg) by mouth nightly as needed for sleep 30 tablet 0     metoprolol tartrate (LOPRESSOR) 50 MG tablet Take 1 tablet (50 mg) by mouth 2 times daily 180 tablet 3     Past Surgical History:   Procedure Laterality Date     COLONOSCOPY WITH CO2 INSUFFLATION N/A 3/15/2017    Procedure: COLONOSCOPY WITH CO2 INSUFFLATION;  Surgeon: Duane, William Charles, MD;  Location: [facility] OR     DILATION AND CURETTAGE  age 20     EXTRACORPOREAL SHOCK WAVE LITHOTRIPSY, CYSTOSCOPY, INSERT STENT URETER(S), COMBINED Bilateral 11/19/2018    Procedure: BILATERAL  EXTRACORPOREAL SHOCK WAVE LITHOTRIPSY, CYSTOSCOPY, LEFT STENT PLACEMENT;  Surgeon: Tavo Saavedra MD;  Location: [facility] OR     REPAIR ANEURYSM ASCENDING AORTA N/A 10/27/2017    Procedure: REPAIR ANEURYSM ASCENDING AORTA;  Median Sternotomy, Cardiopulmonary bypass, Ascending Aorta Aneurysm Repair using Hemashield Platinum Woven Double Velour Graft 34cm X 30cm.;  Surgeon: Rubio Piña MD;  Location: [facility] OR     [place] teeth removed        Social History     Socioeconomic History     Marital status:      Spouse name: Not on file     Number of children: 3     Years of education: Not on file     Highest education level: Not on file   Occupational History     Occupation: Day care   Tobacco Use     Smoking status: Current Some Day Smoker     Packs/day: 0.05     Years: 40.00     Pack years: 2.00     Types: Cigarettes, Other     Last attempt to quit: 10/27/2017     Years since quittin.6     Smokeless tobacco: Never Used     Tobacco comment: 2 cigs daily   Substance and Sexual Activity     Alcohol use: Yes     Alcohol/week: 0.8 - 1.7 standard drinks     Types: 1 - 2 Standard drinks or equivalent per week     Comment: 3-4 drinks per week      Drug use: No     Sexual activity: Yes     Partners: Male     Birth control/protection: Surgical   Other Topics Concern     Parent/sibling w/ CABG, MI or angioplasty before 65F 55M? Yes     Comment: brother w/ sudden cardiac arrest @ age 54   Social History Narrative     Not on file     Social Determinants of Health     Financial Resource Strain: Not on file   Food Insecurity: Not on file   Transportation Needs: Not on file   Physical Activity: Not on file   Stress: Not on file   Social Connections: Not on file   Intimate Partner Violence: Not on file   Housing Stability: Not on file     Family History   Problem Relation Age of Onset     Respiratory Mother         copd     Cancer Maternal Grandmother         Leg     Alzheimer Disease Maternal Grandfather      Heart  Disease Paternal Grandfather      Heart Disease Brother 54        Heart Attack      Cancer Sister         Lung cancer age 55-smoker d age 57          REVIEW OF SYSTEMS  =================  C: NEGATIVE for fever, chills, change in weight  I: NEGATIVE for worrisome rashes, moles or lesions  E/M: NEGATIVE for ear, mouth and throat problems  R: NEGATIVE for significant cough or SHORTNESS OF BREATH  CV:  NEGATIVE for chest pain, palpitations or peripheral edema  GI: NEGATIVE for nausea, abdominal pain, heartburn, or change in bowel habits  NEURO: NEGATIVE numbness/weakness  : see HPI  PSYCH: NEGATIVE depression/anxiety  LYmph: no new enlarged lymph nodes  Ortho: no new trauma/movements           O: Exam:BP (!) 173/90   Pulse 57   Wt 95.7 kg (211 lb)   SpO2 98%   BMI 34.61 kg/m     Constitutional: healthy, alert and no distress  Cardiovascular: negative, PMI normal.   Respiratory: negative, no evidence of respiratory distress  Gastrointestinal: Abdomen soft, non-tender. BS normal. No masses, organomegaly  : no cva tenderness  Musculoskeletal: extremities normal- no gross deformities noted, gait normal and normal muscle tone  Skin: no suspicious lesions or rashes  Neurologic: Alert and oriented  Musculaskeletal: moving all extremities  Psychiatric: mentation appears normal. and affect normal/bright  Hematologic/Lymphatic/Immunologic: normal ant/post cervical, axillary, supraclavicular and inguinal nodes    KUB with bilateral renal stones seen     The patient is here for cystoscopy for evaluation of hematuria.     Patient is prepped and draped.  Flexible cystoscope placed under direct vision.      Cysto: The anterior urethra is normal     In the bladder there is normal mucosa.    Assessment/Plan:  (N20.0) Calculus of kidney  (primary encounter diagnosis)  Comment:    Plan:    (R31.0) Gross hematuria  Comment: neg cysto today.  No bladder pathology  Plan:      CT urogram next.  Discussed ESWL vs ureteroscopy for renal  stone treatment.

## 2022-06-17 DIAGNOSIS — R00.2 PALPITATIONS: ICD-10-CM

## 2022-06-17 RX ORDER — METOPROLOL TARTRATE 50 MG
TABLET ORAL
Qty: 180 TABLET | Refills: 0 | Status: SHIPPED | OUTPATIENT
Start: 2022-06-17 | End: 2023-01-10

## 2022-07-03 ENCOUNTER — HEALTH MAINTENANCE LETTER (OUTPATIENT)
Age: 67
End: 2022-07-03

## 2022-08-28 ENCOUNTER — HEALTH MAINTENANCE LETTER (OUTPATIENT)
Age: 67
End: 2022-08-28

## 2022-10-02 DIAGNOSIS — I71.21 ASCENDING AORTIC ANEURYSM (H): ICD-10-CM

## 2022-10-02 DIAGNOSIS — I10 BENIGN ESSENTIAL HYPERTENSION: ICD-10-CM

## 2022-10-05 ENCOUNTER — TELEPHONE (OUTPATIENT)
Dept: CARDIOLOGY | Facility: CLINIC | Age: 67
End: 2022-10-05

## 2022-10-05 DIAGNOSIS — Z86.79 S/P ASCENDING AORTIC ANEURYSM REPAIR: Primary | ICD-10-CM

## 2022-10-05 DIAGNOSIS — I10 BENIGN ESSENTIAL HYPERTENSION: ICD-10-CM

## 2022-10-05 DIAGNOSIS — Z98.890 S/P ASCENDING AORTIC ANEURYSM REPAIR: Primary | ICD-10-CM

## 2022-10-05 RX ORDER — LISINOPRIL 40 MG/1
40 TABLET ORAL DAILY
Qty: 90 TABLET | Refills: 0 | Status: SHIPPED | OUTPATIENT
Start: 2022-10-05 | End: 2023-01-03

## 2022-10-05 NOTE — TELEPHONE ENCOUNTER
Pt scheduled for follow up in December with Dr Fernandez, refilled 90 days. Lab orders and CT chest needed prior to follow up- orders placed. See telephone encounter

## 2022-10-05 NOTE — TELEPHONE ENCOUNTER
Lisinopril Oral Tablet 40 MG    Last Written Prescription Date:  10/8/21  Last Fill Quantity: 90,   # refills: 3  Last Office Visit : 10/8/21  Future Office visit:  One year> 12/16/22    Routing refill request to provider for review/approval because:  BP   06/03/22 (!) 173/90   11/01/21 (!) 144/72   10/08/21 (!) 195/110           1. Increase lisinopril to 40 mg daily     2. Start amlodipine 2.5 mg daily     3. Schedule you for CT of chest - in follow up from surgery     4. Follow up with JOSE, labs prior to manage blood pressure in 1 month.      5. Follow up with Dr Fernandez, echo and labs in 1 year

## 2022-10-05 NOTE — TELEPHONE ENCOUNTER
Pt was seen by Dr Fernandez last October 2021-   Pt cancelled CT and lab and did not reschedule.   Has been seeing PCP for blood pressure management.     10/8/2021:    Pt instructions:     1. Increase lisinopril to 40 mg daily     2. Start amlodipine 2.5 mg daily     3. Schedule you for CT of chest - in follow up from surgery     4. Follow up with JOSE, labs prior to manage blood pressure in 1 month.      5. Follow up with Dr Fernandez, echo and labs in 1 year      Pt is scheduled for annual follow up with Dr Fernandez in December   Labs and CT must be completed prior to that visit.     Orders placed/extended. Will call pt to schedule

## 2022-10-06 DIAGNOSIS — I10 BENIGN ESSENTIAL HYPERTENSION: ICD-10-CM

## 2022-10-06 DIAGNOSIS — I71.21 ASCENDING AORTIC ANEURYSM (H): ICD-10-CM

## 2022-10-06 NOTE — TELEPHONE ENCOUNTER
ZOËM for pt to return call to clinic scheduler to schedule CT Chest and labs prior to Dr. Fernandez in December.    MARGARET Walden

## 2022-10-10 RX ORDER — LISINOPRIL 40 MG/1
40 TABLET ORAL DAILY
Qty: 90 TABLET | Refills: 0 | OUTPATIENT
Start: 2022-10-10

## 2022-10-10 NOTE — TELEPHONE ENCOUNTER
Called and spoke to patient. Patient is scheduled for lab work a few days before her appointment in December with Dr. Fernandez and is scheduled for a CT chest the week prior to her appointment at the [facility]. Patient confirmed that a [employer] message could be sent to her once the CT is scheduled and she will let us know if it needs to be rescheduled. New Screenst message sent to patient with upcoming appointment.    MARGARET Walden

## 2022-12-08 ENCOUNTER — ANCILLARY PROCEDURE (OUTPATIENT)
Dept: CT IMAGING | Facility: CLINIC | Age: 67
End: 2022-12-08
Attending: INTERNAL MEDICINE
Payer: COMMERCIAL

## 2022-12-08 DIAGNOSIS — Z98.890 S/P ASCENDING AORTIC ANEURYSM REPAIR: ICD-10-CM

## 2022-12-08 DIAGNOSIS — I10 BENIGN ESSENTIAL HYPERTENSION: ICD-10-CM

## 2022-12-08 DIAGNOSIS — Z86.79 S/P ASCENDING AORTIC ANEURYSM REPAIR: ICD-10-CM

## 2022-12-08 PROCEDURE — 71275 CT ANGIOGRAPHY CHEST: CPT | Mod: GC | Performed by: RADIOLOGY

## 2022-12-08 RX ORDER — IOPAMIDOL 755 MG/ML
100 INJECTION, SOLUTION INTRAVASCULAR ONCE
Status: COMPLETED | OUTPATIENT
Start: 2022-12-08 | End: 2022-12-08

## 2022-12-08 RX ADMIN — IOPAMIDOL 100 ML: 755 INJECTION, SOLUTION INTRAVASCULAR at 09:19

## 2023-01-01 DIAGNOSIS — I10 BENIGN ESSENTIAL HYPERTENSION: ICD-10-CM

## 2023-01-01 DIAGNOSIS — I71.21 ASCENDING AORTIC ANEURYSM (H): ICD-10-CM

## 2023-01-03 RX ORDER — LISINOPRIL 40 MG/1
40 TABLET ORAL DAILY
Qty: 30 TABLET | Refills: 0 | Status: SHIPPED | OUTPATIENT
Start: 2023-01-03 | End: 2023-01-11

## 2023-01-03 NOTE — TELEPHONE ENCOUNTER
lisinopril (ZESTRIL) 40 MG tablet 90 tablet 0 10/5/2022         Last Written Prescription Date:  10-5-2022  Last Fill Quantity: 90,   # refills: 0  Last Office Visit : 10-8-2021  Future Office visit:  none    Routing refill request to provider for review/approval because:  Labs overdue  Over a year since last appointment  No appointment scheduled  Has had 90 day turner refill      Kathleen M Doege RN

## 2023-01-03 NOTE — TELEPHONE ENCOUNTER
Lab schd next week, follow up appt cancelled in dec. Reviewed with Dr Fernandez- Will refill 30 days and follow up on lab

## 2023-01-09 ENCOUNTER — LAB (OUTPATIENT)
Dept: LAB | Facility: CLINIC | Age: 68
End: 2023-01-09
Payer: COMMERCIAL

## 2023-01-09 DIAGNOSIS — Z86.79 S/P ASCENDING AORTIC ANEURYSM REPAIR: ICD-10-CM

## 2023-01-09 DIAGNOSIS — I71.21 ASCENDING AORTIC ANEURYSM (H): ICD-10-CM

## 2023-01-09 DIAGNOSIS — Z98.890 S/P ASCENDING AORTIC ANEURYSM REPAIR: ICD-10-CM

## 2023-01-09 DIAGNOSIS — I10 BENIGN ESSENTIAL HYPERTENSION: ICD-10-CM

## 2023-01-09 LAB
ALBUMIN SERPL-MCNC: 3.6 G/DL (ref 3.4–5)
ALP SERPL-CCNC: 86 U/L (ref 40–150)
ALT SERPL W P-5'-P-CCNC: 22 U/L (ref 0–50)
ANION GAP SERPL CALCULATED.3IONS-SCNC: 3 MMOL/L (ref 3–14)
AST SERPL W P-5'-P-CCNC: 13 U/L (ref 0–45)
BILIRUB SERPL-MCNC: 1.1 MG/DL (ref 0.2–1.3)
BUN SERPL-MCNC: 27 MG/DL (ref 7–30)
CALCIUM SERPL-MCNC: 9.2 MG/DL (ref 8.5–10.1)
CHLORIDE BLD-SCNC: 110 MMOL/L (ref 94–109)
CO2 SERPL-SCNC: 31 MMOL/L (ref 20–32)
CREAT SERPL-MCNC: 0.6 MG/DL (ref 0.52–1.04)
ERYTHROCYTE [DISTWIDTH] IN BLOOD BY AUTOMATED COUNT: 14.4 % (ref 10–15)
GFR SERPL CREATININE-BSD FRML MDRD: >90 ML/MIN/1.73M2
GLUCOSE BLD-MCNC: 111 MG/DL (ref 70–99)
HCT VFR BLD AUTO: 45.2 % (ref 35–47)
HGB BLD-MCNC: 14.9 G/DL (ref 11.7–15.7)
MCH RBC QN AUTO: 29.1 PG (ref 26.5–33)
MCHC RBC AUTO-ENTMCNC: 33 G/DL (ref 31.5–36.5)
MCV RBC AUTO: 88 FL (ref 78–100)
NT-PROBNP SERPL-MCNC: 445 PG/ML (ref 0–900)
PLATELET # BLD AUTO: 137 10E3/UL (ref 150–450)
POTASSIUM BLD-SCNC: 4 MMOL/L (ref 3.4–5.3)
PROT SERPL-MCNC: 7.3 G/DL (ref 6.8–8.8)
RBC # BLD AUTO: 5.12 10E6/UL (ref 3.8–5.2)
SODIUM SERPL-SCNC: 144 MMOL/L (ref 133–144)
WBC # BLD AUTO: 6.5 10E3/UL (ref 4–11)

## 2023-01-09 PROCEDURE — 36415 COLL VENOUS BLD VENIPUNCTURE: CPT

## 2023-01-09 PROCEDURE — 85027 COMPLETE CBC AUTOMATED: CPT

## 2023-01-09 PROCEDURE — 83880 ASSAY OF NATRIURETIC PEPTIDE: CPT | Mod: GA

## 2023-01-09 PROCEDURE — 80053 COMPREHEN METABOLIC PANEL: CPT

## 2023-01-10 DIAGNOSIS — I71.21 ASCENDING AORTIC ANEURYSM (H): ICD-10-CM

## 2023-01-10 DIAGNOSIS — I10 BENIGN ESSENTIAL HYPERTENSION: ICD-10-CM

## 2023-01-11 RX ORDER — LISINOPRIL 40 MG/1
40 TABLET ORAL DAILY
Qty: 90 TABLET | Refills: 0
Start: 2023-01-11 | End: 2023-10-24

## 2023-03-07 NOTE — TELEPHONE ENCOUNTER
"Requested Prescriptions   Pending Prescriptions Disp Refills     buPROPion (WELLBUTRIN XL) 150 MG 24 hr tablet [Pharmacy Med Name: buPROPion HCl ER (XL) Oral Tablet Extended Release 24 Hour 150 MG] 90 tablet 0     Sig: TAKE ONE TABLET BY MOUTH IN THE MORNING       SSRIs Protocol Failed - 1/30/2022 10:42 AM        Failed - PHQ-9 score less than 5 in past 6 months     Please review last PHQ-9 score.           Passed - Medication is Bupropion     If the medication is Bupropion (Wellbutrin), and the patient is taking for smoking cessation; OK to refill.          Passed - Medication is active on med list        Passed - Patient is age 18 or older        Passed - No active pregnancy on record        Passed - No positive pregnancy test in last 12 months        Passed - Recent (6 mo) or future (30 days) visit within the authorizing provider's specialty     Patient had office visit in the last 6 months or has a visit in the next 30 days with authorizing provider or within the authorizing provider's specialty.  See \"Patient Info\" tab in inbasket, or \"Choose Columns\" in Meds & Orders section of the refill encounter.               Team:  Please call patient and assist with completing the PHQ-9    " Explained to mom that Dr. Britton is requesting to schedule an appt.     Appt made for Saturday, 04/15/23 @ 1000 with Dr. Britton.

## 2023-04-23 ENCOUNTER — HEALTH MAINTENANCE LETTER (OUTPATIENT)
Age: 68
End: 2023-04-23

## 2023-06-02 ENCOUNTER — HEALTH MAINTENANCE LETTER (OUTPATIENT)
Age: 68
End: 2023-06-02

## 2023-07-25 ENCOUNTER — PREP FOR PROCEDURE (OUTPATIENT)
Dept: ORTHOPEDICS | Facility: CLINIC | Age: 68
End: 2023-07-25

## 2023-07-25 ENCOUNTER — OFFICE VISIT (OUTPATIENT)
Dept: ORTHOPEDICS | Facility: CLINIC | Age: 68
End: 2023-07-25
Payer: COMMERCIAL

## 2023-07-25 ENCOUNTER — ANCILLARY PROCEDURE (OUTPATIENT)
Dept: GENERAL RADIOLOGY | Facility: CLINIC | Age: 68
End: 2023-07-25
Attending: ORTHOPAEDIC SURGERY
Payer: COMMERCIAL

## 2023-07-25 VITALS
SYSTOLIC BLOOD PRESSURE: 186 MMHG | DIASTOLIC BLOOD PRESSURE: 77 MMHG | WEIGHT: 205 LBS | OXYGEN SATURATION: 97 % | HEIGHT: 66 IN | HEART RATE: 60 BPM | BODY MASS INDEX: 32.95 KG/M2

## 2023-07-25 DIAGNOSIS — M17.11 PRIMARY OSTEOARTHRITIS OF RIGHT KNEE: ICD-10-CM

## 2023-07-25 DIAGNOSIS — M17.11 PRIMARY OSTEOARTHRITIS OF RIGHT KNEE: Primary | ICD-10-CM

## 2023-07-25 DIAGNOSIS — M17.0 PRIMARY OSTEOARTHRITIS OF BOTH KNEES: ICD-10-CM

## 2023-07-25 DIAGNOSIS — M17.11 OSTEOARTHRITIS OF RIGHT KNEE: Primary | ICD-10-CM

## 2023-07-25 DIAGNOSIS — M21.061 ACQUIRED GENU VALGUM OF RIGHT KNEE: ICD-10-CM

## 2023-07-25 PROCEDURE — 99204 OFFICE O/P NEW MOD 45 MIN: CPT | Performed by: ORTHOPAEDIC SURGERY

## 2023-07-25 PROCEDURE — 73562 X-RAY EXAM OF KNEE 3: CPT | Mod: TC | Performed by: STUDENT IN AN ORGANIZED HEALTH CARE EDUCATION/TRAINING PROGRAM

## 2023-07-25 ASSESSMENT — PAIN SCALES - GENERAL: PAINLEVEL: WORST PAIN (10)

## 2023-07-25 NOTE — PROGRESS NOTES
"SUBJECTIVE:   Georgie Patino is here in follow up of bilateral knee arthritis    Here to discuss possible right total knee arthroplasty     Previous  injections: Large Joint Injection/Arthocentesis: bilateral knee   Date/Time: 2020 and 19.  Bilateral knees. Length of effectiveness: 3 months.     Present symptoms: ***       Review of Systems:  Constitutional/General: Negative for fever, chills, change in weight  Integumentary/Skin: Negative for worrisome rashes, moles, or lesions  Neuro: Negative for weakness, dizziness, or paresthesias   Psychiatric: negative for changes in mood or affect    OBJECTIVE:  Physical Exam:  BP (!) 186/77 (BP Location: Left arm, Patient Position: Sitting, Cuff Size: Adult Large)   Pulse 60   Ht 1.676 m (5' 6\")   Wt 93 kg (205 lb)   SpO2 97%   BMI 33.09 kg/m    General Appearance: healthy, alert and no distress   Skin: no suspicious lesions or rashes  Neuro: Normal strength and tone, mentation intact and speech normal  Vascular: good pulses, and capillary refill   Lymph: no lymphadenopathy   Psych:  mentation appears normal and affect normal/bright  Resp: no increased work of breathing    {RIGHT LEFT CAPS:199430} Knee Exam:  Gait: {ORTHOGAIT:160359}  Alignment: {Ortho Alignment:705100}  Squat: *** % {squat:154495}.    Patellofemoral joint: {MILD MOD:481039} crepitations in the patellofemoral joint.  Effusion: {MILD/MODERATE:441332}  ROM: {ORTHO ROM:098782}  Tender: {tenderness:015376}  Masses: ***  Ligaments:   Lachman's: stable   Anterior/Posterior drawer: stable,   Varus/Valgus stress: stable to varus and valgus stress  McMurrays: {NE}    X-rays:  Obtained today of bilateral knees reviewed in the office with the patient by myself today and show   Right knee: Advanced lateral and moderate to advanced patellofemoral  degenerative changes similar to the prior study.     *** Left knee: Moderate marginal osteophyte formation in the lateral  compartment without " significant loss of joint space. Moderate to  advanced patellofemoral degenerative changes     ASSESSMENT:   Severe osteoarthritis right knee with valgus deformity  ***    PLAN:   Total Knee Arthroplasty: We talked about the patient's condition and diagnosis.  Because of severe arthritis, and failure of conservative measures, I have suggested total knee arthroplasty of the right  knee(s).  I've talked in depth about the procedure and the risks, which include, but are not limited to blood loss requiring transfusion, infection, neurovascular injury, DVT, PE, continued pain, (both perioperative and persistent post-recovery pain), numbness around the wound, instability, and potential anesthetic and perioperative medical complications, and the possibility of needing additional procedures. We also talked about recovery time, which differs from patient to patient.   Medical clearance will need to be obtained.  Special considerations PS knee. Valgus.          JUDIE Oro MD  Dept. Orthopedic Surgery  [facility]

## 2023-07-25 NOTE — PROGRESS NOTES
"SUBJECTIVE:   Georgie Patino is here in follow up of bilateral knee arthritis    Here to discuss possible right total knee arthroplasty      Previous  injections: Large Joint Injection/Arthocentesis: bilateral knee   Date/Time: 7/1/2020 and 11/26/19.  Bilateral knees. Length of effectiveness: 3 months.      Present symptoms: the past week pain has worsened   Pain walking  Night pain? No   Pain first getting up .   Feeling of giving-way     Review of Systems:  Constitutional/General: Negative for fever, chills, change in weight  Integumentary/Skin: Negative for worrisome rashes, moles, or lesions  Neuro: Negative for weakness, dizziness, or paresthesias   Psychiatric: negative for changes in mood or affect     OBJECTIVE:  Physical Exam:  BP (!) 186/77 (BP Location: Left arm, Patient Position: Sitting, Cuff Size: Adult Large)   Pulse 60   Ht 1.676 m (5' 6\")   Wt 93 kg (205 lb)   SpO2 97%   BMI 33.09 kg/m    General Appearance: healthy, alert and no distress   Skin: no suspicious lesions or rashes  Neuro: Normal strength and tone, mentation intact and speech normal  Vascular: good pulses, and capillary refill   Lymph: no lymphadenopathy   Psych:  mentation appears normal and affect normal/bright  Resp: no increased work of breathing     Right Knee Exam:  Gait: walks with normal gait  Alignment: moderate valgus    Patellofemoral joint: mild crepitations in the patellofemoral joint.  Effusion: mild  ROM: 5-95  Tender: tibial tubercle  Masses: none  Ligaments:              Lachman's: stable              Anterior/Posterior drawer: stable,              Varus/Valgus stress: valgus doesn't correct       X-rays:  Obtained today of bilateral knees reviewed in the office with the patient by myself today and show   Right knee: Advanced lateral and moderate to advanced patellofemoral  degenerative changes advanced compared to the prior study.      Left knee: Moderate marginal osteophyte formation in the lateral  compartment " with moderate. loss of joint spaces medial and latera. Moderate to  advanced patellofemoral degenerative changes     ASSESSMENT:   Severe osteoarthritis right knee with valgus deformity       PLAN:   Total Knee Arthroplasty: We talked about the patient's condition and diagnosis.  Because of severe arthritis, and failure of conservative measures, I have suggested total knee arthroplasty of the right  knee(s).  I've talked in depth about the procedure and the risks, which include, but are not limited to blood loss requiring transfusion, infection, neurovascular injury, DVT, PE, continued pain, (both perioperative and persistent post-recovery pain), numbness around the wound, instability, and potential anesthetic and perioperative medical complications, and the possibility of needing additional procedures. We also talked about recovery time, which differs from patient to patient.   Medical clearance will need to be obtained.  Special considerations PS knee. Valgus.   Alberto Oro MD  Dept. Orthopedic Surgery  [facility]

## 2023-07-25 NOTE — LETTER
"    7/25/2023         RE: Georgie Patino  [address] [place] [id number]        Dear Colleague,    Thank you for referring your patient, Georgie Patino, to the [facility]. Please see a copy of my visit note below.    SUBJECTIVE:   Georgie Patino is here in follow up of bilateral knee arthritis    Here to discuss possible right total knee arthroplasty      Previous  injections: Large Joint Injection/Arthocentesis: bilateral knee   Date/Time: 7/1/2020 and 11/26/19.  Bilateral knees. Length of effectiveness: 3 months.      Present symptoms: the past week pain has worsened   Pain walking  Night pain? No   Pain first getting up .   Feeling of giving-way     Review of Systems:  Constitutional/General: Negative for fever, chills, change in weight  Integumentary/Skin: Negative for worrisome rashes, moles, or lesions  Neuro: Negative for weakness, dizziness, or paresthesias   Psychiatric: negative for changes in mood or affect     OBJECTIVE:  Physical Exam:  BP (!) 186/77 (BP Location: Left arm, Patient Position: Sitting, Cuff Size: Adult Large)   Pulse 60   Ht 1.676 m (5' 6\")   Wt 93 kg (205 lb)   SpO2 97%   BMI 33.09 kg/m    General Appearance: healthy, alert and no distress   Skin: no suspicious lesions or rashes  Neuro: Normal strength and tone, mentation intact and speech normal  Vascular: good pulses, and capillary refill   Lymph: no lymphadenopathy   Psych:  mentation appears normal and affect normal/bright  Resp: no increased work of breathing     Right Knee Exam:  Gait: walks with normal gait  Alignment: moderate valgus    Patellofemoral joint: mild crepitations in the patellofemoral joint.  Effusion: mild  ROM: 5-95  Tender: tibial tubercle  Masses: none  Ligaments:              Lachman's: stable              Anterior/Posterior drawer: stable,              Varus/Valgus stress: valgus doesn't correct       X-rays:  Obtained today of bilateral knees reviewed in the office with " the patient by myself today and show   Right knee: Advanced lateral and moderate to advanced patellofemoral  degenerative changes advanced compared to the prior study.      Left knee: Moderate marginal osteophyte formation in the lateral  compartment with moderate. loss of joint spaces medial and latera. Moderate to  advanced patellofemoral degenerative changes     ASSESSMENT:   Severe osteoarthritis right knee with valgus deformity       PLAN:   Total Knee Arthroplasty: We talked about the patient's condition and diagnosis.  Because of severe arthritis, and failure of conservative measures, I have suggested total knee arthroplasty of the right  knee(s).  I've talked in depth about the procedure and the risks, which include, but are not limited to blood loss requiring transfusion, infection, neurovascular injury, DVT, PE, continued pain, (both perioperative and persistent post-recovery pain), numbness around the wound, instability, and potential anesthetic and perioperative medical complications, and the possibility of needing additional procedures. We also talked about recovery time, which differs from patient to patient.   Medical clearance will need to be obtained.  Special considerations PS knee. Valgus.   Alberto Oro MD  Dept. Orthopedic Surgery  [facility]                      Again, thank you for allowing me to participate in the care of your patient.        Sincerely,        Ross Oro MD

## 2023-07-26 ENCOUNTER — TELEPHONE (OUTPATIENT)
Dept: ORTHOPEDICS | Facility: CLINIC | Age: 68
End: 2023-07-26

## 2023-07-26 NOTE — TELEPHONE ENCOUNTER
Type of surgery: RIGHT TOTAL KNEE ARTHROPLASTY (Right)   Location of surgery: [facility]  Date and time of surgery: 10/02/2023  Surgeon: RANDA  Pre-Op Appt Date: 09/25/2023  Post-Op Appt Date: 10/17/2023   Packet sent out: Yes  Pre-cert/Authorization completed:  No  Date:

## 2023-07-27 DIAGNOSIS — M17.11 PRIMARY OSTEOARTHRITIS OF RIGHT KNEE: Primary | ICD-10-CM

## 2023-08-01 DIAGNOSIS — I10 BENIGN ESSENTIAL HYPERTENSION: ICD-10-CM

## 2023-08-01 DIAGNOSIS — I71.21 ASCENDING AORTIC ANEURYSM (H): ICD-10-CM

## 2023-08-04 NOTE — TELEPHONE ENCOUNTER
Lisinopril Oral Tablet 40 MG      Last Written Prescription Date:  1-11-23  Last Fill Quantity: 90,   # refills: 1  Last Office Visit : 10-8-21 (RTC 1 Y)  Future Office visit:  None    BP Readings from Last 3 Encounters:   07/25/23 (!) 186/77   06/03/22 (!) 173/90   11/01/21 (!) 144/72       Routing refill request to provider for review/approval because:  Blood pressure out of range   Last appt > 18 M

## 2023-08-09 RX ORDER — LISINOPRIL 40 MG/1
40 TABLET ORAL DAILY
Qty: 60 TABLET | Refills: 0 | Status: SHIPPED | OUTPATIENT
Start: 2023-08-09 | End: 2023-09-25

## 2023-08-24 DIAGNOSIS — M17.11 PRIMARY OSTEOARTHRITIS OF RIGHT KNEE: Primary | ICD-10-CM

## 2023-09-05 DIAGNOSIS — M17.11 PRIMARY OSTEOARTHRITIS OF RIGHT KNEE: Primary | ICD-10-CM

## 2023-09-10 DIAGNOSIS — R00.2 PALPITATIONS: ICD-10-CM

## 2023-09-13 NOTE — TELEPHONE ENCOUNTER
Called and LVM for patient to return call to clinic scheduler to schedule cardiac follow-up with Dr. Bashir or Dr. Gomez with an echocardiogram prior.    MARGARET Walden

## 2023-09-13 NOTE — TELEPHONE ENCOUNTER
Metoprolol Tartrate Oral Tablet 50 MG     Last Written Prescription Date:  1/11/2023  Last Fill Quantity: 180,   # refills: 1  Last Office Visit :  10/8/2021  Future Office visit:  None    Routing refill request to provider for review/approval because:  Second Request  Pt needing updated office visit.   Abnormal B/P's?  Please call Pt to schedule visit.       BP Readings from Last 3 Encounters:   07/25/23 (!) 186/77   06/03/22 (!) 173/90   11/01/21 (!) 144/72      Beatrice Rodas RN  Central Triage Red Flags/Med Refills  Beta-Blockers Protocol [id number]/10/2023 10:12 AM   Protocol Details Blood pressure under 140/90 in past 12 months    Recent (12 mo) or future (30 days) visit within the authorizing provider's specialty

## 2023-09-13 NOTE — TELEPHONE ENCOUNTER
Pt is overdue for visit.  LPN/MA please reach out to patient and offer first available appointment echo and follow-up-can see Dr. Bashir or Dr. Gomez.    Jocelin Kaiser, RN  Cardiology Care Coordinator  [facility]  [phone] option 1

## 2023-09-14 RX ORDER — METOPROLOL TARTRATE 50 MG
50 TABLET ORAL 2 TIMES DAILY
Qty: 180 TABLET | Refills: 0 | Status: SHIPPED | OUTPATIENT
Start: 2023-09-14 | End: 2023-10-24

## 2023-09-14 NOTE — TELEPHONE ENCOUNTER
Refill sent x1.    Signed Prescriptions:                        Disp   Refills    metoprolol tartrate (LOPRESSOR) 50 MG tabl*180 ta*0        Sig: Take 1 tablet (50 mg) by mouth 2 times daily  Authorizing Provider: JOHNY LANDIS  Ordering User: AGNES KHAN, RN  Cardiology Care Coordinator  [facility]  [phone] option 1

## 2023-09-14 NOTE — TELEPHONE ENCOUNTER
Called patient to schedule visit with either Dr. Bashir or Dr. Gomez with an echo prior. No answer - LVM. Also sent [employer] message.     Kori Beltran CMA (LILLIAN)

## 2023-09-20 DIAGNOSIS — M17.11 PRIMARY OSTEOARTHRITIS OF RIGHT KNEE: Primary | ICD-10-CM

## 2023-09-24 ENCOUNTER — HEALTH MAINTENANCE LETTER (OUTPATIENT)
Age: 68
End: 2023-09-24

## 2023-09-25 ENCOUNTER — OFFICE VISIT (OUTPATIENT)
Dept: FAMILY MEDICINE | Facility: CLINIC | Age: 68
End: 2023-09-25
Payer: COMMERCIAL

## 2023-09-25 VITALS
WEIGHT: 206 LBS | DIASTOLIC BLOOD PRESSURE: 86 MMHG | OXYGEN SATURATION: 97 % | HEART RATE: 72 BPM | TEMPERATURE: 98.5 F | SYSTOLIC BLOOD PRESSURE: 144 MMHG | BODY MASS INDEX: 33.25 KG/M2 | RESPIRATION RATE: 18 BRPM

## 2023-09-25 DIAGNOSIS — I10 HYPERTENSION, UNSPECIFIED TYPE: ICD-10-CM

## 2023-09-25 DIAGNOSIS — M17.11 PRIMARY OSTEOARTHRITIS OF RIGHT KNEE: ICD-10-CM

## 2023-09-25 DIAGNOSIS — Z01.818 PREOP GENERAL PHYSICAL EXAM: Primary | ICD-10-CM

## 2023-09-25 DIAGNOSIS — Z98.890 S/P ASCENDING AORTIC ANEURYSM REPAIR: ICD-10-CM

## 2023-09-25 DIAGNOSIS — Z86.79 S/P ASCENDING AORTIC ANEURYSM REPAIR: ICD-10-CM

## 2023-09-25 DIAGNOSIS — E66.01 MORBID OBESITY (H): ICD-10-CM

## 2023-09-25 LAB
ANION GAP SERPL CALCULATED.3IONS-SCNC: 9 MMOL/L (ref 7–15)
BASOPHILS # BLD AUTO: 0.1 10E3/UL (ref 0–0.2)
BASOPHILS NFR BLD AUTO: 1 %
BUN SERPL-MCNC: 26.4 MG/DL (ref 8–23)
CALCIUM SERPL-MCNC: 9.5 MG/DL (ref 8.8–10.2)
CHLORIDE SERPL-SCNC: 106 MMOL/L (ref 98–107)
CREAT SERPL-MCNC: 0.65 MG/DL (ref 0.51–0.95)
DEPRECATED HCO3 PLAS-SCNC: 26 MMOL/L (ref 22–29)
EGFRCR SERPLBLD CKD-EPI 2021: >90 ML/MIN/1.73M2
EOSINOPHIL # BLD AUTO: 0.1 10E3/UL (ref 0–0.7)
EOSINOPHIL NFR BLD AUTO: 2 %
ERYTHROCYTE [DISTWIDTH] IN BLOOD BY AUTOMATED COUNT: 12.6 % (ref 10–15)
GLUCOSE SERPL-MCNC: 100 MG/DL (ref 70–99)
HCT VFR BLD AUTO: 46.7 % (ref 35–47)
HGB BLD-MCNC: 15.2 G/DL (ref 11.7–15.7)
IMM GRANULOCYTES # BLD: 0 10E3/UL
IMM GRANULOCYTES NFR BLD: 0 %
LYMPHOCYTES # BLD AUTO: 2.1 10E3/UL (ref 0.8–5.3)
LYMPHOCYTES NFR BLD AUTO: 29 %
MCH RBC QN AUTO: 28.4 PG (ref 26.5–33)
MCHC RBC AUTO-ENTMCNC: 32.5 G/DL (ref 31.5–36.5)
MCV RBC AUTO: 87 FL (ref 78–100)
MONOCYTES # BLD AUTO: 0.5 10E3/UL (ref 0–1.3)
MONOCYTES NFR BLD AUTO: 7 %
NEUTROPHILS # BLD AUTO: 4.4 10E3/UL (ref 1.6–8.3)
NEUTROPHILS NFR BLD AUTO: 61 %
PLATELET # BLD AUTO: 166 10E3/UL (ref 150–450)
POTASSIUM SERPL-SCNC: 4.9 MMOL/L (ref 3.4–5.3)
RBC # BLD AUTO: 5.36 10E6/UL (ref 3.8–5.2)
SODIUM SERPL-SCNC: 141 MMOL/L (ref 136–145)
WBC # BLD AUTO: 7.2 10E3/UL (ref 4–11)

## 2023-09-25 PROCEDURE — 85025 COMPLETE CBC W/AUTO DIFF WBC: CPT | Performed by: PHYSICIAN ASSISTANT

## 2023-09-25 PROCEDURE — 99214 OFFICE O/P EST MOD 30 MIN: CPT | Performed by: PHYSICIAN ASSISTANT

## 2023-09-25 PROCEDURE — 80048 BASIC METABOLIC PNL TOTAL CA: CPT | Performed by: PHYSICIAN ASSISTANT

## 2023-09-25 PROCEDURE — 36415 COLL VENOUS BLD VENIPUNCTURE: CPT | Performed by: PHYSICIAN ASSISTANT

## 2023-09-25 PROCEDURE — 93000 ELECTROCARDIOGRAM COMPLETE: CPT | Performed by: PHYSICIAN ASSISTANT

## 2023-09-25 RX ORDER — AMLODIPINE BESYLATE 5 MG/1
5 TABLET ORAL DAILY
Qty: 90 TABLET | Refills: 0 | Status: SHIPPED | OUTPATIENT
Start: 2023-09-25 | End: 2023-10-24

## 2023-09-25 NOTE — H&P (VIEW-ONLY)
[facility]  [address]  [place] [address]  Phone: [phone]  Primary Provider: Kori Em  Pre-op Performing Provider: KORI EM      PREOPERATIVE EVALUATION:  Today's date: 9/25/2023    Julianna is a 68 year old female who presents for a preoperative evaluation.      9/25/2023    10:16 AM   Additional Questions   Roomed by shan mack       Surgical Information:  Surgery/Procedure: RIGHT TOTAL KNEE ARTHROPLASTY   Surgery Location: [facility]  Surgeon: Dr. Oro  Surgery Date: 10/2/2023  Time of Surgery: 7:30am  Where patient plans to recover: At home with family  Fax number for surgical facility: Note does not need to be faxed, will be available electronically in Epic.    Assessment & Plan     The proposed surgical procedure is considered INTERMEDIATE risk.    Preop general physical exam  - CBC with Platelets & Differential; Future  - Basic metabolic panel; Future  - CBC with Platelets & Differential  - Basic metabolic panel    Morbid obesity (H)  Primary osteoarthritis of right knee  S/P ascending aortic aneurysm repair  - EKG 12-lead complete w/read - Clinics  - amLODIPine (NORVASC) 5 MG tablet; Take 1 tablet (5 mg) by mouth daily    Hypertension, unspecified type  Not well controlled. Discussed with patient needs to restart her amlodipine and follow up in clinic after surgery.   - EKG 12-lead complete w/read - Clinics         Risks and Recommendations:  The patient has the following additional risks and recommendations for perioperative complications:   - Morbid obesity (BMI >40)    Antiplatelet or Anticoagulation Medication Instructions:   - Patient is on no antiplatelet or anticoagulation medications.    Additional Medication Instructions:  Patient is to take all scheduled medications on the day of surgery    RECOMMENDATION:  APPROVAL GIVEN to proceed with proposed procedure, without further diagnostic evaluation.            Subjective       HPI  related to upcoming procedure: Patient with primary OA of the right knee requiring surgery.         9/25/2023     8:35 AM   Preop Questions   1. Have you ever had a heart attack or stroke? No   2. Have you ever had surgery on your heart or blood vessels, such as a stent placement, a coronary artery bypass, or surgery on an artery in your head, neck, heart, or legs? YES -    3. Do you have chest pain with activity? No   4. Do you have a history of  heart failure? No   5. Do you currently have a cold, bronchitis or symptoms of other infection? No   6. Do you have a cough, shortness of breath, or wheezing? No   7. Do you or anyone in your family have previous history of blood clots? No   8. Do you or does anyone in your family have a serious bleeding problem such as prolonged bleeding following surgeries or cuts? No   9. Have you ever had problems with anemia or been told to take iron pills? No   10. Have you had any abnormal blood loss such as black, tarry or bloody stools, or abnormal vaginal bleeding? No   11. Have you ever had a blood transfusion? YES - teenager   11a. Have you ever had a transfusion reaction? No   12. Are you willing to have a blood transfusion if it is medically needed before, during, or after your surgery? Yes   13. Have you or any of your relatives ever had problems with anesthesia? No   14. Do you have sleep apnea, excessive snoring or daytime drowsiness? No   15. Do you have any artifical heart valves or other implanted medical devices like a pacemaker, defibrillator, or continuous glucose monitor? No   16. Do you have artificial joints? No   17. Are you allergic to latex? No       Health Care Directive:  Patient does not have a Health Care Directive or Living Will: Discussed advance care planning with patient; however, patient declined at this time.    Preoperative Review of :   reviewed - no record of controlled substances prescribed.          Review of Systems  CONSTITUTIONAL:  NEGATIVE for fever, chills, change in weight  INTEGUMENTARY/SKIN: NEGATIVE for worrisome rashes, moles or lesions  ENT/MOUTH: NEGATIVE for ear, mouth and throat problems  RESP: NEGATIVE for significant cough or SOB  CV: NEGATIVE for chest pain, palpitations or peripheral edema  GI: POSITIVE for heartburn or reflux and NEGATIVE for abdominal pain generalized, constipation, and diarrhea  : NEGATIVE for frequency, dysuria, or hematuria  MUSCULOSKELETAL: NEGATIVE for significant arthralgias or myalgia  NEURO: NEGATIVE for weakness, dizziness or paresthesias  HEME: NEGATIVE for bleeding problems  PSYCHIATRIC: NEGATIVE for changes in mood or affect    Patient Active Problem List    Diagnosis Date Noted    Screening for cervical cancer 04/01/2021     Priority: Medium     10/28/04 [place] Bx - Negative. NIL Pap, Neg HPV (Care Everywhere)  2012 NIL Pap  2016 NIL Pap, Neg HPV  2021 NIL Pap, Neg HPV.         Thyroid nodule 11/20/2017     Priority: Medium    S/P ascending aortic aneurysm repair 11/07/2017     Priority: Medium    Former smoker 11/07/2017     Priority: Medium    Anxiety 10/17/2017     Priority: Medium    Hypertension 09/21/2017     Priority: Medium    Family history of sudden cardiac death 08/15/2017     Priority: Medium    Benign essential hypertension 01/27/2016     Priority: Medium    CARDIOVASCULAR SCREENING; LDL GOAL LESS THAN 160 05/30/2012     Priority: Medium    Morbid obesity (H) 05/30/2012     Priority: Medium      Past Medical History:   Diagnosis Date    Ascending aortic aneurysm (H) 09/21/2017    Blood transfusions age 19    due to menorhagia    Hypertension 09/21/2017    Localized osteoarthritis of both knees     Thyroid nodule 11/20/2017    Tobacco abuse      Past Surgical History:   Procedure Laterality Date    COLONOSCOPY WITH CO2 INSUFFLATION N/A 03/15/2017    Procedure: COLONOSCOPY WITH CO2 INSUFFLATION;  Surgeon: Duane, William Charles, MD;  Location: [facility] OR    DILATION AND CURETTAGE  age 20     EXTRACORPOREAL SHOCK WAVE LITHOTRIPSY, CYSTOSCOPY, INSERT STENT URETER(S), COMBINED Bilateral 2018    Procedure: BILATERAL EXTRACORPOREAL SHOCK WAVE LITHOTRIPSY, CYSTOSCOPY, LEFT STENT PLACEMENT;  Surgeon: Tavo Saavedra MD;  Location: [facility] OR    GI SURGERY  3/15/2017    GYN SURGERY  age 20    REPAIR ANEURYSM ASCENDING AORTA N/A 10/27/2017    Procedure: REPAIR ANEURYSM ASCENDING AORTA;  Median Sternotomy, Cardiopulmonary bypass, Ascending Aorta Aneurysm Repair using Hemashield Platinum Woven Double Velour Graft 34cm X 30cm.;  Surgeon: Rubio Piña MD;  Location: [facility] OR    VASCULAR SURGERY  10/27/2017    [place] teeth removed       Current Outpatient Medications   Medication Sig Dispense Refill    amLODIPine (NORVASC) 5 MG tablet Take 1 tablet (5 mg) by mouth daily 90 tablet 0    aspirin EC 81 MG EC tablet Take 1 tablet (81 mg) by mouth daily 30 tablet 0    lisinopril (ZESTRIL) 40 MG tablet Take 1 tablet (40 mg) by mouth daily 90 tablet 0    metoprolol tartrate (LOPRESSOR) 50 MG tablet Take 1 tablet (50 mg) by mouth 2 times daily 180 tablet 0       Allergies   Allergen Reactions    Blood Transfusion Related (Informational Only) Other (See Comments)     Patient has a history of a clinically significant antibody against RBC antigens.  A delay in compatible RBCs may occur.        Social History     Tobacco Use    Smoking status: Former     Packs/day: 0.05     Years: 40.00     Pack years: 2.00     Types: Cigarettes, Other     Quit date: 10/27/2017     Years since quittin.9    Smokeless tobacco: Never    Tobacco comments:     2 cigs daily   Substance Use Topics    Alcohol use: Yes     Alcohol/week: 0.8 - 1.7 standard drinks of alcohol     Types: 1 - 2 Standard drinks or equivalent per week     Comment: 3-4 drinks per week      Family History   Problem Relation Age of Onset    Respiratory Mother         copd    Cancer Maternal Grandmother         Leg    Alzheimer Disease Maternal Grandfather      Heart Disease Paternal Grandfather     Heart Disease Brother 54        Heart Attack     Cancer Sister         Lung cancer age 57-smoker d age 57     History   Drug Use No         Objective     BP (!) 158/98 (BP Location: Left arm, Patient Position: Chair, Cuff Size: Adult Large)   Pulse 72   Temp 98.5  F (36.9  C) (Oral)   Resp 18   Wt 93.4 kg (206 lb)   SpO2 97%   BMI 33.25 kg/m      Physical Exam    GENERAL APPEARANCE: healthy, alert and no distress     EYES: EOMI, PERRL     HENT: ear canals and TM's normal and nose and mouth without ulcers or lesions     NECK: no adenopathy, no asymmetry, masses, or scars and thyroid normal to palpation     RESP: lungs clear to auscultation - no rales, rhonchi or wheezes     CV: regular rates and rhythm, normal S1 S2, no S3 or S4 and no murmur, click or rub     ABDOMEN:  soft, nontender, no HSM or masses and bowel sounds normal     MS: extremities normal- no gross deformities noted, no evidence of inflammation in joints, FROM in all extremities.     SKIN: no suspicious lesions or rashes     NEURO: Normal strength and tone, sensory exam grossly normal, mentation intact and speech normal     PSYCH: mentation appears normal. and affect normal/bright     LYMPHATICS: No cervical adenopathy    Recent Labs   Lab Test 01/09/23  0921 10/06/21  0822   HGB 14.9 14.7   * 147*    141   POTASSIUM 4.0 3.9   CR 0.60 0.66        Diagnostics:  Labs pending at this time.  Results will be reviewed when available.   EKG required for History of AAA repair and not completed in the last 90 days.     Revised Cardiac Risk Index (RCRI):  The patient has the following serious cardiovascular risks for perioperative complications:   - No serious cardiac risks = 0 points     RCRI Interpretation: 0 points: Class I (very low risk - 0.4% complication rate)         Signed Electronically by: Kori Em PA-C  Copy of this evaluation report is provided to requesting physician.

## 2023-09-25 NOTE — PATIENT INSTRUCTIONS
The morning of surgery take the metoprolol with a small sip of water.   You should stop using any Ibuprofen (Advil), Naproxen (Aleve) or Aspirin containing products 7 days before your procedure. You may use Tylenol (Acetaminophen) as needed.  Patient Education    Preparing for Your Surgery  Getting started  A nurse will call you to review your health history and instructions. They will give you an arrival time based on your scheduled surgery time. Please be ready to share:  Your doctor's clinic name and phone number  Your medical, surgical, and anesthesia history  A list of allergies and sensitivities  A list of medicines, including herbal treatments and over-the-counter drugs  Whether the patient has a legal guardian (ask how to send us the papers in advance)  Please tell us if you're pregnant--or if there's any chance you might be pregnant. Some surgeries may injure a fetus (unborn baby), so they require a pregnancy test. Surgeries that are safe for a fetus don't always need a test, and you can choose whether to have one.   If you have a child who's having surgery, please ask for a copy of Preparing for Your Child's Surgery.    Preparing for surgery  Within 10 to 30 days of surgery: Have a pre-op exam (sometimes called an H&P, or History and Physical). This can be done at a clinic or pre-operative center.  If you're having a , you may not need this exam. Talk to your care team.  At your pre-op exam, talk to your care team about all medicines you take. If you need to stop any medicines before surgery, ask when to start taking them again.  We do this for your safety. Many medicines can make you bleed too much during surgery. Some change how well surgery (anesthesia) drugs work.  Call your insurance company to let them know you're having surgery. (If you don't have insurance, call [phone].)  Call your clinic if there's any change in your health. This includes signs of a cold or flu (sore throat, runny  nose, cough, rash, fever). It also includes a scrape or scratch near the surgery site.  If you have questions on the day of surgery, call your hospital or surgery center.  Eating and drinking guidelines  For your safety: Unless your surgeon tells you otherwise, follow the guidelines below.  Eat and drink as usual until 8 hours before you arrive for surgery. After that, no food or milk.  Drink clear liquids until 2 hours before you arrive. These are liquids you can see through, like water, Gatorade, and Propel Water. They also include plain black coffee and tea (no cream or milk), candy, and breath mints. You can spit out gum when you arrive.  If you drink alcohol: Stop drinking it the night before surgery.  If your care team tells you to take medicine on the morning of surgery, it's okay to take it with a sip of water.  Preventing infection  Shower or bathe the night before and morning of your surgery. Follow the instructions your clinic gave you. (If no instructions, use regular soap.)  Don't shave or clip hair near your surgery site. We'll remove the hair if needed.  Don't smoke or vape the morning of surgery. You may chew nicotine gum up to 2 hours before surgery. A nicotine patch is okay.  Note: Some surgeries require you to completely quit smoking and nicotine. Check with your surgeon.  Your care team will make every effort to keep you safe from infection. We will:  Clean our hands often with soap and water (or an alcohol-based hand rub).  Clean the skin at your surgery site with a special soap that kills germs.  Give you a special gown to keep you warm. (Cold raises the risk of infection.)  Wear special hair covers, masks, gowns and gloves during surgery.  Give antibiotic medicine, if prescribed. Not all surgeries need antibiotics.  What to bring on the day of surgery  Photo ID and insurance card  Copy of your health care directive, if you have one  Glasses and hearing aids (bring cases)  You can't wear  contacts during surgery  Inhaler and eye drops, if you use them (tell us about these when you arrive)  CPAP machine or breathing device, if you use them  A few personal items, if spending the night  If you have . . .  A pacemaker, ICD (cardiac defibrillator) or other implant: Bring the ID card.  An implanted stimulator: Bring the remote control.  A legal guardian: Bring a copy of the certified (court-stamped) guardianship papers.  Please remove any jewelry, including body piercings. Leave jewelry and other valuables at home.  If you're going home the day of surgery  You must have a responsible adult drive you home. They should stay with you overnight as well.  If you don't have someone to stay with you, and you aren't safe to go home alone, we may keep you overnight. Insurance often won't pay for this.  After surgery  If it's hard to control your pain or you need more pain medicine, please call your surgeon's office.  Questions?   If you have any questions for your care team, list them here: _________________________________________________________________________________________________________________________________________________________________________ ____________________________________ ____________________________________ ____________________________________  For informational purposes only. Not to replace the advice of your health care provider. Copyright   2003, 2019 [facility]. All rights reserved. Clinically reviewed by Enma Varner MD. [employer] 600884 - REV 12/22.

## 2023-09-25 NOTE — PROGRESS NOTES
[facility]  [address]  [place] [address]  Phone: [phone]  Primary Provider: Kori Em  Pre-op Performing Provider: KORI EM      PREOPERATIVE EVALUATION:  Today's date: 9/25/2023    Julianna is a 68 year old female who presents for a preoperative evaluation.      9/25/2023    10:16 AM   Additional Questions   Roomed by shan mack       Surgical Information:  Surgery/Procedure: RIGHT TOTAL KNEE ARTHROPLASTY   Surgery Location: [facility]  Surgeon: Dr. Oro  Surgery Date: 10/2/2023  Time of Surgery: 7:30am  Where patient plans to recover: At home with family  Fax number for surgical facility: Note does not need to be faxed, will be available electronically in Epic.    Assessment & Plan     The proposed surgical procedure is considered INTERMEDIATE risk.    Preop general physical exam  - CBC with Platelets & Differential; Future  - Basic metabolic panel; Future  - CBC with Platelets & Differential  - Basic metabolic panel    Morbid obesity (H)  Primary osteoarthritis of right knee  S/P ascending aortic aneurysm repair  - EKG 12-lead complete w/read - Clinics  - amLODIPine (NORVASC) 5 MG tablet; Take 1 tablet (5 mg) by mouth daily    Hypertension, unspecified type  Not well controlled. Discussed with patient needs to restart her amlodipine and follow up in clinic after surgery.   - EKG 12-lead complete w/read - Clinics         Risks and Recommendations:  The patient has the following additional risks and recommendations for perioperative complications:   - Morbid obesity (BMI >40)    Antiplatelet or Anticoagulation Medication Instructions:   - Patient is on no antiplatelet or anticoagulation medications.    Additional Medication Instructions:  Patient is to take all scheduled medications on the day of surgery    RECOMMENDATION:  APPROVAL GIVEN to proceed with proposed procedure, without further diagnostic evaluation.            Subjective       HPI  related to upcoming procedure: Patient with primary OA of the right knee requiring surgery.         9/25/2023     8:35 AM   Preop Questions   1. Have you ever had a heart attack or stroke? No   2. Have you ever had surgery on your heart or blood vessels, such as a stent placement, a coronary artery bypass, or surgery on an artery in your head, neck, heart, or legs? YES -    3. Do you have chest pain with activity? No   4. Do you have a history of  heart failure? No   5. Do you currently have a cold, bronchitis or symptoms of other infection? No   6. Do you have a cough, shortness of breath, or wheezing? No   7. Do you or anyone in your family have previous history of blood clots? No   8. Do you or does anyone in your family have a serious bleeding problem such as prolonged bleeding following surgeries or cuts? No   9. Have you ever had problems with anemia or been told to take iron pills? No   10. Have you had any abnormal blood loss such as black, tarry or bloody stools, or abnormal vaginal bleeding? No   11. Have you ever had a blood transfusion? YES - teenager   11a. Have you ever had a transfusion reaction? No   12. Are you willing to have a blood transfusion if it is medically needed before, during, or after your surgery? Yes   13. Have you or any of your relatives ever had problems with anesthesia? No   14. Do you have sleep apnea, excessive snoring or daytime drowsiness? No   15. Do you have any artifical heart valves or other implanted medical devices like a pacemaker, defibrillator, or continuous glucose monitor? No   16. Do you have artificial joints? No   17. Are you allergic to latex? No       Health Care Directive:  Patient does not have a Health Care Directive or Living Will: Discussed advance care planning with patient; however, patient declined at this time.    Preoperative Review of :   reviewed - no record of controlled substances prescribed.          Review of Systems  CONSTITUTIONAL:  NEGATIVE for fever, chills, change in weight  INTEGUMENTARY/SKIN: NEGATIVE for worrisome rashes, moles or lesions  ENT/MOUTH: NEGATIVE for ear, mouth and throat problems  RESP: NEGATIVE for significant cough or SOB  CV: NEGATIVE for chest pain, palpitations or peripheral edema  GI: POSITIVE for heartburn or reflux and NEGATIVE for abdominal pain generalized, constipation, and diarrhea  : NEGATIVE for frequency, dysuria, or hematuria  MUSCULOSKELETAL: NEGATIVE for significant arthralgias or myalgia  NEURO: NEGATIVE for weakness, dizziness or paresthesias  HEME: NEGATIVE for bleeding problems  PSYCHIATRIC: NEGATIVE for changes in mood or affect    Patient Active Problem List    Diagnosis Date Noted    Screening for cervical cancer 04/01/2021     Priority: Medium     10/28/04 [place] Bx - Negative. NIL Pap, Neg HPV (Care Everywhere)  2012 NIL Pap  2016 NIL Pap, Neg HPV  2021 NIL Pap, Neg HPV.         Thyroid nodule 11/20/2017     Priority: Medium    S/P ascending aortic aneurysm repair 11/07/2017     Priority: Medium    Former smoker 11/07/2017     Priority: Medium    Anxiety 10/17/2017     Priority: Medium    Hypertension 09/21/2017     Priority: Medium    Family history of sudden cardiac death 08/15/2017     Priority: Medium    Benign essential hypertension 01/27/2016     Priority: Medium    CARDIOVASCULAR SCREENING; LDL GOAL LESS THAN 160 05/30/2012     Priority: Medium    Morbid obesity (H) 05/30/2012     Priority: Medium      Past Medical History:   Diagnosis Date    Ascending aortic aneurysm (H) 09/21/2017    Blood transfusions age 19    due to menorhagia    Hypertension 09/21/2017    Localized osteoarthritis of both knees     Thyroid nodule 11/20/2017    Tobacco abuse      Past Surgical History:   Procedure Laterality Date    COLONOSCOPY WITH CO2 INSUFFLATION N/A 03/15/2017    Procedure: COLONOSCOPY WITH CO2 INSUFFLATION;  Surgeon: Duane, William Charles, MD;  Location: [facility] OR    DILATION AND CURETTAGE  age 20     EXTRACORPOREAL SHOCK WAVE LITHOTRIPSY, CYSTOSCOPY, INSERT STENT URETER(S), COMBINED Bilateral 2018    Procedure: BILATERAL EXTRACORPOREAL SHOCK WAVE LITHOTRIPSY, CYSTOSCOPY, LEFT STENT PLACEMENT;  Surgeon: Tavo Saavedra MD;  Location: [facility] OR    GI SURGERY  3/15/2017    GYN SURGERY  age 20    REPAIR ANEURYSM ASCENDING AORTA N/A 10/27/2017    Procedure: REPAIR ANEURYSM ASCENDING AORTA;  Median Sternotomy, Cardiopulmonary bypass, Ascending Aorta Aneurysm Repair using Hemashield Platinum Woven Double Velour Graft 34cm X 30cm.;  Surgeon: Rubio Piña MD;  Location: [facility] OR    VASCULAR SURGERY  10/27/2017    [place] teeth removed       Current Outpatient Medications   Medication Sig Dispense Refill    amLODIPine (NORVASC) 5 MG tablet Take 1 tablet (5 mg) by mouth daily 90 tablet 0    aspirin EC 81 MG EC tablet Take 1 tablet (81 mg) by mouth daily 30 tablet 0    lisinopril (ZESTRIL) 40 MG tablet Take 1 tablet (40 mg) by mouth daily 90 tablet 0    metoprolol tartrate (LOPRESSOR) 50 MG tablet Take 1 tablet (50 mg) by mouth 2 times daily 180 tablet 0       Allergies   Allergen Reactions    Blood Transfusion Related (Informational Only) Other (See Comments)     Patient has a history of a clinically significant antibody against RBC antigens.  A delay in compatible RBCs may occur.        Social History     Tobacco Use    Smoking status: Former     Packs/day: 0.05     Years: 40.00     Pack years: 2.00     Types: Cigarettes, Other     Quit date: 10/27/2017     Years since quittin.9    Smokeless tobacco: Never    Tobacco comments:     2 cigs daily   Substance Use Topics    Alcohol use: Yes     Alcohol/week: 0.8 - 1.7 standard drinks of alcohol     Types: 1 - 2 Standard drinks or equivalent per week     Comment: 3-4 drinks per week      Family History   Problem Relation Age of Onset    Respiratory Mother         copd    Cancer Maternal Grandmother         Leg    Alzheimer Disease Maternal Grandfather      Heart Disease Paternal Grandfather     Heart Disease Brother 54        Heart Attack     Cancer Sister         Lung cancer age 55-smoker d age 57     History   Drug Use No         Objective     BP (!) 158/98 (BP Location: Left arm, Patient Position: Chair, Cuff Size: Adult Large)   Pulse 72   Temp 98.5  F (36.9  C) (Oral)   Resp 18   Wt 93.4 kg (206 lb)   SpO2 97%   BMI 33.25 kg/m      Physical Exam    GENERAL APPEARANCE: healthy, alert and no distress     EYES: EOMI, PERRL     HENT: ear canals and TM's normal and nose and mouth without ulcers or lesions     NECK: no adenopathy, no asymmetry, masses, or scars and thyroid normal to palpation     RESP: lungs clear to auscultation - no rales, rhonchi or wheezes     CV: regular rates and rhythm, normal S1 S2, no S3 or S4 and no murmur, click or rub     ABDOMEN:  soft, nontender, no HSM or masses and bowel sounds normal     MS: extremities normal- no gross deformities noted, no evidence of inflammation in joints, FROM in all extremities.     SKIN: no suspicious lesions or rashes     NEURO: Normal strength and tone, sensory exam grossly normal, mentation intact and speech normal     PSYCH: mentation appears normal. and affect normal/bright     LYMPHATICS: No cervical adenopathy    Recent Labs   Lab Test 01/09/23  0921 10/06/21  0822   HGB 14.9 14.7   * 147*    141   POTASSIUM 4.0 3.9   CR 0.60 0.66        Diagnostics:  Labs pending at this time.  Results will be reviewed when available.   EKG required for History of AAA repair and not completed in the last 90 days.     Revised Cardiac Risk Index (RCRI):  The patient has the following serious cardiovascular risks for perioperative complications:   - No serious cardiac risks = 0 points     RCRI Interpretation: 0 points: Class I (very low risk - 0.4% complication rate)         Signed Electronically by: Kori Em PA-C  Copy of this evaluation report is provided to requesting physician.

## 2023-09-26 NOTE — LETTER
[facility]  [address] [facility]  [place] [address]  November 25, 2019    Georgie Patino  [address] [place] [address]  [place] [address]      Dear Julianna,    Thank you for your time today.      The clinic care coordinator is a registered nurse and/or  who understand the health care system. The goal of clinic care coordination is to help you manage your health and improve access to the [facility] system in the most efficient manner. The registered nurse can assist you in meeting your health care goals by providing education, coordinating services, and strengthening the communication among your providers. The  can assist you with financial, behavioral, psychosocial, chemical dependency, counseling, and/or psychiatric resources.    Your next follow up will be completed by Kyra, the Community Health Worker,  who can be reached at [phone].  However, please feel free to contact me at [phone], with any questions or concerns. We at [facility] are focused on providing you with the highest-quality healthcare experience possible and that all starts with you.      Sincerely,     FRANK Machado, MSW Clinic   [facility]  Care Coordination  [facility] [place]   [email or web address]  Office: [phone]  Employed by [facility]     Enclosed: I have enclosed a copy of the Complex Care Plan. This has helpful information and goals that we have talked about. Please keep this in an easy to access place to use as needed.   Cyclosporine Counseling:  I discussed with the patient the risks of cyclosporine including but not limited to hypertension, gingival hyperplasia,myelosuppression, immunosuppression, liver damage, kidney damage, neurotoxicity, lymphoma, and serious infections. The patient understands that monitoring is required including baseline blood pressure, CBC, CMP, lipid panel and uric acid, and then 1-2 times monthly CMP and blood pressure.

## 2023-09-28 ENCOUNTER — MYC MEDICAL ADVICE (OUTPATIENT)
Dept: ORTHOPEDICS | Facility: CLINIC | Age: 68
End: 2023-09-28
Payer: COMMERCIAL

## 2023-09-28 NOTE — TELEPHONE ENCOUNTER
N/A on cell or home #. Reason for call: Asking if patient has heard back from PT dept as she is pending 10/2/23 R TKA. Note 5 entries for PT to contact her since July 2023. P: My Chart and phone message left advising importance of getting this set up today.  Call back if questions or barriers.  Aki Butt OPA

## 2023-09-29 RX ORDER — ACETAMINOPHEN 500 MG
500-1000 TABLET ORAL EVERY 6 HOURS PRN
COMMUNITY
End: 2023-10-24

## 2023-09-29 NOTE — PROGRESS NOTES
PTA medications updated by Medication Scribe prior to surgery via phone call with patient (last doses completed by Nurse)     Medication history sources: Patient, Surescripts, and H&P  In the past week, patient estimated taking medication this percent of the time: Greater than 90%      Significant changes made to the medication list:  None      Additional medication history information:   None    Medication reconciliation completed by provider prior to medication history? No    Time spent in this activity: 28 minutes    The information provided in this note is only as accurate as the sources available at the time of update(s)      Prior to Admission medications    Medication Sig Last Dose Taking? Auth Provider Long Term End Date   acetaminophen (TYLENOL) 500 MG tablet Take 500-1,000 mg by mouth every 6 hours as needed for mild pain Unknown at PRN Yes Reported, Patient No    amLODIPine (NORVASC) 5 MG tablet Take 1 tablet (5 mg) by mouth daily  at AM Yes Kori Em PA-C Yes    aspirin EC 81 MG EC tablet Take 1 tablet (81 mg) by mouth daily 9/22/2023 at AM Yes Jovon Collazo PA-C     diphenhydrAMINE-acetaminophen (TYLENOL PM)  MG tablet Take 1 tablet by mouth nightly as needed for sleep  at HS Yes Reported, Patient     lisinopril (ZESTRIL) 40 MG tablet Take 1 tablet (40 mg) by mouth daily  at PM Yes Jitendra Fernandez MD Yes    metoprolol tartrate (LOPRESSOR) 50 MG tablet Take 1 tablet (50 mg) by mouth 2 times daily  at AM Yes Jitendra Fernandez MD Yes    omeprazole (PRILOSEC) 20 MG DR capsule Take 20 mg by mouth daily  at AM Yes Reported, Patient

## 2023-10-02 ENCOUNTER — APPOINTMENT (OUTPATIENT)
Dept: PHYSICAL THERAPY | Facility: CLINIC | Age: 68
End: 2023-10-02
Attending: ORTHOPAEDIC SURGERY
Payer: COMMERCIAL

## 2023-10-02 ENCOUNTER — APPOINTMENT (OUTPATIENT)
Dept: GENERAL RADIOLOGY | Facility: CLINIC | Age: 68
End: 2023-10-02
Attending: ORTHOPAEDIC SURGERY
Payer: COMMERCIAL

## 2023-10-02 ENCOUNTER — ANESTHESIA EVENT (OUTPATIENT)
Dept: SURGERY | Facility: CLINIC | Age: 68
End: 2023-10-02
Payer: COMMERCIAL

## 2023-10-02 ENCOUNTER — ANESTHESIA (OUTPATIENT)
Dept: SURGERY | Facility: CLINIC | Age: 68
End: 2023-10-02
Payer: COMMERCIAL

## 2023-10-02 ENCOUNTER — HOSPITAL ENCOUNTER (OUTPATIENT)
Facility: CLINIC | Age: 68
Discharge: HOME | End: 2023-10-03
Attending: ORTHOPAEDIC SURGERY | Admitting: ORTHOPAEDIC SURGERY
Payer: COMMERCIAL

## 2023-10-02 DIAGNOSIS — Z96.651 STATUS POST TOTAL RIGHT KNEE REPLACEMENT: Primary | ICD-10-CM

## 2023-10-02 DIAGNOSIS — Z96.651 S/P TKR (TOTAL KNEE REPLACEMENT), RIGHT: ICD-10-CM

## 2023-10-02 LAB
ABO/RH(D): NORMAL
ANION GAP SERPL CALCULATED.3IONS-SCNC: 14 MMOL/L (ref 7–15)
ANTIBODY SCREEN: NEGATIVE
BLD PROD TYP BPU: NORMAL
BLD PROD TYP BPU: NORMAL
BLOOD COMPONENT TYPE: NORMAL
BLOOD COMPONENT TYPE: NORMAL
BUN SERPL-MCNC: 31.7 MG/DL (ref 8–23)
CALCIUM SERPL-MCNC: 9.1 MG/DL (ref 8.8–10.2)
CHLORIDE SERPL-SCNC: 105 MMOL/L (ref 98–107)
CODING SYSTEM: NORMAL
CODING SYSTEM: NORMAL
CREAT SERPL-MCNC: 0.61 MG/DL (ref 0.51–0.95)
CROSSMATCH: NORMAL
CROSSMATCH: NORMAL
DEPRECATED HCO3 PLAS-SCNC: 21 MMOL/L (ref 22–29)
EGFRCR SERPLBLD CKD-EPI 2021: >90 ML/MIN/1.73M2
FASTING STATUS PATIENT QL REPORTED: YES
GLUCOSE SERPL-MCNC: 120 MG/DL (ref 70–99)
GLUCOSE SERPL-MCNC: 158 MG/DL (ref 70–99)
POTASSIUM SERPL-SCNC: 4.1 MMOL/L (ref 3.4–5.3)
POTASSIUM SERPL-SCNC: 4.3 MMOL/L (ref 3.4–5.3)
SODIUM SERPL-SCNC: 140 MMOL/L (ref 135–145)
SPECIMEN EXPIRATION DATE: NORMAL
UNIT ABO/RH: NORMAL
UNIT ABO/RH: NORMAL
UNIT NUMBER: NORMAL
UNIT NUMBER: NORMAL
UNIT STATUS: NORMAL
UNIT STATUS: NORMAL
UNIT TYPE ISBT: 5100
UNIT TYPE ISBT: 5100

## 2023-10-02 PROCEDURE — 250N000013 HC RX MED GY IP 250 OP 250 PS 637: Performed by: HOSPITALIST

## 2023-10-02 PROCEDURE — 82947 ASSAY GLUCOSE BLOOD QUANT: CPT | Performed by: ANESTHESIOLOGY

## 2023-10-02 PROCEDURE — 258N000003 HC RX IP 258 OP 636: Performed by: ORTHOPAEDIC SURGERY

## 2023-10-02 PROCEDURE — 250N000013 HC RX MED GY IP 250 OP 250 PS 637

## 2023-10-02 PROCEDURE — 36415 COLL VENOUS BLD VENIPUNCTURE: CPT | Performed by: ANESTHESIOLOGY

## 2023-10-02 PROCEDURE — 86901 BLOOD TYPING SEROLOGIC RH(D): CPT | Performed by: ANESTHESIOLOGY

## 2023-10-02 PROCEDURE — 250N000025 HC SEVOFLURANE, PER MIN: Performed by: ORTHOPAEDIC SURGERY

## 2023-10-02 PROCEDURE — C1776 JOINT DEVICE (IMPLANTABLE): HCPCS | Performed by: ORTHOPAEDIC SURGERY

## 2023-10-02 PROCEDURE — 258N000003 HC RX IP 258 OP 636: Performed by: REGISTERED NURSE

## 2023-10-02 PROCEDURE — 80048 BASIC METABOLIC PNL TOTAL CA: CPT | Performed by: ANESTHESIOLOGY

## 2023-10-02 PROCEDURE — 250N000011 HC RX IP 250 OP 636: Performed by: ORTHOPAEDIC SURGERY

## 2023-10-02 PROCEDURE — 250N000009 HC RX 250: Performed by: SURGERY

## 2023-10-02 PROCEDURE — 250N000011 HC RX IP 250 OP 636: Mod: JZ | Performed by: ORTHOPAEDIC SURGERY

## 2023-10-02 PROCEDURE — 250N000013 HC RX MED GY IP 250 OP 250 PS 637: Performed by: ORTHOPAEDIC SURGERY

## 2023-10-02 PROCEDURE — 360N000077 HC SURGERY LEVEL 4, PER MIN: Performed by: ORTHOPAEDIC SURGERY

## 2023-10-02 PROCEDURE — 250N000009 HC RX 250: Performed by: ORTHOPAEDIC SURGERY

## 2023-10-02 PROCEDURE — C1713 ANCHOR/SCREW BN/BN,TIS/BN: HCPCS | Performed by: ORTHOPAEDIC SURGERY

## 2023-10-02 PROCEDURE — 258N000001 HC RX 258: Performed by: ORTHOPAEDIC SURGERY

## 2023-10-02 PROCEDURE — 250N000011 HC RX IP 250 OP 636: Performed by: REGISTERED NURSE

## 2023-10-02 PROCEDURE — 999N000141 HC STATISTIC PRE-PROCEDURE NURSING ASSESSMENT: Performed by: ORTHOPAEDIC SURGERY

## 2023-10-02 PROCEDURE — 370N000017 HC ANESTHESIA TECHNICAL FEE, PER MIN: Performed by: ORTHOPAEDIC SURGERY

## 2023-10-02 PROCEDURE — 250N000011 HC RX IP 250 OP 636: Mod: JZ | Performed by: SURGERY

## 2023-10-02 PROCEDURE — 27447 TOTAL KNEE ARTHROPLASTY: CPT | Mod: RT | Performed by: ORTHOPAEDIC SURGERY

## 2023-10-02 PROCEDURE — 272N000001 HC OR GENERAL SUPPLY STERILE: Performed by: ORTHOPAEDIC SURGERY

## 2023-10-02 PROCEDURE — 36415 COLL VENOUS BLD VENIPUNCTURE: CPT | Performed by: ORTHOPAEDIC SURGERY

## 2023-10-02 PROCEDURE — 97161 PT EVAL LOW COMPLEX 20 MIN: CPT | Mod: GP

## 2023-10-02 PROCEDURE — 97116 GAIT TRAINING THERAPY: CPT | Mod: GP

## 2023-10-02 PROCEDURE — 86922 COMPATIBILITY TEST ANTIGLOB: CPT | Performed by: ANESTHESIOLOGY

## 2023-10-02 PROCEDURE — 710N000009 HC RECOVERY PHASE 1, LEVEL 1, PER MIN: Performed by: ORTHOPAEDIC SURGERY

## 2023-10-02 PROCEDURE — 999N000063 XR KNEE PORT RIGHT 1/2 VIEWS: Mod: RT

## 2023-10-02 PROCEDURE — 99204 OFFICE O/P NEW MOD 45 MIN: CPT

## 2023-10-02 PROCEDURE — 250N000009 HC RX 250: Performed by: REGISTERED NURSE

## 2023-10-02 PROCEDURE — 258N000003 HC RX IP 258 OP 636: Performed by: ANESTHESIOLOGY

## 2023-10-02 PROCEDURE — 82947 ASSAY GLUCOSE BLOOD QUANT: CPT | Mod: 91 | Performed by: ORTHOPAEDIC SURGERY

## 2023-10-02 DEVICE — BONE CEMENT RADIOPAQUE SIMPLEX HV FULL DOSE 6194-1-001: Type: IMPLANTABLE DEVICE | Site: KNEE | Status: FUNCTIONAL

## 2023-10-02 DEVICE — IMPLANTABLE DEVICE
Type: IMPLANTABLE DEVICE | Site: KNEE | Status: FUNCTIONAL
Brand: PERSONA®

## 2023-10-02 DEVICE — IMPLANTABLE DEVICE: Type: IMPLANTABLE DEVICE | Site: KNEE | Status: FUNCTIONAL

## 2023-10-02 RX ORDER — ONDANSETRON 2 MG/ML
4 INJECTION INTRAMUSCULAR; INTRAVENOUS EVERY 6 HOURS PRN
Status: DISCONTINUED | OUTPATIENT
Start: 2023-10-02 | End: 2023-10-03 | Stop reason: HOSPADM

## 2023-10-02 RX ORDER — GLYCOPYRROLATE 0.2 MG/ML
INJECTION, SOLUTION INTRAMUSCULAR; INTRAVENOUS PRN
Status: DISCONTINUED | OUTPATIENT
Start: 2023-10-02 | End: 2023-10-02

## 2023-10-02 RX ORDER — BUPIVACAINE HYDROCHLORIDE 7.5 MG/ML
INJECTION, SOLUTION INTRASPINAL
Status: COMPLETED | OUTPATIENT
Start: 2023-10-02 | End: 2023-10-02

## 2023-10-02 RX ORDER — ACETAMINOPHEN 325 MG/1
975 TABLET ORAL ONCE
Status: COMPLETED | OUTPATIENT
Start: 2023-10-02 | End: 2023-10-02

## 2023-10-02 RX ORDER — HYDROMORPHONE HCL IN WATER/PF 6 MG/30 ML
0.2 PATIENT CONTROLLED ANALGESIA SYRINGE INTRAVENOUS EVERY 5 MIN PRN
Status: DISCONTINUED | OUTPATIENT
Start: 2023-10-02 | End: 2023-10-02 | Stop reason: HOSPADM

## 2023-10-02 RX ORDER — HYDROMORPHONE HCL IN WATER/PF 6 MG/30 ML
0.2 PATIENT CONTROLLED ANALGESIA SYRINGE INTRAVENOUS
Status: DISCONTINUED | OUTPATIENT
Start: 2023-10-02 | End: 2023-10-03 | Stop reason: HOSPADM

## 2023-10-02 RX ORDER — OXYCODONE HYDROCHLORIDE 5 MG/1
5 TABLET ORAL EVERY 4 HOURS PRN
Status: DISCONTINUED | OUTPATIENT
Start: 2023-10-02 | End: 2023-10-03 | Stop reason: HOSPADM

## 2023-10-02 RX ORDER — ACETAMINOPHEN 325 MG/1
650 TABLET ORAL EVERY 4 HOURS PRN
Status: DISCONTINUED | OUTPATIENT
Start: 2023-10-05 | End: 2023-10-03 | Stop reason: HOSPADM

## 2023-10-02 RX ORDER — SODIUM CHLORIDE, SODIUM LACTATE, POTASSIUM CHLORIDE, CALCIUM CHLORIDE 600; 310; 30; 20 MG/100ML; MG/100ML; MG/100ML; MG/100ML
INJECTION, SOLUTION INTRAVENOUS CONTINUOUS
Status: DISCONTINUED | OUTPATIENT
Start: 2023-10-02 | End: 2023-10-02 | Stop reason: HOSPADM

## 2023-10-02 RX ORDER — ONDANSETRON 2 MG/ML
INJECTION INTRAMUSCULAR; INTRAVENOUS PRN
Status: DISCONTINUED | OUTPATIENT
Start: 2023-10-02 | End: 2023-10-02

## 2023-10-02 RX ORDER — LISINOPRIL 40 MG/1
40 TABLET ORAL DAILY
Status: DISCONTINUED | OUTPATIENT
Start: 2023-10-03 | End: 2023-10-03 | Stop reason: HOSPADM

## 2023-10-02 RX ORDER — ONDANSETRON 2 MG/ML
4 INJECTION INTRAMUSCULAR; INTRAVENOUS EVERY 30 MIN PRN
Status: DISCONTINUED | OUTPATIENT
Start: 2023-10-02 | End: 2023-10-02 | Stop reason: HOSPADM

## 2023-10-02 RX ORDER — NALOXONE HYDROCHLORIDE 0.4 MG/ML
0.4 INJECTION, SOLUTION INTRAMUSCULAR; INTRAVENOUS; SUBCUTANEOUS
Status: DISCONTINUED | OUTPATIENT
Start: 2023-10-02 | End: 2023-10-03 | Stop reason: HOSPADM

## 2023-10-02 RX ORDER — FAMOTIDINE 20 MG/1
20 TABLET, FILM COATED ORAL 2 TIMES DAILY
Status: DISCONTINUED | OUTPATIENT
Start: 2023-10-02 | End: 2023-10-03 | Stop reason: HOSPADM

## 2023-10-02 RX ORDER — SODIUM CHLORIDE 9 MG/ML
INJECTION, SOLUTION INTRAVENOUS CONTINUOUS
Status: DISCONTINUED | OUTPATIENT
Start: 2023-10-02 | End: 2023-10-03 | Stop reason: HOSPADM

## 2023-10-02 RX ORDER — AMOXICILLIN 250 MG
1-2 CAPSULE ORAL 2 TIMES DAILY
Qty: 30 TABLET | Refills: 0 | Status: SHIPPED | OUTPATIENT
Start: 2023-10-02 | End: 2023-10-24

## 2023-10-02 RX ORDER — OXYCODONE HYDROCHLORIDE 5 MG/1
10 TABLET ORAL EVERY 4 HOURS PRN
Status: DISCONTINUED | OUTPATIENT
Start: 2023-10-02 | End: 2023-10-03 | Stop reason: HOSPADM

## 2023-10-02 RX ORDER — TRANEXAMIC ACID 650 MG/1
1950 TABLET ORAL ONCE
Status: COMPLETED | OUTPATIENT
Start: 2023-10-02 | End: 2023-10-02

## 2023-10-02 RX ORDER — POLYETHYLENE GLYCOL 3350 17 G/17G
17 POWDER, FOR SOLUTION ORAL DAILY
Status: DISCONTINUED | OUTPATIENT
Start: 2023-10-03 | End: 2023-10-03 | Stop reason: HOSPADM

## 2023-10-02 RX ORDER — HYDROMORPHONE HCL IN WATER/PF 6 MG/30 ML
0.4 PATIENT CONTROLLED ANALGESIA SYRINGE INTRAVENOUS EVERY 5 MIN PRN
Status: DISCONTINUED | OUTPATIENT
Start: 2023-10-02 | End: 2023-10-02 | Stop reason: HOSPADM

## 2023-10-02 RX ORDER — ACETAMINOPHEN 325 MG/1
975 TABLET ORAL EVERY 8 HOURS
Status: DISCONTINUED | OUTPATIENT
Start: 2023-10-02 | End: 2023-10-03 | Stop reason: HOSPADM

## 2023-10-02 RX ORDER — CEFAZOLIN SODIUM 2 G/100ML
2 INJECTION, SOLUTION INTRAVENOUS EVERY 8 HOURS
Status: COMPLETED | OUTPATIENT
Start: 2023-10-02 | End: 2023-10-02

## 2023-10-02 RX ORDER — BISACODYL 10 MG
10 SUPPOSITORY, RECTAL RECTAL DAILY PRN
Status: DISCONTINUED | OUTPATIENT
Start: 2023-10-02 | End: 2023-10-03 | Stop reason: HOSPADM

## 2023-10-02 RX ORDER — FENTANYL CITRATE 50 UG/ML
50 INJECTION, SOLUTION INTRAMUSCULAR; INTRAVENOUS EVERY 5 MIN PRN
Status: DISCONTINUED | OUTPATIENT
Start: 2023-10-02 | End: 2023-10-02 | Stop reason: HOSPADM

## 2023-10-02 RX ORDER — PROPOFOL 10 MG/ML
INJECTION, EMULSION INTRAVENOUS CONTINUOUS PRN
Status: DISCONTINUED | OUTPATIENT
Start: 2023-10-02 | End: 2023-10-02

## 2023-10-02 RX ORDER — CELECOXIB 200 MG/1
400 CAPSULE ORAL ONCE
Status: COMPLETED | OUTPATIENT
Start: 2023-10-02 | End: 2023-10-02

## 2023-10-02 RX ORDER — ONDANSETRON 4 MG/1
4 TABLET, ORALLY DISINTEGRATING ORAL EVERY 30 MIN PRN
Status: DISCONTINUED | OUTPATIENT
Start: 2023-10-02 | End: 2023-10-02 | Stop reason: HOSPADM

## 2023-10-02 RX ORDER — AMOXICILLIN 250 MG
1 CAPSULE ORAL 2 TIMES DAILY
Status: DISCONTINUED | OUTPATIENT
Start: 2023-10-02 | End: 2023-10-03 | Stop reason: HOSPADM

## 2023-10-02 RX ORDER — ASPIRIN 325 MG
325 TABLET, DELAYED RELEASE (ENTERIC COATED) ORAL DAILY
Status: DISCONTINUED | OUTPATIENT
Start: 2023-10-02 | End: 2023-10-03 | Stop reason: HOSPADM

## 2023-10-02 RX ORDER — NALOXONE HYDROCHLORIDE 0.4 MG/ML
0.2 INJECTION, SOLUTION INTRAMUSCULAR; INTRAVENOUS; SUBCUTANEOUS
Status: DISCONTINUED | OUTPATIENT
Start: 2023-10-02 | End: 2023-10-03 | Stop reason: HOSPADM

## 2023-10-02 RX ORDER — CEFAZOLIN SODIUM/WATER 2 G/20 ML
2 SYRINGE (ML) INTRAVENOUS SEE ADMIN INSTRUCTIONS
Status: DISCONTINUED | OUTPATIENT
Start: 2023-10-02 | End: 2023-10-02 | Stop reason: HOSPADM

## 2023-10-02 RX ORDER — PROPOFOL 10 MG/ML
INJECTION, EMULSION INTRAVENOUS PRN
Status: DISCONTINUED | OUTPATIENT
Start: 2023-10-02 | End: 2023-10-02

## 2023-10-02 RX ORDER — IBUPROFEN 600 MG/1
600 TABLET, FILM COATED ORAL EVERY 6 HOURS PRN
Status: DISCONTINUED | OUTPATIENT
Start: 2023-10-02 | End: 2023-10-03 | Stop reason: HOSPADM

## 2023-10-02 RX ORDER — ASPIRIN 325 MG
325 TABLET, DELAYED RELEASE (ENTERIC COATED) ORAL 2 TIMES DAILY WITH MEALS
Qty: 60 TABLET | Refills: 0 | Status: SHIPPED | OUTPATIENT
Start: 2023-10-02

## 2023-10-02 RX ORDER — FENTANYL CITRATE 50 UG/ML
25 INJECTION, SOLUTION INTRAMUSCULAR; INTRAVENOUS EVERY 5 MIN PRN
Status: DISCONTINUED | OUTPATIENT
Start: 2023-10-02 | End: 2023-10-02 | Stop reason: HOSPADM

## 2023-10-02 RX ORDER — LIDOCAINE 40 MG/G
CREAM TOPICAL
Status: DISCONTINUED | OUTPATIENT
Start: 2023-10-02 | End: 2023-10-03 | Stop reason: HOSPADM

## 2023-10-02 RX ORDER — CEFAZOLIN SODIUM/WATER 2 G/20 ML
2 SYRINGE (ML) INTRAVENOUS
Status: COMPLETED | OUTPATIENT
Start: 2023-10-02 | End: 2023-10-02

## 2023-10-02 RX ORDER — TRAZODONE HYDROCHLORIDE 50 MG/1
50 TABLET, FILM COATED ORAL
Status: DISCONTINUED | OUTPATIENT
Start: 2023-10-02 | End: 2023-10-03 | Stop reason: HOSPADM

## 2023-10-02 RX ORDER — ONDANSETRON 4 MG/1
4 TABLET, ORALLY DISINTEGRATING ORAL EVERY 6 HOURS PRN
Status: DISCONTINUED | OUTPATIENT
Start: 2023-10-02 | End: 2023-10-03 | Stop reason: HOSPADM

## 2023-10-02 RX ORDER — HYDROMORPHONE HCL IN WATER/PF 6 MG/30 ML
0.4 PATIENT CONTROLLED ANALGESIA SYRINGE INTRAVENOUS
Status: DISCONTINUED | OUTPATIENT
Start: 2023-10-02 | End: 2023-10-03 | Stop reason: HOSPADM

## 2023-10-02 RX ORDER — DEXMEDETOMIDINE HYDROCHLORIDE 4 UG/ML
INJECTION, SOLUTION INTRAVENOUS PRN
Status: DISCONTINUED | OUTPATIENT
Start: 2023-10-02 | End: 2023-10-02

## 2023-10-02 RX ORDER — AMLODIPINE BESYLATE 5 MG/1
5 TABLET ORAL DAILY
Status: DISCONTINUED | OUTPATIENT
Start: 2023-10-03 | End: 2023-10-03 | Stop reason: HOSPADM

## 2023-10-02 RX ORDER — METOPROLOL TARTRATE 50 MG
50 TABLET ORAL 2 TIMES DAILY
Status: DISCONTINUED | OUTPATIENT
Start: 2023-10-02 | End: 2023-10-03 | Stop reason: HOSPADM

## 2023-10-02 RX ORDER — ACETAMINOPHEN 325 MG/1
650 TABLET ORAL EVERY 4 HOURS PRN
Qty: 100 TABLET | Refills: 0 | Status: SHIPPED | OUTPATIENT
Start: 2023-10-02

## 2023-10-02 RX ORDER — LIDOCAINE HYDROCHLORIDE 20 MG/ML
INJECTION, SOLUTION INFILTRATION; PERINEURAL PRN
Status: DISCONTINUED | OUTPATIENT
Start: 2023-10-02 | End: 2023-10-02

## 2023-10-02 RX ORDER — VANCOMYCIN HYDROCHLORIDE 1 G/20ML
INJECTION, POWDER, LYOPHILIZED, FOR SOLUTION INTRAVENOUS PRN
Status: DISCONTINUED | OUTPATIENT
Start: 2023-10-02 | End: 2023-10-02 | Stop reason: HOSPADM

## 2023-10-02 RX ORDER — DEXAMETHASONE SODIUM PHOSPHATE 4 MG/ML
INJECTION, SOLUTION INTRA-ARTICULAR; INTRALESIONAL; INTRAMUSCULAR; INTRAVENOUS; SOFT TISSUE PRN
Status: DISCONTINUED | OUTPATIENT
Start: 2023-10-02 | End: 2023-10-02

## 2023-10-02 RX ADMIN — ACETAMINOPHEN 975 MG: 325 TABLET, FILM COATED ORAL at 22:21

## 2023-10-02 RX ADMIN — SODIUM CHLORIDE: 9 INJECTION, SOLUTION INTRAVENOUS at 12:55

## 2023-10-02 RX ADMIN — DEXMEDETOMIDINE HYDROCHLORIDE 8 MCG: 200 INJECTION INTRAVENOUS at 10:21

## 2023-10-02 RX ADMIN — DEXMEDETOMIDINE HYDROCHLORIDE 8 MCG: 200 INJECTION INTRAVENOUS at 08:15

## 2023-10-02 RX ADMIN — TRAZODONE HYDROCHLORIDE 50 MG: 50 TABLET ORAL at 20:48

## 2023-10-02 RX ADMIN — HYDROMORPHONE HYDROCHLORIDE 0.4 MG: 0.2 INJECTION, SOLUTION INTRAMUSCULAR; INTRAVENOUS; SUBCUTANEOUS at 11:19

## 2023-10-02 RX ADMIN — CEFAZOLIN SODIUM 2 G: 2 INJECTION, SOLUTION INTRAVENOUS at 16:20

## 2023-10-02 RX ADMIN — FENTANYL CITRATE 50 MCG: 50 INJECTION, SOLUTION INTRAMUSCULAR; INTRAVENOUS at 11:08

## 2023-10-02 RX ADMIN — ONDANSETRON 4 MG: 2 INJECTION INTRAMUSCULAR; INTRAVENOUS at 10:04

## 2023-10-02 RX ADMIN — CELECOXIB 400 MG: 200 CAPSULE ORAL at 06:29

## 2023-10-02 RX ADMIN — ONDANSETRON 4 MG: 2 INJECTION INTRAMUSCULAR; INTRAVENOUS at 16:26

## 2023-10-02 RX ADMIN — CEFAZOLIN SODIUM 2 G: 2 INJECTION, SOLUTION INTRAVENOUS at 22:33

## 2023-10-02 RX ADMIN — FAMOTIDINE 20 MG: 20 TABLET, FILM COATED ORAL at 20:05

## 2023-10-02 RX ADMIN — ROPIVACAINE HYDROCHLORIDE 20 ML: 5 INJECTION, SOLUTION EPIDURAL; INFILTRATION; PERINEURAL at 07:26

## 2023-10-02 RX ADMIN — LIDOCAINE HYDROCHLORIDE 80 MG: 20 INJECTION, SOLUTION INFILTRATION; PERINEURAL at 07:43

## 2023-10-02 RX ADMIN — BUPIVACAINE HYDROCHLORIDE IN DEXTROSE 1.6 ML: 7.5 INJECTION, SOLUTION SUBARACHNOID at 07:42

## 2023-10-02 RX ADMIN — GLYCOPYRROLATE 0.1 MG: 0.2 INJECTION, SOLUTION INTRAMUSCULAR; INTRAVENOUS at 08:10

## 2023-10-02 RX ADMIN — PHENYLEPHRINE HYDROCHLORIDE 0.5 MCG/KG/MIN: 10 INJECTION INTRAVENOUS at 07:52

## 2023-10-02 RX ADMIN — METOPROLOL TARTRATE 50 MG: 50 TABLET, FILM COATED ORAL at 20:05

## 2023-10-02 RX ADMIN — SODIUM CHLORIDE, POTASSIUM CHLORIDE, SODIUM LACTATE AND CALCIUM CHLORIDE: 600; 310; 30; 20 INJECTION, SOLUTION INTRAVENOUS at 07:37

## 2023-10-02 RX ADMIN — LIDOCAINE HYDROCHLORIDE 60 MG: 20 INJECTION, SOLUTION INFILTRATION; PERINEURAL at 08:18

## 2023-10-02 RX ADMIN — PHENYLEPHRINE HYDROCHLORIDE 200 MCG: 10 INJECTION INTRAVENOUS at 07:51

## 2023-10-02 RX ADMIN — TRANEXAMIC ACID 1950 MG: 650 TABLET ORAL at 06:29

## 2023-10-02 RX ADMIN — SENNOSIDES AND DOCUSATE SODIUM 1 TABLET: 50; 8.6 TABLET ORAL at 13:15

## 2023-10-02 RX ADMIN — ACETAMINOPHEN 975 MG: 325 TABLET, FILM COATED ORAL at 13:15

## 2023-10-02 RX ADMIN — SENNOSIDES AND DOCUSATE SODIUM 1 TABLET: 50; 8.6 TABLET ORAL at 20:05

## 2023-10-02 RX ADMIN — ACETAMINOPHEN 975 MG: 325 TABLET, FILM COATED ORAL at 06:29

## 2023-10-02 RX ADMIN — HYDROMORPHONE HYDROCHLORIDE 0.2 MG: 0.2 INJECTION, SOLUTION INTRAMUSCULAR; INTRAVENOUS; SUBCUTANEOUS at 16:28

## 2023-10-02 RX ADMIN — IBUPROFEN 600 MG: 600 TABLET ORAL at 20:05

## 2023-10-02 RX ADMIN — FENTANYL CITRATE 50 MCG: 50 INJECTION, SOLUTION INTRAMUSCULAR; INTRAVENOUS at 10:57

## 2023-10-02 RX ADMIN — ASPIRIN 325 MG: 325 TABLET, COATED ORAL at 13:15

## 2023-10-02 RX ADMIN — PROPOFOL 100 MG: 10 INJECTION, EMULSION INTRAVENOUS at 08:21

## 2023-10-02 RX ADMIN — MIDAZOLAM 1 MG: 1 INJECTION INTRAMUSCULAR; INTRAVENOUS at 08:00

## 2023-10-02 RX ADMIN — PHENYLEPHRINE HYDROCHLORIDE 200 MCG: 10 INJECTION INTRAVENOUS at 07:50

## 2023-10-02 RX ADMIN — DEXAMETHASONE SODIUM PHOSPHATE 10 MG: 4 INJECTION, SOLUTION INTRA-ARTICULAR; INTRALESIONAL; INTRAMUSCULAR; INTRAVENOUS; SOFT TISSUE at 07:44

## 2023-10-02 RX ADMIN — GLYCOPYRROLATE 0.1 MG: 0.2 INJECTION, SOLUTION INTRAMUSCULAR; INTRAVENOUS at 08:06

## 2023-10-02 RX ADMIN — PROPOFOL 150 MCG/KG/MIN: 10 INJECTION, EMULSION INTRAVENOUS at 07:43

## 2023-10-02 RX ADMIN — Medication 2 G: at 07:37

## 2023-10-02 RX ADMIN — HYDROMORPHONE HYDROCHLORIDE 0.4 MG: 0.2 INJECTION, SOLUTION INTRAMUSCULAR; INTRAVENOUS; SUBCUTANEOUS at 11:35

## 2023-10-02 ASSESSMENT — ACTIVITIES OF DAILY LIVING (ADL)
ADLS_ACUITY_SCORE: 23
ADLS_ACUITY_SCORE: 23
ADLS_ACUITY_SCORE: 27
ADLS_ACUITY_SCORE: 23
ADLS_ACUITY_SCORE: 23
ADLS_ACUITY_SCORE: 27
ADLS_ACUITY_SCORE: 24

## 2023-10-02 ASSESSMENT — LIFESTYLE VARIABLES: TOBACCO_USE: 1

## 2023-10-02 NOTE — PROGRESS NOTES
Patient vital signs are at baseline: Yes, on 2L  Patient able to ambulate as they were prior to admission or with assist devices provided by therapies during their stay:  Yes, up on bedside commod   Patient MUST void prior to discharge:  No,  Reason:  due to void but voided 100 mL  Patient able to tolerate oral intake:  Yes  Pain has adequate pain control using Oral analgesics:  Yes  Does patient have an identified :  Yes  Has goal D/C date and time been discussed with patient:  Yes    Patient A&Ox4. CMS intact ex of mild numbness on BLE. R knee incision DCI. Rating pain mild  on scale of 5/10, schedule Tylenol given. Infusing NS  @ 100 mL/hr.

## 2023-10-02 NOTE — ANESTHESIA POSTPROCEDURE EVALUATION
Patient: Georgie Patino    Procedure: Procedure(s):  RIGHT TOTAL KNEE ARTHROPLASTY       Anesthesia Type:  General    Note:  Disposition: Inpatient   Postop Pain Control: Uneventful            Sign Out: Well controlled pain   PONV: No   Neuro/Psych: Uneventful            Sign Out: Acceptable/Baseline neuro status   Airway/Respiratory: Uneventful            Sign Out: Acceptable/Baseline resp. status   CV/Hemodynamics: Uneventful            Sign Out: Acceptable CV status   Other NRE: NONE   DID A NON-ROUTINE EVENT OCCUR? No           Last vitals:  Vitals Value Taken Time   /52 10/02/23 1145   Temp 36.4  C (97.6  F) 10/02/23 1130   Pulse 80 10/02/23 1159   Resp 22 10/02/23 1159   SpO2 91 % 10/02/23 1159   Vitals shown include unvalidated device data.    Electronically Signed By: Jay Figueroa MD  October 2, 2023  12:00 PM

## 2023-10-02 NOTE — ANESTHESIA PROCEDURE NOTES
Airway       Patient location during procedure: OR       Procedure Start/Stop Times: 10/2/2023 8:22 AM  Staff -        CRNA: Roshni Alvarez APRN CRNA       Performed By: CRNA  Consent for Airway        Urgency: elective  Indications and Patient Condition       Indications for airway management: teresa-procedural       Induction type:intravenous       Mask difficulty assessment: 1 - vent by mask    Final Airway Details       Final airway type: supraglottic airway    Supraglottic Airway Details        Type: LMA       Brand: I-Gel       LMA size: 4    Post intubation assessment        Placement verified by: capnometry, equal breath sounds and chest rise        Number of attempts at approach: 1       Number of other approaches attempted: 0       Secured with: silk tape       Ease of procedure: easy       Dentition: Intact and Unchanged    Medication(s) Administered   Medication Administration Time: 10/2/2023 8:22 AM

## 2023-10-02 NOTE — ANESTHESIA CARE TRANSFER NOTE
Patient: Georgie Patino    Procedure: Procedure(s):  RIGHT TOTAL KNEE ARTHROPLASTY       Diagnosis: Primary osteoarthritis of right knee [M17.11]  Diagnosis Additional Information: No value filed.    Anesthesia Type:   Spinal     Note:    Oropharynx: oropharynx clear of all foreign objects and spontaneously breathing  Level of Consciousness: awake  Oxygen Supplementation: nasal cannula  Level of Supplemental Oxygen (L/min / FiO2): 2  Independent Airway: airway patency satisfactory and stable  Dentition: dentition unchanged  Vital Signs Stable: post-procedure vital signs reviewed and stable  Report to RN Given: handoff report given  Patient transferred to: PACU    Handoff Report: Identifed the Patient, Identified the Reponsible Provider, Reviewed the pertinent medical history, Discussed the surgical course, Reviewed Intra-OP anesthesia mangement and issues during anesthesia, Set expectations for post-procedure period and Allowed opportunity for questions and acknowledgement of understanding      Vitals:  Vitals Value Taken Time   /45 10/02/23 1052   Temp     Pulse 69 10/02/23 1056   Resp 19 10/02/23 1056   SpO2 95 % 10/02/23 1056   Vitals shown include unvalidated device data.    Electronically Signed By: TESHA Escobar CRNA  October 2, 2023  10:56 AM

## 2023-10-02 NOTE — PROGRESS NOTES
10/02/23 1700   Appointment Info   Signing Clinician's Name / Credentials (PT) Adriana Coyne DPT   Quick Adds   Quick Adds Certification   Living Environment   People in Home child(camila), adult;spouse   Current Living Arrangements house   Home Accessibility stairs to enter home   Number of Stairs, Main Entrance 3   Stair Railings, Main Entrance railing on left side (ascending)   Transportation Anticipated car, drives self   Living Environment Comments Lives in house w/ 3 SARAH, can stay on main level, lives with daughter, granddaughter, spouse   Self-Care   Usual Activity Tolerance moderate   Current Activity Tolerance fair   Regular Exercise No   Equipment Currently Used at Home walker, rolling   Fall history within last six months no   Activity/Exercise/Self-Care Comment IND w/ all ADL's, no AD, cannot walk a full city block   General Information   Onset of Illness/Injury or Date of Surgery 10/02/23   Referring Physician Ross Oro MD   Patient/Family Therapy Goals Statement (PT) go home   Pertinent History of Current Problem (include personal factors and/or comorbidities that impact the POC) S/p R TKR   Existing Precautions/Restrictions fall;weight bearing   Weight-Bearing Status - RLE weight-bearing as tolerated   Cognition   Affect/Mental Status (Cognition) WFL   Orientation Status (Cognition) oriented x 4   Follows Commands (Cognition) WFL   Pain Assessment   Patient Currently in Pain No   Integumentary/Edema   Integumentary/Edema Comments Wraps on RLE   Posture    Posture Forward head position   Range of Motion (ROM)   Range of Motion ROM deficits secondary to surgical procedure;ROM deficits secondary to pain;ROM deficits secondary to swelling;ROM deficits secondary to weakness   ROM Comment RLE limited, all others WFL   Strength (Manual Muscle Testing)   Strength Comments RLE limited, all others WFL   Bed Mobility   Comment, (Bed Mobility) Sonya for assist with RLE   Transfers   Comment,  (Transfers) sit<>stand FWW SBA   Gait/Stairs (Locomotion)   Comment, (Gait/Stairs) Amb SBA w/ FWW   Balance   Balance Comments SBA FWW   Sensory Examination   Sensory Perception WFL   Clinical Impression   Criteria for Skilled Therapeutic Intervention Yes, treatment indicated   PT Diagnosis (PT) Impaired gait   Influenced by the following impairments Decreased ROM, strenght   Functional limitations due to impairments impaired mobility   Clinical Presentation (PT Evaluation Complexity) Stable/Uncomplicated   Clinical Presentation Rationale clinical judgement   Clinical Decision Making (Complexity) low complexity   Planned Therapy Interventions (PT) balance training;bed mobility training;cryotherapy;gait training;home exercise program;patient/family education;ROM (range of motion);stair training;strengthening   Anticipated Equipment Needs at Discharge (PT) walker, rolling   Risk & Benefits of therapy have been explained evaluation/treatment results reviewed;care plan/treatment goals reviewed;risks/benefits reviewed;current/potential barriers reviewed;participants voiced agreement with care plan;participants included;patient;spouse/significant other;daughter   PT Total Evaluation Time   PT Eval, Low Complexity Minutes (12109) 14   Therapy Certification   Start of care date 10/02/23   Certification date from 10/02/23   Certification date to 10/02/23   Physical Therapy Goals   PT Frequency Daily   PT Predicted Duration/Target Date for Goal Attainment 10/09/23   PT Goals Bed Mobility;Transfers;Gait;Stairs   PT: Bed Mobility Supervision/stand-by assist;Supine to/from sit;Rolling;Bridging   PT: Transfers Supervision/stand-by assist;Sit to/from stand;Bed to/from chair;Assistive device   PT: Gait Supervision/stand-by assist;Assistive device;150 feet   PT: Stairs Supervision/stand-by assist;3 stairs   Interventions   Interventions Quick Adds Gait Training;Therapeutic Activity   Therapeutic Activity   Therapeutic Activities:  dynamic activities to improve functional performance Minutes (01547) 10   Treatment Detail/Skilled Intervention Supine<>sit from bed Sonya w/ cues for log rolling, assist at leg. Nausea at EOB, resolved with rest. Sit<>stand SBA w/ FWW, edu on WBing status and no precautions. Edu on ankle pumps and quad contractions, completed x 10 IND. Toilet transfer SBA w/ IND pericares. Cues for kicking RLE out in front. KI on. Left in recliner with call light on.   Gait Training   Gait Training Minutes (03648) 10   Symptoms Noted During/After Treatment (Gait Training) fatigue;increased pain   Treatment Detail/Skilled Intervention Amb 150ft w/ FWW SBA, cues for step through and posture. Pt had small icnrease in pain. Edu on improtance of mobility daily, and edu on discharge palnning and OP during amb.   PT Discharge Planning   PT Plan LE strength, stairs   PT Rationale for DC Rec Pt below functional baseline though has good family support. Anticipate pt DC home w/ assist and OP services pending stairs.   PT Brief overview of current status SBA FWW   Total Session Time   Timed Code Treatment Minutes 20   Total Session Time (sum of timed and untimed services) 34   [facility]  OUTPATIENT PHYSICAL THERAPY EVALUATION  PLAN OF TREATMENT FOR OUTPATIENT REHABILITATION  (COMPLETE FOR INITIAL CLAIMS ONLY)  Patient's Last Name, First Name, M.I.  YOB: 1955  Beverly PatinoGeorgie  R                        Provider's Name  [facility] Medical Record No.  [id number]                             Onset Date:  10/02/23   Start of Care Date:  10/02/23   Type:     _X_PT   ___OT   ___SLP Medical Diagnosis:                 PT Diagnosis:  Impaired gait Visits from SOC:  1     See note for plan of treatment, functional goals and certification details    I CERTIFY THE NEED FOR THESE SERVICES FURNISHED UNDER        THIS PLAN OF TREATMENT AND WHILE UNDER MY CARE     (Physician co-signature  of this document indicates review and certification of the therapy plan).

## 2023-10-02 NOTE — ANESTHESIA PREPROCEDURE EVALUATION
Anesthesia Pre-Procedure Evaluation    Patient: Georgie Patino   MRN: [id number] : 1955        Procedure : Procedure(s):  RIGHT TOTAL KNEE ARTHROPLASTY          Past Medical History:   Diagnosis Date    Ascending aortic aneurysm (H24) 2017    Blood transfusions age 19    due to menorhagia    History of blood transfusion     Hypertension 2017    Localized osteoarthritis of both knees     Thyroid nodule 2017    Tobacco abuse       Past Surgical History:   Procedure Laterality Date    COLONOSCOPY WITH CO2 INSUFFLATION N/A 03/15/2017    Procedure: COLONOSCOPY WITH CO2 INSUFFLATION;  Surgeon: Duane, William Charles, MD;  Location: [facility] OR    DILATION AND CURETTAGE  age 20    EXTRACORPOREAL SHOCK WAVE LITHOTRIPSY, CYSTOSCOPY, INSERT STENT URETER(S), COMBINED Bilateral 2018    Procedure: BILATERAL EXTRACORPOREAL SHOCK WAVE LITHOTRIPSY, CYSTOSCOPY, LEFT STENT PLACEMENT;  Surgeon: Tavo Saavedra MD;  Location: [facility] OR    GI SURGERY  3/15/2017    GYN SURGERY  age 20    REPAIR ANEURYSM ASCENDING AORTA N/A 10/27/2017    Procedure: REPAIR ANEURYSM ASCENDING AORTA;  Median Sternotomy, Cardiopulmonary bypass, Ascending Aorta Aneurysm Repair using Hemashield Platinum Woven Double Velour Graft 34cm X 30cm.;  Surgeon: uRbio Piña MD;  Location: [facility] OR    VASCULAR SURGERY  10/27/2017    [place] teeth removed        Allergies   Allergen Reactions    Blood Transfusion Related (Informational Only) Other (See Comments)     Patient has a history of a clinically significant antibody against RBC antigens.  A delay in compatible RBCs may occur.      Social History     Tobacco Use    Smoking status: Every Day     Packs/day: 0.05     Years: 40.00     Pack years: 2.00     Types: Cigarettes, Other    Smokeless tobacco: Never    Tobacco comments:     2 cigs daily   Substance Use Topics    Alcohol use: Yes     Alcohol/week: 0.8 - 1.7 standard drinks of alcohol     Types: 1 - 2 Standard drinks or  equivalent per week     Comment: 3-4 drinks per week       Wt Readings from Last 1 Encounters:   10/02/23 91.6 kg (202 lb)        Anesthesia Evaluation            ROS/MED HX  ENT/Pulmonary:     (+)                tobacco use,                       Neurologic:       Cardiovascular: Comment: Ascending AA s/p Repair    (+)  hypertension- -   -  - -                                      METS/Exercise Tolerance:     Hematologic:       Musculoskeletal:   (+)  arthritis,             GI/Hepatic:       Renal/Genitourinary:       Endo:     (+)          thyroid problem,     Obesity,       Psychiatric/Substance Use:     (+) psychiatric history anxiety       Infectious Disease:       Malignancy:       Other:            Physical Exam    Airway        Mallampati: II   TM distance: > 3 FB   Neck ROM: full   Mouth opening: > 3 cm    Respiratory Devices and Support         Dental       (+) Minor Abnormalities - some fillings, tiny chips      Cardiovascular   cardiovascular exam normal          Pulmonary   pulmonary exam normal                OUTSIDE LABS:  CBC:   Lab Results   Component Value Date    WBC 7.2 09/25/2023    WBC 6.5 01/09/2023    HGB 15.2 09/25/2023    HGB 14.9 01/09/2023    HCT 46.7 09/25/2023    HCT 45.2 01/09/2023     09/25/2023     (L) 01/09/2023     BMP:   Lab Results   Component Value Date     09/25/2023     01/09/2023    POTASSIUM 4.1 10/02/2023    POTASSIUM 4.9 09/25/2023    CHLORIDE 106 09/25/2023    CHLORIDE 110 (H) 01/09/2023    CO2 26 09/25/2023    CO2 31 01/09/2023    BUN 26.4 (H) 09/25/2023    BUN 27 01/09/2023    CR 0.65 09/25/2023    CR 0.60 01/09/2023     (H) 10/02/2023     (H) 09/25/2023     COAGS:   Lab Results   Component Value Date    PTT 35 10/27/2017    INR 1.21 (H) 10/29/2017    FIBR 257 10/27/2017     POC:   Lab Results   Component Value Date     (H) 10/31/2017     HEPATIC:   Lab Results   Component Value Date    ALBUMIN 3.6 01/09/2023    PROTTOTAL  7.3 01/09/2023    ALT 22 01/09/2023    AST 13 01/09/2023    ALKPHOS 86 01/09/2023    BILITOTAL 1.1 01/09/2023     OTHER:   Lab Results   Component Value Date    PH 7.40 10/28/2017    LACT 3.0 (H) 10/27/2017    A1C 5.5 10/28/2017    QASIM 9.5 09/25/2023    PHOS 2.5 10/30/2017    MAG 2.1 05/31/2019    TSH 0.44 03/25/2021       Anesthesia Plan    ASA Status:  2    NPO Status:  NPO Appropriate    Anesthesia Type: General.   Induction: Intravenous, Propofol.   Maintenance: Balanced.        Consents    Anesthesia Plan(s) and associated risks, benefits, and realistic alternatives discussed. Questions answered and patient/representative(s) expressed understanding.     - Discussed: Risks, Benefits and Alternatives for the PROCEDURE were discussed     - Discussed with:  Patient            Postoperative Care    Pain management: Multi-modal analgesia, Peripheral nerve block (Single Shot).   PONV prophylaxis: Ondansetron (or other 5HT-3)     Comments:                Jay Figueroa MD

## 2023-10-02 NOTE — INTERVAL H&P NOTE
"I have reviewed the surgical (or preoperative) H&P that is linked to this encounter, and examined the patient. There are no significant changes    Clinical Conditions Present on Arrival:  Clinically Significant Risk Factors Present on Admission                 # Drug Induced Platelet Defect: home medication list includes an antiplatelet medication  # Obesity: Estimated body mass index is 32.6 kg/m  as calculated from the following:    Height as of this encounter: 1.676 m (5' 6\").    Weight as of this encounter: 91.6 kg (202 lb).       "

## 2023-10-02 NOTE — CONSULTS
"[facility]  Consult Note - Hospitalist Service  Date of Admission:  10/2/2023  Consult Requested by: Dr. Oro  Reason for Consult: post op management of hypertension medications    Assessment & Plan   Georgie Patino is a 68 year old female with a PMH significant for benign essential hypertension, morbid obesity, AAA s/p repair (2017), and osteoarthritis who was admitted for an elective right TKA with Dr. Oro.     Osteoarthritis of right knee s/p right TKA  Procedure performed by Dr. Oro under general anesthesia and spinal with adductor block. EBL 30 ml. No intraoperative complications.  - Defer post-operative management of analgesia, PT/OT, activity restrictions, and anticoagulation to primary orthopedic surgery team  - PRN Zofran for Nausea  - Encourage I/S 10x/hr while awake    Benign Essential Hypertension  PTA regimen includes 5 mg amlodipine daily, ASA 81 mg daily, lisinopril 40 mg daily, and metoprolol 50 mg daily. Postoperative VS reviewed. BPs 120-140s/60-70s.   - Continue PTA amlodipine and metoprolol  - Hold PTA lisinopril; consider resuming 10/3 after review of BMP and VS  - DVT prophylaxis with 325 mg ASA per orthopedic surgery  - AM BMP    Morbid Obesity  - Continue to follow PCP outpatient for further  management     AAA s/p repair (2017)  - Noted in history     The patient's care was discussed with the Attending Physician, Dr. Goetz.    Clinically Significant Risk Factors Present on Admission                # Drug Induced Platelet Defect: home medication list includes an antiplatelet medication   # Hypertension: Noted on problem list      # Obesity: Estimated body mass index is 32.6 kg/m  as calculated from the following:    Height as of this encounter: 1.676 m (5' 6\").    Weight as of this encounter: 91.6 kg (202 lb).              Cb Muñiz NP  Hospitalist Service  Securely message with Vocera (more info)  Text page via [employer] Paging/Directory "   ______________________________________________________________________    Chief Complaint   S/p right TKA    History is obtained from the patient and chart review.     History of Present Illness   Georgie Patino is a 68 year old female with a PMH significant for benign essential hypertension, morbid obesity, AAA s/p repair (2017), and osteoarthritis who was admitted for an elective right TKA with Dr. Oro.     Procedure performed by Dr. Oro under general anesthesia and spinal with adductor block. EBL 30 ml. No intraoperative complications.    Upon arrival patient is non-toxic appearing, not in acute distress. On ROS she denies fevers, chills, chest pain, palpitations, SOB, cough, headache, visual disturbances, or lightheadedness. She does endorse nausea when she recently ambulated to bathroom.       Past Medical History    Past Medical History:   Diagnosis Date    Ascending aortic aneurysm (H24) 09/21/2017    Blood transfusions age 19    due to menorhagia    History of blood transfusion     Hypertension 09/21/2017    Localized osteoarthritis of both knees     Thyroid nodule 11/20/2017    Tobacco abuse        Past Surgical History   Past Surgical History:   Procedure Laterality Date    ARTHROPLASTY KNEE Right 10/2/2023    Procedure: RIGHT TOTAL KNEE ARTHROPLASTY;  Surgeon: Ross Oro MD;  Location:  OR    COLONOSCOPY WITH CO2 INSUFFLATION N/A 03/15/2017    Procedure: COLONOSCOPY WITH CO2 INSUFFLATION;  Surgeon: Duane, William Charles, MD;  Location:  OR    DILATION AND CURETTAGE  age 20    EXTRACORPOREAL SHOCK WAVE LITHOTRIPSY, CYSTOSCOPY, INSERT STENT URETER(S), COMBINED Bilateral 11/19/2018    Procedure: BILATERAL EXTRACORPOREAL SHOCK WAVE LITHOTRIPSY, CYSTOSCOPY, LEFT STENT PLACEMENT;  Surgeon: Tavo Saavedra MD;  Location:  OR    GI SURGERY  3/15/2017    GYN SURGERY  age 20    REPAIR ANEURYSM ASCENDING AORTA N/A 10/27/2017    Procedure: REPAIR ANEURYSM ASCENDING AORTA;  Median  Sternotomy, Cardiopulmonary bypass, Ascending Aorta Aneurysm Repair using Hemashield Platinum Woven Double Velour Graft 34cm X 30cm.;  Surgeon: Rubio Piña MD;  Location: [facility] OR    VASCULAR SURGERY  10/27/2017    [place] teeth removed         Medications   Medications Prior to Admission   Medication Sig Dispense Refill Last Dose    amLODIPine (NORVASC) 5 MG tablet Take 1 tablet (5 mg) by mouth daily 90 tablet 0 10/1/2023 at AM    aspirin EC 81 MG EC tablet Take 1 tablet (81 mg) by mouth daily 30 tablet 0 Past Week at AM    diphenhydrAMINE-acetaminophen (TYLENOL PM)  MG tablet Take 1 tablet by mouth nightly as needed for sleep   10/1/2023 at HS    lisinopril (ZESTRIL) 40 MG tablet Take 1 tablet (40 mg) by mouth daily 90 tablet 0 10/1/2023 at PM    metoprolol tartrate (LOPRESSOR) 50 MG tablet Take 1 tablet (50 mg) by mouth 2 times daily 180 tablet 0 10/1/2023 at AM    omeprazole (PRILOSEC) 20 MG DR capsule Take 20 mg by mouth daily   10/1/2023 at AM    acetaminophen (TYLENOL) 500 MG tablet Take 500-1,000 mg by mouth every 6 hours as needed for mild pain             Social History   I have reviewed this patient's social history and updated it with pertinent information if needed.  Social History     Tobacco Use    Smoking status: Every Day     Packs/day: 0.05     Years: 40.00     Pack years: 2.00     Types: Cigarettes, Other    Smokeless tobacco: Never    Tobacco comments:     2 cigs daily   Vaping Use    Vaping Use: Never used   Substance Use Topics    Alcohol use: Yes     Alcohol/week: 0.8 - 1.7 standard drinks of alcohol     Types: 1 - 2 Standard drinks or equivalent per week     Comment: 3-4 drinks per week     Drug use: Yes     Types: Marijuana         Allergies   Allergies   Allergen Reactions    Blood Transfusion Related (Informational Only) Other (See Comments)     Patient has a history of a clinically significant antibody against RBC antigens.  A delay in compatible RBCs may occur.         Physical Exam   Vital Signs: Temp: 98.1  F (36.7  C) Temp src: Oral BP: 125/75 Pulse: 71   Resp: 12 SpO2: 95 % O2 Device: Nasal cannula Oxygen Delivery: 2 LPM  Weight: 202 lbs 0 oz  General:  Appears stated age, no acute distress. A&O x 3.  Skin:  Warm, dry. No rashes or lesions on exposed skin. UTV surgical incision.   HEENT:  Normocephalic, atraumatic.  Neck:  Supple.  Chest:  Breath sounds CTA and no increased work of breathing on room air.  Cardiovascular:  RRR, no rub or murmur. No peripheral edema.  Abdomen:  Soft, non-tender, non-distended.  Musculoskeletal:  Right knee immobilizer in place.   Neurological:  No focal neurological deficits.   Psychiatric:  Affect and mood congruent.      Medical Decision Making       45 MINUTES SPENT BY ME on the date of service doing chart review, history, exam, documentation & further activities per the note.      Data     I have personally reviewed the following data over the past 24 hrs:    N/A  \   N/A   / N/A     140 105 31.7 (H) /  158 (H)   4.3 21 (L) 0.61 \

## 2023-10-02 NOTE — OP NOTE
REPORT OF OPERATION/ PROCEDURE    DATE OF PROCEDURE: 10/2/2023     SURGEON: Ross Oro MD     FIRST ASSISTANT:  YOSVANY Gomes     PREOPERATIVE DIAGNOSIS: right knee osteoarthritis.     POSTOPERATIVE DIAGNOSIS:  right knee osteoarthritis.     PROCEDURE: right total knee arthroplasty.     ANESTHESIA: Anesthesia:  Spinal with Adductor Block    INDICATIONS: Georgie Patino is a 68 year old female  with severe right knee pain, with severe osteoarthritis on the x-rays involving mainly the lateral compartment, with significant changes tricompartmentally and significant valgus deformity.  Patient had failed conservative treatment.  Informed consent was obtained for the procedure.     DESCRIPTION OF PROCEDURE: The patient was taken to the operating room, placed on the operating table in supine position, and spinal anesthesia was successfully achieved. A tourniquet was placed around the proximal thigh. The limb was prepped and draped in the usual sterile fashion. Pause for site confirmation was performed. The limb was exsanguinated, and tourniquet inflated to 300 mmHg. A longitudinal incision was made centered on the patella with the knee flexed. The incision was taken down through the skin and subcutaneous tissue to the level of the extensor mechanism which was incised in a manner of the median parapatellar approach. Patella was everted. Fat pad was excised. Osteophytes removed. ACL detached, PCL detached. Only enough medial release was performed off the tibia along the joint line for placement of retractors. Then, the anterior portions of the menisci were removed.     Operative Findings: Medial compartment: grade 3 and 4 articular cartilage loss.  Lateral compartment: complete articular cartilage loss, hypoplasia and some bone wear on the femoral side, wear on the lateral side.  Patellofemoral joint: moderate articular cartilage loss.  Large tricompartmental osteophytes were found and removed.     Then,  using an external tibial alignment guide, we set our cutting block in the appropriate varus-valgus position, appropriate posterior slope and depth, based on referencing the worn lateral side.. I removed 4 mm off of the medial side, with a pretty symmetrical cutThe cut surface of the tibia was removed and measured on the backtable.     We then turned our attention towards the femur. The medullary drill was used to enter the femoral canal. Then, distal femoral cut was made at 6 degrees of valgus. Then the footed guide was placed on the cut end of the femur. We set the external rotation to 5 degrees and sized the femur. I double checked this based on the epicondylar axis and Whitesides line, and made sure the cutting block was parallel to my tibial cut. We then made the anterior cut as well as the posterior cut and chamfer cuts for the size 7 femur.  There was slight notching anteriorly.  The remaining meniscal tissue was removed, and some large posterior ostephytes removed with an osteotome.     It was at this point that I realized that medial retractor, which was placed appropriately, had collapsed the medial cortex, just with levering on the retractor, and the medial cancellous bone collapsed.  First thought I could get away with a small tibia, but there was too much bone involvement.  Despite this, I placed the femoral and tibial trials into place. I felt we had very good extension and flexion.  But there was significant medial laxity. Based on this, I thought the MCL was compromised, so I decided to convert to a more constrained construct than a CR femur. So at this point, I needed to make the box cut on the femur for a potential CPS construct, which was made, and I called for medial wedge/buildup for the medial defect. I then trialed again to set the tibial rotation.    We then removed the trials and measured for the tibial side. It measured size E.  I drilled for the 75mm stemmed tibia. And made marks on the  bone for fins with the bovie.  I then put the E trial tray on and attached the cutter for the medial wedge. I felt that the 15mm buildup would be appropriate, so I mad that cut.  I removed the tray and completed the vertical component of the cut. Then, with a 15mm buildup on the medial tibia trial, I tapped the trial into place, cutting the fin on the lateral side. I trialed construct with a PS spacer. I had good varus-valgus balance, and still good flexion -extension balance. I then felt that the MCL was still intact at this point. We then prepared the patella by removing 8 mm of patella using the patellar reamer. We then drilled the lug holes for the size 32 patellar component.  Joint block was injected into the posterior capsular structures along the posterior femoral periosteum with the knee flexed. We then thoroughly washed and dried all of the surfaces. The tibial component with the wedge and stem was constructed on the back table.  I then cemented all three components into place using Simplex HV cement. I removed the excess cement. Once the cement had cured, I reduced the knee and put it through a range of motion and tested the stability. I felt that the size 10 PS spacer was the best.  We didn't need the more constrained CPS spacer.     As we had several times prior in the case, we thoroughly washed the knee with antibiotic saline solution using the pulsavac.  Then, 0.3% Betadine was poured into the knee, and allowed to sit for 2 minutes, and the knee rinsed again.  We checked to make sure that all of the cured extraneous cement was removed.  Joint block was injected into medial and lateral capsule, medial and lateral tibial sub-periosteal, and surrounding soft tissues. The actual 10mm PS spacer was then placed.  Tracking was checked.     Lateral retinacular release?  No.    The remaining joint block was then injected in the previously uninjected areas, medially, laterally, the anterior structures and into  the subcutaneous tissues as well. I closed the extensor mechanism using #1 Ethibond sutures, each placed in a figure-of-eight fashion in the proximal two-thirds of the extensor mechanism, and a running #1 Vicryl suture distally.  I oversewed with #1 Stratofix along the entire extensor mechanism.  Through a small defect in the extensor mechanism, I placed Vanco powder, then completed the closure over that defect. TXA 1 gm diluted in 50cc of saline was injected into the knee and then the tourniquet was deflated.  2-0 Vicryl suture was used in the subcutaneous layer, followed by running 3-0 Monocryl suture in the subcuticular layer, and Dermabond on the skin. Sterile dressings were applied. The patient was awakened, and transferred to the hospital cart and to the recovery area in satisfactory condition.    EBL: 30cc.     Complications:  Bone loss medially due to local osteoporosis.    Specimen(s) :  none    Prosthetic Devices: Rian Persona Femur cemented size 7, PS  Tibia: cemented size E, 75mm stemmed, with 15mm medial wedge/buildup.  Spacer 10 mm, PS; patella size 8 mm cemented.    PLAN: Weightbearing as tolerated. Routine total knee protocol.     SILKE TOLENTINO MD

## 2023-10-02 NOTE — ANESTHESIA PROCEDURE NOTES
"Intrathecal Procedure Note    Pre-Procedure   Staff -        Anesthesiologist:  Jay Figueroa MD       Performed By: anesthesiologist       Location: OR       Pre-Anesthestic Checklist: patient identified, IV checked, risks and benefits discussed, informed consent, monitors and equipment checked, pre-op evaluation and at physician/surgeon's request  Timeout:       Correct Patient: Yes        Correct Procedure: Yes        Correct Site: Yes        Correct Position: Yes   Procedure Documentation  Procedure: intrathecal       Patient Position: sitting       Patient Prep/Sterile Barriers: sterile gloves, mask, patient draped       Skin prep: Betadine       Insertion Site: L2-3. (midline approach).       Needle Gauge: 24.        Needle Length (Inches): 3.5        Spinal Needle Type: Kimberly-Pedro Luis       Introducer used       Introducer: 20 G       # of attempts: 1 and  # of redirects:          : 0.    Assessment/Narrative         Paresthesias: No.       CSF fluid: clear.       Opening pressure was cmH2O while  Sitting.      Medication(s) Administered   0.75% Hyperbaric Bupivacaine (Intrathecal) - Intrathecal   1.6 mL - 10/2/2023 7:42:00 AM   Comments:  Patient tolerated the procedure well and without complications.  Patient laid flat immediately.      FOR [facility] ([facility]) ONLY:   Pain Team Contact information: please page the Pain Team Via [employer]. Search \"Pain\". During daytime hours, please page the attending first. At night please page the resident first.      "

## 2023-10-02 NOTE — ANESTHESIA PROCEDURE NOTES
Femoral (via adductor canal) Procedure Note    Pre-Procedure   Staff -        Anesthesiologist:  Jay Figueroa MD       Performed By: anesthesiologist       Location: pre-op       Pre-Anesthestic Checklist: patient identified, IV checked, site marked, risks and benefits discussed, informed consent, monitors and equipment checked, pre-op evaluation, at physician/surgeon's request and post-op pain management  Timeout:       Correct Patient: Yes        Correct Procedure: Yes        Correct Site: Yes        Correct Position: Yes        Correct Laterality: Yes        Site Marked: Yes  Procedure Documentation  Procedure: Femoral (via adductor canal)       Patient Position: supine       Patient Prep/Sterile Barriers: sterile gloves, mask, patient draped       Skin prep: Chloraprep       Local skin infiltrated with 3 mL of 1% lidocaine.        Needle Type: insulated       Needle Gauge: 21.        Needle Length (Inches): 3.5        Ultrasound guided       1. Ultrasound was used to identify targeted nerve, plexus, vascular marker, or fascial plane and place a needle adjacent to it in real-time.       2. Ultrasound was used to visualize the spread of anesthetic in close proximity to the above referenced structure.       3. A permanent image is entered into the patient's record.       4. The visualized anatomic structures appeared normal.       5. There were no apparent abnormal pathologic findings.    Assessment/Narrative         The placement was negative for: blood aspirated, painful injection and site bleeding       Paresthesias: No.       Bolus given via needle..        Secured via.        Insertion/Infusion Method: Single Shot       Complications: none       Injection made incrementally with aspirations every 5 mL.    Medication(s) Administered   Ropivacaine 0.5% w/ 1:400K Epi (Injection) - Injection   20 mL - 10/2/2023 7:26:00 AM   Comments:  The surgeon has given a verbal order transferring care of this patient  "to me for the performance of a regional analgesia block for post-op pain control. It is requested of me because I am uniquely trained and qualified to perform this block and the surgeon is neither trained nor qualified to perform this procedure.          FOR [facility] ([facility]) ONLY:   Pain Team Contact information: please page the Pain Team Via [employer]. Search \"Pain\". During daytime hours, please page the attending first. At night please page the resident first.      "

## 2023-10-03 ENCOUNTER — APPOINTMENT (OUTPATIENT)
Dept: OCCUPATIONAL THERAPY | Facility: CLINIC | Age: 68
End: 2023-10-03
Attending: ORTHOPAEDIC SURGERY
Payer: COMMERCIAL

## 2023-10-03 ENCOUNTER — APPOINTMENT (OUTPATIENT)
Dept: PHYSICAL THERAPY | Facility: CLINIC | Age: 68
End: 2023-10-03
Attending: ORTHOPAEDIC SURGERY
Payer: COMMERCIAL

## 2023-10-03 VITALS
RESPIRATION RATE: 18 BRPM | WEIGHT: 202 LBS | BODY MASS INDEX: 32.47 KG/M2 | HEIGHT: 66 IN | OXYGEN SATURATION: 92 % | DIASTOLIC BLOOD PRESSURE: 67 MMHG | TEMPERATURE: 98.4 F | SYSTOLIC BLOOD PRESSURE: 143 MMHG | HEART RATE: 65 BPM

## 2023-10-03 LAB
FASTING STATUS PATIENT QL REPORTED: NO
GLUCOSE SERPL-MCNC: 147 MG/DL (ref 70–99)
HGB BLD-MCNC: 12.7 G/DL (ref 11.7–15.7)

## 2023-10-03 PROCEDURE — 82947 ASSAY GLUCOSE BLOOD QUANT: CPT | Performed by: ORTHOPAEDIC SURGERY

## 2023-10-03 PROCEDURE — 99207 PR NO BILLABLE SERVICE THIS VISIT: CPT | Performed by: INTERNAL MEDICINE

## 2023-10-03 PROCEDURE — 97165 OT EVAL LOW COMPLEX 30 MIN: CPT | Mod: GO

## 2023-10-03 PROCEDURE — 250N000011 HC RX IP 250 OP 636: Performed by: ORTHOPAEDIC SURGERY

## 2023-10-03 PROCEDURE — 85018 HEMOGLOBIN: CPT | Performed by: ORTHOPAEDIC SURGERY

## 2023-10-03 PROCEDURE — 36415 COLL VENOUS BLD VENIPUNCTURE: CPT | Performed by: ORTHOPAEDIC SURGERY

## 2023-10-03 PROCEDURE — 97535 SELF CARE MNGMENT TRAINING: CPT | Mod: GO

## 2023-10-03 PROCEDURE — 250N000013 HC RX MED GY IP 250 OP 250 PS 637: Performed by: ORTHOPAEDIC SURGERY

## 2023-10-03 PROCEDURE — 97116 GAIT TRAINING THERAPY: CPT | Mod: GP

## 2023-10-03 PROCEDURE — 250N000013 HC RX MED GY IP 250 OP 250 PS 637

## 2023-10-03 PROCEDURE — 97110 THERAPEUTIC EXERCISES: CPT | Mod: GP

## 2023-10-03 RX ORDER — OXYCODONE HYDROCHLORIDE 5 MG/1
5 TABLET ORAL 4 TIMES DAILY PRN
Qty: 26 TABLET | Refills: 0 | Status: SHIPPED | OUTPATIENT
Start: 2023-10-03 | End: 2023-10-24

## 2023-10-03 RX ORDER — ONDANSETRON 4 MG/1
4 TABLET, ORALLY DISINTEGRATING ORAL EVERY 6 HOURS PRN
Qty: 30 TABLET | Refills: 1 | Status: SHIPPED | OUTPATIENT
Start: 2023-10-03 | End: 2023-10-24

## 2023-10-03 RX ORDER — MELOXICAM 15 MG/1
15 TABLET ORAL DAILY
Qty: 15 TABLET | Refills: 0 | Status: SHIPPED | OUTPATIENT
Start: 2023-10-03

## 2023-10-03 RX ADMIN — METOPROLOL TARTRATE 50 MG: 50 TABLET, FILM COATED ORAL at 08:27

## 2023-10-03 RX ADMIN — SENNOSIDES AND DOCUSATE SODIUM 1 TABLET: 50; 8.6 TABLET ORAL at 08:27

## 2023-10-03 RX ADMIN — ONDANSETRON 4 MG: 4 TABLET, ORALLY DISINTEGRATING ORAL at 05:33

## 2023-10-03 RX ADMIN — OXYCODONE HYDROCHLORIDE 5 MG: 5 TABLET ORAL at 00:14

## 2023-10-03 RX ADMIN — POLYETHYLENE GLYCOL 3350 17 G: 17 POWDER, FOR SOLUTION ORAL at 08:28

## 2023-10-03 RX ADMIN — FAMOTIDINE 20 MG: 20 TABLET, FILM COATED ORAL at 08:27

## 2023-10-03 RX ADMIN — OXYCODONE HYDROCHLORIDE 5 MG: 5 TABLET ORAL at 05:33

## 2023-10-03 RX ADMIN — OXYCODONE HYDROCHLORIDE 5 MG: 5 TABLET ORAL at 10:00

## 2023-10-03 RX ADMIN — ONDANSETRON 4 MG: 4 TABLET, ORALLY DISINTEGRATING ORAL at 10:00

## 2023-10-03 RX ADMIN — ONDANSETRON 4 MG: 4 TABLET, ORALLY DISINTEGRATING ORAL at 00:15

## 2023-10-03 RX ADMIN — AMLODIPINE BESYLATE 5 MG: 5 TABLET ORAL at 08:27

## 2023-10-03 RX ADMIN — ASPIRIN 325 MG: 325 TABLET, COATED ORAL at 08:27

## 2023-10-03 RX ADMIN — ACETAMINOPHEN 975 MG: 325 TABLET, FILM COATED ORAL at 05:32

## 2023-10-03 ASSESSMENT — ACTIVITIES OF DAILY LIVING (ADL)
ADLS_ACUITY_SCORE: 27

## 2023-10-03 NOTE — PLAN OF CARE
Goal Outcome Evaluation:    Patient vital signs are at baseline: Yes  Patient able to ambulate as they were prior to admission or with assist devices provided by therapies during their stay:  Yes  Patient MUST void prior to discharge:  Yes  Patient able to tolerate oral intake:  Yes  Pain has adequate pain control using Oral analgesics:  Yes, (oxycodone + zofran).   Does patient have an identified :  Yes  Has goal D/C date and time been discussed with patient:  Yes     A&O x4  CMS intact  Dressing CDI, KI in place  Plan to discharge home today.

## 2023-10-03 NOTE — PLAN OF CARE
Goal Outcome Evaluation:       Orientation: A/O x4 calm and alert,Pt discharging now home with spouse. Pt will be discharging  walker.     Vitals: VSS on R A    IV Access/drains: PIV removed now     Diet: regular     Mobility: SBA with the walker     GI/: Continent     Wound/Skin: CMS intact  Dressing CDI, KI in place    Consults: OT/PT    Discharge Plan: discharging home       See Flow sheets for assessment

## 2023-10-03 NOTE — PROGRESS NOTES
MD Notification    Notified Person: MD    Notified Person Name: TRUONG HERNANDEZ     Notification Date/Time: 10/2/23    Notification Interaction: paged    Purpose of Notification: Would like a sleep aid other than melatonin, nothing in PTA meds.     Orders Received: Orders received    Comments:

## 2023-10-03 NOTE — PLAN OF CARE
Physical Therapy Discharge Summary    Reason for therapy discharge:    Discharged to home with outpatient therapy.  All goals and outcomes met, no further needs identified.    Progress towards therapy goal(s). See goals on Care Plan in Epic electronic health record for goal details.  Goals met    Therapy recommendation(s):    Continue home exercise program. Pt tolerated session well. Acute PT goals met at this time. Anticipate pt will require assist as needed, use of walker for ambulation, supervision on stairs at time of discharge.  PT Brief overview of current status: [place], amb with FWW

## 2023-10-03 NOTE — PROGRESS NOTES
Hospitalist Chart Check    Patient is a 69yo female with PMHx of hypertension, hx of AAA repair in 2017, morbid obesity and OA who underwent an elective R TKA on 10/2/23. Hospitalist service consulted for assistance with postop medical mgmt.     Chart reviewed. Uneventful night. BPs mildly elevated but VS otherwise stable. Labs stable. Ortho planning for discharge home today. No concerns from hospitalist standpoint. Okay to cont home meds as per prior to admission (including amlodipine, lisinopril and metoprolol). Follow up with PCP as scheduled or as needed.     Please call if questions/concerns arise prior to discharge.     Holly Short,   Internal Medicine - Hospitalist  10/3/2023  10:21 AM

## 2023-10-03 NOTE — PROGRESS NOTES
10/03/23 0800   Appointment Info   Signing Clinician's Name / Credentials (OT) Sydnie Hamilton OTR/L   Rehab Comments (OT) WBAT ROMAT   Quick Adds   Quick Adds Certification   Living Environment   People in Home child(camila), adult;spouse   Current Living Arrangements house   Home Accessibility stairs to enter home   Number of Stairs, Main Entrance 3   Stair Railings, Main Entrance railing on left side (ascending)   Stair Railings, Within Home, Primary railings safe and in good condition;railing on left side (ascending)   Transportation Anticipated car, drives self   Living Environment Comments Pt lives in a house w/ 3 SARAH, all needs met on main level, daughter and  will be available to help as needed; pt has a tub shower and shower chair available.   Self-Care   Usual Activity Tolerance moderate   Current Activity Tolerance moderate   Regular Exercise No   Equipment Currently Used at Home walker, rolling;shower chair   Fall history within last six months no   Activity/Exercise/Self-Care Comment IND w/ ADLs at baseline, owns FWW but was not using PTA.   Instrumental Activities of Daily Living (IADL)   IADL Comments IND w/ IADLs, retired, performs most cooking/cleaning and home mgmt IND   General Information   Onset of Illness/Injury or Date of Surgery 10/02/23   Referring Physician Ross Oro MD   Patient/Family Therapy Goal Statement (OT) return home   Additional Occupational Profile Info/Pertinent History of Current Problem s/p R TKA   Existing Precautions/Restrictions fall;weight bearing   Right Lower Extremity (Weight-bearing Status) weight-bearing as tolerated (WBAT)   Cognitive Status Examination   Orientation Status orientation to person, place and time   Visual Perception   Impact of Vision Impairment on Function (Vision) glasses   Sensory   Sensory Comments denies any numbness/tingling   Pain Assessment   Patient Currently in Pain No   Range of Motion Comprehensive   Comment, General Range  of Motion UE WFL, RLE limited s/p TKA   Strength Comprehensive (MMT)   Comment, General Manual Muscle Testing (MMT) Assessment UE WFL   Bed Mobility   Comment (Bed Mobility) Mod I w/ HOB elevated   Transfers   Transfer Comments CGA-SBA w/ FWW   Balance   Balance Comments using FWW for support s/p TKA, no LOB noted during session   Activities of Daily Living   BADL Assessment/Intervention lower body dressing   Lower Body Dressing Assessment/Training   Comment, (Lower Body Dressing) see SC note below for edu and VCs   [place] Level (Lower Body Dressing) minimum assist (75% patient effort)   Clinical Impression   Criteria for Skilled Therapeutic Interventions Met (OT) Yes, treatment indicated   OT Diagnosis impaired ADL/IADL INd   OT Problem List-Impairments impacting ADL problems related to;strength;post-surgical precautions   Assessment of Occupational Performance 1-3 Performance Deficits   Identified Performance Deficits dressing, bathing, functional mobility, home mgmt   Planned Therapy Interventions (OT) ADL retraining;home program guidelines;progressive activity/exercise;risk factor education   Clinical Decision Making Complexity (OT) low complexity   Risk & Benefits of therapy have been explained evaluation/treatment results reviewed;care plan/treatment goals reviewed;risks/benefits reviewed;current/potential barriers reviewed;participants voiced agreement with care plan;participants included;patient   OT Total Evaluation Time   OT Eval, Low Complexity Minutes (62315) 9   Therapy Certification   Medical Diagnosis R TKA   Start of Care Date 10/03/23   Certification date from 10/03/23   Certification date to 10/06/23   OT Goals   Therapy Frequency (OT) One time eval and treatment   OT Predicted Duration/Target Date for Goal Attainment 10/03/23   OT Goals Hygiene/Grooming;Lower Body Dressing;Toilet Transfer/Toileting   OT: Hygiene/Grooming supervision/stand-by assist   OT: Lower Body Dressing Modified  independent   OT: Toilet Transfer/Toileting Modified independent;toilet transfer;cleaning and garment management   Interventions   Interventions Quick Adds Self-Care/Home Management   Self-Care/Home Management   Self-Care/Home Mgmt/ADL, Compensatory, Meal Prep Minutes (11636) 11   Treatment Detail/Skilled Intervention OT: SBA bed mobility, OOB to R side, CGA STS to FWW, pt set up for TB dsg, able to perform w/o cues but educated in technique for dressing surgical LE first, pt receptive to cues, able to don underwear, pants, slippers and doff socks w/ set up A. IND w/ UB dsg; pt educated in car trasnfer in addition to shower chair transfer in tub shower, therapist avel padilla in use of plastic bag to reduce friction on seat. Completes standing g/h SBA, toilet tr, Mod I using FWW and grab bar pt states she has a sink top for support for toilet tr upon return to home, all needs met, pt sitting up in chair at end of session, chair alarm on. provided ICE for RLE.   OT Discharge Planning   OT Plan discharge, all IP OT goals met   OT Discharge Recommendation (DC Rec)   (defer to ortho)   OT Rationale for DC Rec Pt functioning below baseline s/p TKA, has shower chair and caregiver support upon discharge from spouse and daughter. Owns FWW. All IP OT goals met at this time, defer further recommendations to ortho team.   Total Session Time   Timed Code Treatment Minutes 11   Total Session Time (sum of timed and untimed services) 20        [facility]  OUTPATIENT OCCUPATIONAL THERAPY  EVALUATION  PLAN OF TREATMENT FOR OUTPATIENT REHABILITATION  (COMPLETE FOR INITIAL CLAIMS ONLY)  Patient's Last Name, First Name, M.I.  YOB: 1955  Georgie Patino                          Provider's Name  [facility] Medical Record No.  [id number]                             Onset Date:  10/02/23   Start of Care Date:  10/03/23   Type:     ___PT   _X_OT   ___SLP Medical  Diagnosis:  (P) R TKA                    OT Diagnosis:  impaired ADL/IADL INd Visits from SOC:  1     See note for plan of treatment, functional goals and certification details    I CERTIFY THE NEED FOR THESE SERVICES FURNISHED UNDER        THIS PLAN OF TREATMENT AND WHILE UNDER MY CARE     (Physician co-signature of this document indicates review and certification of the therapy plan).

## 2023-10-03 NOTE — PROGRESS NOTES
"  ORTHO DAILY PROGRESS NOTE    POD # 1    Georgie Patino is a 68 year old female  s/p Procedure(s):  RIGHT TOTAL KNEE ARTHROPLASTY -  on 10/2/2023    Interim: pain increasing today.    Nausea: none. Getting Zofran    OBJECTIVE:  BP (!) 143/67 (BP Location: Right arm)   Pulse 65   Temp 98.4  F (36.9  C) (Oral)   Resp 18   Ht 1.676 m (5' 6\")   Wt 91.6 kg (202 lb)   SpO2 92%   BMI 32.60 kg/m      Temp (24hrs), Av.1  F (36.7  C), Min:97.6  F (36.4  C), Max:98.9  F (37.2  C)      General: in no apparent distress,  Awake, alert    Incision is covered. Swelling moderate  CMSI   Toes warm and well perfused  Calves soft and nontender to squeeze.      Recent Labs   Lab Test 10/03/23  0835 23  1118 23  0921 10/06/21  0822   HGB 12.7 15.2 14.9 14.7   PLT  --  166 137* 147*     Recent Labs   Lab Test 10/02/23  1257 10/02/23  0546 23  1118 23  0921   POTASSIUM 4.3 4.1 4.9 4.0   BUN 31.7*  --  26.4* 27   CR 0.61  --  0.65 0.60         PT:  continue           ASSESSMENT:  status post total knee arthroplasty  doing well.    PLAN:  Continue PHYSICAL THERAPY  Discussed focal osteoporosis that we found  Discharge planned: today.    JUDIE Oro MD  Orthopedic Surgery   [facility]  P: [phone]   "

## 2023-10-05 ENCOUNTER — THERAPY VISIT (OUTPATIENT)
Dept: PHYSICAL THERAPY | Facility: CLINIC | Age: 68
End: 2023-10-05
Attending: ORTHOPAEDIC SURGERY
Payer: COMMERCIAL

## 2023-10-05 DIAGNOSIS — M17.11 PRIMARY OSTEOARTHRITIS OF RIGHT KNEE: ICD-10-CM

## 2023-10-05 DIAGNOSIS — M21.061 ACQUIRED GENU VALGUM OF RIGHT KNEE: ICD-10-CM

## 2023-10-05 DIAGNOSIS — M25.561 CHRONIC PAIN OF RIGHT KNEE: Primary | ICD-10-CM

## 2023-10-05 DIAGNOSIS — G89.29 CHRONIC PAIN OF RIGHT KNEE: Primary | ICD-10-CM

## 2023-10-05 DIAGNOSIS — Z96.651 STATUS POST TOTAL RIGHT KNEE REPLACEMENT: ICD-10-CM

## 2023-10-05 PROCEDURE — 97161 PT EVAL LOW COMPLEX 20 MIN: CPT | Mod: GP | Performed by: PHYSICAL THERAPIST

## 2023-10-05 PROCEDURE — 97110 THERAPEUTIC EXERCISES: CPT | Mod: GP | Performed by: PHYSICAL THERAPIST

## 2023-10-05 ASSESSMENT — ACTIVITIES OF DAILY LIVING (ADL)
AS_A_RESULT_OF_YOUR_KNEE_INJURY,_HOW_WOULD_YOU_RATE_YOUR_CURRENT_LEVEL_OF_DAILY_ACTIVITY?: ABNORMAL
SIT WITH YOUR KNEE BENT: ACTIVITY IS NOT DIFFICULT
GO DOWN STAIRS: ACTIVITY IS VERY DIFFICULT
WEAKNESS: THE SYMPTOM AFFECTS MY ACTIVITY SEVERELY
SWELLING: THE SYMPTOM AFFECTS MY ACTIVITY SEVERELY
RISE FROM A CHAIR: ACTIVITY IS NOT DIFFICULT
LIMPING: THE SYMPTOM AFFECTS MY ACTIVITY SEVERELY
KNEEL ON THE FRONT OF YOUR KNEE: ACTIVITY IS VERY DIFFICULT
KNEE_ACTIVITY_OF_DAILY_LIVING_SUM: 29
KNEE_ACTIVITY_OF_DAILY_LIVING_SCORE: 41.43
STIFFNESS: THE SYMPTOM AFFECTS MY ACTIVITY SEVERELY
GO UP STAIRS: ACTIVITY IS VERY DIFFICULT
STAND: ACTIVITY IS NOT DIFFICULT
HOW_WOULD_YOU_RATE_THE_OVERALL_FUNCTION_OF_YOUR_KNEE_DURING_YOUR_USUAL_DAILY_ACTIVITIES?: ABNORMAL
RAW_SCORE: 29
WALK: ACTIVITY IS SOMEWHAT DIFFICULT
GIVING WAY, BUCKLING OR SHIFTING OF KNEE: THE SYMPTOM AFFECTS MY ACTIVITY MODERATELY
SQUAT: ACTIVITY IS VERY DIFFICULT
PAIN: THE SYMPTOM AFFECTS MY ACTIVITY SEVERELY

## 2023-10-05 NOTE — PROGRESS NOTES
PHYSICAL THERAPY EVALUATION  Type of Visit: Evaluation    See electronic medical record for Abuse and Falls Screening details.    Subjective Surgery on 10/2/2023, TKA. Has stairs to get into the house with a railing, has been going well. Flight of stairs to basement, but doesn't need to go down there right now. Has been elevating leg in recliner. Has been doing HEP at home and walking with walker.      Presenting condition or subjective complaint: Post knee surgery  Date of onset: 10/02/23 (Date of surgery)    Relevant medical history: Arthritis; High blood pressure; Smoking   Dates & types of surgery: Knee replacement surgery    Prior diagnostic imaging/testing results: X-ray     Prior therapy history for the same diagnosis, illness or injury: No      Prior Level of Function  Transfers: Independent  Ambulation: Independent  ADL: Independent  IADL: Driving, Housekeeping, Laundry, Meal preparation, Yard work    Living Environment  Social support: With a significant other or spouse   Type of home: 2-story   Stairs to enter the home: Yes 3 Is there a railing: Yes   Ramp: No   Stairs inside the home: Yes 10 Is there a railing: Yes   Help at home:    Equipment owned: Walker with wheels     Employment: No Gardening, Reading  Hobbies/Interests:      Patient goals for therapy:      Pain assessment:      Objective   KNEE EVALUATION  PAIN: Pain Level at Rest: 4/10  Pain Level with Use: 7/10  INTEGUMENTARY (edema, incisions):  Significant ecchymosis of knee, posterior thigh and lower leg. Moderate swelling of knee, lower leg, and foot  POSTURE:   GAIT:  Weightbearing Status: WBAT  Assistive Device(s): Walker (front wheeled)  Gait Deviations: Antalgic  Stride length decreased  Prabha decreased  BALANCE/PROPRIOCEPTION:   WEIGHTBEARING ALIGNMENT:   NON-WEIGHTBEARING ALIGNMENT:   ROM:   (Degrees) Left AROM Left PROM  Right AROM Right PROM   Knee Flexion  116  109   Knee Extension -8  -16      STRENGTH:   Pain: - none + mild ++  moderate +++ severe  Strength Scale: 0-5/5 Left Right   Knee Flexion 5 <2/5 due to ROM deficits   Knee Extension 5 <2/5 due to ROM deficits   Quad Set       FLEXIBILITY:   SPECIAL TESTS:   FUNCTIONAL TESTS:   PALPATION:   JOINT MOBILITY:     Assessment & Plan   CLINICAL IMPRESSIONS  Medical Diagnosis: Primary osteoarthritis of right knee / Acquired genu valgum of right knee    Treatment Diagnosis: Chronic right knee pain (Right TKA)   Impression/Assessment: Patient is a 68 year old female with Right knee pain complaints.  The following significant findings have been identified: Pain, Decreased ROM/flexibility, Decreased joint mobility, Decreased strength, Impaired balance, and Decreased activity tolerance. These impairments interfere with their ability to perform self care tasks, recreational activities, household chores, driving , household mobility, and community mobility as compared to previous level of function.     Clinical Decision Making (Complexity):  Clinical Presentation: Stable/Uncomplicated  Clinical Presentation Rationale: based on medical and personal factors listed in PT evaluation  Clinical Decision Making (Complexity): Low complexity    PLAN OF CARE  Treatment Interventions:  Interventions: Gait Training, Manual Therapy, Neuromuscular Re-education, Therapeutic Activity, Therapeutic Exercise, Self-Care/Home Management    Long Term Goals     PT Goal 1  Goal Identifier: Ambulation  Goal Description: Pt will be able to ambulate for 20 minutes in order to run errands without having to stop due to pain.  Target Date: 11/23/23  PT Goal 2  Goal Identifier: Stairs  Goal Description: Pt will be able to ascend and descend 1 flight of stairs with a reciprical gait pattern and a handrail assist safely in order to do laundry at home.  Target Date: 11/23/23      Frequency of Treatment: 2 times per week for 3 weeks then 1 time per week for 4 weeks  Duration of Treatment: 7 weeks    Recommended Referrals to Other  Professionals:   Education Assessment:   Learner/Method: Patient;No Barriers to Learning    Risks and benefits of evaluation/treatment have been explained.   Patient/Family/caregiver agrees with Plan of Care.     Evaluation Time:     PT Eval, Low Complexity Minutes (86573): 15       Signing Clinician: JEAN-PIERRE Stallings [facility]                                                                                   OUTPATIENT PHYSICAL THERAPY      PLAN OF TREATMENT FOR OUTPATIENT REHABILITATION   Patient's Last Name, First Name, Georgie Browning YOB: 1955   Provider's Name   [facility]   Medical Record No.  [id number]     Onset Date: 10/02/23 (Date of surgery)  Start of Care Date: 10/05/23     Medical Diagnosis:  Primary osteoarthritis of right knee / Acquired genu valgum of right knee      PT Treatment Diagnosis:  Chronic right knee pain (Right TKA) Plan of Treatment  Frequency/Duration: 2 times per week for 3 weeks then 1 time per week for 4 weeks/ 7 weeks    Certification date from 10/05/23 to 11/23/23         See note for plan of treatment details and functional goals     Russell Lopez PT                         I CERTIFY THE NEED FOR THESE SERVICES FURNISHED UNDER        THIS PLAN OF TREATMENT AND WHILE UNDER MY CARE     (Physician attestation of this document indicates review and certification of the therapy plan).                Referring Provider:  Ross Oro      Initial Assessment  See Epic Evaluation- Start of Care Date: 10/05/23

## 2023-10-09 ENCOUNTER — TELEPHONE (OUTPATIENT)
Dept: ORTHOPEDICS | Facility: CLINIC | Age: 68
End: 2023-10-09
Payer: COMMERCIAL

## 2023-10-09 DIAGNOSIS — Z96.659 HISTORY OF TOTAL KNEE REPLACEMENT, UNSPECIFIED LATERALITY: Primary | ICD-10-CM

## 2023-10-09 RX ORDER — OXYCODONE HYDROCHLORIDE 5 MG/1
5 TABLET ORAL EVERY 6 HOURS PRN
Qty: 20 TABLET | Refills: 0 | Status: SHIPPED | OUTPATIENT
Start: 2023-10-09 | End: 2023-10-24

## 2023-10-09 NOTE — TELEPHONE ENCOUNTER
[facility] Call Center    Phone Message    May a detailed message be left on voicemail: yes     Reason for Call: Other: Julianna Monzon is calling to see why she is able to pick medication not until 10/13. Please call her to explain why.     Action Taken: Other: johnathan    Travel Screening: Not Applicable

## 2023-10-09 NOTE — TELEPHONE ENCOUNTER
[facility] Call Center    Phone Message    May a detailed message be left on voicemail: yes     Reason for Call: Other: patient calling asking for a medication refill.  oxyCODONE (ROXICODONE) 5 MG tablet [facility] [address] [place] [address] [phone]    Action Taken: Other: message sent to team     Travel Screening: Not Applicable

## 2023-10-10 NOTE — TELEPHONE ENCOUNTER
Called patient and she stated she went to the pharmacy and they told her that someone filled the prescription somewhere else according to the state. Will check with Dr. Oro and see if he can see any of this reporting.     Nely ASHTON RN, [facility] 10/10/23 11:47 AM

## 2023-10-11 ENCOUNTER — THERAPY VISIT (OUTPATIENT)
Dept: PHYSICAL THERAPY | Facility: CLINIC | Age: 68
End: 2023-10-11
Payer: COMMERCIAL

## 2023-10-11 DIAGNOSIS — G89.29 CHRONIC PAIN OF RIGHT KNEE: Primary | ICD-10-CM

## 2023-10-11 DIAGNOSIS — M25.561 CHRONIC PAIN OF RIGHT KNEE: Primary | ICD-10-CM

## 2023-10-11 PROCEDURE — 97110 THERAPEUTIC EXERCISES: CPT | Mod: GP | Performed by: PHYSICAL THERAPIST

## 2023-10-12 NOTE — PROGRESS NOTES
SUBJECTIVE:  Georgie Patino is here in follow up of right total knee arthroplasty on 10/2/23. It has been approximately 2 weeks since the procedure. Correction of significant valgus and surprise medial tibial bone defect from simple Z-retractor levering.     She had a lot of bruising, which is nearly resolved  Has been doing great in physical therapy  Mild toe numbness/tingling     Review of Systems:  Constitutional/General: Negative for fever, chills, change in weight  Integumentary/Skin: Negative for worrisome rashes, moles, or lesions  Neuro: Negative for weakness, dizziness, or paresthesias   Psychiatric: negative for changes in mood or affect    OBJECTIVE:  Physical Exam:  /75 (BP Location: Left arm, Patient Position: Sitting, Cuff Size: Adult Large)   Pulse 69   SpO2 97%   General Appearance: healthy, alert and no distress   Skin: no suspicious lesions or rashes  Neuro: Normal strength and tone, mentation intact and speech normal  Vascular: good pulses, and capillary refill   Lymph: no lymphadenopathy   Psych:  mentation appears normal and affect normal/bright  Resp: no increased work of breathing    Right Knee Exam:  Inspection: Wound looks good. No evidence of infection.   ROM: 0-110 degrees  Good stability.  Toes bruised.  Normal sensation foot and toes. Normal motor function    ASSESSMENT:  Doing very well status post right TKA    PLAN:  Continue physical therapy. Start scar massage.   ASA x 2 more weeks.  Return to clinic: 4 weeks, new x-rays at that time  Should consider work-up for osteoporosis.   We went over the postoperative xrays .    JUDIE Oro MD  Dept. Orthopedic Surgery  [facility]

## 2023-10-17 ENCOUNTER — OFFICE VISIT (OUTPATIENT)
Dept: ORTHOPEDICS | Facility: CLINIC | Age: 68
End: 2023-10-17
Payer: COMMERCIAL

## 2023-10-17 VITALS — SYSTOLIC BLOOD PRESSURE: 139 MMHG | DIASTOLIC BLOOD PRESSURE: 75 MMHG | OXYGEN SATURATION: 97 % | HEART RATE: 69 BPM

## 2023-10-17 DIAGNOSIS — M79.89 SWELLING OF CALF: ICD-10-CM

## 2023-10-17 DIAGNOSIS — Z96.651 STATUS POST TOTAL RIGHT KNEE REPLACEMENT: ICD-10-CM

## 2023-10-17 DIAGNOSIS — G89.29 CHRONIC PAIN OF RIGHT KNEE: Primary | ICD-10-CM

## 2023-10-17 DIAGNOSIS — M25.561 CHRONIC PAIN OF RIGHT KNEE: Primary | ICD-10-CM

## 2023-10-17 PROCEDURE — 99024 POSTOP FOLLOW-UP VISIT: CPT | Performed by: ORTHOPAEDIC SURGERY

## 2023-10-17 ASSESSMENT — PAIN SCALES - GENERAL: PAINLEVEL: MODERATE PAIN (5)

## 2023-10-17 NOTE — LETTER
10/17/2023         RE: Georgie Patino  [address] [facility] [facility] [id number]        Dear Colleague,    Thank you for referring your patient, Georgie Patino, to the [facility]. Please see a copy of my visit note below.    SUBJECTIVE:  Georgie Patino is here in follow up of right total knee arthroplasty on 10/2/23. It has been approximately 2 weeks since the procedure. Correction of significant valgus and surprise medial tibial bone defect from simple Z-retractor levering.     She had a lot of bruising, which is nearly resolved  Has been doing great in physical therapy  Mild toe numbness/tingling     Review of Systems:  Constitutional/General: Negative for fever, chills, change in weight  Integumentary/Skin: Negative for worrisome rashes, moles, or lesions  Neuro: Negative for weakness, dizziness, or paresthesias   Psychiatric: negative for changes in mood or affect    OBJECTIVE:  Physical Exam:  /75 (BP Location: Left arm, Patient Position: Sitting, Cuff Size: Adult Large)   Pulse 69   SpO2 97%   General Appearance: healthy, alert and no distress   Skin: no suspicious lesions or rashes  Neuro: Normal strength and tone, mentation intact and speech normal  Vascular: good pulses, and capillary refill   Lymph: no lymphadenopathy   Psych:  mentation appears normal and affect normal/bright  Resp: no increased work of breathing    Right Knee Exam:  Inspection: Wound looks good. No evidence of infection.   ROM: 0-110 degrees  Good stability.  Toes bruised.  Normal sensation foot and toes. Normal motor function    ASSESSMENT:  Doing very well status post right TKA    PLAN:  Continue physical therapy. Start scar massage.   ASA x 2 more weeks.  Return to clinic: 4 weeks, new x-rays at that time  Should consider work-up for osteoporosis.   We went over the postoperative xrays .    JUDIE Oro MD  Dept. Orthopedic Surgery  [facility]        Again, thank you for allowing  me to participate in the care of your patient.        Sincerely,        Ross Oro MD

## 2023-10-18 ENCOUNTER — THERAPY VISIT (OUTPATIENT)
Dept: PHYSICAL THERAPY | Facility: CLINIC | Age: 68
End: 2023-10-18
Payer: COMMERCIAL

## 2023-10-18 DIAGNOSIS — G89.29 CHRONIC PAIN OF RIGHT KNEE: Primary | ICD-10-CM

## 2023-10-18 DIAGNOSIS — M25.561 CHRONIC PAIN OF RIGHT KNEE: Primary | ICD-10-CM

## 2023-10-18 PROCEDURE — 97110 THERAPEUTIC EXERCISES: CPT | Mod: GP | Performed by: PHYSICAL THERAPIST

## 2023-10-24 ENCOUNTER — THERAPY VISIT (OUTPATIENT)
Dept: PHYSICAL THERAPY | Facility: CLINIC | Age: 68
End: 2023-10-24
Payer: COMMERCIAL

## 2023-10-24 ENCOUNTER — OFFICE VISIT (OUTPATIENT)
Dept: FAMILY MEDICINE | Facility: CLINIC | Age: 68
End: 2023-10-24
Payer: COMMERCIAL

## 2023-10-24 VITALS
HEIGHT: 66 IN | RESPIRATION RATE: 19 BRPM | TEMPERATURE: 98.4 F | HEART RATE: 62 BPM | OXYGEN SATURATION: 96 % | WEIGHT: 200.8 LBS | DIASTOLIC BLOOD PRESSURE: 91 MMHG | SYSTOLIC BLOOD PRESSURE: 151 MMHG | BODY MASS INDEX: 32.27 KG/M2

## 2023-10-24 DIAGNOSIS — R30.0 DYSURIA: ICD-10-CM

## 2023-10-24 DIAGNOSIS — E66.01 MORBID OBESITY (H): ICD-10-CM

## 2023-10-24 DIAGNOSIS — G89.29 CHRONIC PAIN OF RIGHT KNEE: Primary | ICD-10-CM

## 2023-10-24 DIAGNOSIS — I10 BENIGN ESSENTIAL HYPERTENSION: ICD-10-CM

## 2023-10-24 DIAGNOSIS — Z12.11 SCREEN FOR COLON CANCER: Primary | ICD-10-CM

## 2023-10-24 DIAGNOSIS — R00.2 PALPITATIONS: ICD-10-CM

## 2023-10-24 DIAGNOSIS — Z78.0 ASYMPTOMATIC MENOPAUSAL STATE: ICD-10-CM

## 2023-10-24 DIAGNOSIS — R73.09 ELEVATED GLUCOSE: ICD-10-CM

## 2023-10-24 DIAGNOSIS — Z00.00 ENCOUNTER FOR MEDICARE ANNUAL WELLNESS EXAM: ICD-10-CM

## 2023-10-24 DIAGNOSIS — M25.561 CHRONIC PAIN OF RIGHT KNEE: Primary | ICD-10-CM

## 2023-10-24 DIAGNOSIS — R31.29 MICROHEMATURIA: ICD-10-CM

## 2023-10-24 DIAGNOSIS — Z98.890 S/P ASCENDING AORTIC ANEURYSM REPAIR: ICD-10-CM

## 2023-10-24 DIAGNOSIS — Z12.31 VISIT FOR SCREENING MAMMOGRAM: ICD-10-CM

## 2023-10-24 DIAGNOSIS — Z86.79 S/P ASCENDING AORTIC ANEURYSM REPAIR: ICD-10-CM

## 2023-10-24 LAB
ALBUMIN UR-MCNC: ABNORMAL MG/DL
APPEARANCE UR: ABNORMAL
BACTERIA #/AREA URNS HPF: ABNORMAL /HPF
BASOPHILS # BLD AUTO: 0.1 10E3/UL (ref 0–0.2)
BASOPHILS NFR BLD AUTO: 1 %
BILIRUB UR QL STRIP: NEGATIVE
CHOLEST SERPL-MCNC: 198 MG/DL
COLOR UR AUTO: YELLOW
EOSINOPHIL # BLD AUTO: 0.1 10E3/UL (ref 0–0.7)
EOSINOPHIL NFR BLD AUTO: 1 %
ERYTHROCYTE [DISTWIDTH] IN BLOOD BY AUTOMATED COUNT: 14.3 % (ref 10–15)
FERRITIN SERPL-MCNC: 141 NG/ML (ref 11–328)
GLUCOSE UR STRIP-MCNC: NEGATIVE MG/DL
HBA1C MFR BLD: 5.1 % (ref 0–5.6)
HCT VFR BLD AUTO: 44.9 % (ref 35–47)
HDLC SERPL-MCNC: 49 MG/DL
HGB BLD-MCNC: 14 G/DL (ref 11.7–15.7)
HGB UR QL STRIP: ABNORMAL
IMM GRANULOCYTES # BLD: 0 10E3/UL
IMM GRANULOCYTES NFR BLD: 0 %
KETONES UR STRIP-MCNC: NEGATIVE MG/DL
LDLC SERPL CALC-MCNC: 125 MG/DL
LEUKOCYTE ESTERASE UR QL STRIP: ABNORMAL
LYMPHOCYTES # BLD AUTO: 2 10E3/UL (ref 0.8–5.3)
LYMPHOCYTES NFR BLD AUTO: 33 %
MAGNESIUM SERPL-MCNC: 2 MG/DL (ref 1.7–2.3)
MCH RBC QN AUTO: 28.4 PG (ref 26.5–33)
MCHC RBC AUTO-ENTMCNC: 31.2 G/DL (ref 31.5–36.5)
MCV RBC AUTO: 91 FL (ref 78–100)
MONOCYTES # BLD AUTO: 0.4 10E3/UL (ref 0–1.3)
MONOCYTES NFR BLD AUTO: 7 %
NEUTROPHILS # BLD AUTO: 3.5 10E3/UL (ref 1.6–8.3)
NEUTROPHILS NFR BLD AUTO: 58 %
NITRATE UR QL: NEGATIVE
NONHDLC SERPL-MCNC: 149 MG/DL
PH UR STRIP: 6 [PH] (ref 5–7)
PLATELET # BLD AUTO: 239 10E3/UL (ref 150–450)
RBC # BLD AUTO: 4.93 10E6/UL (ref 3.8–5.2)
RBC #/AREA URNS AUTO: ABNORMAL /HPF
SP GR UR STRIP: 1.01 (ref 1–1.03)
SQUAMOUS #/AREA URNS AUTO: ABNORMAL /LPF
TRIGL SERPL-MCNC: 120 MG/DL
UROBILINOGEN UR STRIP-ACNC: 0.2 E.U./DL
WBC # BLD AUTO: 6.1 10E3/UL (ref 4–11)
WBC #/AREA URNS AUTO: ABNORMAL /HPF

## 2023-10-24 PROCEDURE — 83735 ASSAY OF MAGNESIUM: CPT | Performed by: PHYSICIAN ASSISTANT

## 2023-10-24 PROCEDURE — 99397 PER PM REEVAL EST PAT 65+ YR: CPT | Mod: 24 | Performed by: PHYSICIAN ASSISTANT

## 2023-10-24 PROCEDURE — 80061 LIPID PANEL: CPT | Performed by: PHYSICIAN ASSISTANT

## 2023-10-24 PROCEDURE — 99213 OFFICE O/P EST LOW 20 MIN: CPT | Mod: 24 | Performed by: PHYSICIAN ASSISTANT

## 2023-10-24 PROCEDURE — 83036 HEMOGLOBIN GLYCOSYLATED A1C: CPT | Performed by: PHYSICIAN ASSISTANT

## 2023-10-24 PROCEDURE — 36415 COLL VENOUS BLD VENIPUNCTURE: CPT | Performed by: PHYSICIAN ASSISTANT

## 2023-10-24 PROCEDURE — 85025 COMPLETE CBC W/AUTO DIFF WBC: CPT | Performed by: PHYSICIAN ASSISTANT

## 2023-10-24 PROCEDURE — 97110 THERAPEUTIC EXERCISES: CPT | Mod: GP | Performed by: PHYSICAL THERAPIST

## 2023-10-24 PROCEDURE — 87086 URINE CULTURE/COLONY COUNT: CPT | Performed by: PHYSICIAN ASSISTANT

## 2023-10-24 PROCEDURE — 81001 URINALYSIS AUTO W/SCOPE: CPT | Performed by: PHYSICIAN ASSISTANT

## 2023-10-24 PROCEDURE — G0009 ADMIN PNEUMOCOCCAL VACCINE: HCPCS | Performed by: PHYSICIAN ASSISTANT

## 2023-10-24 PROCEDURE — 82728 ASSAY OF FERRITIN: CPT | Mod: GZ | Performed by: PHYSICIAN ASSISTANT

## 2023-10-24 PROCEDURE — 90677 PCV20 VACCINE IM: CPT | Performed by: PHYSICIAN ASSISTANT

## 2023-10-24 RX ORDER — RESPIRATORY SYNCYTIAL VIRUS VACCINE 120MCG/0.5
0.5 KIT INTRAMUSCULAR ONCE
Qty: 1 EACH | Refills: 0 | Status: CANCELLED | OUTPATIENT
Start: 2023-10-24 | End: 2023-10-24

## 2023-10-24 RX ORDER — AMLODIPINE BESYLATE 5 MG/1
5 TABLET ORAL DAILY
Qty: 90 TABLET | Refills: 3 | Status: SHIPPED | OUTPATIENT
Start: 2023-10-24

## 2023-10-24 RX ORDER — LISINOPRIL 40 MG/1
40 TABLET ORAL DAILY
Qty: 90 TABLET | Refills: 0 | Status: SHIPPED | OUTPATIENT
Start: 2023-10-24 | End: 2024-01-22

## 2023-10-24 RX ORDER — SULFAMETHOXAZOLE/TRIMETHOPRIM 800-160 MG
1 TABLET ORAL 2 TIMES DAILY
Qty: 6 TABLET | Refills: 0 | Status: SHIPPED | OUTPATIENT
Start: 2023-10-24 | End: 2023-10-27

## 2023-10-24 RX ORDER — METOPROLOL TARTRATE 50 MG
50 TABLET ORAL 2 TIMES DAILY
Qty: 180 TABLET | Refills: 3 | Status: SHIPPED | OUTPATIENT
Start: 2023-10-24

## 2023-10-24 ASSESSMENT — ENCOUNTER SYMPTOMS
DIARRHEA: 0
HEMATOCHEZIA: 0
DIZZINESS: 0
HEADACHES: 0
FEVER: 0
CHILLS: 0
WEAKNESS: 1
PARESTHESIAS: 0
FREQUENCY: 1
EYE PAIN: 0
COUGH: 0
MYALGIAS: 1
DYSURIA: 1
NAUSEA: 1
ABDOMINAL PAIN: 0
SORE THROAT: 0
SHORTNESS OF BREATH: 0
ARTHRALGIAS: 1
HEMATURIA: 0
CONSTIPATION: 0
BREAST MASS: 0
JOINT SWELLING: 1
PALPITATIONS: 0
NERVOUS/ANXIOUS: 1

## 2023-10-24 ASSESSMENT — ACTIVITIES OF DAILY LIVING (ADL)
CURRENT_FUNCTION: TRANSPORTATION REQUIRES ASSISTANCE
CURRENT_FUNCTION: LAUNDRY REQUIRES ASSISTANCE
CURRENT_FUNCTION: SHOPPING REQUIRES ASSISTANCE
CURRENT_FUNCTION: HOUSEWORK REQUIRES ASSISTANCE
CURRENT_FUNCTION: PREPARING MEALS REQUIRES ASSISTANCE

## 2023-10-24 ASSESSMENT — PATIENT HEALTH QUESTIONNAIRE - PHQ9
10. IF YOU CHECKED OFF ANY PROBLEMS, HOW DIFFICULT HAVE THESE PROBLEMS MADE IT FOR YOU TO DO YOUR WORK, TAKE CARE OF THINGS AT HOME, OR GET ALONG WITH OTHER PEOPLE: SOMEWHAT DIFFICULT
SUM OF ALL RESPONSES TO PHQ QUESTIONS 1-9: 5
SUM OF ALL RESPONSES TO PHQ QUESTIONS 1-9: 5

## 2023-10-24 NOTE — PROGRESS NOTES
The patient was provided with suggestions to help her develop a healthy physical lifestyle.  The patient was counseled and encouraged to consider modifying their diet and eating habits. She was provided with information on recommended healthy diet options.  The patient reports that she has difficulty with activities of daily living.  Information on urinary incontinence and treatment options given to patient.  The patient was provided with suggestions to help her develop a healthy emotional lifestyle.  The patient's PHQ-9 score is consistent with mild depression. S

## 2023-10-24 NOTE — PROGRESS NOTES
"SUBJECTIVE:   Julianna is a 68 year old who presents for Preventive Visit.      10/24/2023    10:22 AM   Additional Questions   Roomed by shan mack   UTI: Ever since the surgery, has noticed symptoms of urgency, frequency, pain in groin area, minor blood in the urine. Patient denies burning with urination.  Currently has kidney stones; lithotripsy was used to treat in the past.   Thinks it could be passing stones.   Pain at night and calf d/t recent left knee replacement. Patient states sleeping only 3-4 hours. Also mentioned restless leg. She can't sit still. The restless leg goes away within a couple of hours, but the pain is there all night.  Maintenance: DEXA, Colonoscopy referral, Mammogram, lipids, A1C  Pneumococcal    Ran out of blood pressure meds 2 days ago.     Are you in the first 12 months of your Medicare coverage?  No    Healthy Habits:     In general, how would you rate your overall health?  Fair    Frequency of exercise:  4-5 days/week    Duration of exercise:  15-30 minutes    Do you usually eat at least 4 servings of fruit and vegetables a day, include whole grains    & fiber and avoid regularly eating high fat or \"junk\" foods?  No    Taking medications regularly:  Yes    Medication side effects:  None    Ability to successfully perform activities of daily living:  Transportation requires assistance, shopping requires assistance, preparing meals requires assistance, housework requires assistance and laundry requires assistance    Home Safety:  No safety concerns identified    Hearing Impairment:  No hearing concerns    In the past 6 months, have you been bothered by leaking of urine? Yes    In general, how would you rate your overall mental or emotional health?  Fair    Additional concerns today:  No      Today's PHQ-9 Score:       10/24/2023    10:14 AM   PHQ-9 SCORE   PHQ-9 Total Score MyChart 5 (Mild depression)   PHQ-9 Total Score 5       Have you ever done Advance Care Planning? (For example, a " Health Directive, POLST, or a discussion with a medical provider or your loved ones about your wishes): Yes, advance care planning is on file.       Fall risk  Fallen 2 or more times in the past year?: No  Any fall with injury in the past year?: No    Cognitive Screening   1) Repeat 3 items (Leader, Season, Table)    2) Clock draw: NORMAL  3) 3 item recall: Recalls 2 objects   Results: NORMAL clock, 1-2 items recalled: COGNITIVE IMPAIRMENT LESS LIKELY    Mini-CogTM Copyright ALIYA Proctor. Licensed by the author for use in [facility]; reprinted with permission (hiro@[facility]). All rights reserved.      Do you have sleep apnea, excessive snoring or daytime drowsiness? : no    Reviewed and updated as needed this visit by clinical staff   Tobacco  Allergies  Meds              Reviewed and updated as needed this visit by Provider                 Social History     Tobacco Use    Smoking status: Every Day     Packs/day: 0.05     Years: 40.00     Additional pack years: 0.00     Total pack years: 2.00     Types: Cigarettes, Other    Smokeless tobacco: Never    Tobacco comments:     2 cigs daily   Substance Use Topics    Alcohol use: Yes     Alcohol/week: 0.8 - 1.7 standard drinks of alcohol     Types: 1 - 2 Standard drinks or equivalent per week     Comment: 3-4 drinks per week              10/24/2023    10:11 AM   Alcohol Use   Prescreen: >3 drinks/day or >7 drinks/week? No     Do you have a current opioid prescription? No  Do you use any other controlled substances or medications that are not prescribed by a provider? None          Current providers sharing in care for this patient include:   Patient Care Team:  Kori Em PA-C as PCP - General (Family Medicine)  Matilda Obando, ALICIA as Nurse Coordinator (Thoracic Surgery)  Jitendra Fernandez MD as MD (Advanced Heart Failure and Transplant Cardiology)  Marlys Wade APRN CNP (Inactive) as Referring Physician (Internal Medicine)  Tavo Saavedra MD  as Assigned Surgical Provider  Ross Oro MD as Assigned Musculoskeletal Provider  Kori Em PA-C as Assigned PCP    The following health maintenance items are reviewed in Epic and correct as of today:  Health Maintenance   Topic Date Due    DEXA  Never done    ZOSTER IMMUNIZATION (1 of 2) Never done    RSV VACCINE 60+ (1 - 1-dose 60+ series) Never done    NICOTINE/TOBACCO CESSATION COUNSELING Q 1 YR  08/15/2018    COLORECTAL CANCER SCREENING  03/15/2020    Pneumococcal Vaccine: 65+ Years (2 - PCV) 11/21/2020    DTAP/TDAP/TD IMMUNIZATION (2 - Td or Tdap) 05/30/2022    MEDICARE ANNUAL WELLNESS VISIT  07/03/2022    LIPID  10/06/2022    ANNUAL REVIEW OF HM ORDERS  11/01/2022    MAMMO SCREENING  03/22/2023    INFLUENZA VACCINE (1) 09/01/2023    COVID-19 Vaccine (4 - 2023-24 season) 09/01/2023    LUNG CANCER SCREENING  12/08/2023    BMP  10/02/2024    FALL RISK ASSESSMENT  10/24/2024    ADVANCE CARE PLANNING  11/21/2024    HEPATITIS C SCREENING  Completed    PHQ-2 (once per calendar year)  Completed    IPV IMMUNIZATION  Aged Out    HPV IMMUNIZATION  Aged Out    MENINGITIS IMMUNIZATION  Aged Out    PAP  Discontinued     Lab work is in process      FHS-7:       3/25/2021     9:32 AM 9/25/2023     8:42 AM 10/24/2023    10:14 AM   Breast CA Risk Assessment (FHS-7)   Did any of your first-degree relatives have breast or ovarian cancer? Yes  Yes   Did any of your relatives have bilateral breast cancer? Yes Unknown Yes   Did any man in your family have breast cancer? No No    Did any woman in your family have breast and ovarian cancer?  Yes    Did any woman in your family have breast cancer before age 50 y?  No    Do you have 2 or more relatives with breast and/or ovarian cancer?  No    Do you have 2 or more relatives with breast and/or bowel cancer?  No      click delete button to remove this line now  Mammogram Screening: Recommended mammography every 1-2 years with patient discussion and risk factor  "consideration  Pertinent mammograms are reviewed under the imaging tab.    Review of Systems   Constitutional:  Negative for chills and fever.   HENT:  Negative for congestion, ear pain, hearing loss and sore throat.    Eyes:  Negative for pain and visual disturbance.   Respiratory:  Negative for cough and shortness of breath.    Cardiovascular:  Positive for peripheral edema. Negative for chest pain and palpitations.   Gastrointestinal:  Positive for nausea. Negative for abdominal pain, constipation, diarrhea and hematochezia.   Breasts:  Negative for tenderness, breast mass and discharge.   Genitourinary:  Positive for dysuria, frequency and urgency. Negative for genital sores, hematuria, pelvic pain, vaginal bleeding and vaginal discharge.   Musculoskeletal:  Positive for arthralgias, joint swelling and myalgias.   Skin:  Negative for rash.   Neurological:  Positive for weakness. Negative for dizziness, headaches and paresthesias.   Psychiatric/Behavioral:  Positive for mood changes. The patient is nervous/anxious.          OBJECTIVE:   BP (!) 151/91 (BP Location: Left arm, Patient Position: Chair, Cuff Size: Adult Large)   Pulse 62   Temp 98.4  F (36.9  C) (Oral)   Resp 19   Ht 1.67 m (5' 5.75\")   Wt 91.1 kg (200 lb 12.8 oz)   SpO2 96%   BMI 32.66 kg/m   Estimated body mass index is 32.66 kg/m  as calculated from the following:    Height as of this encounter: 1.67 m (5' 5.75\").    Weight as of this encounter: 91.1 kg (200 lb 12.8 oz).    Physical Exam  GENERAL APPEARANCE: healthy, alert and no distress  EYES: Eyes grossly normal to inspection, PERRL and conjunctivae and sclerae normal  HENT: ear canals and TM's normal, nose and mouth without ulcers or lesions, oropharynx clear and oral mucous membranes moist  NECK: no adenopathy, no asymmetry, masses, or scars and thyroid normal to palpation  RESP: lungs clear to auscultation - no rales, rhonchi or wheezes  CV: regular rate and rhythm, normal S1 S2, no S3 " or S4, no murmur, click or rub, no peripheral edema  ABDOMEN: soft, nontender, no hepatosplenomegaly, no masses and bowel sounds normal  MS: no musculoskeletal defects are noted and gait is age appropriate without ataxia  SKIN: no suspicious lesions or rashes  NEURO: Normal strength and tone, sensory exam grossly normal, mentation intact and speech normal  PSYCH: mentation appears normal and affect normal/bright    Diagnostic Test Results:  Labs reviewed in Epic    ASSESSMENT / PLAN:   (Z12.11) Screen for colon cancer  (primary encounter diagnosis)  Plan: Colonoscopy Screening  Referral    (Z12.31) Visit for screening mammogram  Plan: MA SCREENING DIGITAL BILAT - Future  (s+30)    (I10) Benign essential hypertension  Did not take medications today, follow up for blood pressure check when on medications.   Plan: lisinopril (ZESTRIL) 40 MG tablet, Lipid panel         reflex to direct LDL Non-fasting, Magnesium    (Z00.00) Encounter for Medicare annual wellness exam    (Z98.890,  Z86.79) S/P ascending aortic aneurysm repair  Plan: CBC with Platelets & Differential, Ferritin,         amLODIPine (NORVASC) 5 MG tablet    (R00.2) Palpitations  Plan: metoprolol tartrate (LOPRESSOR) 50 MG tablet    (Z78.0) Asymptomatic menopausal state  Plan: DEXA HIP/PELVIS/SPINE - Future    (R30.0) Dysuria  Comment: Symptoms are suggestive of UTI.  Plan: UA Macroscopic with reflex to Microscopic and         Culture, Urine Microscopic Exam, Urine Culture,        sulfamethoxazole-trimethoprim (BACTRIM DS)         800-160 MG tablet    (R73.09) Elevated glucose  Plan: Hemoglobin A1c    (E66.01) Morbid obesity (H)  Weight loss would help improve HTN and risk for pre-diabetes      Given UTI sxs, will get a UA. Regarding the knee pain, patient will have to contact orthopedic provider to discuss pain management plan. For now, discussed using OTC analgesics and topical Voltaren as needed.    Will check Ferritin and Magnesium given restless  "leg symptoms.     Patient agreeable with the plan.     COUNSELING:  Reviewed preventive health counseling, as reflected in patient instructions      BMI:   Estimated body mass index is 32.66 kg/m  as calculated from the following:    Height as of this encounter: 1.67 m (5' 5.75\").    Weight as of this encounter: 91.1 kg (200 lb 12.8 oz).   Weight management plan: Discussed healthy diet and exercise guidelines      She reports that she has been smoking cigarettes and other. She has a 2.00 pack-year smoking history. She has never used smokeless tobacco.  Nicotine/Tobacco Cessation Plan:   Information offered: Patient not interested at this time      Appropriate preventive services were discussed with this patient, including applicable screening as appropriate for fall prevention, nutrition, physical activity, Tobacco-use cessation, weight loss and cognition.  Checklist reviewing preventive services available has been given to the patient.    Reviewed patients plan of care and provided an AVS. The Intermediate Care Plan ( asthma action plan, low back pain action plan, and migraine action plan) for Georgie meets the Care Plan requirement. This Care Plan has been established and reviewed with the Patient.          Kori Em PA-C  [facility]    Identified Health Risks:  I have reviewed Opioid Use Disorder and Substance Use Disorder risk factors and made any needed referrals.   Answers submitted by the patient for this visit:  Patient Health Questionnaire (Submitted on 10/24/2023)  If you checked off any problems, how difficult have these problems made it for you to do your work, take care of things at home, or get along with other people?: Somewhat difficult  PHQ9 TOTAL SCORE: 5  The student WASHINGTON [email or web address] acted as a scribe and the encounter documented above was completely performed by myself and the documentation reflects the work I have performed today.   Kori Em PA-C     "

## 2023-10-24 NOTE — RESULT ENCOUNTER NOTE
Julianna,     It does look like you have a urinary tract infection. I have sent an antibiotic to the pharmacy and will have you start this.   Kori Em PA-C

## 2023-10-24 NOTE — PATIENT INSTRUCTIONS
COVID, Shingles, RSV and Tdap (tetanus), Influenza.     Schedule colonoscopy  Schedule bone density and mammogram        Patient Education   Personalized Prevention Plan  You are due for the preventive services outlined below.  Your care team is available to assist you in scheduling these services.  If you have already completed any of these items, please share that information with your care team to update in your medical record.  Health Maintenance Due   Topic Date Due    Osteoporosis Screening  Never done    Zoster (Shingles) Vaccine (1 of 2) Never done    RSV VACCINE 60+ (1 - 1-dose 60+ series) Never done    Colorectal Cancer Screening  03/15/2020    Pneumococcal Vaccine (2 - PCV) 11/21/2020    Diptheria Tetanus Pertussis (DTAP/TDAP/TD) Vaccine (2 - Td or Tdap) 05/30/2022    Annual Wellness Visit  07/03/2022    Cholesterol Lab  10/06/2022    ANNUAL REVIEW OF HM ORDERS  11/01/2022    Mammogram  03/22/2023    Flu Vaccine (1) 09/01/2023    COVID-19 Vaccine (4 - 2023-24 season) 09/01/2023    LUNG CANCER SCREENING  12/08/2023     Your Health Risk Assessment indicates you feel you are not in good health    A healthy lifestyle helps keep the body fit and the mind alert. It helps protect you from disease, helps you fight disease, and helps prevent chronic disease (disease that doesn't go away) from getting worse. This is important as you get older and begin to notice twinges in muscles and joints and a decline in the strength and stamina you once took for granted. A healthy lifestyle includes good healthcare, good nutrition, weight control, recreation, and regular exercise. Avoid harmful substances and do what you can to keep safe. Another part of a healthy lifestyle is stay mentally active and socially involved.    Good healthcare   Have a wellness visit every year.   If you have new symptoms, let us know right away. Don't wait until the next checkup.   Take medicines exactly as prescribed and keep your medicines in a  safe place. Tell us if your medicine causes problems.   Healthy diet and weight control   Eat 3 or 4 small, nutritious, low-fat, high-fiber meals a day. Include a variety of fruits, vegetables, and whole-grain foods.   Make sure you get enough calcium in your diet. Calcium, vitamin D, and exercise help prevent osteoporosis (bone thinning).   If you live alone, try eating with others when you can. That way you get a good meal and have company while you eat it.   Try to keep a healthy weight. If you eat more calories than your body uses for energy, it will be stored as fat and you will gain weight.     Recreation   Recreation is not limited to sports and team events. It includes any activity that provides relaxation, interest, enjoyment, and exercise. Recreation provides an outlet for physical, mental, and social energy. It can give a sense of worth and achievement. It can help you stay healthy.    Mental Exercise and Social Involvement  Mental and emotional health is as important as physical health. Keep in touch with friends and family. Stay as active as possible. Continue to learn and challenge yourself.   Things you can do to stay mentally active are:  Learn something new, like a foreign language or musical instrument.   Play SCRABBLE or do crossword puzzles. If you cannot find people to play these games with you at home, you can play them with others on your computer through the Internet.   Join a games club--anything from card games to chess or checkers or lawn bowling.   Start a new hobby.   Go back to school.   Volunteer.   Read.   Keep up with world events.  Learning About Dietary Guidelines  What are the Dietary Guidelines for Americans?     Dietary Guidelines for Americans provide tips for eating well and staying healthy. This helps reduce the risk for long-term (chronic) diseases.  These guidelines recommend that you:  Eat and drink the right amount for you. The U.S[place] government's food guide is called  MyPlate. It can help you make your own well-balanced eating plan.  Try to balance your eating with your activity. This helps you stay at a healthy weight.  Drink alcohol in moderation, if at all.  Limit foods high in salt, saturated fat, trans fat, and added sugar.  These guidelines are from the U.S. Department of Agriculture and the U.S. Department of Health and Human Services. They are updated every 5 years.  What is MyPlate?  MyPlate is the U.S. government's food guide. It can help you make your own well-balanced eating plan. A balanced eating plan means that you eat enough, but not too much, and that your food gives you the nutrients you need to stay healthy.  MyPlate focuses on eating plenty of whole grains, fruits, and vegetables, and on limiting fat and sugar. It is available online at www.ChooseMyPlate.gov.  How can you get started?  If you're trying to eat healthier, you can slowly change your eating habits over time. You don't have to make big changes all at once. Start by adding one or two healthy foods to your meals each day.  Grains  Choose whole-grain breads and cereals and whole-wheat pasta and whole-grain crackers.  Vegetables  Eat a variety of vegetables every day. They have lots of nutrients and are part of a heart-healthy diet.  Fruits  Eat a variety of fruits every day. Fruits contain lots of nutrients. Choose fresh fruit instead of fruit juice.  Protein foods  Choose fish and lean poultry more often. Eat red meat and fried meats less often. Dried beans, tofu, and nuts are also good sources of protein.  Dairy  Choose low-fat or fat-free products from this food group. If you have problems digesting milk, try eating cheese or yogurt instead.  Fats and oils  Limit fats and oils if you're trying to cut calories. Choose healthy fats when you cook. These include canola oil and olive oil.  Where can you learn more?  Go to https://www.healthwise.net/patiented  Enter D676 in the search box to learn more  "about \"Learning About Dietary Guidelines.\"  Current as of: March 1, 2023               Content Version: 13.7    1023-3372 [employer].   Care instructions adapted under license by your healthcare professional. If you have questions about a medical condition or this instruction, always ask your healthcare professional. [employer] disclaims any warranty or liability for your use of this information.      Activities of Daily Living    Your Health Risk Assessment indicates you have difficulties with activities of daily living such as housework, bathing, preparing meals, taking medication, etc. Please make a follow up appointment for us to address this issue in more detail.  Bladder Training: Care Instructions  Your Care Instructions     Bladder training is used to treat urge incontinence and stress incontinence. Urge incontinence means that the need to urinate comes on so fast that you can't get to a toilet in time. Stress incontinence means that you leak urine because of pressure on your bladder. For example, it may happen when you laugh, cough, or lift something heavy.  Bladder training can increase how long you can wait before you have to urinate. It can also help your bladder hold more urine. And it can give you better control over the urge to urinate.  It is important to remember that bladder training takes a few weeks to a few months to make a difference. You may not see results right away, but don't give up.  Follow-up care is a key part of your treatment and safety. Be sure to make and go to all appointments, and call your doctor if you are having problems. It's also a good idea to know your test results and keep a list of the medicines you take.  How can you care for yourself at home?  Work with your doctor to come up with a bladder training program that is right for you. You may use one or more of the following methods.  Delayed urination  In the beginning, try to keep from urinating " "for 5 minutes after you first feel the need to go.  While you wait, take deep, slow breaths to relax. Kegel exercises can also help you delay the need to go to the bathroom.  After some practice, when you can easily wait 5 minutes to urinate, try to wait 10 minutes before you urinate.  Slowly increase the waiting period until you are able to control when you have to urinate.  Scheduled urination  Empty your bladder when you first wake up in the morning.  Schedule times throughout the day when you will urinate.  Start by going to the bathroom every hour, even if you don't need to go.  Slowly increase the time between trips to the bathroom.  When you have found a schedule that works well for you, keep doing it.  If you wake up during the night and have to urinate, do it. Apply your schedule to waking hours only.  Kegel exercises  These tighten and strengthen pelvic muscles, which can help you control the flow of urine. (If doing these exercises causes pain, stop doing them and talk with your doctor.) To do Kegel exercises:  Squeeze your muscles as if you were trying not to pass gas. Or squeeze your muscles as if you were stopping the flow of urine. Your belly, legs, and buttocks shouldn't move.  Hold the squeeze for 3 seconds, then relax for 5 to 10 seconds.  Start with 3 seconds, then add 1 second each week until you are able to squeeze for 10 seconds.  Repeat the exercise 10 times a session. Do 3 to 8 sessions a day.  When should you call for help?  Watch closely for changes in your health, and be sure to contact your doctor if:    Your incontinence is getting worse.     You do not get better as expected.   Where can you learn more?  Go to https://www.[employer].net/patiented  Enter V684 in the search box to learn more about \"Bladder Training: Care Instructions.\"  Current as of: March 1, 2023               Content Version: 13.7    9394-8771 [employer]   Care instructions adapted under license by your " healthcare professional. If you have questions about a medical condition or this instruction, always ask your healthcare professional. Healthwise, [facility] disclaims any warranty or liability for your use of this information.      Your Health Risk Assessment indicates you feel you are not in good emotional health.    Recreation   Recreation is not limited to sports and team events. It includes any activity that provides relaxation, interest, enjoyment, and exercise. Recreation provides an outlet for physical, mental, and social energy. It can give a sense of worth and achievement. It can help you stay healthy.    Mental Exercise and Social Involvement  Mental and emotional health is as important as physical health. Keep in touch with friends and family. Stay as active as possible. Continue to learn and challenge yourself.   Things you can do to stay mentally active are:  Learn something new, like a foreign language or musical instrument.   Play SCRABBLE or do crossword puzzles. If you cannot find people to play these games with you at home, you can play them with others on your computer through the Internet.   Join a games club--anything from card games to chess or checkers or lawn bowling.   Start a new hobby.   Go back to school.   Volunteer.   Read.   Keep up with world events.  Learning About Depression Screening  What is depression screening?  Depression screening is a way to see if you have depression symptoms. It may be done by a doctor or counselor. It's often part of a routine checkup. That's because your mental health is just as important as your physical health.  Depression is a mental health condition that affects how you feel, think, and act. You may:  Have less energy.  Lose interest in your daily activities.  Feel sad and grouchy for a long time.  Depression is very common. It affects people of all ages.  Many things can lead to depression. Some people become depressed after they have a stroke or  "find out they have a major illness like cancer or heart disease. The death of a loved one or a breakup may lead to depression. It can run in families. Most experts believe that a combination of inherited genes and stressful life events can cause it.  What happens during screening?  You may be asked to fill out a form about your depression symptoms. You and the doctor will discuss your answers. The doctor may ask you more questions to learn more about how you think, act, and feel.  What happens after screening?  If you have symptoms of depression, your doctor will talk to you about your options.  Doctors usually treat depression with medicines or counseling. Often, combining the two works best. Many people don't get help because they think that they'll get over the depression on their own. But people with depression may not get better unless they get treatment.  The cause of depression is not well understood. There may be many factors involved. But if you have depression, it's not your fault.  A serious symptom of depression is thinking about death or suicide. If you or someone you care about talks about this or about feeling hopeless, get help right away.  It's important to know that depression can be treated. Medicine, counseling, and self-care may help.  Where can you learn more?  Go to https://www.[employer].net/patiented  Enter T185 in the search box to learn more about \"Learning About Depression Screening.\"  Current as of: October 20, 2022               Content Version: 13.7    3831-0707 [employer].   Care instructions adapted under license by your healthcare professional. If you have questions about a medical condition or this instruction, always ask your healthcare professional. [employer] disclaims any warranty or liability for your use of this information.         "

## 2023-10-25 LAB — BACTERIA UR CULT: NO GROWTH

## 2023-10-26 NOTE — RESULT ENCOUNTER NOTE
Julianna,     Your urine did NOT show any bacterial growth. Since you were having symptoms I do want you to finish you medications but then we should recheck your urine in about 2-3 weeks. This can be with a lab only appointment.   We will also get a vaginal swab at that time (you collect yourself) to see if that was maybe a source of some of the white blood cells.   Kori Em PA-C

## 2023-10-30 ENCOUNTER — THERAPY VISIT (OUTPATIENT)
Dept: PHYSICAL THERAPY | Facility: CLINIC | Age: 68
End: 2023-10-30
Payer: COMMERCIAL

## 2023-10-30 DIAGNOSIS — M25.561 CHRONIC PAIN OF RIGHT KNEE: Primary | ICD-10-CM

## 2023-10-30 DIAGNOSIS — G89.29 CHRONIC PAIN OF RIGHT KNEE: Primary | ICD-10-CM

## 2023-10-30 PROCEDURE — 97110 THERAPEUTIC EXERCISES: CPT | Mod: GP | Performed by: PHYSICAL THERAPIST

## 2023-10-31 ENCOUNTER — TELEPHONE (OUTPATIENT)
Dept: FAMILY MEDICINE | Facility: CLINIC | Age: 68
End: 2023-10-31
Payer: COMMERCIAL

## 2023-10-31 NOTE — RESULT ENCOUNTER NOTE
Patient did not view on [employer]. Please ensure that she received the messages for this and her urine.   Kori Em PA-C

## 2023-10-31 NOTE — TELEPHONE ENCOUNTER
"Left message on answering machine for patient to call back to the nurse at [phone].    Please give result message:     \"Your labs show continued high cholesterol. I would recommend that we start a cholesterol medication. I would want you to start atorvastatin 20mg once per day and then we would recheck your cholesterol in about 3-6 months. Let me know if you are agreeable to this with a message back.  Kori Levine,     Your urine did NOT show any bacterial growth. Since you were having symptoms I do want you to finish you medications but then we should recheck your urine in about 2-3 weeks. This can be with a lab only appointment.  We will also get a vaginal swab at that time (you collect yourself) to see if that was maybe a source of some of the white blood cells.\"    Nely Glaser RN  [facility]    "

## 2023-11-03 NOTE — TELEPHONE ENCOUNTER
[employer] message sent to patient.     Thanks,  ALICIA Bergman  [facility]

## 2023-11-13 ENCOUNTER — LAB (OUTPATIENT)
Dept: LAB | Facility: CLINIC | Age: 68
End: 2023-11-13
Payer: COMMERCIAL

## 2023-11-13 DIAGNOSIS — R30.0 DYSURIA: ICD-10-CM

## 2023-11-13 LAB
ALBUMIN UR-MCNC: 30 MG/DL
APPEARANCE UR: ABNORMAL
BACTERIA #/AREA URNS HPF: ABNORMAL /HPF
BILIRUB UR QL STRIP: ABNORMAL
CLUE CELLS: ABNORMAL
COLOR UR AUTO: ABNORMAL
GLUCOSE UR STRIP-MCNC: NEGATIVE MG/DL
HGB UR QL STRIP: ABNORMAL
KETONES UR STRIP-MCNC: NEGATIVE MG/DL
LEUKOCYTE ESTERASE UR QL STRIP: ABNORMAL
NITRATE UR QL: POSITIVE
PH UR STRIP: 7 [PH] (ref 5–7)
RBC #/AREA URNS AUTO: >100 /HPF
SP GR UR STRIP: 1.01 (ref 1–1.03)
SQUAMOUS #/AREA URNS AUTO: ABNORMAL /LPF
TRICHOMONAS, WET PREP: ABNORMAL
UROBILINOGEN UR STRIP-ACNC: 0.2 E.U./DL
WBC #/AREA URNS AUTO: ABNORMAL /HPF
WBC'S/HIGH POWER FIELD, WET PREP: ABNORMAL
YEAST, WET PREP: ABNORMAL

## 2023-11-13 PROCEDURE — 81001 URINALYSIS AUTO W/SCOPE: CPT

## 2023-11-13 PROCEDURE — 87210 SMEAR WET MOUNT SALINE/INK: CPT

## 2023-11-13 PROCEDURE — 87086 URINE CULTURE/COLONY COUNT: CPT

## 2023-11-14 LAB — BACTERIA UR CULT: NORMAL

## 2023-11-14 NOTE — RESULT ENCOUNTER NOTE
Julianna again,     Your urine did not show any infection. However, with the large amount of blood and the changes in your symptoms we should consider a CT scan of your kidneys to check for the source of the blood.   I have placed the order and they should call to schedule.   Kori Em PA-C

## 2023-11-29 NOTE — PROGRESS NOTES
"SUBJECTIVE:  Georgie Patino is here in follow up of right total knee arthroplasty on 10/2/23. It has been approximately 9 weeks since the procedure.   Her procedure was complicated by a defect in the medial tibial surface (which may have been a cyst, but I think there is osteoporosis as well)    She had to discontinue physical therapy due to cost. Has been doing home exercise program religiously.  She is very happy with the amount of pain relief from the surgery.     Review of Systems:  Constitutional/General: Negative for fever, chills, change in weight  Integumentary/Skin: Negative for worrisome rashes, moles, or lesions  Neuro: Negative for weakness, dizziness, or paresthesias   Psychiatric: negative for changes in mood or affect    OBJECTIVE:  Physical Exam:  BP (!) 161/80 (BP Location: Left arm, Patient Position: Sitting, Cuff Size: Adult Large)   Pulse 51   Ht 1.67 m (5' 5.75\")   Wt 90.7 kg (200 lb)   SpO2 97%   BMI 32.53 kg/m    General Appearance: healthy, alert and no distress   Skin: no suspicious lesions or rashes  Neuro: Normal strength and tone, mentation intact and speech normal  Vascular: good pulses, and capillary refill   Lymph: no lymphadenopathy   Psych:  mentation appears normal and affect normal/bright  Resp: no increased work of breathing    Right Knee Exam:  Inspection: Wound looks healed. No evidence of infection.   ROM: 0-125 degrees  Good stability.    X-rays:  Obtained today,  of the right  KNEE: 3-views, reviewed in the office with the patient by myself today and show Components in good alignment, well fixed. Normal patellofemoral joint alignment.      ASSESSMENT:  Doing very well status post right TOTAL KNEE ARTHROPLASTY  She may have osteoporosis    PLAN:  Continue exercises on own. Scar massage. [place] survey to be performed at 12 week jean carlos  I encouraged screening for osteoporosis    Return to clinic: 1 year postoperative or as needed     JUDIE Oro MD  Dept. Orthopedic " Surgery  [facility]

## 2023-12-05 ENCOUNTER — OFFICE VISIT (OUTPATIENT)
Dept: ORTHOPEDICS | Facility: CLINIC | Age: 68
End: 2023-12-05
Payer: COMMERCIAL

## 2023-12-05 ENCOUNTER — ANCILLARY PROCEDURE (OUTPATIENT)
Dept: GENERAL RADIOLOGY | Facility: CLINIC | Age: 68
End: 2023-12-05
Attending: ORTHOPAEDIC SURGERY
Payer: COMMERCIAL

## 2023-12-05 VITALS
DIASTOLIC BLOOD PRESSURE: 80 MMHG | SYSTOLIC BLOOD PRESSURE: 161 MMHG | HEART RATE: 51 BPM | WEIGHT: 200 LBS | HEIGHT: 66 IN | OXYGEN SATURATION: 97 % | BODY MASS INDEX: 32.14 KG/M2

## 2023-12-05 DIAGNOSIS — Z96.651 STATUS POST TOTAL RIGHT KNEE REPLACEMENT: Primary | ICD-10-CM

## 2023-12-05 DIAGNOSIS — Z96.651 STATUS POST TOTAL RIGHT KNEE REPLACEMENT: ICD-10-CM

## 2023-12-05 PROCEDURE — 99024 POSTOP FOLLOW-UP VISIT: CPT | Performed by: ORTHOPAEDIC SURGERY

## 2023-12-05 PROCEDURE — 73562 X-RAY EXAM OF KNEE 3: CPT | Mod: TC | Performed by: RADIOLOGY

## 2023-12-05 ASSESSMENT — PAIN SCALES - GENERAL: PAINLEVEL: NO PAIN (0)

## 2023-12-05 NOTE — LETTER
"    12/5/2023         RE: Georgie Patino  [address] [place] [id number]        Dear Colleague,    Thank you for referring your patient, Georgie Patino, to the [facility]. Please see a copy of my visit note below.    SUBJECTIVE:  Georgie Patino is here in follow up of right total knee arthroplasty on 10/2/23. It has been approximately 9 weeks since the procedure.   Her procedure was complicated by a defect in the medial tibial surface (which may have been a cyst, but I think there is osteoporosis as well)    She had to discontinue physical therapy due to cost. Has been doing home exercise program religiously.  She is very happy with the amount of pain relief from the surgery.     Review of Systems:  Constitutional/General: Negative for fever, chills, change in weight  Integumentary/Skin: Negative for worrisome rashes, moles, or lesions  Neuro: Negative for weakness, dizziness, or paresthesias   Psychiatric: negative for changes in mood or affect    OBJECTIVE:  Physical Exam:  BP (!) 161/80 (BP Location: Left arm, Patient Position: Sitting, Cuff Size: Adult Large)   Pulse 51   Ht 1.67 m (5' 5.75\")   Wt 90.7 kg (200 lb)   SpO2 97%   BMI 32.53 kg/m    General Appearance: healthy, alert and no distress   Skin: no suspicious lesions or rashes  Neuro: Normal strength and tone, mentation intact and speech normal  Vascular: good pulses, and capillary refill   Lymph: no lymphadenopathy   Psych:  mentation appears normal and affect normal/bright  Resp: no increased work of breathing    Right Knee Exam:  Inspection: Wound looks healed. No evidence of infection.   ROM: 0-125 degrees  Good stability.    X-rays:  Obtained today,  of the right  KNEE: 3-views, reviewed in the office with the patient by myself today and show Components in good alignment, well fixed. Normal patellofemoral joint alignment.      ASSESSMENT:  Doing very well status post right TOTAL KNEE ARTHROPLASTY  She may have " osteoporosis    PLAN:  Continue exercises on own. Scar massage. [place] survey to be performed at 12 week jean carlos  I encouraged screening for osteoporosis    Return to clinic: 1 year postoperative or as needed     JUDIE Oro MD  Dept. Orthopedic Surgery  [facility]        Again, thank you for allowing me to participate in the care of your patient.        Sincerely,        Ross Oro MD

## 2023-12-12 ENCOUNTER — ANCILLARY PROCEDURE (OUTPATIENT)
Dept: CT IMAGING | Facility: CLINIC | Age: 68
End: 2023-12-12
Attending: PHYSICIAN ASSISTANT
Payer: COMMERCIAL

## 2023-12-12 DIAGNOSIS — R31.29 MICROHEMATURIA: ICD-10-CM

## 2023-12-12 PROCEDURE — 74176 CT ABD & PELVIS W/O CONTRAST: CPT | Mod: TC | Performed by: RADIOLOGY

## 2023-12-13 DIAGNOSIS — I71.20 THORACIC AORTIC ANEURYSM WITHOUT RUPTURE, UNSPECIFIED PART (H): Primary | ICD-10-CM

## 2023-12-13 DIAGNOSIS — N20.0 STAGHORN KIDNEY STONES: ICD-10-CM

## 2024-01-12 ENCOUNTER — ANCILLARY PROCEDURE (OUTPATIENT)
Dept: CARDIOLOGY | Facility: CLINIC | Age: 69
End: 2024-01-12
Attending: INTERNAL MEDICINE
Payer: COMMERCIAL

## 2024-01-12 DIAGNOSIS — R00.2 PALPITATIONS: ICD-10-CM

## 2024-01-12 LAB — LVEF ECHO: NORMAL

## 2024-01-12 PROCEDURE — 93306 TTE W/DOPPLER COMPLETE: CPT | Performed by: INTERNAL MEDICINE

## 2024-01-16 PROBLEM — G89.29 CHRONIC PAIN OF RIGHT KNEE: Status: RESOLVED | Noted: 2023-10-05 | Resolved: 2024-01-16

## 2024-01-16 PROBLEM — M25.561 CHRONIC PAIN OF RIGHT KNEE: Status: RESOLVED | Noted: 2023-10-05 | Resolved: 2024-01-16

## 2024-01-16 NOTE — PROGRESS NOTES
DISCHARGE  Reason for Discharge: Patient has failed to schedule further appointments.    Equipment Issued: None    Discharge Plan: Patient to continue home program.    Referring Provider:  Ross Oro       10/30/23 0500   Appointment Info   Signing clinician's name / credentials Burton Lopez DPT   Total/Authorized Visits 9 (Per order)   Visits Used 5   Medical Diagnosis Primary osteoarthritis of right knee / Acquired genu valgum of right knee   PT Tx Diagnosis Chronic right knee pain  (Right TKA)   Other pertinent information NEXT: ROM   Quick Adds Certification   Progress Note/Certification   Start of Care Date 10/05/23   Onset of illness/injury or Date of Surgery 10/02/23  (Date of surgery)   Therapy Frequency 2 times per week for 3 weeks then 1 time per week for 4 weeks   Predicted Duration 7 weeks   Certification date from 10/05/23   Certification date to 11/23/23   Progress Note Due Date 11/23/23   Progress Note Completed Date 10/05/23   GOALS   PT Goals 2   PT Goal 1   Goal Identifier Ambulation   Goal Description Pt will be able to ambulate for 20 minutes in order to run errands without having to stop due to pain.   Target Date 11/23/23   PT Goal 2   Goal Identifier Stairs   Goal Description Pt will be able to ascend and descend 1 flight of stairs with a reciprical gait pattern and a handrail assist safely in order to do laundry at home.   Target Date 11/23/23   Subjective Report   Subjective Report Has been walking without the walker which has been going well. HEP going well. Working with floor peddler as well.   Objective Measures   Objective Measures Objective Measure 1   Objective Measure 1   Objective Measure AAROM   Details 0-7-123   Objective Measure 2   Objective Measure Gait   Details Slow and deliberate gait pattern with reduced heel strike and toe off. No AD.   Treatment Interventions (PT)   Interventions Therapeutic Procedure/Exercise;Therapeutic Activity;Gait Training   Therapeutic  Procedure/Exercise   Therapeutic Procedures: strength, endurance, ROM, flexibillity minutes (56900) 35   Therapeutic Procedures Ther Proc 2;Ther Proc 3;Ther Proc 4;Ther Proc 5;Ther Proc 6;Ther Proc 7;Ther Proc 8;Ther Proc 9   Ther Proc 1 AAROM for knee flexion x8 with 15 second holds   Ther Proc 2 Quad sets x10 with 5 second holds   Ther Proc 3 AAROM for knee extension in supine with uninvolved LE assist x7 with 30 second holds   Ther Proc 4 Heel slides x8   Ther Proc 5 SLR in supine x10 independently with moderate quad lag. Quad lag improved with cueing.   Ther Proc 6 NuStep x7 mins with seat at 4 to work on ROM   Ther Proc 7 Toe raises x15   Skilled Intervention ROM and strengthening exercises to improve motion and strength   Patient Response/Progress Improved extension   Gait Training   Gait Training Minutes, includes stair climbing (69518) 5   Gait 1 Education regarding use of single point cane as balance was mildly impacted. Educated to use single point cane on opposite side as surgical leg to get increased support. Education regarding sequencing of single point cane and surgical leg during gait. Gait with single point cane in gym x8 lengths   Skilled Intervention Pt education and gait activities   Patient Response/Progress Verbalized and demonstrated understanding   Education   Learner/Method Patient;No Barriers to Learning   Plan   Home program PTRx (handout)   Total Session Time   Timed Code Treatment Minutes 40   Total Treatment Time (sum of timed and untimed services) 40

## 2024-01-22 DIAGNOSIS — I10 BENIGN ESSENTIAL HYPERTENSION: ICD-10-CM

## 2024-01-22 RX ORDER — LISINOPRIL 40 MG/1
40 TABLET ORAL DAILY
Qty: 90 TABLET | Refills: 0 | Status: SHIPPED | OUTPATIENT
Start: 2024-01-22 | End: 2024-04-19

## 2024-01-31 ENCOUNTER — OFFICE VISIT (OUTPATIENT)
Dept: CARDIOLOGY | Facility: CLINIC | Age: 69
End: 2024-01-31
Attending: PHYSICIAN ASSISTANT
Payer: COMMERCIAL

## 2024-01-31 ENCOUNTER — DOCUMENTATION ONLY (OUTPATIENT)
Dept: CARDIOLOGY | Facility: CLINIC | Age: 69
End: 2024-01-31

## 2024-01-31 VITALS
SYSTOLIC BLOOD PRESSURE: 153 MMHG | WEIGHT: 196 LBS | OXYGEN SATURATION: 97 % | BODY MASS INDEX: 31.88 KG/M2 | DIASTOLIC BLOOD PRESSURE: 77 MMHG | HEART RATE: 55 BPM

## 2024-01-31 DIAGNOSIS — E78.5 HYPERLIPIDEMIA LDL GOAL <70: ICD-10-CM

## 2024-01-31 DIAGNOSIS — I10 BENIGN ESSENTIAL HYPERTENSION: ICD-10-CM

## 2024-01-31 DIAGNOSIS — I71.20 THORACIC AORTIC ANEURYSM WITHOUT RUPTURE, UNSPECIFIED PART (H): ICD-10-CM

## 2024-01-31 DIAGNOSIS — R00.2 PALPITATIONS: ICD-10-CM

## 2024-01-31 DIAGNOSIS — I71.20 THORACIC AORTIC ANEURYSM WITHOUT RUPTURE, UNSPECIFIED PART (H): Primary | ICD-10-CM

## 2024-01-31 DIAGNOSIS — Z98.890 S/P ASCENDING AORTIC ANEURYSM REPAIR: ICD-10-CM

## 2024-01-31 DIAGNOSIS — Z86.79 S/P ASCENDING AORTIC ANEURYSM REPAIR: Primary | ICD-10-CM

## 2024-01-31 DIAGNOSIS — Z98.890 S/P ASCENDING AORTIC ANEURYSM REPAIR: Primary | ICD-10-CM

## 2024-01-31 DIAGNOSIS — Z86.79 S/P ASCENDING AORTIC ANEURYSM REPAIR: ICD-10-CM

## 2024-01-31 PROCEDURE — 99215 OFFICE O/P EST HI 40 MIN: CPT | Performed by: INTERNAL MEDICINE

## 2024-01-31 PROCEDURE — 99417 PROLNG OP E/M EACH 15 MIN: CPT | Performed by: INTERNAL MEDICINE

## 2024-01-31 RX ORDER — HYDROCHLOROTHIAZIDE 25 MG/1
25 TABLET ORAL DAILY
Qty: 90 TABLET | Refills: 3 | Status: SHIPPED | OUTPATIENT
Start: 2024-01-31

## 2024-01-31 RX ORDER — ROSUVASTATIN CALCIUM 10 MG/1
10 TABLET, COATED ORAL DAILY
Qty: 90 TABLET | Refills: 3 | Status: SHIPPED | OUTPATIENT
Start: 2024-01-31

## 2024-01-31 NOTE — NURSING NOTE
"Chief Complaint   Patient presents with    RECHECK     Return General Cardiology for S/P ascending aortic aneurysm repair; AAA; HTN; HLD; Ascending aorta dilation       Initial BP (!) 174/75 (BP Location: Right arm, Patient Position: Chair, Cuff Size: Adult Large)   Pulse 56   Wt 88.9 kg (196 lb)   SpO2 97%   BMI 31.88 kg/m   Estimated body mass index is 31.88 kg/m  as calculated from the following:    Height as of 12/5/23: 1.67 m (5' 5.75\").    Weight as of this encounter: 88.9 kg (196 lb)..  BP completed using cuff size: DAVID Valles    "

## 2024-01-31 NOTE — PROGRESS NOTES
[facility]      Referring provider:Kori Em PA-C     HPI: Ms. Georgie Patino is a 68 year old  female with PMH significant for    -Hypertension  -Hyperlipidemia  -Status post ascending aortic aneurysm repair in 2017  -Former smoker.    Patient was seen in cardiology clinic in 2017 for palpitations and found to have severely dilated ascending aorta at 6 cm.  Subsequently underwent aneurysm repair.  Last seen in cardiology clinic in 2021.  Since then she has been doing well.  She has been following with PCP since 2021.  In October of last year she started on amlodipine in addition to metoprolol and lisinopril.  She does not monitor her blood pressure at 1.  Overall clinic visits show high blood pressure.  She is physically inactive due to knee pain.  Denies chest pain, shortness of breath, dizziness, palpitations or lower extremity edema.  CTA chest 12/8/2022 showed patent ascending aortic graft with no leak.  Cardiovascular risk factors:  After she quit smoking she started vaping.   Blood pressure is not well-controlled.  Patient is on lisinopril 40, amlodipine 5 mg, aspirin 325, metoprolol 50 mg twice daily.    No diabetes.  Hyperlipidemia not well-controlled.  .    Medications, personal, family, and social history reviewed with patient and revised.    PAST MEDICAL HISTORY:  Past Medical History:   Diagnosis Date    Ascending aortic aneurysm (H24) 09/21/2017    Blood transfusions age 19    due to menorhagia    History of blood transfusion     Hypertension 09/21/2017    Localized osteoarthritis of both knees     Thyroid nodule 11/20/2017    Tobacco abuse        CURRENT MEDICATIONS:  Current Outpatient Medications   Medication Sig Dispense Refill    acetaminophen (TYLENOL) 325 MG tablet Take 2 tablets (650 mg) by mouth every 4 hours as needed for other (mild  pain) 100 tablet 0    amLODIPine (NORVASC) 5 MG tablet Take 1 tablet (5 mg) by mouth daily 90 tablet 3    aspirin (ASA) 325 MG EC tablet Take 1 tablet (325 mg) by mouth 2 times daily (with meals) 60 tablet 0    diphenhydrAMINE-acetaminophen (TYLENOL PM)  MG tablet Take 1 tablet by mouth nightly as needed for sleep      lisinopril (ZESTRIL) 40 MG tablet TAKE ONE TABLET BY MOUTH ONE TIME DAILY 90 tablet 0    meloxicam (MOBIC) 15 MG tablet Take 1 tablet (15 mg) by mouth daily 15 tablet 0    metoprolol tartrate (LOPRESSOR) 50 MG tablet Take 1 tablet (50 mg) by mouth 2 times daily 180 tablet 3    omeprazole (PRILOSEC) 20 MG DR capsule Take 20 mg by mouth daily         PAST SURGICAL HISTORY:  Past Surgical History:   Procedure Laterality Date    ARTHROPLASTY KNEE Right 10/2/2023    Procedure: RIGHT TOTAL KNEE ARTHROPLASTY;  Surgeon: Ross Oro MD;  Location:  OR    COLONOSCOPY WITH CO2 INSUFFLATION N/A 03/15/2017    Procedure: COLONOSCOPY WITH CO2 INSUFFLATION;  Surgeon: Duane, William Charles, MD;  Location:  OR    DILATION AND CURETTAGE  age 20    EXTRACORPOREAL SHOCK WAVE LITHOTRIPSY, CYSTOSCOPY, INSERT STENT URETER(S), COMBINED Bilateral 11/19/2018    Procedure: BILATERAL EXTRACORPOREAL SHOCK WAVE LITHOTRIPSY, CYSTOSCOPY, LEFT STENT PLACEMENT;  Surgeon: Tavo Saavedra MD;  Location: [facility] OR    GI SURGERY  3/15/2017    GYN SURGERY  age 20    REPAIR ANEURYSM ASCENDING AORTA N/A 10/27/2017    Procedure: REPAIR ANEURYSM ASCENDING AORTA;  Median Sternotomy, Cardiopulmonary bypass, Ascending Aorta Aneurysm Repair using Hemashield Platinum Woven Double Velour Graft 34cm X 30cm.;  Surgeon: Rubio Piña MD;  Location: [facility] OR    VASCULAR SURGERY  10/27/2017    [place] teeth removed         ALLERGIES:     Allergies   Allergen Reactions    Blood Transfusion Related (Informational Only) Other (See Comments)     Patient has a history of a clinically significant antibody against RBC antigens.  A  delay in compatible RBCs may occur.       FAMILY HISTORY:  Family History   Problem Relation Age of Onset    Respiratory Mother         copd    Cancer Maternal Grandmother         Leg    Alzheimer Disease Maternal Grandfather     Heart Disease Paternal Grandfather     Heart Disease Brother 54        Heart Attack     Cancer Sister         Lung cancer age 57-smoker d age 57         SOCIAL HISTORY:  Social History     Tobacco Use    Smoking status: Every Day     Packs/day: 0.05     Years: 40.00     Additional pack years: 0.00     Total pack years: 2.00     Types: Cigarettes, Other    Smokeless tobacco: Never    Tobacco comments:     2 cigs daily   Vaping Use    Vaping Use: Never used   Substance Use Topics    Alcohol use: Yes     Alcohol/week: 0.8 - 1.7 standard drinks of alcohol     Types: 1 - 2 Standard drinks or equivalent per week     Comment: 3-4 drinks per week     Drug use: Yes     Types: Marijuana       ROS:   Constitutional: No fever, chills, or sweats. Weight stable.   Cardiovascular: As per HPI.       Exam:  BP (!) 174/75 (BP Location: Right arm, Patient Position: Chair, Cuff Size: Adult Large)   Pulse 56   Wt 88.9 kg (196 lb)   SpO2 97%   BMI 31.88 kg/m    GENERAL APPEARANCE: alert and no distress  HEENT: no icterus, no central cyanosis  LYMPH/NECK: no adenopathy, no asymmetry, JVP not elevated, no carotid bruits.  RESPIRATORY: lungs clear to auscultation - no rales, rhonchi or wheezes, no use of accessory muscles, no retractions, respirations are unlabored, normal respiratory rate  CARDIOVASCULAR: regular rhythm, normal S1, S2, no S3 or S4 and no murmur, click or rub, precordium quiet with normal PMI.  GI: soft, non tender  EXTREMITIES: no edema  NEURO: alert, normal speech,and affect  SKIN: no ecchymoses, no rashes     I have reviewed the labs and personally reviewed the imaging below and made my comment in the assessment and plan.    Labs:  CBC RESULTS:   Lab Results   Component Value Date    WBC 6.1  10/24/2023    WBC 7.9 11/10/2017    RBC 4.93 10/24/2023    RBC 4.08 11/10/2017    HGB 14.0 10/24/2023    HGB 14.9 11/12/2018    HCT 44.9 10/24/2023    HCT 36.8 11/10/2017    MCV 91 10/24/2023    MCV 90 11/10/2017    MCH 28.4 10/24/2023    MCH 29.4 11/10/2017    MCHC 31.2 (L) 10/24/2023    MCHC 32.6 11/10/2017    RDW 14.3 10/24/2023    RDW 13.5 11/10/2017     10/24/2023     11/10/2017       BMP RESULTS:  Lab Results   Component Value Date     10/02/2023     03/25/2021    POTASSIUM 4.3 10/02/2023    POTASSIUM 4.0 01/09/2023    POTASSIUM 4.3 03/25/2021    CHLORIDE 105 10/02/2023    CHLORIDE 110 (H) 01/09/2023    CHLORIDE 106 03/25/2021    CO2 21 (L) 10/02/2023    CO2 31 01/09/2023    CO2 31 03/25/2021    ANIONGAP 14 10/02/2023    ANIONGAP 3 01/09/2023    ANIONGAP 2 (L) 03/25/2021     (H) 10/03/2023     (H) 01/09/2023     (H) 03/25/2021    BUN 31.7 (H) 10/02/2023    BUN 27 01/09/2023    BUN 28 03/25/2021    CR 0.61 10/02/2023    CR 0.62 03/25/2021    GFRESTIMATED >90 10/02/2023    GFRESTIMATED >90 03/25/2021    GFRESTBLACK >90 03/25/2021    QASIM 9.1 10/02/2023    QASIM 9.5 03/25/2021      Echocardiogram 1/12/2024  Status post ascending aorta aneurysm repair using 34 mm Hemashield graft with  PFO closure (2017).     Left ventricular size, wall motion and function are normal. The ejection  fraction is 60-65%.  The right ventricle is normal size. Global right ventricular function is  normal.  Trace to mild aortic insufficiency is present.  Sinuses of Valsalva 4.1 (indexed at 2.05 cm/m2)  No pericardial effusion is present.     CT angiogram of the chest 12/8/2022:  Patent ascending aortic graft. No leak.     Assessment and Plan:     # Status post thoracic aneurysm repair in 2017  -Recent echocardiogram 1/12/2024 is unremarkable.  -Continue to monitor.  CT angiogram of the chest 12/2022 showed patent ascending graft with no leak.    # Hypertension uncontrolled  -Continue lisinopril  40 mg daily  -Continue amlodipine 5 mg daily  -Continue metoprolol 50 mg twice daily  -Start hydrochlorothiazide 25 mg daily  -BMP in 2 weeks  -Goal blood pressure is less than 130/80 mmHg.  -Encouraged home monitoring. She will update us about her home blood pressure readings in a month.    # Hyperlipidemia  -Start Crestor 10 mg daily    Return to clinic 1 year or earlier as needed.    Total time spent today for this visit is 57 minutes including precharting, face-to-face clinic visit, review of labs/imaging and medical documentation.    Vipul RACHEL MD  [facility] Division of Cardiology  Pager [phone]

## 2024-01-31 NOTE — LETTER
1/31/2024      RE: Georgie Patino  [address] [place] [id number]       Dear Colleague,    Thank you for the opportunity to participate in the care of your patient, Georgie Patino, at the [facility] [address] [facility]. Please see a copy of my visit note below.                                                                                                                           General Cardiology Clinic-[place]      Referring provider:Kori Em PA-C     HPI: Ms. Georgie Patino is a 68 year old  female with PMH significant for    -Hypertension  -Hyperlipidemia  -Status post ascending aortic aneurysm repair in 2017  -Former smoker.    Patient was seen in cardiology clinic in 2017 for palpitations and found to have severely dilated ascending aorta at 6 cm.  Subsequently underwent aneurysm repair.  Last seen in cardiology clinic in 2021.  Since then she has been doing well.  She has been following with PCP since 2021.  In October of last year she started on amlodipine in addition to metoprolol and lisinopril.  She does not monitor her blood pressure at 1.  Overall clinic visits show high blood pressure.  She is physically inactive due to knee pain.  Denies chest pain, shortness of breath, dizziness, palpitations or lower extremity edema.  CTA chest 12/8/2022 showed patent ascending aortic graft with no leak.  Cardiovascular risk factors:  After she quit smoking she started vaping.   Blood pressure is not well-controlled.  Patient is on lisinopril 40, amlodipine 5 mg, aspirin 325, metoprolol 50 mg twice daily.    No diabetes.  Hyperlipidemia not well-controlled.  .    Medications, personal, family, and social history reviewed with patient and revised.    PAST MEDICAL HISTORY:  Past Medical History:   Diagnosis Date    Ascending aortic aneurysm (H24) 09/21/2017    Blood transfusions age 19    due to menorhagia    History of  blood transfusion     Hypertension 09/21/2017    Localized osteoarthritis of both knees     Thyroid nodule 11/20/2017    Tobacco abuse        CURRENT MEDICATIONS:  Current Outpatient Medications   Medication Sig Dispense Refill    acetaminophen (TYLENOL) 325 MG tablet Take 2 tablets (650 mg) by mouth every 4 hours as needed for other (mild pain) 100 tablet 0    amLODIPine (NORVASC) 5 MG tablet Take 1 tablet (5 mg) by mouth daily 90 tablet 3    aspirin (ASA) 325 MG EC tablet Take 1 tablet (325 mg) by mouth 2 times daily (with meals) 60 tablet 0    diphenhydrAMINE-acetaminophen (TYLENOL PM)  MG tablet Take 1 tablet by mouth nightly as needed for sleep      lisinopril (ZESTRIL) 40 MG tablet TAKE ONE TABLET BY MOUTH ONE TIME DAILY 90 tablet 0    meloxicam (MOBIC) 15 MG tablet Take 1 tablet (15 mg) by mouth daily 15 tablet 0    metoprolol tartrate (LOPRESSOR) 50 MG tablet Take 1 tablet (50 mg) by mouth 2 times daily 180 tablet 3    omeprazole (PRILOSEC) 20 MG DR capsule Take 20 mg by mouth daily         PAST SURGICAL HISTORY:  Past Surgical History:   Procedure Laterality Date    ARTHROPLASTY KNEE Right 10/2/2023    Procedure: RIGHT TOTAL KNEE ARTHROPLASTY;  Surgeon: Ross Oro MD;  Location:  OR    COLONOSCOPY WITH CO2 INSUFFLATION N/A 03/15/2017    Procedure: COLONOSCOPY WITH CO2 INSUFFLATION;  Surgeon: Duane, William Charles, MD;  Location:  OR    DILATION AND CURETTAGE  age 20    EXTRACORPOREAL SHOCK WAVE LITHOTRIPSY, CYSTOSCOPY, INSERT STENT URETER(S), COMBINED Bilateral 11/19/2018    Procedure: BILATERAL EXTRACORPOREAL SHOCK WAVE LITHOTRIPSY, CYSTOSCOPY, LEFT STENT PLACEMENT;  Surgeon: Tavo Saavedra MD;  Location:  OR    GI SURGERY  3/15/2017    GYN SURGERY  age 20    REPAIR ANEURYSM ASCENDING AORTA N/A 10/27/2017    Procedure: REPAIR ANEURYSM ASCENDING AORTA;  Median Sternotomy, Cardiopulmonary bypass, Ascending Aorta Aneurysm Repair using Hemashield Platinum Woven Double Velour Graft  34cm X 30cm.;  Surgeon: Rubio Piña MD;  Location: [facility] OR    VASCULAR SURGERY  10/27/2017    [place] teeth removed         ALLERGIES:     Allergies   Allergen Reactions    Blood Transfusion Related (Informational Only) Other (See Comments)     Patient has a history of a clinically significant antibody against RBC antigens.  A delay in compatible RBCs may occur.       FAMILY HISTORY:  Family History   Problem Relation Age of Onset    Respiratory Mother         copd    Cancer Maternal Grandmother         Leg    Alzheimer Disease Maternal Grandfather     Heart Disease Paternal Grandfather     Heart Disease Brother 54        Heart Attack     Cancer Sister         Lung cancer age 57-smoker d age 57         SOCIAL HISTORY:  Social History     Tobacco Use    Smoking status: Every Day     Packs/day: 0.05     Years: 40.00     Additional pack years: 0.00     Total pack years: 2.00     Types: Cigarettes, Other    Smokeless tobacco: Never    Tobacco comments:     2 cigs daily   Vaping Use    Vaping Use: Never used   Substance Use Topics    Alcohol use: Yes     Alcohol/week: 0.8 - 1.7 standard drinks of alcohol     Types: 1 - 2 Standard drinks or equivalent per week     Comment: 3-4 drinks per week     Drug use: Yes     Types: Marijuana       ROS:   Constitutional: No fever, chills, or sweats. Weight stable.   Cardiovascular: As per HPI.       Exam:  BP (!) 174/75 (BP Location: Right arm, Patient Position: Chair, Cuff Size: Adult Large)   Pulse 56   Wt 88.9 kg (196 lb)   SpO2 97%   BMI 31.88 kg/m    GENERAL APPEARANCE: alert and no distress  HEENT: no icterus, no central cyanosis  LYMPH/NECK: no adenopathy, no asymmetry, JVP not elevated, no carotid bruits.  RESPIRATORY: lungs clear to auscultation - no rales, rhonchi or wheezes, no use of accessory muscles, no retractions, respirations are unlabored, normal respiratory rate  CARDIOVASCULAR: regular rhythm, normal S1, S2, no S3 or S4 and no murmur, click or rub,  precordium quiet with normal PMI.  GI: soft, non tender  EXTREMITIES: no edema  NEURO: alert, normal speech,and affect  SKIN: no ecchymoses, no rashes     I have reviewed the labs and personally reviewed the imaging below and made my comment in the assessment and plan.    Labs:  CBC RESULTS:   Lab Results   Component Value Date    WBC 6.1 10/24/2023    WBC 7.9 11/10/2017    RBC 4.93 10/24/2023    RBC 4.08 11/10/2017    HGB 14.0 10/24/2023    HGB 14.9 11/12/2018    HCT 44.9 10/24/2023    HCT 36.8 11/10/2017    MCV 91 10/24/2023    MCV 90 11/10/2017    MCH 28.4 10/24/2023    MCH 29.4 11/10/2017    MCHC 31.2 (L) 10/24/2023    MCHC 32.6 11/10/2017    RDW 14.3 10/24/2023    RDW 13.5 11/10/2017     10/24/2023     11/10/2017       BMP RESULTS:  Lab Results   Component Value Date     10/02/2023     03/25/2021    POTASSIUM 4.3 10/02/2023    POTASSIUM 4.0 01/09/2023    POTASSIUM 4.3 03/25/2021    CHLORIDE 105 10/02/2023    CHLORIDE 110 (H) 01/09/2023    CHLORIDE 106 03/25/2021    CO2 21 (L) 10/02/2023    CO2 31 01/09/2023    CO2 31 03/25/2021    ANIONGAP 14 10/02/2023    ANIONGAP 3 01/09/2023    ANIONGAP 2 (L) 03/25/2021     (H) 10/03/2023     (H) 01/09/2023     (H) 03/25/2021    BUN 31.7 (H) 10/02/2023    BUN 27 01/09/2023    BUN 28 03/25/2021    CR 0.61 10/02/2023    CR 0.62 03/25/2021    GFRESTIMATED >90 10/02/2023    GFRESTIMATED >90 03/25/2021    GFRESTBLACK >90 03/25/2021    QASIM 9.1 10/02/2023    QASIM 9.5 03/25/2021      Echocardiogram 1/12/2024  Status post ascending aorta aneurysm repair using 34 mm Hemashield graft with  PFO closure (2017).     Left ventricular size, wall motion and function are normal. The ejection  fraction is 60-65%.  The right ventricle is normal size. Global right ventricular function is  normal.  Trace to mild aortic insufficiency is present.  Sinuses of Valsalva 4.1 (indexed at 2.05 cm/m2)  No pericardial effusion is present.     CT angiogram of  the chest 12/8/2022:  Patent ascending aortic graft. No leak.     Assessment and Plan:     # Status post thoracic aneurysm repair in 2017  -Recent echocardiogram 1/12/2024 is unremarkable.  -Continue to monitor.  CT angiogram of the chest 12/2022 showed patent ascending graft with no leak.    # Hypertension uncontrolled  -Continue lisinopril 40 mg daily  -Continue amlodipine 5 mg daily  -Continue metoprolol 50 mg twice daily  -Start hydrochlorothiazide 25 mg daily  -BMP in 2 weeks  -Goal blood pressure is less than 130/80 mmHg.  -Encouraged home monitoring. She will update us about her home blood pressure readings in a month.    # Hyperlipidemia  -Start Crestor 10 mg daily    Return to clinic 1 year or earlier as needed.    Total time spent today for this visit is 57 minutes including precharting, face-to-face clinic visit, review of labs/imaging and medical documentation.    Vipul RACHEL MD  [facility] Division of Cardiology  Pager [phone]

## 2024-01-31 NOTE — PROGRESS NOTES
Patient saw Dr. Whitlock today in clinic. Patient to have BMP in 2 weeks. Lab order placed.     Vilma Pacheco, RN, BSN  Cardiology RN Care Coordinator   [place]   Phone: [phone]  Fax: [phone] ([place]) [phone] ([place])

## 2024-01-31 NOTE — PATIENT INSTRUCTIONS
Thank you for coming to the [facility]; please note the following instructions:    1. START rosuvastatin (CRESTOR) 10 MG tablet. Take 1 tablet (10 mg) by mouth daily.    2. Dr. Whitlock has prescribed you a blood pressure cuff. Please monitor your blood pressure at home. Blood pressure goal is less than 130/80. Please send us a [employer] or call us [phone] in a month and let us know what your blood pressure readings have been.    When checking your blood pressure at home:  ~upper arm cuff is preferred versus wrist cuff due to accuracy  ~Sit in a chair  ~Rest for 5 minutes  ~Avoid alcohol, nicotine, caffeine, exercise, food or drink 30 minutes prior  ~urinate prior to checking if needed  ~Elbow is at about heart level  ~Feet flat on the floor, no crossing legs or feet  ~No talking, minimal movement   ~Place cuff on bare skin     3. One year follow-up in cardiology. A member of the cardiology team will contact you when it is closer to time to schedule.     4. Blood pressure monitor kit prescription. Please let us know if you have any trouble picking up a blood pressure monitor    5. START hydrochlorothiazide 25 mg    6. Labs in two weeks    7. STOP vaping    If you have any questions regarding your visit please contact your care team:     Cardiology  Telephone Number   Jocelin AZALIA., RN  Vilma CHAMORRO, RN  Sharlene GOMEZ, ALIICA LUKE, DAVID DAVIS, DAVID PAULINO, Visit Facilitator  Kori LOPES [facility] [phone] (option 1)   For scheduling appts:     [phone] (select option 1)       For the Device Clinic (Pacemakers and ICD's)  RN's :  Marisabel Kaiser   During business hours: [phone]    *After business hours:  [phone] (select option 4)      Normal test result notifications will be released via fitogramt or mailed within 7 business days.  All other test results, will be communicated via telephone once reviewed by your cardiologist.    If you need a medication refill please contact your  pharmacy.  Please allow 3 business days for your refill to be completed.    As always, thank you for trusting us with your health care needs!

## 2024-01-31 NOTE — NURSING NOTE
Prescription for blood pressure monitor kit given to patient. Patient to reach out to clinic if any difficulty getting blood pressure monitor.    Vilma Pacheco, RN, BSN  Cardiology RN Care Coordinator   [place]   Phone: [phone]  Fax: [phone] ([place]) [phone] ([place])

## 2024-02-27 ENCOUNTER — TELEPHONE (OUTPATIENT)
Dept: FAMILY MEDICINE | Facility: CLINIC | Age: 69
End: 2024-02-27
Payer: COMMERCIAL

## 2024-02-27 NOTE — LETTER
February 27, 2024    To  Georgie Patino  [address] [place] [address]  [facility] [id number]    Your team at [facility] cares about your health. We have reviewed your chart and based on our findings; we are making the following recommendations to better manage your health.     You are in particular need of attention regarding the following:     HYPERTENSION FOLLOW UP: Office Visit  Schedule Annual MAMMOGRAPHY. [facility] scheduling number is [phone] or schedule in MyChart (self referral).  1 in 8 women will develop invasive breast cancer during her lifetime and it is the most common non-skin cancer in [place] Women. EARLY detection, new treatments, and a better understanding of the disease have increased survival rates- the 5 year survival rate in the 1960's was 63% and today it is close to 90%.  Call or MyChart message your clinic to schedule a colonoscopy, schedule/ a FIT Test, or order a Cologuard test. If you are unsure what type of test you need, please call your clinic and speak to clinic staff.   Colon cancer is now the second leading cause of cancer-related deaths in the [place] for both men and women and there are over 130,000 new cases and 50,000 deaths per year from colon cancer. Colonoscopies can prevent 90-95% of these deaths. Problem lesions can be removed before they ever become cancer. This test is not only looking for cancer, but also getting rid of precancerous lesions.   If you are under/uninsured, we recommend you contact the Healthcare Bluebooks Program.[employer] is a free colorectal cancer screening program that provides colonoscopies for eligible under/uninsured [place] men and women. If you are interested in receiving a free colonoscopy, please call [employer] at t [phone] (mention code [employer]) to see if you're eligible. Please have them send us the results.     If you have already completed these items, please contact the clinic via phone or   [employer] so your care  team can review and update your records. Thank you for   choosing [facility] for your healthcare needs. For any questions,   concerns, or to schedule an appointment please contact our clinic.    Healthy Regards,      Your [facility] Care Team

## 2024-02-27 NOTE — TELEPHONE ENCOUNTER
Patient Quality Outreach    Patient is due for the following:   Hypertension -  Hypertension follow-up visit  Colon Cancer Screening  Breast Cancer Screening - Mammogram      Topic Date Due    Zoster (Shingles) Vaccine (1 of 2) Never done    Diptheria Tetanus Pertussis (DTAP/TDAP/TD) Vaccine (2 - Td or Tdap) 05/30/2022    Flu Vaccine (1) 09/01/2023    COVID-19 Vaccine (4 - 2023-24 season) 09/01/2023       Next Steps:   Schedule a office visit for hypertension    Type of outreach:    Sent [employer] message.    Next Steps:  Reach out within 90 days via [employer].    Max number of attempts reached: Yes. Will try again in 90 days if patient still on fail list.    Questions for provider review:    None           Deepa Leal CMA  Chart routed to Care Team.

## 2024-03-06 ENCOUNTER — LAB (OUTPATIENT)
Dept: LAB | Facility: CLINIC | Age: 69
End: 2024-03-06
Payer: COMMERCIAL

## 2024-03-06 DIAGNOSIS — R00.2 PALPITATIONS: ICD-10-CM

## 2024-03-06 DIAGNOSIS — I71.20 THORACIC AORTIC ANEURYSM WITHOUT RUPTURE, UNSPECIFIED PART (H): ICD-10-CM

## 2024-03-06 DIAGNOSIS — Z86.79 S/P ASCENDING AORTIC ANEURYSM REPAIR: ICD-10-CM

## 2024-03-06 DIAGNOSIS — E78.5 HYPERLIPIDEMIA LDL GOAL <70: ICD-10-CM

## 2024-03-06 DIAGNOSIS — I10 BENIGN ESSENTIAL HYPERTENSION: ICD-10-CM

## 2024-03-06 DIAGNOSIS — Z98.890 S/P ASCENDING AORTIC ANEURYSM REPAIR: ICD-10-CM

## 2024-03-06 PROCEDURE — 36415 COLL VENOUS BLD VENIPUNCTURE: CPT

## 2024-03-06 PROCEDURE — 80048 BASIC METABOLIC PNL TOTAL CA: CPT

## 2024-03-07 LAB
ANION GAP SERPL CALCULATED.3IONS-SCNC: 11 MMOL/L (ref 7–15)
BUN SERPL-MCNC: 24.9 MG/DL (ref 8–23)
CALCIUM SERPL-MCNC: 9.9 MG/DL (ref 8.8–10.2)
CHLORIDE SERPL-SCNC: 102 MMOL/L (ref 98–107)
CREAT SERPL-MCNC: 0.62 MG/DL (ref 0.51–0.95)
DEPRECATED HCO3 PLAS-SCNC: 28 MMOL/L (ref 22–29)
EGFRCR SERPLBLD CKD-EPI 2021: >90 ML/MIN/1.73M2
GLUCOSE SERPL-MCNC: 100 MG/DL (ref 70–99)
POTASSIUM SERPL-SCNC: 3.6 MMOL/L (ref 3.4–5.3)
SODIUM SERPL-SCNC: 141 MMOL/L (ref 135–145)

## 2024-04-03 DIAGNOSIS — I10 BENIGN ESSENTIAL HYPERTENSION: ICD-10-CM

## 2024-04-03 RX ORDER — LISINOPRIL 40 MG/1
40 TABLET ORAL DAILY
Qty: 90 TABLET | Refills: 0 | OUTPATIENT
Start: 2024-04-03

## 2024-05-06 ENCOUNTER — TELEPHONE (OUTPATIENT)
Dept: FAMILY MEDICINE | Facility: CLINIC | Age: 69
End: 2024-05-06
Payer: COMMERCIAL

## 2024-05-06 NOTE — TELEPHONE ENCOUNTER
Patient Quality Outreach    Patient is due for the following:   Hypertension -  BP check  Colon Cancer Screening  Breast Cancer Screening - Mammogram      Topic Date Due    Zoster (Shingles) Vaccine (1 of 2) Never done    Diptheria Tetanus Pertussis (DTAP/TDAP/TD) Vaccine (2 - Td or Tdap) 05/30/2022    COVID-19 Vaccine (4 - 2023-24 season) 09/01/2023       Next Steps:   Schedule a nurse only visit for bp check and mammo    Type of outreach:    Sent [employer] message.      Questions for provider review:    None           Deepa Leal CMA  Chart routed to Care Team.

## 2024-05-06 NOTE — LETTER
May 6, 2024    To  Georgie Patino  [address] [place] [address]  [facility] [address]    Your team at [facility] cares about your health. We have reviewed your chart and based on our findings; we are making the following recommendations to better manage your health.     You are in particular need of attention regarding the following:     HYPERTENSION FOLLOW UP: Blood pressure check with nurse  Visit your closest [place] pharmacy for BP check  Use your home Blood Pressure monitor and call us with your results or send them through [employer].  Call or MyChart message your clinic to schedule a colonoscopy, schedule/ a FIT Test, or order a Cologuard test. If you are unsure what type of test you need, please call your clinic and speak to clinic staff.   Colon cancer is now the second leading cause of cancer-related deaths in the [place] for both men and women and there are over 130,000 new cases and 50,000 deaths per year from colon cancer. Colonoscopies can prevent 90-95% of these deaths. Problem lesions can be removed before they ever become cancer. This test is not only looking for cancer, but also getting rid of precancerous lesions.   If you are under/uninsured, we recommend you contact the [employer] Program.[employer] is a free colorectal cancer screening program that provides colonoscopies for eligible under/uninsured [place] men and women. If you are interested in receiving a free colonoscopy, please call [employer] at t [phone] (mention code ScopesWeb) to see if you're eligible. Please have them send us the results.   Schedule Annual MAMMOGRAPHY. [facility] scheduling number is [phone] or schedule in Nearpodhart (self referral).    If you have already completed these items, please contact the clinic via phone or   [employer] so your care team can review and update your records. Thank you for   choosing [facility] for your healthcare needs. For any questions,   concerns, or  to schedule an appointment please contact our clinic.    Healthy Regards,      Your [facility] Care Team

## 2024-07-09 DIAGNOSIS — I10 BENIGN ESSENTIAL HYPERTENSION: ICD-10-CM

## 2024-07-09 RX ORDER — LISINOPRIL 40 MG/1
40 TABLET ORAL DAILY
Qty: 90 TABLET | Refills: 0 | Status: SHIPPED | OUTPATIENT
Start: 2024-07-09 | End: 2024-10-07

## 2024-07-16 ENCOUNTER — PATIENT OUTREACH (OUTPATIENT)
Dept: GASTROENTEROLOGY | Facility: CLINIC | Age: 69
End: 2024-07-16
Payer: COMMERCIAL

## 2024-08-07 ENCOUNTER — TELEPHONE (OUTPATIENT)
Dept: FAMILY MEDICINE | Facility: CLINIC | Age: 69
End: 2024-08-07
Payer: COMMERCIAL

## 2024-08-07 NOTE — TELEPHONE ENCOUNTER
Patient Quality Outreach    Patient is due for the following:   Hypertension -  BP check  Colon Cancer Screening  Breast Cancer Screening - Mammogram    Next Steps:   No follow up needed at this time.    Type of outreach:    Sent [employer] message.      Questions for provider review:    None           Deepa Leal CMA  Chart routed to Care Team.

## 2024-08-07 NOTE — LETTER
August 7, 2024    To  Georgie Patino  [address] [place] [address]  [facility] [id number]    Your team at [facility] cares about your health. We have reviewed your chart and based on our findings; we are making the following recommendations to better manage your health.     You are in particular need of attention regarding the following:     HYPERTENSION FOLLOW UP: Visit your closest [place] pharmacy for BP check  Use your home Blood Pressure monitor and call us with your results or send them through [employer].  Call or MyChart message your clinic to schedule a colonoscopy, schedule/ a FIT Test, or order a Cologuard test. If you are unsure what type of test you need, please call your clinic and speak to clinic staff.   Colon cancer is now the second leading cause of cancer-related deaths in the [place] for both men and women and there are over 130,000 new cases and 50,000 deaths per year from colon cancer. Colonoscopies can prevent 90-95% of these deaths. Problem lesions can be removed before they ever become cancer. This test is not only looking for cancer, but also getting rid of precancerous lesions.   If you are under/uninsured, we recommend you contact the [employer] Program.[employer] is a free colorectal cancer screening program that provides colonoscopies for eligible under/uninsured [place] men and women. If you are interested in receiving a free colonoscopy, please call [employer] at t [phone] (mention code ScopesWeb) to see if you're eligible. Please have them send us the results.   Schedule Annual MAMMOGRAPHY. [facility] scheduling number is [phone] or schedule in Socialitehart (self referral).    If you have already completed these items, please contact the clinic via phone or   [employer] so your care team can review and update your records. Thank you for   choosing [facility] for your healthcare needs. For any questions,   concerns, or to schedule an appointment  please contact our clinic.    Healthy Regards,      Your  [facility] Team

## 2024-09-24 ENCOUNTER — PATIENT OUTREACH (OUTPATIENT)
Dept: CARE COORDINATION | Facility: CLINIC | Age: 69
End: 2024-09-24
Payer: COMMERCIAL

## 2024-10-05 DIAGNOSIS — I10 BENIGN ESSENTIAL HYPERTENSION: ICD-10-CM

## 2024-10-07 RX ORDER — LISINOPRIL 40 MG/1
40 TABLET ORAL DAILY
Qty: 90 TABLET | Refills: 0 | Status: SHIPPED | OUTPATIENT
Start: 2024-10-07

## 2024-10-08 ENCOUNTER — PATIENT OUTREACH (OUTPATIENT)
Dept: CARE COORDINATION | Facility: CLINIC | Age: 69
End: 2024-10-08
Payer: COMMERCIAL

## 2024-10-23 ENCOUNTER — PATIENT OUTREACH (OUTPATIENT)
Dept: GASTROENTEROLOGY | Facility: CLINIC | Age: 69
End: 2024-10-23
Payer: COMMERCIAL

## 2024-10-23 DIAGNOSIS — Z12.11 SPECIAL SCREENING FOR MALIGNANT NEOPLASMS, COLON: Primary | ICD-10-CM

## 2024-10-23 NOTE — PROGRESS NOTES
"CRC Screening Colonoscopy Referral Review    Patient meets the inclusion criteria for screening colonoscopy standing order.    Ordering/Referring Provider:  Kori Em      BMI: Estimated body mass index is 31.88 kg/m  as calculated from the following:    Height as of 12/5/23: 1.67 m (5' 5.75\").    Weight as of 1/31/24: 88.9 kg (196 lb).     Sedation:  Does patient have any of the following conditions affecting sedation?  No medical conditions affecting sedation.    Previous Scopes:  Any previous recommendations or follow up needs based on previous scope?  na / No recommendations.    Medical Concerns to Postpone Order:  Does patient have any of the following medical concerns that should postpone/delay colonoscopy referral?  No medical conditions affecting colonoscopy referral.    Final Referral Details:  Based on patient's medical history patient is appropriate for referral order with moderate sedation. If patient's BMI > 50 do not schedule in ASC.  "

## 2024-11-12 DIAGNOSIS — R00.2 PALPITATIONS: ICD-10-CM

## 2024-11-12 RX ORDER — METOPROLOL TARTRATE 50 MG
50 TABLET ORAL 2 TIMES DAILY
Qty: 180 TABLET | Refills: 0 | Status: SHIPPED | OUTPATIENT
Start: 2024-11-12

## 2024-11-12 NOTE — LETTER
November 14, 2024    Georgie Patino  [address] [place] [id number]      Dear Georgie Patino,     Your provider has sent a refill of metoprolol tartrate (LOPRESSOR) 50 MG tablet. You are due for a Wellness appointment for further refills.  Please contact the clinic to schedule an appointment for further refills. Please call our scheduling line at [phone] or you can schedule via [employer].         Your Health Care Team,    Team Blue

## 2024-11-14 NOTE — TELEPHONE ENCOUNTER
Called pt, lvm, and sent letter. Please help pt schedule wellness visit with pcp.    Deepa CABRERA MA

## 2024-12-11 ENCOUNTER — ANCILLARY PROCEDURE (OUTPATIENT)
Dept: MAMMOGRAPHY | Facility: CLINIC | Age: 69
End: 2024-12-11
Attending: PHYSICIAN ASSISTANT
Payer: COMMERCIAL

## 2024-12-11 DIAGNOSIS — Z12.31 VISIT FOR SCREENING MAMMOGRAM: ICD-10-CM

## 2024-12-11 PROCEDURE — 77067 SCR MAMMO BI INCL CAD: CPT | Mod: TC | Performed by: RADIOLOGY

## 2024-12-11 PROCEDURE — 77063 BREAST TOMOSYNTHESIS BI: CPT | Mod: TC | Performed by: RADIOLOGY

## 2024-12-12 ENCOUNTER — TELEPHONE (OUTPATIENT)
Dept: FAMILY MEDICINE | Facility: CLINIC | Age: 69
End: 2024-12-12
Payer: COMMERCIAL

## 2024-12-12 NOTE — LETTER
December 12, 2024    To  Georgie Patino  [address] [place] [phone]    Your team at [facility] cares about your health. We have reviewed your chart and based on our findings; we are making the following recommendations to better manage your health.     You are in particular need of attention regarding the following:     HYPERTENSION FOLLOW UP: Blood pressure check with nurse  Visit your closest [place] pharmacy for BP check  Use your home Blood Pressure monitor and call us with your results or send them through [employer].  PREVENTATIVE VISIT: Annual Medicare Wellness:Schedule an Annual Medicare Wellness Exam. Please call your [facility] clinic to set up your appointment.    If you have already completed these items, please contact the clinic via phone or   [employer] so your care team can review and update your records. Thank you for   choosing [facility] Clinics for your healthcare needs. For any questions,   concerns, or to schedule an appointment please contact our clinic.    Healthy Regards,      Your [facility] Care Team

## 2024-12-12 NOTE — TELEPHONE ENCOUNTER
Patient Quality Outreach    Patient is due for the following:   Hypertension -  BP check  Physical Annual Wellness Visit      Topic Date Due    Zoster (Shingles) Vaccine (1 of 2) Never done    Diptheria Tetanus Pertussis (DTAP/TDAP/TD) Vaccine (2 - Td or Tdap) 05/30/2022    Flu Vaccine (1) 09/01/2024    COVID-19 Vaccine (4 - 2024-25 season) 09/01/2024       Action(s) Taken:   Schedule a nurse only visit for bp check     Type of outreach:    Sent [employer] message.    Questions for provider review:    None           Deepa Leal CMA  Chart routed to Care Team.

## 2025-01-04 DIAGNOSIS — Z98.890 S/P ASCENDING AORTIC ANEURYSM REPAIR: ICD-10-CM

## 2025-01-04 DIAGNOSIS — Z86.79 S/P ASCENDING AORTIC ANEURYSM REPAIR: ICD-10-CM

## 2025-01-04 DIAGNOSIS — I10 BENIGN ESSENTIAL HYPERTENSION: ICD-10-CM

## 2025-01-05 ENCOUNTER — HEALTH MAINTENANCE LETTER (OUTPATIENT)
Age: 70
End: 2025-01-05

## 2025-01-06 RX ORDER — AMLODIPINE BESYLATE 5 MG/1
5 TABLET ORAL DAILY
Qty: 90 TABLET | Refills: 0 | Status: SHIPPED | OUTPATIENT
Start: 2025-01-06

## 2025-01-06 RX ORDER — LISINOPRIL 40 MG/1
40 TABLET ORAL DAILY
Qty: 90 TABLET | Refills: 0 | Status: SHIPPED | OUTPATIENT
Start: 2025-01-06

## 2025-02-06 ENCOUNTER — OFFICE VISIT (OUTPATIENT)
Dept: FAMILY MEDICINE | Facility: CLINIC | Age: 70
End: 2025-02-06
Payer: COMMERCIAL

## 2025-02-06 VITALS
TEMPERATURE: 97 F | WEIGHT: 199.6 LBS | HEIGHT: 65 IN | HEART RATE: 65 BPM | BODY MASS INDEX: 33.26 KG/M2 | DIASTOLIC BLOOD PRESSURE: 81 MMHG | OXYGEN SATURATION: 98 % | RESPIRATION RATE: 20 BRPM | SYSTOLIC BLOOD PRESSURE: 155 MMHG

## 2025-02-06 DIAGNOSIS — Z01.818 PREOP GENERAL PHYSICAL EXAM: Primary | ICD-10-CM

## 2025-02-06 DIAGNOSIS — E66.01 MORBID OBESITY (H): ICD-10-CM

## 2025-02-06 DIAGNOSIS — Z98.890 S/P ASCENDING AORTIC ANEURYSM REPAIR: ICD-10-CM

## 2025-02-06 DIAGNOSIS — I10 BENIGN ESSENTIAL HYPERTENSION: ICD-10-CM

## 2025-02-06 DIAGNOSIS — Z86.79 S/P ASCENDING AORTIC ANEURYSM REPAIR: ICD-10-CM

## 2025-02-06 DIAGNOSIS — R94.31 ABNORMAL FINDING ON EKG: ICD-10-CM

## 2025-02-06 DIAGNOSIS — E78.5 HYPERLIPIDEMIA LDL GOAL <70: ICD-10-CM

## 2025-02-06 LAB
ALBUMIN SERPL BCG-MCNC: 4.1 G/DL (ref 3.5–5.2)
ALP SERPL-CCNC: 96 U/L (ref 40–150)
ALT SERPL W P-5'-P-CCNC: 21 U/L (ref 0–50)
ANION GAP SERPL CALCULATED.3IONS-SCNC: 10 MMOL/L (ref 7–15)
AST SERPL W P-5'-P-CCNC: 28 U/L (ref 0–45)
BILIRUB SERPL-MCNC: 0.6 MG/DL
BUN SERPL-MCNC: 26.1 MG/DL (ref 8–23)
CALCIUM SERPL-MCNC: 9.6 MG/DL (ref 8.8–10.4)
CHLORIDE SERPL-SCNC: 108 MMOL/L (ref 98–107)
CHOLEST SERPL-MCNC: 211 MG/DL
CREAT SERPL-MCNC: 0.61 MG/DL (ref 0.51–0.95)
EGFRCR SERPLBLD CKD-EPI 2021: >90 ML/MIN/1.73M2
FASTING STATUS PATIENT QL REPORTED: NO
FASTING STATUS PATIENT QL REPORTED: NO
GLUCOSE SERPL-MCNC: 107 MG/DL (ref 70–99)
HCO3 SERPL-SCNC: 26 MMOL/L (ref 22–29)
HDLC SERPL-MCNC: 56 MG/DL
LDLC SERPL CALC-MCNC: 137 MG/DL
NONHDLC SERPL-MCNC: 155 MG/DL
POTASSIUM SERPL-SCNC: 4.9 MMOL/L (ref 3.4–5.3)
PROT SERPL-MCNC: 7.3 G/DL (ref 6.4–8.3)
SODIUM SERPL-SCNC: 144 MMOL/L (ref 135–145)
TRIGL SERPL-MCNC: 88 MG/DL

## 2025-02-06 RX ORDER — OMEPRAZOLE 20 MG/1
20 CAPSULE, DELAYED RELEASE ORAL DAILY
COMMUNITY
Start: 2025-02-06

## 2025-02-06 NOTE — PROGRESS NOTES
[facility]  [facility]  [address] [facility]  [place] [address]  Phone: [phone]  Primary Provider: Kori Em PA-C  Pre-op Performing Provider: Kori Em PA-C  Feb 6, 2025 2/6/2025   Surgical Information   What procedure is being done? kidney stone removal   Facility or Hospital where procedure/surgery will be performed: [facility]   Who is doing the procedure / surgery? dr dye   Date of surgery / procedure: feb 27 2025   Time of surgery / procedure: kidney stone removal   Where do you plan to recover after surgery? at home with family     Fax number for surgical facility: [phone]    Assessment & Plan     The proposed surgical procedure is considered INTERMEDIATE risk.    Preop general physical exam  EKG performed in clinic with new ST depression in leads V4 and V5 when compared to previous EKGs. Patient has appointment scheduled with cardiology 2/17/25, recommend additional clearance from cardiology. She discontinued hydrochlorothiazide and Crestor due to side effects and has not been on these medications for several months. Has continued lisinopril, metoprolol, and amlodipine as directed. Home blood pressure readings have been WNL; advised patient to keep BP log and bring with her to appointment.    S/P ascending aortic aneurysm repair  See above.  - EKG 12-lead complete w/read - Clinics    Hyperlipidemia LDL goal <70  Benign essential hypertension  See above.   - Lipid panel reflex to direct LDL Non-fasting; Future  - Comprehensive metabolic panel; Future    Morbid obesity (H)  Abnormal finding on EKG     - No identified additional risk factors other than previously addressed    Preoperative Medication Instructions  Antiplatelet or Anticoagulation Medication Instructions   - aspirin: Discontinue aspirin 7 days prior to procedure to reduce bleeding risk. It should be resumed postoperatively.     Additional Medication Instructions  Take  all scheduled medications on the day of surgery    Recommendation  Patient referred to Cardiology (appointment 25) for evaluation before surgery. Surgery approval pending completion of consultation.    The longitudinal plan of care for the diagnosis(es)/condition(s) as documented were addressed during this visit. Due to the added complexity in care, I will continue to support Julianna in the subsequent management and with ongoing continuity of care.    Subjective   Julianna is a 69 year old, presenting for the followin year old female who presents today for preoperative visit for left PC nephrolithotomy and right stent removal at [facility] with Chraley Samano.     Hypertension, hyperlipidemia, s/p AAA repair (), and former smoker. Managed by Dr. Whitlock (Cardiology), last seen 2024. Continues lisinopril daily, amlodipine daily, and metoprolol daily, and aspirin daily. Started HCTZ 25 mg daily and Crestor daily following last cardiology visit. Discontinued hydrochlorothiazide d/t lightheadedness/dizziness and discontinued Crestor d/t GI upset (6 mo ago). Has cardiology follow up scheduled for 25. Checking BP twice daily, most recently this morning noted 135/85, which is typically what she measures.    Continues omeprazole daily without breakthrough heartburn, stable.     Previously-noted bilateral knee pain is improving. Hx right TKA in . Knee pain has been a barrier for physical activity in the past. Additionally struggling with lumbar pain w/ radiation to right hip. Unable to express exercise tolerance as pain limits her activity.     Pre-Op Exam          2025     8:13 AM   Additional Questions   Roomed by An V.         2025     8:13 AM   Patient Reported Additional Medications   Patient reports taking the following new medications none     HPI related to upcoming procedure: Patient with ureteral stone requiring cystoscopy, retrograde pyelogram and percutaneous nephrolithotomy          2/6/2025   Pre-Op Questionnaire   Have you ever had a heart attack or stroke? No   Have you ever had surgery on your heart or blood vessels, such as a stent placement, a coronary artery bypass, or surgery on an artery in your head, neck, heart, or legs? (!) YES    Do you have chest pain with activity? No   Do you have a history of heart failure? No   Do you currently have a cold, bronchitis or symptoms of other infection? No   Do you have a cough, shortness of breath, or wheezing? No   Do you or anyone in your family have previous history of blood clots? No   Do you or does anyone in your family have a serious bleeding problem such as prolonged bleeding following surgeries or cuts? No   Have you ever had problems with anemia or been told to take iron pills? No   Have you had any abnormal blood loss such as black, tarry or bloody stools, or abnormal vaginal bleeding? No   Have you ever had a blood transfusion? (!) YES   Have you ever had a transfusion reaction? No   Are you willing to have a blood transfusion if it is medically needed before, during, or after your surgery? Yes   Have you or any of your relatives ever had problems with anesthesia? No   Do you have sleep apnea, excessive snoring or daytime drowsiness? No   Do you have any artifical heart valves or other implanted medical devices like a pacemaker, defibrillator, or continuous glucose monitor? No   Do you have artificial joints? No   Are you allergic to latex? No     Health Care Directive  Patient does not have a Health Care Directive: Discussed advance care planning with patient; however, patient declined at this time.    Preoperative Review of    reviewed - controlled substances reflected in medication list.          Patient Active Problem List    Diagnosis Date Noted    S/P TKR (total knee replacement), right 10/02/2023     Priority: Medium    Screening for cervical cancer 04/01/2021     Priority: Medium     10/28/04 [place] Bx - Negative.  NIL Pap, Neg HPV (Care Everywhere)  2012 NIL Pap  2016 NIL Pap, Neg HPV  2021 NIL Pap, Neg HPV.         Thyroid nodule 11/20/2017     Priority: Medium    S/P ascending aortic aneurysm repair 11/07/2017     Priority: Medium    Former smoker 11/07/2017     Priority: Medium    Anxiety 10/17/2017     Priority: Medium    Hypertension 09/21/2017     Priority: Medium    Family history of sudden cardiac death 08/15/2017     Priority: Medium    Benign essential hypertension 01/27/2016     Priority: Medium    CARDIOVASCULAR SCREENING; LDL GOAL LESS THAN 160 05/30/2012     Priority: Medium    Morbid obesity (H) 05/30/2012     Priority: Medium      Past Medical History:   Diagnosis Date    Ascending aortic aneurysm 09/21/2017    Blood transfusions age 19    due to menorhagia    History of blood transfusion     Hypertension 09/21/2017    Localized osteoarthritis of both knees     Thyroid nodule 11/20/2017    Tobacco abuse      Past Surgical History:   Procedure Laterality Date    ARTHROPLASTY KNEE Right 10/2/2023    Procedure: RIGHT TOTAL KNEE ARTHROPLASTY;  Surgeon: Ross Oro MD;  Location:  OR    COLONOSCOPY WITH CO2 INSUFFLATION N/A 03/15/2017    Procedure: COLONOSCOPY WITH CO2 INSUFFLATION;  Surgeon: Duane, William Charles, MD;  Location:  OR    DILATION AND CURETTAGE  age 20    EXTRACORPOREAL SHOCK WAVE LITHOTRIPSY, CYSTOSCOPY, INSERT STENT URETER(S), COMBINED Bilateral 11/19/2018    Procedure: BILATERAL EXTRACORPOREAL SHOCK WAVE LITHOTRIPSY, CYSTOSCOPY, LEFT STENT PLACEMENT;  Surgeon: Tavo Saavedra MD;  Location:  OR    GI SURGERY  3/15/2017    GYN SURGERY  age 20    REPAIR ANEURYSM ASCENDING AORTA N/A 10/27/2017    Procedure: REPAIR ANEURYSM ASCENDING AORTA;  Median Sternotomy, Cardiopulmonary bypass, Ascending Aorta Aneurysm Repair using Hemashield Platinum Woven Double Velour Graft 34cm X 30cm.;  Surgeon: Rubio Piña MD;  Location: [facility] OR    VASCULAR SURGERY  10/27/2017    Maicol  teeth removed       Current Outpatient Medications   Medication Sig Dispense Refill    acetaminophen (TYLENOL) 325 MG tablet Take 2 tablets (650 mg) by mouth every 4 hours as needed for other (mild pain) 100 tablet 0    amLODIPine (NORVASC) 5 MG tablet TAKE ONE TABLET BY MOUTH ONE TIME DAILY 90 tablet 0    aspirin (ASA) 325 MG EC tablet Take 1 tablet (325 mg) by mouth 2 times daily (with meals) 60 tablet 0    Blood Pressure Monitor KIT 1 kit daily 1 kit 0    diphenhydrAMINE-acetaminophen (TYLENOL PM)  MG tablet Take 1 tablet by mouth nightly as needed for sleep      lisinopril (ZESTRIL) 40 MG tablet TAKE ONE TABLET BY MOUTH ONE TIME DAILY 90 tablet 0    metoprolol tartrate (LOPRESSOR) 50 MG tablet TAKE ONE TABLET BY MOUTH TWICE DAILY 180 tablet 0    hydrochlorothiazide (HYDRODIURIL) 25 MG tablet Take 1 tablet (25 mg) by mouth daily (Patient not taking: Reported on 2/6/2025) 90 tablet 3    meloxicam (MOBIC) 15 MG tablet Take 1 tablet (15 mg) by mouth daily (Patient not taking: Reported on 2/6/2025) 15 tablet 0    omeprazole (PRILOSEC) 20 MG DR capsule Take 20 mg by mouth daily (Patient not taking: Reported on 2/6/2025)      rosuvastatin (CRESTOR) 10 MG tablet Take 1 tablet (10 mg) by mouth daily (Patient not taking: Reported on 2/6/2025) 90 tablet 3       Allergies   Allergen Reactions    Blood Transfusion Related (Informational Only) Other (See Comments)     Patient has a history of a clinically significant antibody against RBC antigens.  A delay in compatible RBCs may occur.        Social History     Tobacco Use    Smoking status: Former     Current packs/day: 0.05     Average packs/day: 0.1 packs/day for 40.0 years (2.0 ttl pk-yrs)     Types: Other, Cigarettes    Smokeless tobacco: Current    Tobacco comments:     E cig   Substance Use Topics    Alcohol use: Yes     Alcohol/week: 0.8 - 1.7 standard drinks of alcohol     Types: 1 - 2 Standard drinks or equivalent per week     Comment: 3-4 drinks per week   "      History   Drug Use    Types: Marijuana             Review of Systems  CONSTITUTIONAL: NEGATIVE for fever, chills, or unexpected change in weight  ENT/MOUTH: NEGATIVE for ear, mouth and throat problems  RESP: NEGATIVE for significant cough or SOB  CV: NEGATIVE for chest pain, palpitations or peripheral edema    Objective    BP (!) 155/81   Pulse 65   Temp 97  F (36.1  C) (Temporal)   Resp 20   Ht 1.66 m (5' 5.35\")   Wt 90.5 kg (199 lb 9.6 oz)   SpO2 98%   BMI 32.86 kg/m     Estimated body mass index is 32.86 kg/m  as calculated from the following:    Height as of this encounter: 1.66 m (5' 5.35\").    Weight as of this encounter: 90.5 kg (199 lb 9.6 oz).  Physical Exam  GENERAL: alert and no distress  EYES: Eyes grossly normal to inspection, PERRL and conjunctivae and sclerae normal  HENT: ear canals are patent and TM's normal, nose and mouth without ulcers or lesions  NECK: no adenopathy, no asymmetry, masses, or scars  RESP: lungs clear to auscultation - no rales, rhonchi or wheezes  CV: regular rate and rhythm, normal S1 S2, no S3 or S4, no murmur, click or rub, no peripheral edema  ABDOMEN: soft, nontender, no hepatosplenomegaly, no masses   MS: no gross musculoskeletal defects noted, no edema  SKIN: exposed skin with no suspicious lesions or rashes  NEURO: mentation intact and speech normal  PSYCH: affect normal/bright    Recent Labs   Lab Test 03/06/24  1010      POTASSIUM 3.6   CR 0.62        Diagnostics  Labs pending at this time.  Results will be reviewed when available.   EKG required for known coronary heart disease and not completed in the last 90 days.     Revised Cardiac Risk Index (RCRI)  The patient has the following serious cardiovascular risks for perioperative complications:   - No serious cardiac risks = 0 points     RCRI Interpretation: 0 points: Class I (very low risk - 0.4% complication rate)         Signed Electronically by: PARISA Shi PA-S  A copy of " this evaluation report is provided to the requesting physician.  The student FILIBERTO Melendez acted as a scribe and the encounter documented above was completely performed by myself and the documentation reflects the work I have performed today.   Kori Em PA-C

## 2025-02-06 NOTE — PATIENT INSTRUCTIONS
How to Take Your Medication Before Surgery  Preoperative Medication Instructions   Antiplatelet or Anticoagulation Medication Instructions   - aspirin: Discontinue aspirin 7 days prior to procedure to reduce bleeding risk. It should be resumed postoperatively.     Additional Medication Instructions  Take all scheduled medications on the day of surgery     You should stop using any Ibuprofen (Advil), Naproxen (Aleve) or Aspirin containing products 7 days before your procedure. You may use Tylenol (Acetaminophen) as needed. Maximum Tylenol dose is 1000mg every 8 hours.     Patient Education   Preparing for Your Surgery  For Adults  Getting started  In most cases, a nurse will call to review your health history and instructions. They will give you an arrival time based on your scheduled surgery time. Please be ready to share:  Your doctor's clinic name and phone number  Your medical, surgical, and anesthesia history  A list of allergies and sensitivities  A list of medicines, including herbal treatments and over-the-counter drugs  Whether the patient has a legal guardian (ask how to send us the papers in advance)  Note: You may not receive a call if you were seen at our PAC (Preoperative Assessment Center).  Please tell us if you're pregnant--or if there's any chance you might be pregnant. Some surgeries may injure a fetus (unborn baby), so they require a pregnancy test. Surgeries that are safe for a fetus don't always need a test, and you can choose whether to have one.   Preparing for surgery  Within 10 to 30 days of surgery: Have a pre-op exam (sometimes called an H&P, or History and Physical). This can be done at a clinic or pre-operative center.  If you're having a , you may not need this exam. Talk to your care team.  At your pre-op exam, talk to your care team about all medicines you take. (This includes CBD oil and any drugs, such as THC, marijuana, and other forms of cannabis.) If you need to stop any  medicine before surgery, ask when to start taking it again.  This is for your safety. Many medicines and drugs can make you bleed too much during surgery. Some change how well surgery (anesthesia) drugs work.  Call your insurance company to let them know you're having surgery. (If you don't have insurance, call [phone].)  Call your clinic if there's any change in your health. This includes a scrape or scratch near the surgery site, or any signs of a cold (sore throat, runny nose, cough, rash, fever).  Eating and drinking guidelines  For your safety: Unless your surgeon tells you otherwise, follow the guidelines below.  Eat and drink as normal until 8 hours before you arrive for surgery. After that, no food or milk. You can spit out gum when you arrive.  Drink clear liquids until 2 hours before you arrive. These are liquids you can see through, like water, Gatorade, and Propel Water. They also include plain black coffee and tea (no cream or milk).  No alcohol for 24 hours before you arrive. The night before surgery, stop any drinks that contain THC.  If your care team tells you to take medicine on the morning of surgery, it's okay to take it with a sip of water. No other medicines or drugs are allowed (including CBD oil)--follow your care team's instructions.  If you have questions the day of surgery, call your hospital or surgery center.   Preventing infection  Shower or bathe the night before and the morning of surgery. Follow the instructions your clinic gave you. (If no instructions, use regular soap.)  Don't shave or clip hair near your surgery site. We'll remove the hair if needed.  Don't smoke or vape the morning of surgery. No chewing tobacco for 6 hours before you arrive. A nicotine patch is okay. You may spit out nicotine gum when you arrive.  For some surgeries, the surgeon will tell you to fully quit smoking and nicotine.  We will make every effort to keep you safe from infection. We will:  Clean our  hands often with soap and water (or an alcohol-based hand rub).  Clean the skin at your surgery site with a special soap that kills germs.  Give you a special gown to keep you warm. (Cold raises the risk of infection.)  Wear hair covers, masks, gowns, and gloves during surgery.  Give antibiotic medicine, if prescribed. Not all surgeries need this medicine.  What to bring on the day of surgery  Photo ID and insurance card  Copy of your health care directive, if you have one  Glasses and hearing aids (bring cases)  You can't wear contacts during surgery  Inhaler and eye drops, if you use them (tell us about these when you arrive)  CPAP machine or breathing device, if you use them  A few personal items, if spending the night  If you have . . .  A pacemaker, ICD (cardiac defibrillator), or other implant: Bring the ID card.  An implanted stimulator: Bring the remote control.  A legal guardian: Bring a copy of the certified (court-stamped) guardianship papers.  Please remove any jewelry, including body piercings. Leave jewelry and other valuables at home.  If you're going home the day of surgery  You must have a responsible adult drive you home. They should stay with you overnight as well.  If you don't have someone to stay with you, and you aren't safe to go home alone, we may keep you overnight. Insurance often won't pay for this.  After surgery  If it's hard to control your pain or you need more pain medicine, please call your surgeon's office.  Questions?   If you have any questions for your care team, list them here:   ____________________________________________________________________________________________________________________________________________________________________________________________________________________________________________________________  For informational purposes only. Not to replace the advice of your health care provider. Copyright   2003, 2019 [facility]. All rights  reserved. Clinically reviewed by José Manuel Patel MD. [employer] [id number] - REV 08/24.

## 2025-02-17 ENCOUNTER — TELEPHONE (OUTPATIENT)
Dept: CARDIOLOGY | Facility: CLINIC | Age: 70
End: 2025-02-17

## 2025-02-17 NOTE — TELEPHONE ENCOUNTER
Attempted to call patient. Unable to reach patient.    Vilma Pacheco, RN, BSN  Cardiology RN Care Coordinator   [place]   Phone: [phone]  Fax: [phone] ([place]) [phone] ([place])

## 2025-02-17 NOTE — TELEPHONE ENCOUNTER
[facility] Call Center    Phone Message    May a detailed message be left on voicemail: yes     Reason for Call: Other: Patient stated they are having surgery for kidney stone removal on 02/27, and will like to know if they are needing to see Dr. Whitlock before they are able to have this surgery. Please call patient back to further discuss.      Action Taken: Other: Cardiology    Travel Screening: Not Applicable     Thank you!  [facility]

## 2025-02-18 ENCOUNTER — TELEPHONE (OUTPATIENT)
Dept: FAMILY MEDICINE | Facility: CLINIC | Age: 70
End: 2025-02-18
Payer: COMMERCIAL

## 2025-02-18 DIAGNOSIS — I10 BENIGN ESSENTIAL HYPERTENSION: ICD-10-CM

## 2025-02-18 DIAGNOSIS — R94.31 ABNORMAL FINDING ON EKG: ICD-10-CM

## 2025-02-18 DIAGNOSIS — Z86.79 S/P ASCENDING AORTIC ANEURYSM REPAIR: Primary | ICD-10-CM

## 2025-02-18 DIAGNOSIS — Z98.890 S/P ASCENDING AORTIC ANEURYSM REPAIR: Primary | ICD-10-CM

## 2025-02-18 NOTE — TELEPHONE ENCOUNTER
Patient should be able to be seen for cardiac clearance for surgery- not her usual follow up sooner.   I can place the referral for that.   I can not clear her.   Kori Em PA-C

## 2025-02-18 NOTE — TELEPHONE ENCOUNTER
Zoondy message sent to the patient with message from PCP below and cardiology scheduling phone number.    Yuliya TOMAS RN  [facility]

## 2025-02-18 NOTE — TELEPHONE ENCOUNTER
Patient had to cancel Cardiology appointment due to family emergency. Cannot get scheduled again until July.    Patient wondering if you can clear the patient or will she have to reschedule the surgery?    Can leave message on mychart.    Diana Zarate RN on 2/18/2025 at 11:39 AM

## 2025-02-19 NOTE — TELEPHONE ENCOUNTER
Called and spoke with patient. See other telephone encounter for further follow up.    Vilma Pacheco, RN, BSN  Cardiology RN Care Coordinator   [place]   Phone: [phone]  Fax: [phone] ([place]) [phone] ([place])

## 2025-02-25 ENCOUNTER — OFFICE VISIT (OUTPATIENT)
Dept: CARDIOLOGY | Facility: CLINIC | Age: 70
End: 2025-02-25
Payer: COMMERCIAL

## 2025-02-25 VITALS
DIASTOLIC BLOOD PRESSURE: 84 MMHG | WEIGHT: 199 LBS | BODY MASS INDEX: 32.76 KG/M2 | OXYGEN SATURATION: 100 % | HEART RATE: 67 BPM | SYSTOLIC BLOOD PRESSURE: 150 MMHG

## 2025-02-25 DIAGNOSIS — Z98.890 S/P ASCENDING AORTIC ANEURYSM REPAIR: ICD-10-CM

## 2025-02-25 DIAGNOSIS — E78.5 HYPERLIPIDEMIA LDL GOAL <70: ICD-10-CM

## 2025-02-25 DIAGNOSIS — Z86.79 S/P ASCENDING AORTIC ANEURYSM REPAIR: ICD-10-CM

## 2025-02-25 DIAGNOSIS — I10 BENIGN ESSENTIAL HYPERTENSION: ICD-10-CM

## 2025-02-25 DIAGNOSIS — Z01.810 PREOP CARDIOVASCULAR EXAM: Primary | ICD-10-CM

## 2025-02-25 PROCEDURE — 99214 OFFICE O/P EST MOD 30 MIN: CPT

## 2025-02-25 PROCEDURE — 3077F SYST BP >= 140 MM HG: CPT

## 2025-02-25 PROCEDURE — 3078F DIAST BP <80 MM HG: CPT

## 2025-02-25 RX ORDER — ATORVASTATIN CALCIUM 10 MG/1
10 TABLET, FILM COATED ORAL DAILY
Qty: 90 TABLET | Refills: 3 | Status: SHIPPED | OUTPATIENT
Start: 2025-02-25

## 2025-02-25 RX ORDER — AMLODIPINE BESYLATE 5 MG/1
5 TABLET ORAL DAILY
Qty: 90 TABLET | Refills: 0 | Status: SHIPPED | OUTPATIENT
Start: 2025-02-25

## 2025-02-25 RX ORDER — LISINOPRIL 40 MG/1
40 TABLET ORAL DAILY
Qty: 90 TABLET | Refills: 0 | Status: SHIPPED | OUTPATIENT
Start: 2025-02-25

## 2025-02-25 RX ORDER — METOPROLOL TARTRATE 50 MG
50 TABLET ORAL 2 TIMES DAILY
Qty: 180 TABLET | Refills: 0 | Status: SHIPPED | OUTPATIENT
Start: 2025-02-25

## 2025-02-25 RX ORDER — ASPIRIN 81 MG/1
81 TABLET ORAL DAILY
COMMUNITY

## 2025-02-25 NOTE — LETTER
2025      RE: Georgie Patino  [address] [place] [id number]       Dear Colleague,    Thank you for the opportunity to participate in the care of your patient, Georgie Patino, at the [facility]. Please see a copy of my visit note below.        [facility]    Patient Name: Georgie Patino   : 1955   Date of Visit: 2025  Primary Care Physician: Kori Em PA-C         Georgie Patino is a pleasant 69 year old female, who presents for pre-operative cardiac evaluation. Prior history significant for hypertension, hyperlipidemia, s/p AAA repair (), and former smoker.    At most recent cardiology clinic visit in 2024 with Dr. Whitlock, patient was started on hydrochlorothiazide 25 mg daily and rosuvastatin 10 mg daily. She was also advised to stop vaping. Discontinued hydrochlorothiazide d/t lightheadedness/dizziness and discontinued Crestor d/t GI upset (6 mo ago).    Georgie is scheduled for cystoscopy, right retrograde pyelogram, and right percutaneous nephrolithostomy on  with Dr. Moon at [facility].    Today in clinic, Julianna denies any cardiac symptoms. No chest pain, palpitations, SOB/RAMON, dizziness, or edema. She says she currently has a UTI on top of her kidney stones so she has been feeling fatigued with this infection (on antibiotics).    She goes on walks normally for exercise, but this winter has been limited by weather and not feeling well due to her kidney stones. Julianna does need to go down stairs to do laundry, as well as does heavy household chores like vacuuming and mopping without any cardiac concerns.    Home BPs: usually 130/70, not often over 140     Current Cardiac Medications  Amlodipine 5 mg daily   mg daily  Lisinopril 40 mg daily  Lopressor 50 mg BID      PAST MEDICAL HISTORY:  Past Medical History:   Diagnosis Date     Ascending aortic aneurysm  09/21/2017     Blood transfusions age 19    due to menorhagia     History of blood transfusion      Hypertension 09/21/2017     Localized osteoarthritis of both knees      Thyroid nodule 11/20/2017     Tobacco abuse        PAST SURGICAL HISTORY:  Past Surgical History:   Procedure Laterality Date     ARTHROPLASTY KNEE Right 10/2/2023    Procedure: RIGHT TOTAL KNEE ARTHROPLASTY;  Surgeon: Ross Oro MD;  Location:  OR     COLONOSCOPY WITH CO2 INSUFFLATION N/A 03/15/2017    Procedure: COLONOSCOPY WITH CO2 INSUFFLATION;  Surgeon: Duane, William Charles, MD;  Location:  OR     DILATION AND CURETTAGE  age 20     EXTRACORPOREAL SHOCK WAVE LITHOTRIPSY, CYSTOSCOPY, INSERT STENT URETER(S), COMBINED Bilateral 11/19/2018    Procedure: BILATERAL EXTRACORPOREAL SHOCK WAVE LITHOTRIPSY, CYSTOSCOPY, LEFT STENT PLACEMENT;  Surgeon: Tavo Saavedra MD;  Location:  OR     GI SURGERY  3/15/2017     GYN SURGERY  age 20     REPAIR ANEURYSM ASCENDING AORTA N/A 10/27/2017    Procedure: REPAIR ANEURYSM ASCENDING AORTA;  Median Sternotomy, Cardiopulmonary bypass, Ascending Aorta Aneurysm Repair using Hemashield Platinum Woven Double Velour Graft 34cm X 30cm.;  Surgeon: Rubio Piña MD;  Location: [facility] OR     VASCULAR SURGERY  10/27/2017     [place] teeth removed         FAMILY HISTORY:  Family History   Problem Relation Age of Onset     Respiratory Mother         copd     Cancer Maternal Grandmother         Leg     Alzheimer Disease Maternal Grandfather      Heart Disease Paternal Grandfather      Heart Disease Brother 54        Heart Attack      Cancer Sister         Lung cancer age 55-smoker d age 57       SOCIAL HISTORY:  Social History     Socioeconomic History     Marital status:      Number of children: 3   Occupational History     Occupation: Day care   Tobacco Use     Smoking status: Former     Current packs/day: 0.05     Average packs/day: 0.1 packs/day for 40.0 years (2.0 ttl pk-yrs)     Types:  Other, Cigarettes     Smokeless tobacco: Current     Tobacco comments:     E cig   Vaping Use     Vaping status: Some Days   Substance and Sexual Activity     Alcohol use: Yes     Alcohol/week: 0.8 - 1.7 standard drinks of alcohol     Types: 1 - 2 Standard drinks or equivalent per week     Comment: 3-4 drinks per week      Drug use: Yes     Types: Marijuana     Sexual activity: Yes     Partners: Male     Birth control/protection: Surgical   Other Topics Concern     Parent/sibling w/ CABG, MI or angioplasty before 65F 55M? Yes     Social Drivers of Health     Financial Resource Strain: Low Risk  (9/25/2023)    Financial Resource Strain      Within the past 12 months, have you or your family members you live with been unable to get utilities (heat, electricity) when it was really needed?: No   Food Insecurity: Low Risk  (9/25/2023)    Food Insecurity      Within the past 12 months, did you worry that your food would run out before you got money to buy more?: No      Within the past 12 months, did the food you bought just not last and you didn t have money to get more?: No   Transportation Needs: Low Risk  (9/25/2023)    Transportation Needs      Within the past 12 months, has lack of transportation kept you from medical appointments, getting your medicines, non-medical meetings or appointments, work, or from getting things that you need?: No   Interpersonal Safety: Low Risk  (2/6/2025)    Interpersonal Safety      Do you feel physically and emotionally safe where you currently live?: Yes      Within the past 12 months, have you been hit, slapped, kicked or otherwise physically hurt by someone?: No      Within the past 12 months, have you been humiliated or emotionally abused in other ways by your partner or ex-partner?: No   Housing Stability: Low Risk  (9/25/2023)    Housing Stability      Do you have housing? : Yes      Are you worried about losing your housing?: No       MEDICATIONS:  Current Outpatient Medications    Medication Sig Dispense Refill     acetaminophen (TYLENOL) 325 MG tablet Take 2 tablets (650 mg) by mouth every 4 hours as needed for other (mild pain) 100 tablet 0     amLODIPine (NORVASC) 5 MG tablet TAKE ONE TABLET BY MOUTH ONE TIME DAILY 90 tablet 0     Blood Pressure Monitor KIT 1 kit daily 1 kit 0     diphenhydrAMINE-acetaminophen (TYLENOL PM)  MG tablet Take 1 tablet by mouth nightly as needed for sleep       lisinopril (ZESTRIL) 40 MG tablet TAKE ONE TABLET BY MOUTH ONE TIME DAILY 90 tablet 0     metoprolol tartrate (LOPRESSOR) 50 MG tablet TAKE ONE TABLET BY MOUTH TWICE DAILY 180 tablet 0     omeprazole (PRILOSEC) 20 MG DR capsule Take 1 capsule (20 mg) by mouth daily.       aspirin (ASA) 325 MG EC tablet Take 1 tablet (325 mg) by mouth 2 times daily (with meals) (Patient not taking: Reported on 2/25/2025) 60 tablet 0     rosuvastatin (CRESTOR) 10 MG tablet Take 1 tablet (10 mg) by mouth daily (Patient not taking: Reported on 2/6/2025) 90 tablet 3     No current facility-administered medications for this visit.        ALLERGIES:     Allergies   Allergen Reactions     Blood Transfusion Related (Informational Only) Other (See Comments)     Patient has a history of a clinically significant antibody against RBC antigens.  A delay in compatible RBCs may occur.       ROS:  A complete 10-point ROS was negative except as above.    PHYSICAL EXAM:  BP (!) 149/79 (BP Location: Right arm, Patient Position: Chair, Cuff Size: Adult Regular)   Pulse 67   Wt 90.3 kg (199 lb)   SpO2 100%   BMI 32.76 kg/m    Gen: alert, interactive, NAD  Neck: supple, no JVD  CV: RRR, no m/r/g  Chest: CTAB, no wheezes or crackles  Abd: soft, NT, ND  Ext: no LE edema    LABS:    CMP  Sodium   Date Value Ref Range Status   02/06/2025 144 135 - 145 mmol/L Final   03/25/2021 139 133 - 144 mmol/L Final     Potassium   Date Value Ref Range Status   02/06/2025 4.9 3.4 - 5.3 mmol/L Final   01/09/2023 4.0 3.4 - 5.3 mmol/L Final    03/25/2021 4.3 3.4 - 5.3 mmol/L Final     Chloride   Date Value Ref Range Status   02/06/2025 108 (H) 98 - 107 mmol/L Final   01/09/2023 110 (H) 94 - 109 mmol/L Final   03/25/2021 106 94 - 109 mmol/L Final     Carbon Dioxide   Date Value Ref Range Status   03/25/2021 31 20 - 32 mmol/L Final     Carbon Dioxide (CO2)   Date Value Ref Range Status   02/06/2025 26 22 - 29 mmol/L Final   01/09/2023 31 20 - 32 mmol/L Final     Anion Gap   Date Value Ref Range Status   02/06/2025 10 7 - 15 mmol/L Final   01/09/2023 3 3 - 14 mmol/L Final   03/25/2021 2 (L) 3 - 14 mmol/L Final     Glucose   Date Value Ref Range Status   02/06/2025 107 (H) 70 - 99 mg/dL Final   01/09/2023 111 (H) 70 - 99 mg/dL Final   03/25/2021 109 (H) 70 - 99 mg/dL Final     Urea Nitrogen   Date Value Ref Range Status   02/06/2025 26.1 (H) 8.0 - 23.0 mg/dL Final   01/09/2023 27 7 - 30 mg/dL Final   03/25/2021 28 7 - 30 mg/dL Final     Creatinine   Date Value Ref Range Status   02/06/2025 0.61 0.51 - 0.95 mg/dL Final   03/25/2021 0.62 0.52 - 1.04 mg/dL Final     GFR Estimate   Date Value Ref Range Status   02/06/2025 >90 >60 mL/min/1.73m2 Final     Comment:     eGFR calculated using 2021 CKD-EPI equation.   03/25/2021 >90 >60 mL/min/[1.73_m2] Final     Comment:     Non  GFR Calc  Starting 12/18/2018, serum creatinine based estimated GFR (eGFR) will be   calculated using the Chronic Kidney Disease Epidemiology Collaboration   (CKD-EPI) equation.       Calcium   Date Value Ref Range Status   02/06/2025 9.6 8.8 - 10.4 mg/dL Final   03/25/2021 9.5 8.5 - 10.1 mg/dL Final     Bilirubin Total   Date Value Ref Range Status   02/06/2025 0.6 <=1.2 mg/dL Final     Alkaline Phosphatase   Date Value Ref Range Status   02/06/2025 96 40 - 150 U/L Final     ALT   Date Value Ref Range Status   02/06/2025 21 0 - 50 U/L Final     AST   Date Value Ref Range Status   02/06/2025 28 0 - 45 U/L Final       CBC  CBC RESULTS:   Recent Labs   Lab Test 10/24/23  1110  "  WBC 6.1   RBC 4.93   HGB 14.0   HCT 44.9   MCV 91   MCH 28.4   MCHC 31.2*   RDW 14.3          TROP  No results found for: \"TROPI\", \"TROPONIN\", \"TROPR\", \"TROPN\"    LIPIDS  Recent Labs   Lab Test 02/06/25  0906 10/24/23  1110   CHOL 211* 198   HDL 56 49*   * 125*   TRIG 88 120       TSH  TSH   Date Value Ref Range Status   03/25/2021 0.44 0.40 - 4.00 mU/L Final       HBA1C  Lab Results   Component Value Date    A1C 5.1 10/24/2023    A1C 5.5 10/28/2017         CARDIAC DATA:    EKG 10/28/17:      EKG 2/6/25:      Echo 1/12/24:  Status post ascending aorta aneurysm repair using 34 mm Hemashield graft with  PFO closure (2017).     Left ventricular size, wall motion and function are normal. The ejection  fraction is 60-65%.  The right ventricle is normal size. Global right ventricular function is  normal.  Trace to mild aortic insufficiency is present.  Sinuses of Valsalva 4.1 (indexed at 2.05 cm/m2)  No pericardial effusion is present.     This study was compared with the study from 5/28/2021 . There has been no  change.    CTA Chest 12/8/22:  1. Patent ascending aortic graft. No leak.     2. Native aorta distal to the graft, proximal to the innominate  origin, measures 44 mm in diameter, previously 43 mm. Descending aorta  measures 39 mm at the diaphragm, previously 38 mm.     3. Unchanged 9 mm splenic artery aneurysm.     4. Unchanged focus of early hepatic enhancement in hepatic segment  7/8, likely representing a hemangioma.     5. No substantial change prominent and mildly enlarged mediastinal  lymph nodes, maybe reactive.        ASSESSMENT/PLAN:  In summary, Georgie MERE Pemelisa is here today with the following -      # Pre-operative cardiac evaluation    Blood pressures borderline at home, elevated in clinic today. May be secondary to pain, but home pressures remain slightly above goal prior to kidney stone issues. Patient reports no cardiac symptoms in clinic today. EKG from pre-op visit with PCP " compared with previous EKG on file from 10/28/17 and discussed with primary cardiologist Dr. Whitlock, similar waveform and no new significant findings.     1. Nephrolithostomy is a non-emergent procedure that is of low risk based on ACC/AHA guidelines.   2. The patient currently has no active unstable or decompensated cardiac conditions.  3. The patient's RCRI score is 0. Based on RCRI score, the risk of major cardiac risk complications are: 3.9%  4. Continue beta-blockers, but hold ACE-I on morning of surgery.  5. No further cardiac risk stratification testing is currently indicated prior to surgery.    After upcoming kidney stone procedures, recommended to monitor BP regularly. If top number remains above 130 regularly, can increase amlodipine to 10 mg daily.    Follow up:  - Dr. Whitlock in July as scheduled for annual visit      TESHA Vanegas, CIARANP-C  [facility]  Securely message with [employer]         Chart review time: 10 minutes  Visit time: 20 minutes  Chart completion/documentation: 4 minutes  Total time spent: 34 minutes       Please do not hesitate to contact me if you have any questions/concerns.     Sincerely,     TESHA Vanegas CNP

## 2025-02-25 NOTE — PATIENT INSTRUCTIONS
Thank you for coming to the [facility] [place]; please note the following instructions:    1. No further cardiac risk stratification testing is currently indicated prior to surgery.  2. After your upcoming kidney stone procedures, check your blood pressure regularly and let us know if it is still usually above 130 on the top, we can discuss increasing your amlodipine.      Follow up with Dr. Whitlock in July as scheduled, with fasting lipid lab prior.        If you have any questions regarding your visit please contact your care team:     Cardiology  Telephone Number   Jocelin MELTON, RN  Vilma CHAMORRO, RN  Sharlene GOMEZ, RN  [place] LUKE, RMGOPAL DAVIS, DAVID PAULINO, Clinic Facilitator  Jayna CHAMORRO, Clinic Facilitator [phone] (option 1)   For scheduling appts:     [phone] (select option 1)       For the Device Clinic (Pacemakers and ICD's)  RN's :  Marisabel Kaiser   During business hours: [phone]    *After business hours:  [phone] (select option 4)      Normal test result notifications will be released via [employer] or mailed within 7 business days.  All other test results, will be communicated via telephone once reviewed by your cardiologist.    If you need a medication refill please contact your pharmacy.  Please allow 3 business days for your refill to be completed.    As always, thank you for trusting us with your health care needs!

## 2025-02-25 NOTE — PROGRESS NOTES
[facility]    Patient Name: Georgie Patino   : 1955   Date of Visit: 2025  Primary Care Physician: Kori Em PA-C         Georgie Patino is a pleasant 69 year old female, who presents for pre-operative cardiac evaluation. Prior history significant for hypertension, hyperlipidemia, s/p AAA repair (), and former smoker.    At most recent cardiology clinic visit in 2024 with Dr. Whitlock, patient was started on hydrochlorothiazide 25 mg daily and rosuvastatin 10 mg daily. She was also advised to stop vaping. Discontinued hydrochlorothiazide d/t lightheadedness/dizziness and discontinued Crestor d/t GI upset (6 mo ago).    Georgie is scheduled for cystoscopy, right retrograde pyelogram, and right percutaneous nephrolithostomy on  with Dr. Moon at [facility].    Today in clinic, Julianna denies any cardiac symptoms. No chest pain, palpitations, SOB/RAMON, dizziness, or edema. She says she currently has a UTI on top of her kidney stones so she has been feeling fatigued with this infection (on antibiotics).    She goes on walks normally for exercise, but this winter has been limited by weather and not feeling well due to her kidney stones. Julianna does need to go down stairs to do laundry, as well as does heavy household chores like vacuuming and mopping without any cardiac concerns.    Home BPs: usually 130/70, not often over 140     Current Cardiac Medications  Amlodipine 5 mg daily   mg daily  Lisinopril 40 mg daily  Lopressor 50 mg BID      PAST MEDICAL HISTORY:  Past Medical History:   Diagnosis Date    Ascending aortic aneurysm 2017    Blood transfusions age 19    due to menorhagia    History of blood transfusion     Hypertension 2017    Localized osteoarthritis of both knees     Thyroid nodule 2017    Tobacco abuse        PAST SURGICAL HISTORY:  Past Surgical History:   Procedure Laterality Date    ARTHROPLASTY KNEE Right 10/2/2023    Procedure:  RIGHT TOTAL KNEE ARTHROPLASTY;  Surgeon: Ross Oro MD;  Location:  OR    COLONOSCOPY WITH CO2 INSUFFLATION N/A 03/15/2017    Procedure: COLONOSCOPY WITH CO2 INSUFFLATION;  Surgeon: Duane, William Charles, MD;  Location: [facility] OR    DILATION AND CURETTAGE  age 20    EXTRACORPOREAL SHOCK WAVE LITHOTRIPSY, CYSTOSCOPY, INSERT STENT URETER(S), COMBINED Bilateral 11/19/2018    Procedure: BILATERAL EXTRACORPOREAL SHOCK WAVE LITHOTRIPSY, CYSTOSCOPY, LEFT STENT PLACEMENT;  Surgeon: Tavo Saavedra MD;  Location: [facility] OR    GI SURGERY  3/15/2017    GYN SURGERY  age 20    REPAIR ANEURYSM ASCENDING AORTA N/A 10/27/2017    Procedure: REPAIR ANEURYSM ASCENDING AORTA;  Median Sternotomy, Cardiopulmonary bypass, Ascending Aorta Aneurysm Repair using Hemashield Platinum Woven Double Velour Graft 34cm X 30cm.;  Surgeon: Rubio Piña MD;  Location: [facility] OR    VASCULAR SURGERY  10/27/2017    [place] teeth removed         FAMILY HISTORY:  Family History   Problem Relation Age of Onset    Respiratory Mother         copd    Cancer Maternal Grandmother         Leg    Alzheimer Disease Maternal Grandfather     Heart Disease Paternal Grandfather     Heart Disease Brother 54        Heart Attack     Cancer Sister         Lung cancer age 57-smoker d age 57       SOCIAL HISTORY:  Social History     Socioeconomic History    Marital status:     Number of children: 3   Occupational History    Occupation: Day care   Tobacco Use    Smoking status: Former     Current packs/day: 0.05     Average packs/day: 0.1 packs/day for 40.0 years (2.0 ttl pk-yrs)     Types: Other, Cigarettes    Smokeless tobacco: Current    Tobacco comments:     E cig   Vaping Use    Vaping status: Some Days   Substance and Sexual Activity    Alcohol use: Yes     Alcohol/week: 0.8 - 1.7 standard drinks of alcohol     Types: 1 - 2 Standard drinks or equivalent per week     Comment: 3-4 drinks per week     Drug use: Yes     Types: Marijuana    Sexual  activity: Yes     Partners: Male     Birth control/protection: Surgical   Other Topics Concern    Parent/sibling w/ CABG, MI or angioplasty before 65F 55M? Yes     Social Drivers of Health     Financial Resource Strain: Low Risk  (9/25/2023)    Financial Resource Strain     Within the past 12 months, have you or your family members you live with been unable to get utilities (heat, electricity) when it was really needed?: No   Food Insecurity: Low Risk  (9/25/2023)    Food Insecurity     Within the past 12 months, did you worry that your food would run out before you got money to buy more?: No     Within the past 12 months, did the food you bought just not last and you didn t have money to get more?: No   Transportation Needs: Low Risk  (9/25/2023)    Transportation Needs     Within the past 12 months, has lack of transportation kept you from medical appointments, getting your medicines, non-medical meetings or appointments, work, or from getting things that you need?: No   Interpersonal Safety: Low Risk  (2/6/2025)    Interpersonal Safety     Do you feel physically and emotionally safe where you currently live?: Yes     Within the past 12 months, have you been hit, slapped, kicked or otherwise physically hurt by someone?: No     Within the past 12 months, have you been humiliated or emotionally abused in other ways by your partner or ex-partner?: No   Housing Stability: Low Risk  (9/25/2023)    Housing Stability     Do you have housing? : Yes     Are you worried about losing your housing?: No       MEDICATIONS:  Current Outpatient Medications   Medication Sig Dispense Refill    acetaminophen (TYLENOL) 325 MG tablet Take 2 tablets (650 mg) by mouth every 4 hours as needed for other (mild pain) 100 tablet 0    amLODIPine (NORVASC) 5 MG tablet TAKE ONE TABLET BY MOUTH ONE TIME DAILY 90 tablet 0    Blood Pressure Monitor KIT 1 kit daily 1 kit 0    diphenhydrAMINE-acetaminophen (TYLENOL PM)  MG tablet Take 1 tablet  by mouth nightly as needed for sleep      lisinopril (ZESTRIL) 40 MG tablet TAKE ONE TABLET BY MOUTH ONE TIME DAILY 90 tablet 0    metoprolol tartrate (LOPRESSOR) 50 MG tablet TAKE ONE TABLET BY MOUTH TWICE DAILY 180 tablet 0    omeprazole (PRILOSEC) 20 MG DR capsule Take 1 capsule (20 mg) by mouth daily.      aspirin (ASA) 325 MG EC tablet Take 1 tablet (325 mg) by mouth 2 times daily (with meals) (Patient not taking: Reported on 2/25/2025) 60 tablet 0    rosuvastatin (CRESTOR) 10 MG tablet Take 1 tablet (10 mg) by mouth daily (Patient not taking: Reported on 2/6/2025) 90 tablet 3     No current facility-administered medications for this visit.        ALLERGIES:     Allergies   Allergen Reactions    Blood Transfusion Related (Informational Only) Other (See Comments)     Patient has a history of a clinically significant antibody against RBC antigens.  A delay in compatible RBCs may occur.       ROS:  A complete 10-point ROS was negative except as above.    PHYSICAL EXAM:  BP (!) 149/79 (BP Location: Right arm, Patient Position: Chair, Cuff Size: Adult Regular)   Pulse 67   Wt 90.3 kg (199 lb)   SpO2 100%   BMI 32.76 kg/m    Gen: alert, interactive, NAD  Neck: supple, no JVD  CV: RRR, no m/r/g  Chest: CTAB, no wheezes or crackles  Abd: soft, NT, ND  Ext: no LE edema    LABS:    CMP  Sodium   Date Value Ref Range Status   02/06/2025 144 135 - 145 mmol/L Final   03/25/2021 139 133 - 144 mmol/L Final     Potassium   Date Value Ref Range Status   02/06/2025 4.9 3.4 - 5.3 mmol/L Final   01/09/2023 4.0 3.4 - 5.3 mmol/L Final   03/25/2021 4.3 3.4 - 5.3 mmol/L Final     Chloride   Date Value Ref Range Status   02/06/2025 108 (H) 98 - 107 mmol/L Final   01/09/2023 110 (H) 94 - 109 mmol/L Final   03/25/2021 106 94 - 109 mmol/L Final     Carbon Dioxide   Date Value Ref Range Status   03/25/2021 31 20 - 32 mmol/L Final     Carbon Dioxide (CO2)   Date Value Ref Range Status   02/06/2025 26 22 - 29 mmol/L Final   01/09/2023 31  "20 - 32 mmol/L Final     Anion Gap   Date Value Ref Range Status   02/06/2025 10 7 - 15 mmol/L Final   01/09/2023 3 3 - 14 mmol/L Final   03/25/2021 2 (L) 3 - 14 mmol/L Final     Glucose   Date Value Ref Range Status   02/06/2025 107 (H) 70 - 99 mg/dL Final   01/09/2023 111 (H) 70 - 99 mg/dL Final   03/25/2021 109 (H) 70 - 99 mg/dL Final     Urea Nitrogen   Date Value Ref Range Status   02/06/2025 26.1 (H) 8.0 - 23.0 mg/dL Final   01/09/2023 27 7 - 30 mg/dL Final   03/25/2021 28 7 - 30 mg/dL Final     Creatinine   Date Value Ref Range Status   02/06/2025 0.61 0.51 - 0.95 mg/dL Final   03/25/2021 0.62 0.52 - 1.04 mg/dL Final     GFR Estimate   Date Value Ref Range Status   02/06/2025 >90 >60 mL/min/1.73m2 Final     Comment:     eGFR calculated using 2021 CKD-EPI equation.   03/25/2021 >90 >60 mL/min/[1.73_m2] Final     Comment:     Non  GFR Calc  Starting 12/18/2018, serum creatinine based estimated GFR (eGFR) will be   calculated using the Chronic Kidney Disease Epidemiology Collaboration   (CKD-EPI) equation.       Calcium   Date Value Ref Range Status   02/06/2025 9.6 8.8 - 10.4 mg/dL Final   03/25/2021 9.5 8.5 - 10.1 mg/dL Final     Bilirubin Total   Date Value Ref Range Status   02/06/2025 0.6 <=1.2 mg/dL Final     Alkaline Phosphatase   Date Value Ref Range Status   02/06/2025 96 40 - 150 U/L Final     ALT   Date Value Ref Range Status   02/06/2025 21 0 - 50 U/L Final     AST   Date Value Ref Range Status   02/06/2025 28 0 - 45 U/L Final       CBC  CBC RESULTS:   Recent Labs   Lab Test 10/24/23  1110   WBC 6.1   RBC 4.93   HGB 14.0   HCT 44.9   MCV 91   MCH 28.4   MCHC 31.2*   RDW 14.3          TROP  No results found for: \"TROPI\", \"TROPONIN\", \"TROPR\", \"TROPN\"    LIPIDS  Recent Labs   Lab Test 02/06/25  0906 10/24/23  1110   CHOL 211* 198   HDL 56 49*   * 125*   TRIG 88 120       TSH  TSH   Date Value Ref Range Status   03/25/2021 0.44 0.40 - 4.00 mU/L Final       HBA1C  Lab Results "   Component Value Date    A1C 5.1 10/24/2023    A1C 5.5 10/28/2017         CARDIAC DATA:    EKG 10/28/17:      EKG 2/6/25:      Echo 1/12/24:  Status post ascending aorta aneurysm repair using 34 mm Hemashield graft with  PFO closure (2017).     Left ventricular size, wall motion and function are normal. The ejection  fraction is 60-65%.  The right ventricle is normal size. Global right ventricular function is  normal.  Trace to mild aortic insufficiency is present.  Sinuses of Valsalva 4.1 (indexed at 2.05 cm/m2)  No pericardial effusion is present.     This study was compared with the study from 5/28/2021 . There has been no  change.    CTA Chest 12/8/22:  1. Patent ascending aortic graft. No leak.     2. Native aorta distal to the graft, proximal to the innominate  origin, measures 44 mm in diameter, previously 43 mm. Descending aorta  measures 39 mm at the diaphragm, previously 38 mm.     3. Unchanged 9 mm splenic artery aneurysm.     4. Unchanged focus of early hepatic enhancement in hepatic segment  7/8, likely representing a hemangioma.     5. No substantial change prominent and mildly enlarged mediastinal  lymph nodes, maybe reactive.        ASSESSMENT/PLAN:  In summary, Georgie Patino is here today with the following -      # Pre-operative cardiac evaluation    Blood pressures borderline at home, elevated in clinic today. May be secondary to pain, but home pressures remain slightly above goal prior to kidney stone issues. Patient reports no cardiac symptoms in clinic today. EKG from pre-op visit with PCP compared with previous EKG on file from 10/28/17 and discussed with primary cardiologist Dr. Whitlock, similar waveform and no new significant findings.     1. Nephrolithostomy is a non-emergent procedure that is of low risk based on ACC/AHA guidelines.   2. The patient currently has no active unstable or decompensated cardiac conditions.  3. The patient's RCRI score is 0. Based on RCRI score, the risk of  major cardiac risk complications are: 3.9%  4. Continue beta-blockers, but hold ACE-I on morning of surgery.  5. No further cardiac risk stratification testing is currently indicated prior to surgery.    After upcoming kidney stone procedures, recommended to monitor BP regularly. If top number remains above 130 regularly, can increase amlodipine to 10 mg daily.    Follow up:  - Dr. Whitlock in July as scheduled for annual visit      TESHA Vanegas, FNP-C  [facility] Cardiology  Securely message with [employer]         Chart review time: 10 minutes  Visit time: 20 minutes  Chart completion/documentation: 4 minutes  Total time spent: 34 minutes

## 2025-02-25 NOTE — NURSING NOTE
"Chief Complaint   Patient presents with    Cardiac Clearance       Initial BP (!) 170/82 (BP Location: Right arm, Patient Position: Chair, Cuff Size: Adult Regular)   Pulse 58   Wt 90.3 kg (199 lb)   SpO2 96%   BMI 32.76 kg/m   Estimated body mass index is 32.76 kg/m  as calculated from the following:    Height as of 2/6/25: 1.66 m (5' 5.35\").    Weight as of this encounter: 90.3 kg (199 lb)..  BP completed using cuff size: regular    Jayna Huizar, Visit Facilitator    "

## 2025-04-10 ENCOUNTER — TRANSFERRED RECORDS (OUTPATIENT)
Dept: HEALTH INFORMATION MANAGEMENT | Facility: CLINIC | Age: 70
End: 2025-04-10
Payer: COMMERCIAL

## 2025-04-22 ENCOUNTER — PATIENT OUTREACH (OUTPATIENT)
Dept: CARE COORDINATION | Facility: CLINIC | Age: 70
End: 2025-04-22
Payer: COMMERCIAL

## 2025-05-02 NOTE — H&P (VIEW-ONLY)
Preoperative Evaluation  [facility]  [address]  [place] [address]  Phone: [phone]  Primary Provider: Kori Em PA-C  Pre-op Performing Provider: Kori Em PA-C  May 2, 2025             5/2/2025   Surgical Information   What procedure is being done? kidney stone removalst   Facility or Hospital where procedure/surgery will be performed: [facility] in [place]   Who is doing the procedure / surgery? dr aida larose   Date of surgery / procedure: 5\13\25   Time of surgery / procedure: remove kidney stone   Where do you plan to recover after surgery? at home with family     Fax number for surgical facility: Note does not need to be faxed, will be available electronically in Epic.    Assessment & Plan     The proposed surgical procedure is considered INTERMEDIATE risk.    Preop general physical exam  - CBC with Platelets & Differential; Future  - Basic metabolic panel; Future  - CBC with Platelets & Differential  - Basic metabolic panel    Nausea  - oxyCODONE (ROXICODONE) 5 MG tablet; Take 1 tablet (5 mg) by mouth every 6 hours as needed for pain.  - ondansetron (ZOFRAN ODT) 4 MG ODT tab; Take 1 tablet (4 mg) by mouth every 8 hours as needed for nausea.    Nephrolithiasis  Patient needs to stop NSAIDS prior to procedure and notes that acetaminophen is not helpful for controlling pain. Small amount of oxycodone given.   - oxyCODONE (ROXICODONE) 5 MG tablet; Take 1 tablet (5 mg) by mouth every 6 hours as needed for pain.  - UA Macroscopic with reflex to Microscopic and Culture; Future  - Urine Culture; Future  - UA Macroscopic with reflex to Microscopic and Culture  - Urine Culture    Benign essential hypertension  Morbid obesity (H)  Anxiety          - No identified additional risk factors other than previously addressed    Preoperative Medication Instructions  Antiplatelet or Anticoagulation Medication Instructions   - aspirin: Discontinue aspirin 7 days prior to  procedure to reduce bleeding risk. It should be resumed postoperatively.     Additional Medication Instructions  Take all scheduled medications on the day of surgery    Recommendation  Approval given to proceed with proposed procedure pending review of diagnostic evaluation.  The longitudinal plan of care for the diagnosis(es)/condition(s) as documented were addressed during this visit. Due to the added complexity in care, I will continue to support Julianna in the subsequent management and with ongoing continuity of care.      Subjective   Julianna is a 69 year old, presenting for the following:  Pre-Op Exam          5/2/2025    10:38 AM   Additional Questions   Roomed by danny bustillo     Pt would like to discuss pain on lt side and something for the pain- related to surgery.     HPI: Patient with nephrolithiasis requiring removal and possible stenting.          5/2/2025   Pre-Op Questionnaire   Have you ever had a heart attack or stroke? No   Have you ever had surgery on your heart or blood vessels, such as a stent placement, a coronary artery bypass, or surgery on an artery in your head, neck, heart, or legs? (!) YES    Do you have chest pain with activity? No   Do you have a history of heart failure? No   Do you currently have a cold, bronchitis or symptoms of other infection? (!) YES resolving   Do you have a cough, shortness of breath, or wheezing? (!) YES cough-resolving.    Do you or anyone in your family have previous history of blood clots? No   Do you or does anyone in your family have a serious bleeding problem such as prolonged bleeding following surgeries or cuts? No   Have you ever had problems with anemia or been told to take iron pills? No   Have you had any abnormal blood loss such as black, tarry or bloody stools, or abnormal vaginal bleeding? No   Have you ever had a blood transfusion? (!) YES   Have you ever had a transfusion reaction? No   Are you willing to have a blood transfusion if it is medically  needed before, during, or after your surgery? Yes   Have you or any of your relatives ever had problems with anesthesia? No   Do you have sleep apnea, excessive snoring or daytime drowsiness? No   Do you have any artifical heart valves or other implanted medical devices like a pacemaker, defibrillator, or continuous glucose monitor? No   Do you have artificial joints? (!) YES   Are you allergic to latex? No     Advance Care Planning    Discussed advance care planning with patient; however, patient declined at this time.    Preoperative Review of    reviewed - controlled substances reflected in medication list.          Patient Active Problem List    Diagnosis Date Noted    S/P TKR (total knee replacement), right 10/02/2023     Priority: Medium    Screening for cervical cancer 04/01/2021     Priority: Medium     10/28/04 [place] Bx - Negative. NIL Pap, Neg HPV (Care Everywhere)  2012 NIL Pap  2016 NIL Pap, Neg HPV  2021 NIL Pap, Neg HPV.         Thyroid nodule 11/20/2017     Priority: Medium    S/P ascending aortic aneurysm repair 11/07/2017     Priority: Medium    Former smoker 11/07/2017     Priority: Medium    Anxiety 10/17/2017     Priority: Medium    Hypertension 09/21/2017     Priority: Medium    Family history of sudden cardiac death 08/15/2017     Priority: Medium    Benign essential hypertension 01/27/2016     Priority: Medium    CARDIOVASCULAR SCREENING; LDL GOAL LESS THAN 160 05/30/2012     Priority: Medium    Morbid obesity (H) 05/30/2012     Priority: Medium      Past Medical History:   Diagnosis Date    Ascending aortic aneurysm 09/21/2017    Blood transfusions age 19    due to menorhagia    History of blood transfusion     Hypertension 09/21/2017    Localized osteoarthritis of both knees     Thyroid nodule 11/20/2017    Tobacco abuse      Past Surgical History:   Procedure Laterality Date    ARTHROPLASTY KNEE Right 10/2/2023    Procedure: RIGHT TOTAL KNEE ARTHROPLASTY;  Surgeon: Ross Oro  MD [place];  Location: [facility] OR    COLONOSCOPY WITH CO2 INSUFFLATION N/A 03/15/2017    Procedure: COLONOSCOPY WITH CO2 INSUFFLATION;  Surgeon: Duane, William Charles, MD;  Location: [facility] OR    DILATION AND CURETTAGE  age 20    EXTRACORPOREAL SHOCK WAVE LITHOTRIPSY, CYSTOSCOPY, INSERT STENT URETER(S), COMBINED Bilateral 11/19/2018    Procedure: BILATERAL EXTRACORPOREAL SHOCK WAVE LITHOTRIPSY, CYSTOSCOPY, LEFT STENT PLACEMENT;  Surgeon: Tavo Saavedra MD;  Location: [facility] OR    GI SURGERY  3/15/2017    GYN SURGERY  age 20    REPAIR ANEURYSM ASCENDING AORTA N/A 10/27/2017    Procedure: REPAIR ANEURYSM ASCENDING AORTA;  Median Sternotomy, Cardiopulmonary bypass, Ascending Aorta Aneurysm Repair using Hemashield Platinum Woven Double Velour Graft 34cm X 30cm.;  Surgeon: Rubio Piña MD;  Location: [facility] OR    VASCULAR SURGERY  10/27/2017    [place] teeth removed       Current Outpatient Medications   Medication Sig Dispense Refill    acetaminophen (TYLENOL) 325 MG tablet Take 2 tablets (650 mg) by mouth every 4 hours as needed for other (mild pain) 100 tablet 0    amLODIPine (NORVASC) 5 MG tablet Take 1 tablet (5 mg) by mouth daily. 90 tablet 0    atorvastatin (LIPITOR) 10 MG tablet Take 1 tablet (10 mg) by mouth daily. 90 tablet 3    Blood Pressure Monitor KIT 1 kit daily 1 kit 0    diphenhydrAMINE-acetaminophen (TYLENOL PM)  MG tablet Take 1 tablet by mouth nightly as needed for sleep      lisinopril (ZESTRIL) 40 MG tablet Take 1 tablet (40 mg) by mouth daily. 90 tablet 0    metoprolol tartrate (LOPRESSOR) 50 MG tablet Take 1 tablet (50 mg) by mouth 2 times daily. 180 tablet 0    omeprazole (PRILOSEC) 20 MG DR capsule Take 1 capsule (20 mg) by mouth daily.      aspirin 81 MG EC tablet Take 81 mg by mouth daily.         Allergies   Allergen Reactions    Blood Transfusion Related (Informational Only) Other (See Comments)     Patient has a history of a clinically significant antibody against RBC antigens.  A  "delay in compatible RBCs may occur.        Social History     Tobacco Use    Smoking status: Former     Current packs/day: 0.00     Average packs/day: 0.1 packs/day for 40.0 years (2.0 ttl pk-yrs)     Types: Cigarettes, Other     Quit date: 10/27/2017     Years since quittin.5    Smokeless tobacco: Never    Tobacco comments:     E cig   Substance Use Topics    Alcohol use: Yes     Alcohol/week: 0.8 - 1.7 standard drinks of alcohol     Types: 1 - 2 Standard drinks or equivalent per week     Comment: 3-4 drinks per week        History   Drug Use    Types: Marijuana               Objective    /73 (BP Location: Left arm, Patient Position: Sitting, Cuff Size: Adult Regular)   Pulse (!) 49   Temp 97.6  F (36.4  C) (Oral)   Resp 16   Ht 1.664 m (5' 5.5\")   Wt 88.2 kg (194 lb 6.4 oz)   SpO2 98%   BMI 31.86 kg/m     Estimated body mass index is 31.86 kg/m  as calculated from the following:    Height as of this encounter: 1.664 m (5' 5.5\").    Weight as of this encounter: 88.2 kg (194 lb 6.4 oz).  Physical Exam  GENERAL: alert and no distress  EYES: Eyes grossly normal to inspection, PERRL and conjunctivae and sclerae normal  HENT: ear canals and TM's normal, nose and mouth without ulcers or lesions  NECK: no adenopathy, no asymmetry, masses, or scars  RESP: lungs clear to auscultation - no rales, rhonchi or wheezes  CV: regular rate and rhythm, normal S1 S2, no S3 or S4, no murmur, click or rub, no peripheral edema  ABDOMEN: soft, nontender, no hepatosplenomegaly, no masses   MS: no gross musculoskeletal defects noted, no edema  SKIN: no suspicious lesions or rashes  NEURO: Normal strength and tone, mentation intact and speech normal  PSYCH: mentation appears normal, affect normal/bright    Recent Labs   Lab Test 25  0949 25  0906   HGB 11.3*  --      --    NA  --  144   POTASSIUM  --  4.9   CR  --  0.61        Diagnostics  Labs pending at this time.  Results will be reviewed when " available.   No EKG- completed in the last 120 days.     Revised Cardiac Risk Index (RCRI)  The patient has the following serious cardiovascular risks for perioperative complications:   - No serious cardiac risks = 0 points     RCRI Interpretation: 0 points: Class I (very low risk - 0.4% complication rate)         Signed Electronically by: Kori Em PA-C  A copy of this evaluation report is provided to the requesting physician.

## 2025-05-06 ENCOUNTER — TELEPHONE (OUTPATIENT)
Dept: FAMILY MEDICINE | Facility: CLINIC | Age: 70
End: 2025-05-06
Payer: COMMERCIAL

## 2025-05-06 NOTE — TELEPHONE ENCOUNTER
Left message on voicemail advising patient to return call at [phone].    Your urine showed group B strep. We often don't treat this in patients unless they are pregnant. However, since your upcoming surgery is kidney related I think it would be important. I have sent antibiotics to the pharmacy for you.  Kori DENNISN, RN  [facility], [place]

## 2025-05-08 NOTE — TELEPHONE ENCOUNTER
Patient notified of provider's message as written.  Patient verbalized understanding.  She had already viewed result note on [facility] and started on abx    Olga Lidia Villanueva RN  [facility]

## 2025-05-13 ENCOUNTER — ANESTHESIA EVENT (OUTPATIENT)
Dept: SURGERY | Facility: HOSPITAL | Age: 70
End: 2025-05-13
Payer: COMMERCIAL

## 2025-05-13 ENCOUNTER — HOSPITAL ENCOUNTER (OUTPATIENT)
Facility: HOSPITAL | Age: 70
Discharge: HOME | End: 2025-05-14
Attending: STUDENT IN AN ORGANIZED HEALTH CARE EDUCATION/TRAINING PROGRAM | Admitting: STUDENT IN AN ORGANIZED HEALTH CARE EDUCATION/TRAINING PROGRAM
Payer: COMMERCIAL

## 2025-05-13 ENCOUNTER — ANESTHESIA (OUTPATIENT)
Dept: SURGERY | Facility: HOSPITAL | Age: 70
End: 2025-05-13
Payer: COMMERCIAL

## 2025-05-13 DIAGNOSIS — N20.0 KIDNEY STONE: Primary | ICD-10-CM

## 2025-05-13 LAB
ANION GAP SERPL CALCULATED.3IONS-SCNC: 10 MMOL/L (ref 7–15)
BUN SERPL-MCNC: 19 MG/DL (ref 8–23)
CALCIUM SERPL-MCNC: 9 MG/DL (ref 8.8–10.4)
CHLORIDE SERPL-SCNC: 106 MMOL/L (ref 98–107)
CREAT SERPL-MCNC: 0.66 MG/DL (ref 0.51–0.95)
EGFRCR SERPLBLD CKD-EPI 2021: >90 ML/MIN/1.73M2
ERYTHROCYTE [DISTWIDTH] IN BLOOD BY AUTOMATED COUNT: 15.7 % (ref 10–15)
GLUCOSE BLDC GLUCOMTR-MCNC: 121 MG/DL (ref 70–99)
GLUCOSE SERPL-MCNC: 162 MG/DL (ref 70–99)
HCO3 SERPL-SCNC: 25 MMOL/L (ref 22–29)
HCT VFR BLD AUTO: 33.9 % (ref 35–47)
HGB BLD-MCNC: 10.2 G/DL (ref 11.7–15.7)
MCH RBC QN AUTO: 23.3 PG (ref 26.5–33)
MCHC RBC AUTO-ENTMCNC: 30.1 G/DL (ref 31.5–36.5)
MCV RBC AUTO: 78 FL (ref 78–100)
PLATELET # BLD AUTO: 196 10E3/UL (ref 150–450)
POTASSIUM SERPL-SCNC: 4.1 MMOL/L (ref 3.4–5.3)
RBC # BLD AUTO: 4.37 10E6/UL (ref 3.8–5.2)
SODIUM SERPL-SCNC: 141 MMOL/L (ref 135–145)
WBC # BLD AUTO: 10.4 10E3/UL (ref 4–11)

## 2025-05-13 PROCEDURE — 82962 GLUCOSE BLOOD TEST: CPT

## 2025-05-13 PROCEDURE — 250N000009 HC RX 250: Performed by: STUDENT IN AN ORGANIZED HEALTH CARE EDUCATION/TRAINING PROGRAM

## 2025-05-13 PROCEDURE — 258N000003 HC RX IP 258 OP 636: Performed by: NURSE ANESTHETIST, CERTIFIED REGISTERED

## 2025-05-13 PROCEDURE — 272N000001 HC OR GENERAL SUPPLY STERILE: Performed by: STUDENT IN AN ORGANIZED HEALTH CARE EDUCATION/TRAINING PROGRAM

## 2025-05-13 PROCEDURE — 250N000009 HC RX 250: Performed by: NURSE ANESTHETIST, CERTIFIED REGISTERED

## 2025-05-13 PROCEDURE — 99215 OFFICE O/P EST HI 40 MIN: CPT | Performed by: INTERNAL MEDICINE

## 2025-05-13 PROCEDURE — 250N000011 HC RX IP 250 OP 636: Performed by: STUDENT IN AN ORGANIZED HEALTH CARE EDUCATION/TRAINING PROGRAM

## 2025-05-13 PROCEDURE — 82365 CALCULUS SPECTROSCOPY: CPT | Performed by: STUDENT IN AN ORGANIZED HEALTH CARE EDUCATION/TRAINING PROGRAM

## 2025-05-13 PROCEDURE — 250N000011 HC RX IP 250 OP 636: Performed by: NURSE PRACTITIONER

## 2025-05-13 PROCEDURE — 272N000004 HC RX 272: Performed by: STUDENT IN AN ORGANIZED HEALTH CARE EDUCATION/TRAINING PROGRAM

## 2025-05-13 PROCEDURE — 250N000025 HC SEVOFLURANE, PER MIN: Performed by: STUDENT IN AN ORGANIZED HEALTH CARE EDUCATION/TRAINING PROGRAM

## 2025-05-13 PROCEDURE — 370N000017 HC ANESTHESIA TECHNICAL FEE, PER MIN: Performed by: STUDENT IN AN ORGANIZED HEALTH CARE EDUCATION/TRAINING PROGRAM

## 2025-05-13 PROCEDURE — 36415 COLL VENOUS BLD VENIPUNCTURE: CPT | Performed by: STUDENT IN AN ORGANIZED HEALTH CARE EDUCATION/TRAINING PROGRAM

## 2025-05-13 PROCEDURE — C1769 GUIDE WIRE: HCPCS | Performed by: STUDENT IN AN ORGANIZED HEALTH CARE EDUCATION/TRAINING PROGRAM

## 2025-05-13 PROCEDURE — 250N000013 HC RX MED GY IP 250 OP 250 PS 637: Performed by: INTERNAL MEDICINE

## 2025-05-13 PROCEDURE — 250N000013 HC RX MED GY IP 250 OP 250 PS 637: Performed by: STUDENT IN AN ORGANIZED HEALTH CARE EDUCATION/TRAINING PROGRAM

## 2025-05-13 PROCEDURE — 250N000011 HC RX IP 250 OP 636: Performed by: NURSE ANESTHETIST, CERTIFIED REGISTERED

## 2025-05-13 PROCEDURE — C2617 STENT, NON-COR, TEM W/O DEL: HCPCS | Performed by: STUDENT IN AN ORGANIZED HEALTH CARE EDUCATION/TRAINING PROGRAM

## 2025-05-13 PROCEDURE — 999N000141 HC STATISTIC PRE-PROCEDURE NURSING ASSESSMENT: Performed by: STUDENT IN AN ORGANIZED HEALTH CARE EDUCATION/TRAINING PROGRAM

## 2025-05-13 PROCEDURE — 360N000080 HC SURGERY LEVEL 7, PER MIN: Performed by: STUDENT IN AN ORGANIZED HEALTH CARE EDUCATION/TRAINING PROGRAM

## 2025-05-13 PROCEDURE — 258N000003 HC RX IP 258 OP 636: Performed by: STUDENT IN AN ORGANIZED HEALTH CARE EDUCATION/TRAINING PROGRAM

## 2025-05-13 PROCEDURE — 710N000009 HC RECOVERY PHASE 1, LEVEL 1, PER MIN: Performed by: STUDENT IN AN ORGANIZED HEALTH CARE EDUCATION/TRAINING PROGRAM

## 2025-05-13 PROCEDURE — 250N000011 HC RX IP 250 OP 636: Performed by: ANESTHESIOLOGY

## 2025-05-13 PROCEDURE — 85027 COMPLETE CBC AUTOMATED: CPT | Performed by: STUDENT IN AN ORGANIZED HEALTH CARE EDUCATION/TRAINING PROGRAM

## 2025-05-13 PROCEDURE — 80048 BASIC METABOLIC PNL TOTAL CA: CPT | Performed by: STUDENT IN AN ORGANIZED HEALTH CARE EDUCATION/TRAINING PROGRAM

## 2025-05-13 DEVICE — URETERAL STENT
Type: IMPLANTABLE DEVICE | Site: ABDOMEN | Status: FUNCTIONAL
Brand: PERCUFLEX™ PLUS

## 2025-05-13 RX ORDER — LORAZEPAM 2 MG/ML
.5-1 INJECTION INTRAMUSCULAR
Status: DISCONTINUED | OUTPATIENT
Start: 2025-05-13 | End: 2025-05-13 | Stop reason: HOSPADM

## 2025-05-13 RX ORDER — PANTOPRAZOLE SODIUM 40 MG/1
40 TABLET, DELAYED RELEASE ORAL
Status: DISCONTINUED | OUTPATIENT
Start: 2025-05-14 | End: 2025-05-14 | Stop reason: HOSPADM

## 2025-05-13 RX ORDER — BUPIVACAINE HYDROCHLORIDE 5 MG/ML
INJECTION, SOLUTION PERINEURAL PRN
Status: DISCONTINUED | OUTPATIENT
Start: 2025-05-13 | End: 2025-05-13 | Stop reason: HOSPADM

## 2025-05-13 RX ORDER — FENTANYL CITRATE 50 UG/ML
INJECTION, SOLUTION INTRAMUSCULAR; INTRAVENOUS PRN
Status: DISCONTINUED | OUTPATIENT
Start: 2025-05-13 | End: 2025-05-13

## 2025-05-13 RX ORDER — POLYETHYLENE GLYCOL 3350 17 G/17G
17 POWDER, FOR SOLUTION ORAL DAILY
Status: DISCONTINUED | OUTPATIENT
Start: 2025-05-14 | End: 2025-05-14 | Stop reason: HOSPADM

## 2025-05-13 RX ORDER — HYDROMORPHONE HCL IN WATER/PF 6 MG/30 ML
0.4 PATIENT CONTROLLED ANALGESIA SYRINGE INTRAVENOUS EVERY 5 MIN PRN
Status: DISCONTINUED | OUTPATIENT
Start: 2025-05-13 | End: 2025-05-13 | Stop reason: HOSPADM

## 2025-05-13 RX ORDER — KETOROLAC TROMETHAMINE 30 MG/ML
15 INJECTION, SOLUTION INTRAMUSCULAR; INTRAVENOUS EVERY 6 HOURS
Status: COMPLETED | OUTPATIENT
Start: 2025-05-13 | End: 2025-05-14

## 2025-05-13 RX ORDER — ONDANSETRON 4 MG/1
4 TABLET, ORALLY DISINTEGRATING ORAL EVERY 30 MIN PRN
Status: DISCONTINUED | OUTPATIENT
Start: 2025-05-13 | End: 2025-05-13 | Stop reason: HOSPADM

## 2025-05-13 RX ORDER — ALBUTEROL SULFATE 0.83 MG/ML
2.5 SOLUTION RESPIRATORY (INHALATION) EVERY 4 HOURS PRN
Status: DISCONTINUED | OUTPATIENT
Start: 2025-05-13 | End: 2025-05-13 | Stop reason: HOSPADM

## 2025-05-13 RX ORDER — ONDANSETRON 4 MG/1
4 TABLET, ORALLY DISINTEGRATING ORAL EVERY 30 MIN PRN
Status: DISCONTINUED | OUTPATIENT
Start: 2025-05-13 | End: 2025-05-13

## 2025-05-13 RX ORDER — DEXAMETHASONE SODIUM PHOSPHATE 10 MG/ML
INJECTION, SOLUTION INTRAMUSCULAR; INTRAVENOUS PRN
Status: DISCONTINUED | OUTPATIENT
Start: 2025-05-13 | End: 2025-05-13

## 2025-05-13 RX ORDER — NALOXONE HYDROCHLORIDE 0.4 MG/ML
0.1 INJECTION, SOLUTION INTRAMUSCULAR; INTRAVENOUS; SUBCUTANEOUS
Status: DISCONTINUED | OUTPATIENT
Start: 2025-05-13 | End: 2025-05-13

## 2025-05-13 RX ORDER — CEFAZOLIN SODIUM/WATER 2 G/20 ML
2 SYRINGE (ML) INTRAVENOUS
Status: COMPLETED | OUTPATIENT
Start: 2025-05-13 | End: 2025-05-13

## 2025-05-13 RX ORDER — ENOXAPARIN SODIUM 100 MG/ML
40 INJECTION SUBCUTANEOUS EVERY 24 HOURS
Status: DISCONTINUED | OUTPATIENT
Start: 2025-05-14 | End: 2025-05-14 | Stop reason: HOSPADM

## 2025-05-13 RX ORDER — ACETAMINOPHEN 325 MG/1
975 TABLET ORAL
Status: DISCONTINUED | OUTPATIENT
Start: 2025-05-13 | End: 2025-05-13 | Stop reason: HOSPADM

## 2025-05-13 RX ORDER — ATORVASTATIN CALCIUM 10 MG/1
10 TABLET, FILM COATED ORAL DAILY
Status: DISCONTINUED | OUTPATIENT
Start: 2025-05-13 | End: 2025-05-14 | Stop reason: HOSPADM

## 2025-05-13 RX ORDER — LIDOCAINE 40 MG/G
CREAM TOPICAL
Status: DISCONTINUED | OUTPATIENT
Start: 2025-05-13 | End: 2025-05-14 | Stop reason: HOSPADM

## 2025-05-13 RX ORDER — NALOXONE HYDROCHLORIDE 0.4 MG/ML
0.2 INJECTION, SOLUTION INTRAMUSCULAR; INTRAVENOUS; SUBCUTANEOUS
Status: DISCONTINUED | OUTPATIENT
Start: 2025-05-13 | End: 2025-05-14 | Stop reason: HOSPADM

## 2025-05-13 RX ORDER — AMOXICILLIN 250 MG
1 CAPSULE ORAL 2 TIMES DAILY
Status: DISCONTINUED | OUTPATIENT
Start: 2025-05-13 | End: 2025-05-14 | Stop reason: HOSPADM

## 2025-05-13 RX ORDER — HYDROMORPHONE HCL IN WATER/PF 6 MG/30 ML
0.2 PATIENT CONTROLLED ANALGESIA SYRINGE INTRAVENOUS EVERY 5 MIN PRN
Status: DISCONTINUED | OUTPATIENT
Start: 2025-05-13 | End: 2025-05-13 | Stop reason: HOSPADM

## 2025-05-13 RX ORDER — MEPERIDINE HYDROCHLORIDE 25 MG/ML
12.5 INJECTION INTRAMUSCULAR; INTRAVENOUS; SUBCUTANEOUS EVERY 5 MIN PRN
Status: DISCONTINUED | OUTPATIENT
Start: 2025-05-13 | End: 2025-05-13 | Stop reason: HOSPADM

## 2025-05-13 RX ORDER — SODIUM CHLORIDE, SODIUM LACTATE, POTASSIUM CHLORIDE, CALCIUM CHLORIDE 600; 310; 30; 20 MG/100ML; MG/100ML; MG/100ML; MG/100ML
INJECTION, SOLUTION INTRAVENOUS CONTINUOUS PRN
Status: DISCONTINUED | OUTPATIENT
Start: 2025-05-13 | End: 2025-05-13

## 2025-05-13 RX ORDER — OXYBUTYNIN CHLORIDE 5 MG/1
5 TABLET ORAL 3 TIMES DAILY PRN
Status: DISCONTINUED | OUTPATIENT
Start: 2025-05-13 | End: 2025-05-14 | Stop reason: HOSPADM

## 2025-05-13 RX ORDER — SODIUM CHLORIDE, SODIUM LACTATE, POTASSIUM CHLORIDE, CALCIUM CHLORIDE 600; 310; 30; 20 MG/100ML; MG/100ML; MG/100ML; MG/100ML
INJECTION, SOLUTION INTRAVENOUS CONTINUOUS
Status: DISCONTINUED | OUTPATIENT
Start: 2025-05-13 | End: 2025-05-13 | Stop reason: HOSPADM

## 2025-05-13 RX ORDER — SODIUM CHLORIDE, SODIUM LACTATE, POTASSIUM CHLORIDE, CALCIUM CHLORIDE 600; 310; 30; 20 MG/100ML; MG/100ML; MG/100ML; MG/100ML
INJECTION, SOLUTION INTRAVENOUS CONTINUOUS
Status: DISCONTINUED | OUTPATIENT
Start: 2025-05-13 | End: 2025-05-14 | Stop reason: HOSPADM

## 2025-05-13 RX ORDER — ONDANSETRON 2 MG/ML
4 INJECTION INTRAMUSCULAR; INTRAVENOUS EVERY 30 MIN PRN
Status: DISCONTINUED | OUTPATIENT
Start: 2025-05-13 | End: 2025-05-13

## 2025-05-13 RX ORDER — PROCHLORPERAZINE MALEATE 5 MG/1
5 TABLET ORAL EVERY 6 HOURS PRN
Status: DISCONTINUED | OUTPATIENT
Start: 2025-05-13 | End: 2025-05-14 | Stop reason: HOSPADM

## 2025-05-13 RX ORDER — DEXAMETHASONE SODIUM PHOSPHATE 4 MG/ML
4 INJECTION, SOLUTION INTRA-ARTICULAR; INTRALESIONAL; INTRAMUSCULAR; INTRAVENOUS; SOFT TISSUE
Status: DISCONTINUED | OUTPATIENT
Start: 2025-05-13 | End: 2025-05-13

## 2025-05-13 RX ORDER — ACETAMINOPHEN 325 MG/1
650 TABLET ORAL EVERY 4 HOURS PRN
Status: DISCONTINUED | OUTPATIENT
Start: 2025-05-13 | End: 2025-05-14 | Stop reason: HOSPADM

## 2025-05-13 RX ORDER — NALOXONE HYDROCHLORIDE 0.4 MG/ML
0.1 INJECTION, SOLUTION INTRAMUSCULAR; INTRAVENOUS; SUBCUTANEOUS
Status: DISCONTINUED | OUTPATIENT
Start: 2025-05-13 | End: 2025-05-13 | Stop reason: HOSPADM

## 2025-05-13 RX ORDER — METOPROLOL TARTRATE 25 MG/1
50 TABLET, FILM COATED ORAL 2 TIMES DAILY
Status: DISCONTINUED | OUTPATIENT
Start: 2025-05-13 | End: 2025-05-14 | Stop reason: HOSPADM

## 2025-05-13 RX ORDER — ONDANSETRON 2 MG/ML
4 INJECTION INTRAMUSCULAR; INTRAVENOUS EVERY 6 HOURS PRN
Status: DISCONTINUED | OUTPATIENT
Start: 2025-05-13 | End: 2025-05-14 | Stop reason: HOSPADM

## 2025-05-13 RX ORDER — DEXAMETHASONE SODIUM PHOSPHATE 4 MG/ML
4 INJECTION, SOLUTION INTRA-ARTICULAR; INTRALESIONAL; INTRAMUSCULAR; INTRAVENOUS; SOFT TISSUE
Status: DISCONTINUED | OUTPATIENT
Start: 2025-05-13 | End: 2025-05-13 | Stop reason: HOSPADM

## 2025-05-13 RX ORDER — HALOPERIDOL 5 MG/ML
2 INJECTION INTRAMUSCULAR
Status: COMPLETED | OUTPATIENT
Start: 2025-05-13 | End: 2025-05-13

## 2025-05-13 RX ORDER — CEFAZOLIN SODIUM/WATER 2 G/20 ML
2 SYRINGE (ML) INTRAVENOUS SEE ADMIN INSTRUCTIONS
Status: DISCONTINUED | OUTPATIENT
Start: 2025-05-13 | End: 2025-05-13 | Stop reason: HOSPADM

## 2025-05-13 RX ORDER — ONDANSETRON 2 MG/ML
INJECTION INTRAMUSCULAR; INTRAVENOUS PRN
Status: DISCONTINUED | OUTPATIENT
Start: 2025-05-13 | End: 2025-05-13

## 2025-05-13 RX ORDER — NALOXONE HYDROCHLORIDE 0.4 MG/ML
0.4 INJECTION, SOLUTION INTRAMUSCULAR; INTRAVENOUS; SUBCUTANEOUS
Status: DISCONTINUED | OUTPATIENT
Start: 2025-05-13 | End: 2025-05-14 | Stop reason: HOSPADM

## 2025-05-13 RX ORDER — ACETAMINOPHEN 325 MG/1
975 TABLET ORAL EVERY 8 HOURS
Status: DISCONTINUED | OUTPATIENT
Start: 2025-05-13 | End: 2025-05-14 | Stop reason: HOSPADM

## 2025-05-13 RX ORDER — HYDROMORPHONE HCL IN WATER/PF 6 MG/30 ML
0.2 PATIENT CONTROLLED ANALGESIA SYRINGE INTRAVENOUS
Status: DISCONTINUED | OUTPATIENT
Start: 2025-05-13 | End: 2025-05-14 | Stop reason: HOSPADM

## 2025-05-13 RX ORDER — OXYCODONE HYDROCHLORIDE 5 MG/1
10 TABLET ORAL
Status: DISCONTINUED | OUTPATIENT
Start: 2025-05-13 | End: 2025-05-13

## 2025-05-13 RX ORDER — SODIUM CHLORIDE, SODIUM LACTATE, POTASSIUM CHLORIDE, AND CALCIUM CHLORIDE .6; .31; .03; .02 G/100ML; G/100ML; G/100ML; G/100ML
IRRIGANT IRRIGATION PRN
Status: DISCONTINUED | OUTPATIENT
Start: 2025-05-13 | End: 2025-05-13 | Stop reason: HOSPADM

## 2025-05-13 RX ORDER — LIDOCAINE HYDROCHLORIDE 10 MG/ML
INJECTION, SOLUTION INFILTRATION; PERINEURAL PRN
Status: DISCONTINUED | OUTPATIENT
Start: 2025-05-13 | End: 2025-05-13

## 2025-05-13 RX ORDER — OXYCODONE HYDROCHLORIDE 5 MG/1
10 TABLET ORAL EVERY 4 HOURS PRN
Status: DISCONTINUED | OUTPATIENT
Start: 2025-05-13 | End: 2025-05-14 | Stop reason: HOSPADM

## 2025-05-13 RX ORDER — FENTANYL CITRATE 50 UG/ML
50 INJECTION, SOLUTION INTRAMUSCULAR; INTRAVENOUS EVERY 5 MIN PRN
Status: DISCONTINUED | OUTPATIENT
Start: 2025-05-13 | End: 2025-05-13 | Stop reason: HOSPADM

## 2025-05-13 RX ORDER — PROPOFOL 10 MG/ML
INJECTION, EMULSION INTRAVENOUS PRN
Status: DISCONTINUED | OUTPATIENT
Start: 2025-05-13 | End: 2025-05-13

## 2025-05-13 RX ORDER — ONDANSETRON 4 MG/1
4 TABLET, ORALLY DISINTEGRATING ORAL EVERY 6 HOURS PRN
Status: DISCONTINUED | OUTPATIENT
Start: 2025-05-13 | End: 2025-05-14 | Stop reason: HOSPADM

## 2025-05-13 RX ORDER — OXYCODONE HYDROCHLORIDE 5 MG/1
5 TABLET ORAL
Status: DISCONTINUED | OUTPATIENT
Start: 2025-05-13 | End: 2025-05-13

## 2025-05-13 RX ORDER — ONDANSETRON 2 MG/ML
4 INJECTION INTRAMUSCULAR; INTRAVENOUS EVERY 30 MIN PRN
Status: DISCONTINUED | OUTPATIENT
Start: 2025-05-13 | End: 2025-05-13 | Stop reason: HOSPADM

## 2025-05-13 RX ORDER — OXYCODONE HYDROCHLORIDE 5 MG/1
5 TABLET ORAL EVERY 4 HOURS PRN
Status: DISCONTINUED | OUTPATIENT
Start: 2025-05-13 | End: 2025-05-14 | Stop reason: HOSPADM

## 2025-05-13 RX ORDER — AMLODIPINE BESYLATE 5 MG/1
5 TABLET ORAL DAILY
Status: DISCONTINUED | OUTPATIENT
Start: 2025-05-13 | End: 2025-05-14 | Stop reason: HOSPADM

## 2025-05-13 RX ORDER — OMEPRAZOLE 20 MG/1
20 CAPSULE, DELAYED RELEASE ORAL DAILY
Status: DISCONTINUED | OUTPATIENT
Start: 2025-05-13 | End: 2025-05-13

## 2025-05-13 RX ORDER — ACETAMINOPHEN 325 MG/1
975 TABLET ORAL ONCE
Status: DISCONTINUED | OUTPATIENT
Start: 2025-05-13 | End: 2025-05-13

## 2025-05-13 RX ORDER — FENTANYL CITRATE 50 UG/ML
25 INJECTION, SOLUTION INTRAMUSCULAR; INTRAVENOUS EVERY 5 MIN PRN
Status: DISCONTINUED | OUTPATIENT
Start: 2025-05-13 | End: 2025-05-13 | Stop reason: HOSPADM

## 2025-05-13 RX ORDER — HYDROXYZINE HYDROCHLORIDE 10 MG/1
10 TABLET, FILM COATED ORAL EVERY 6 HOURS PRN
Status: DISCONTINUED | OUTPATIENT
Start: 2025-05-13 | End: 2025-05-13 | Stop reason: HOSPADM

## 2025-05-13 RX ADMIN — LIDOCAINE HYDROCHLORIDE 5 ML: 10 INJECTION, SOLUTION INFILTRATION; PERINEURAL at 12:05

## 2025-05-13 RX ADMIN — SODIUM CHLORIDE, SODIUM LACTATE, POTASSIUM CHLORIDE, AND CALCIUM CHLORIDE: .6; .31; .03; .02 INJECTION, SOLUTION INTRAVENOUS at 19:43

## 2025-05-13 RX ADMIN — MIDAZOLAM HYDROCHLORIDE 1 MG: 1 INJECTION, SOLUTION INTRAMUSCULAR; INTRAVENOUS at 12:03

## 2025-05-13 RX ADMIN — ONDANSETRON 4 MG: 2 INJECTION INTRAMUSCULAR; INTRAVENOUS at 12:42

## 2025-05-13 RX ADMIN — PHENYLEPHRINE HYDROCHLORIDE 100 MCG: 10 INJECTION INTRAVENOUS at 12:31

## 2025-05-13 RX ADMIN — SODIUM CHLORIDE, SODIUM LACTATE, POTASSIUM CHLORIDE, AND CALCIUM CHLORIDE: .6; .31; .03; .02 INJECTION, SOLUTION INTRAVENOUS at 11:05

## 2025-05-13 RX ADMIN — PROPOFOL 200 MG: 10 INJECTION, EMULSION INTRAVENOUS at 12:05

## 2025-05-13 RX ADMIN — KETOROLAC TROMETHAMINE 15 MG: 30 INJECTION, SOLUTION INTRAMUSCULAR at 18:17

## 2025-05-13 RX ADMIN — FENTANYL CITRATE 25 MCG: 50 INJECTION, SOLUTION INTRAMUSCULAR; INTRAVENOUS at 17:00

## 2025-05-13 RX ADMIN — PHENYLEPHRINE HYDROCHLORIDE 100 MCG: 10 INJECTION INTRAVENOUS at 12:40

## 2025-05-13 RX ADMIN — DEXMEDETOMIDINE HYDROCHLORIDE 8 MCG: 100 INJECTION, SOLUTION INTRAVENOUS at 12:31

## 2025-05-13 RX ADMIN — HALOPERIDOL LACTATE 2 MG: 5 INJECTION, SOLUTION INTRAMUSCULAR at 18:11

## 2025-05-13 RX ADMIN — METOPROLOL TARTRATE 50 MG: 25 TABLET, FILM COATED ORAL at 19:39

## 2025-05-13 RX ADMIN — ROCURONIUM 50 MG: 50 INJECTION, SOLUTION INTRAVENOUS at 12:05

## 2025-05-13 RX ADMIN — ACETAMINOPHEN 975 MG: 325 TABLET ORAL at 21:07

## 2025-05-13 RX ADMIN — ONDANSETRON 4 MG: 4 TABLET, ORALLY DISINTEGRATING ORAL at 23:59

## 2025-05-13 RX ADMIN — DEXAMETHASONE SODIUM PHOSPHATE 10 MG: 10 INJECTION, SOLUTION INTRAMUSCULAR; INTRAVENOUS at 12:31

## 2025-05-13 RX ADMIN — FENTANYL CITRATE 25 MCG: 50 INJECTION, SOLUTION INTRAMUSCULAR; INTRAVENOUS at 17:09

## 2025-05-13 RX ADMIN — SUGAMMADEX 200 MG: 100 INJECTION, SOLUTION INTRAVENOUS at 16:18

## 2025-05-13 RX ADMIN — Medication 2 G: at 16:05

## 2025-05-13 RX ADMIN — PHENYLEPHRINE HYDROCHLORIDE 100 MCG: 10 INJECTION INTRAVENOUS at 16:03

## 2025-05-13 RX ADMIN — FENTANYL CITRATE 100 MCG: 50 INJECTION, SOLUTION INTRAMUSCULAR; INTRAVENOUS at 12:05

## 2025-05-13 RX ADMIN — SODIUM CHLORIDE, SODIUM LACTATE, POTASSIUM CHLORIDE, AND CALCIUM CHLORIDE: .6; .31; .03; .02 INJECTION, SOLUTION INTRAVENOUS at 15:10

## 2025-05-13 RX ADMIN — AMLODIPINE BESYLATE 5 MG: 5 TABLET ORAL at 19:39

## 2025-05-13 RX ADMIN — Medication 2 G: at 12:03

## 2025-05-13 RX ADMIN — ATORVASTATIN CALCIUM 10 MG: 10 TABLET, FILM COATED ORAL at 19:39

## 2025-05-13 RX ADMIN — SENNOSIDES AND DOCUSATE SODIUM 1 TABLET: 50; 8.6 TABLET ORAL at 19:39

## 2025-05-13 RX ADMIN — HYDROMORPHONE HYDROCHLORIDE 0.5 MG: 1 INJECTION, SOLUTION INTRAMUSCULAR; INTRAVENOUS; SUBCUTANEOUS at 15:38

## 2025-05-13 RX ADMIN — KETOROLAC TROMETHAMINE 15 MG: 30 INJECTION, SOLUTION INTRAMUSCULAR at 23:52

## 2025-05-13 RX ADMIN — PHENYLEPHRINE HYDROCHLORIDE 200 MCG: 10 INJECTION INTRAVENOUS at 12:21

## 2025-05-13 RX ADMIN — DEXMEDETOMIDINE HYDROCHLORIDE 12 MCG: 100 INJECTION, SOLUTION INTRAVENOUS at 15:38

## 2025-05-13 RX ADMIN — MIDAZOLAM HYDROCHLORIDE 1 MG: 1 INJECTION, SOLUTION INTRAMUSCULAR; INTRAVENOUS at 11:58

## 2025-05-13 RX ADMIN — HYDROMORPHONE HYDROCHLORIDE 0.5 MG: 1 INJECTION, SOLUTION INTRAMUSCULAR; INTRAVENOUS; SUBCUTANEOUS at 15:43

## 2025-05-13 ASSESSMENT — ACTIVITIES OF DAILY LIVING (ADL)
ADLS_ACUITY_SCORE: 25
ADLS_ACUITY_SCORE: 27
ADLS_ACUITY_SCORE: 25
ADLS_ACUITY_SCORE: 26
ADLS_ACUITY_SCORE: 25
ADLS_ACUITY_SCORE: 25
ADLS_ACUITY_SCORE: 27
ADLS_ACUITY_SCORE: 27

## 2025-05-13 ASSESSMENT — LIFESTYLE VARIABLES: TOBACCO_USE: 1

## 2025-05-13 NOTE — PROGRESS NOTES
Patient admitted to room 14 at approximately 1840 via cart from surgery.  Reason for Admission: Jeannette  Report received from: PACU  Patient was accompanied by Spouse.  Discharge transportation provided by:  Patient ambulated/transferred:  assist of 2.AIR MATTRESS  Patient is alert and orientated x 4.  Outpatient Observation education provided to: (patient, family, friend)  MDRO Education done if applicable (MRSA, VRE, etc)  Safety risks were identified during admission:  fall.   Yellow risk/fall band applied:  Yes  Home meds sent home: No  Home meds sent to pharmacy:No   Detailed Belongings:  black tennis shoes, black pants, gray shirt    Patient has CAPNO on and Incentive Spirometry in Room - not ready to receive instructions yet - still pretty groggy.  Just received PCP pump will be placing those on soon

## 2025-05-13 NOTE — ANESTHESIA PREPROCEDURE EVALUATION
Anesthesia Pre-Procedure Evaluation    Patient: Georgie Patino   MRN: [id number] : 1955          Procedure : Procedure(s):  ROBOTIC PYELOLITHOTOMY, LEFT NEPHROSCOPY, LEFT ANTEGRADE STENT PLACEMENT         Past Medical History:   Diagnosis Date    Anxiety     Ascending aortic aneurysm 2017    Blood transfusions age 19    due to menorhagia    Difficulty walking     History of blood transfusion     Hypertension 2017    Localized osteoarthritis of both knees     Morbid obesity (H)     Thyroid nodule 2017    Tobacco abuse     Walking troubles       Past Surgical History:   Procedure Laterality Date    ARTHROPLASTY KNEE Right 10/2/2023    Procedure: RIGHT TOTAL KNEE ARTHROPLASTY;  Surgeon: Ross Oro MD;  Location:  OR    COLONOSCOPY WITH CO2 INSUFFLATION N/A 03/15/2017    Procedure: COLONOSCOPY WITH CO2 INSUFFLATION;  Surgeon: Duane, William Charles, MD;  Location:  OR    DILATION AND CURETTAGE  age 20    EXTRACORPOREAL SHOCK WAVE LITHOTRIPSY, CYSTOSCOPY, INSERT STENT URETER(S), COMBINED Bilateral 2018    Procedure: BILATERAL EXTRACORPOREAL SHOCK WAVE LITHOTRIPSY, CYSTOSCOPY, LEFT STENT PLACEMENT;  Surgeon: Tavo Saavedra MD;  Location:  OR    GI SURGERY  3/15/2017    GYN SURGERY  age 20    REPAIR ANEURYSM ASCENDING AORTA N/A 10/27/2017    Procedure: REPAIR ANEURYSM ASCENDING AORTA;  Median Sternotomy, Cardiopulmonary bypass, Ascending Aorta Aneurysm Repair using Hemashield Platinum Woven Double Velour Graft 34cm X 30cm.;  Surgeon: Rubio Piña MD;  Location: [facility]    VASCULAR SURGERY  10/27/2017    [place] teeth removed        Allergies   Allergen Reactions    Blood Transfusion Related (Informational Only) Other (See Comments)     Patient has a history of a clinically significant antibody against RBC antigens.  A delay in compatible RBCs may occur.      Social History     Tobacco Use    Smoking status: Some Days     Current packs/day: 0.00     Average  packs/day: 0.1 packs/day for 40.0 years (2.0 ttl pk-yrs)     Types: Cigarettes, Other     Last attempt to quit: 10/27/2017     Years since quittin.5    Smokeless tobacco: Never    Tobacco comments:     E cig   Substance Use Topics    Alcohol use: Not Currently     Alcohol/week: 0.8 - 1.7 standard drinks of alcohol     Types: 1 - 2 Standard drinks or equivalent per week      Wt Readings from Last 1 Encounters:   25 87.1 kg (192 lb)        Anesthesia Evaluation            ROS/MED HX  ENT/Pulmonary:     (+)                tobacco use,                        Neurologic:       Cardiovascular: Comment: Ascending AA s/p Repair    (+)  hypertension- -   -  - -                                      METS/Exercise Tolerance:     Hematologic:       Musculoskeletal:   (+)  arthritis,             GI/Hepatic:       Renal/Genitourinary:       Endo:     (+)          thyroid problem,     Obesity,       Psychiatric/Substance Use:     (+) psychiatric history anxiety       Infectious Disease:       Malignancy:       Other:              Physical Exam  Airway  Mallampati: II  TM distance: >3 FB  Neck ROM: full  Mouth opening: >= 4 cm    Cardiovascular   Rhythm: regular  Rate: normal rate   Comments: Ascending AA s/p Repair  Dental Comments: wnl      Pulmonary Breath sounds clear to auscultation        Neurological   She appears awake, alert and oriented x3.    Other Findings       OUTSIDE LABS:  CBC:   Lab Results   Component Value Date    WBC 6.6 2025    WBC 8.2 2025    HGB 10.8 (L) 2025    HGB 11.3 (L) 2025    HCT 36.9 2025    HCT 37.7 2025     2025     2025     BMP:   Lab Results   Component Value Date     2025     2025    POTASSIUM 4.4 2025    POTASSIUM 4.9 2025    CHLORIDE 104 2025    CHLORIDE 108 (H) 2025    CO2 26 2025    CO2 26 2025    BUN 17.4 2025    BUN 26.1 (H) 2025    CR 0.62  "05/02/2025    CR 0.61 02/06/2025     (H) 05/13/2025     (H) 05/02/2025     COAGS:   Lab Results   Component Value Date    PTT 35 10/27/2017    INR 1.21 (H) 10/29/2017    FIBR 257 10/27/2017     POC:   Lab Results   Component Value Date     (H) 10/31/2017     HEPATIC:   Lab Results   Component Value Date    ALBUMIN 4.1 02/06/2025    PROTTOTAL 7.3 02/06/2025    ALT 21 02/06/2025    AST 28 02/06/2025    ALKPHOS 96 02/06/2025    BILITOTAL 0.6 02/06/2025     OTHER:   Lab Results   Component Value Date    PH 7.40 10/28/2017    LACT 3.0 (H) 10/27/2017    A1C 5.1 10/24/2023    QASIM 9.3 05/02/2025    PHOS 2.5 10/30/2017    MAG 2.0 10/24/2023    TSH 0.44 03/25/2021       Anesthesia Plan    ASA Status:  2      NPO Status: NPO Appropriate   Anesthesia Type: General.   Induction: intravenous.        Consents    Anesthesia Plan(s) and associated risks, benefits, and realistic alternatives discussed. Questions answered and patient/representative(s) expressed understanding.     - Discussed:     - Discussed with:  Patient, family            Postoperative Care            Comments:    Other Comments: NPO. Meds reviewed. This is her 3rd kidney stone surgery for staghorn calculus. No problems with anesthesia, no recent URI. No CP or SOB.               Taty Silver MD    I have reviewed the pertinent notes and labs in the chart from the past 30 days and (re)examined the patient.  Any updates or changes from those notes are reflected in this note.    Clinically Significant Risk Factors Present on Admission                   # Hypertension: Noted on problem list           # Obesity: Estimated body mass index is 31.46 kg/m  as calculated from the following:    Height as of 5/2/25: 1.664 m (5' 5.5\").    Weight as of this encounter: 87.1 kg (192 lb).                    "

## 2025-05-13 NOTE — ANESTHESIA POSTPROCEDURE EVALUATION
Patient: Georgie SEVERINO Peel    Procedure: Procedure(s):  ROBOTIC PYELOLITHOTOMY, LEFT ANTEGRADE STENT PLACEMENT       Anesthesia Type:  General    Note:  Disposition: Admission   Postop Pain Control: Uneventful            Sign Out: Well controlled pain   PONV: No   Neuro/Psych: Uneventful            Sign Out: Acceptable/Baseline neuro status   Airway/Respiratory: Uneventful            Sign Out: Acceptable/Baseline resp. status   CV/Hemodynamics: Uneventful            Sign Out: Acceptable CV status   Other NRE: NONE   DID A NON-ROUTINE EVENT OCCUR? No           Last vitals:  Vitals Value Taken Time   /61 05/13/25 17:30   Temp 37.3  C (99.1  F) 05/13/25 17:15   Pulse 60 05/13/25 17:30   Resp 18 05/13/25 17:30   SpO2 97 % 05/13/25 17:30   Vitals shown include unfiled device data.    Electronically Signed By: Christopher J. Behrens, MD  May 13, 2025  5:32 PM

## 2025-05-13 NOTE — ANESTHESIA CARE TRANSFER NOTE
Patient: Georgie Patino    Procedure: Procedure(s):  ROBOTIC PYELOLITHOTOMY, LEFT ANTEGRADE STENT PLACEMENT       Diagnosis: Staghorn calculus [N20.0]  Diagnosis Additional Information: No value filed.    Anesthesia Type:   General     Note:    Oropharynx: oropharynx clear of all foreign objects and spontaneously breathing  Level of Consciousness: drowsy  Oxygen Supplementation: face mask  Level of Supplemental Oxygen (L/min / FiO2): 8  Independent Airway: airway patency satisfactory and stable  Dentition: dentition unchanged  Vital Signs Stable: post-procedure vital signs reviewed and stable  Report to RN Given: handoff report given  Patient transferred to: PACU    Handoff Report: Identifed the Patient, Identified the Reponsible Provider, Reviewed the pertinent medical history, Discussed the surgical course, Reviewed Intra-OP anesthesia mangement and issues during anesthesia, Set expectations for post-procedure period and Allowed opportunity for questions and acknowledgement of understanding  Vitals:  Vitals Value Taken Time   /69 05/13/25 16:32   Temp     Pulse 59 05/13/25 16:33   Resp 19 05/13/25 16:33   SpO2 99 % 05/13/25 16:33   Vitals shown include unfiled device data.    Electronically Signed By: TESHA Manzanares CRNA  May 13, 2025  4:35 PM

## 2025-05-13 NOTE — PHARMACY-ADMISSION MEDICATION HISTORY
Pharmacist Admission Medication History    Admission medication history is complete. The information provided in this note is only as accurate as the sources available at the time of the update.    Information Source(s): Patient, Clinic records, and CareEverywhere/SureScripts via in-person    Pertinent Information: Patient was able confirm current medications and last known administration times. Patient recently finished a 5 day course of Penicillin VK for UTI of group b strep    Changes made to PTA medication list:  Added: None  Deleted: None  Changed: None    Allergies reviewed with patient and updates made in EHR: yes    Medication History Completed By: Neo Hwang [facility] 5/13/2025 10:45 AM    PTA Med List   Medication Sig Last Dose/Taking    amLODIPine (NORVASC) 5 MG tablet Take 1 tablet (5 mg) by mouth daily. 5/12/2025 Evening    atorvastatin (LIPITOR) 10 MG tablet Take 1 tablet (10 mg) by mouth daily. 5/12/2025 Evening    diphenhydrAMINE-acetaminophen (TYLENOL PM)  MG tablet Take 1 tablet by mouth nightly as needed for sleep Taking As Needed    lisinopril (ZESTRIL) 40 MG tablet Take 1 tablet (40 mg) by mouth daily. 5/12/2025 Evening    metoprolol tartrate (LOPRESSOR) 50 MG tablet Take 1 tablet (50 mg) by mouth 2 times daily. 5/13/2025 Morning    omeprazole (PRILOSEC) 20 MG DR capsule Take 1 capsule (20 mg) by mouth daily. 5/12/2025 Morning

## 2025-05-13 NOTE — DISCHARGE INSTRUCTIONS
Home Care After Robotic Stone removal   The following instructions will help you care for yourself, or be cared for upon your return home today.     Diet   Regular diet     Activity      No heavy lifting over 15 lbs for 6 weeks.  Walking and stairs are fine.   You should try to be active every day and be up moving to minimize risk of thrombosis and help with bowel recovery.   No driving while taking narcotic pain medications.     Comments Regarding Robotic Laparoscopic Surgery   It is common to feel bloated for several days due to gas used during laparoscopic surgery. This may include pain in the shoulders and upper back. Gentle activity such as walking short distances several times a day may help relieve this discomfort. You may not have a bowel movement for a few days. It is not uncommon to have some blood in the urine after a bowel movement or increased activity. Walking, high fiber foods, and stool softeners may be used to help facilitate the first bowel movement.     Wound Care and Hygiene   Adhesive Closure   Covering your incision is a clear transparent skin closure.   It will remain in place for 10 to 14 days and wear off naturally.   You should remove your post-operative dressing the day after your surgery.   - Do not soak your incision or take a tub bath for 2 weeks.   - No further care is required unless you have a problem.     Incision Care   - Please clean it daily with mild soap and water.   - Keep it dry for the rest of the day.   - You do not need to cover your incision.   - Watch for increasing redness, pus-like discharge or seperation of the incision.   - Do not let the shower stream hit the incision directly.   -There are no staples or sutures to be removed. It is not uncommon to have a little bruising.     Call Your Doctor If You Develop...   - Severe, increased, or unrelieved pain not controlled by oral pain medication   - Temperature above 100.5 degrees and/or chills   - Passing clots in urine  preventing bladder emptying   - Inability to urinate after eight (8) hours   - Signs of cold or flu.   - Nausea and vomiting that continues for more than 24 hours   - - Nausea and vomiting that prevents you from being able to keep medicine or fluids down     Symptoms of Wound Infection:   - Increase in pain in or around wound   - Change in the amount of drainage   - Change in the color of drainage   - Change in the odor of drainage   - Warmth in the tissues around the wound   - Red streaks on the skin near the wound   - Fever (temperature greater than 101.5 degrees F)   - Incision separates or opens up     Symptoms of DVT (Deep Vein Thrombus, or Blood Clot):   - Any tender, swollen, or reddened areas from your groin to your heels.   - Numbness or tingling in groin or calf   - The skin on your leg looks pale or blue or it feels cold to touch   - Any shortness of breath   - Chest pain   - Fever or chills     Symptoms of Urinary Tract Infection (UTI):   - Urge to urinate more often   - Burning sensation   - Fever   - Incontinence     Medications   Please resume home medications as directed on your discharge paperwork.   Pain control:  Take tylenol every 6 hours for the next 2 days for pain control alternate with ibuprofen every 6 hours as needed (take tylenol, then 3 hours later take ibuprofen, then 3 hours later take tylenol, etc.)   Take opiate pain medications as needed for breakthrough pain. Most patients are able to transition to solely over the counter pain medications by 1-3 days after surgery. Take an over the counter stool softener while on narcotic medications.   You may take Miralax over the counter as directed on label for constipation.     Additional Information:   You have a stent in place. That will stay for 4 weeks with removal in clinic.   You also have a drain. IF it is not removed before discharge, you will have a follow-up appointment for removal. You should track the drain output at home      Contacts   [facility]  [phone]    Follow up Appointment:    You will have a follow-up appointment in 2 weeks for wound check.   Your pathology results will be available online in a few days. You are welcome to review the results, but your surgeon will discuss them in great detail at your follow-up visit.

## 2025-05-13 NOTE — ANESTHESIA PROCEDURE NOTES
Airway       Patient location during procedure: OR       Procedure Start/Stop Times: 5/13/2025 12:08 PM  Staff -        CRNA: Shruthi Brown APRN CRNA       Performed By: CRNA  Consent for Airway        Urgency: elective  Indications and Patient Condition       Indications for airway management: teresa-procedural       Induction type:intravenous       Mask difficulty assessment: 1 - vent by mask    Final Airway Details       Final airway type: endotracheal airway       Successful airway: ETT - single  Endotracheal Airway Details        ETT size (mm): 7.0       Cuffed: yes       Cuff volume (mL): 5       Successful intubation technique: direct laryngoscopy       DL Blade Type: MAC 3       Grade View of Cords: 2       Adjucts: stylet       Position: Right       Measured from: gums/teeth       Secured at (cm): 23       Bite block used: Soft    Post intubation assessment        Placement verified by: capnometry, equal breath sounds and chest rise        Number of attempts at approach: 1       Number of other approaches attempted: 0       Secured with: tape       Ease of procedure: easy       Dentition: Intact    Medication(s) Administered   Medication Administration Time: 5/13/2025 12:08 PM

## 2025-05-13 NOTE — INTERVAL H&P NOTE
"I have reviewed the surgical (or preoperative) H&P that is linked to this encounter, and examined the patient. There are no significant changes    Clinical Conditions Present on Arrival:  Clinically Significant Risk Factors Present on Admission                           # Obesity: Estimated body mass index is 31.46 kg/m  as calculated from the following:    Height as of 5/2/25: 1.664 m (5' 5.5\").    Weight as of this encounter: 87.1 kg (192 lb).           "

## 2025-05-13 NOTE — OP NOTE
Preoperative diagnosis   Left Staghorn Stone    Postoperative diagnosis   Left Staghorn Stone    Procedure performed   Left robotic pyelolithotomy  Left antegrade stent placement 6x24    Attending surgeon   Charley Samano. MD Chad Hernandez MD (Assisting)    Anesthesia   General     EBL   30 mL     Complications   None     Specimen   1. Left kidney stone > 2 cm    Findings   Very reactive pelvis and proximal ureter with an inflammatory rind present. Complete left staghorn in 4 pieces. Main pelvis and one calyx able to be removed but two other large stones unable to  be extracted due to completely filling the calyces and unable to be maneuvered free.     Indications   Ms. Patino is a 69 year old patient who was found to complete bilateral staghorn stones. Right sided treatment required PCNL x 2 and ureteroscopy for stone clearance. Patient elected to try robotic stone removal to treat the left sided stones.  After discussing different treatment options, she opted for a robot assisted pyelolithotomy. She now presents for this procedure. Informed consent was obtained. Risks and benefits discussed including renal failure, need for additional procedures, conversion to open surgery, conversion to radical nephrectomy, urine leak.     Procedure   The patient was taken to the operating room and placed supine on the operating table. Pre-operative antibiotics were administered. Bilateral lower extremity SCDs were placed. After induction of general anesthesia, a Mckenna catheter and orogastric tube were placed without difficulty. She was then repositioned in the right lateral decubitus position with her Left side up. All bony prominences were appropriately padded. She was secured to the table with foam and tape. She was prepped and draped in a sterile fashion and a timeout was performed.     Initial entrance was gained into the peritoneal cavity with a Veress needle. The abdomen insufflated and a 8-mm trocar was placed under  direct vision into the abdomen. Other ports were placed under direct vision,  Other ports were placed under direct vision, two 8 mm ports in a horizontal line and a 8-mm port in the right lower quadrant. A 12 mm airseal was placed in midline.   Initially the surgery began by incising the white line of Toldt and developing a plane between the colon and Gerota's fascia. This was taken down inferiorly into the pelvis and superiorly up along the spleen to allow the spleen and pancreas to fall medially. At this point the plane posterior to the ureter and gonadal vein was developed. The 4th arm was used to place the hilum on stretch, and this was dissected out minimally. Attention was then turned to the hilum. The fat overlying was dissected off. Significant reactive tissue was found with a inflammatory rind. Two small vessels overlying the renal pelvis were clipped with 2 clips down and 1 up. Once sufficient exposure was obtained, a V incision was made vertically. Two large stone were removed with gentle manipulation. There remained two smaller stones stuck within the calyces. They were unable to be free or broken up sufficiently for removal. The stones were placed in an endocatch bag.   The V plasty was closed with 4-0 vicryl both running and interrupted. Before completing the closure, a 6x24F stent was passed retrograde down the ureter. The curl was placed in the kidney. The pelvis closure was then completed. The abdomen was surveyed and good hemostasis was ensured. Surgiflo was paced over the closure. A 19F javy drain was placed through the lower port site and the port removed. It was secured with a silk stitch.       An extraction incision was then made in the midline by extending the assistant port.The specimen was removed intact in the bag. We closed the fascia with interrupted 0 vicryl. Marcaine was injected subcutaneously. Skin incisions were closed with subcuticular 4-0 Monocryl. Dermabond was applied. The  patient was repositioned supine.     The patient tolerated the procedure well. All sponge and instrument counts were correct x 2. The patient was taken to the recovery unit without incident.     Charley Samano MD was present in the room for all critical parts of the procedure.

## 2025-05-14 VITALS
RESPIRATION RATE: 20 BRPM | TEMPERATURE: 98 F | OXYGEN SATURATION: 93 % | DIASTOLIC BLOOD PRESSURE: 73 MMHG | SYSTOLIC BLOOD PRESSURE: 168 MMHG | HEART RATE: 61 BPM | WEIGHT: 196.21 LBS | BODY MASS INDEX: 32.15 KG/M2

## 2025-05-14 LAB
ANION GAP SERPL CALCULATED.3IONS-SCNC: 7 MMOL/L (ref 7–15)
BUN SERPL-MCNC: 19.7 MG/DL (ref 8–23)
CALCIUM SERPL-MCNC: 8.8 MG/DL (ref 8.8–10.4)
CHLORIDE SERPL-SCNC: 104 MMOL/L (ref 98–107)
CREAT SERPL-MCNC: 0.56 MG/DL (ref 0.51–0.95)
EGFRCR SERPLBLD CKD-EPI 2021: >90 ML/MIN/1.73M2
ERYTHROCYTE [DISTWIDTH] IN BLOOD BY AUTOMATED COUNT: 15.6 % (ref 10–15)
GLUCOSE BLDC GLUCOMTR-MCNC: 111 MG/DL (ref 70–99)
GLUCOSE SERPL-MCNC: 104 MG/DL (ref 70–99)
HCO3 SERPL-SCNC: 29 MMOL/L (ref 22–29)
HCT VFR BLD AUTO: 33.8 % (ref 35–47)
HGB BLD-MCNC: 9.8 G/DL (ref 11.7–15.7)
MCH RBC QN AUTO: 22.6 PG (ref 26.5–33)
MCHC RBC AUTO-ENTMCNC: 29 G/DL (ref 31.5–36.5)
MCV RBC AUTO: 78 FL (ref 78–100)
PLATELET # BLD AUTO: 198 10E3/UL (ref 150–450)
POTASSIUM SERPL-SCNC: 4 MMOL/L (ref 3.4–5.3)
RBC # BLD AUTO: 4.34 10E6/UL (ref 3.8–5.2)
SODIUM SERPL-SCNC: 140 MMOL/L (ref 135–145)
WBC # BLD AUTO: 10.5 10E3/UL (ref 4–11)

## 2025-05-14 PROCEDURE — 250N000013 HC RX MED GY IP 250 OP 250 PS 637: Performed by: INTERNAL MEDICINE

## 2025-05-14 PROCEDURE — 82962 GLUCOSE BLOOD TEST: CPT

## 2025-05-14 PROCEDURE — 80048 BASIC METABOLIC PNL TOTAL CA: CPT | Performed by: NURSE PRACTITIONER

## 2025-05-14 PROCEDURE — 85027 COMPLETE CBC AUTOMATED: CPT | Performed by: NURSE PRACTITIONER

## 2025-05-14 PROCEDURE — 82570 ASSAY OF URINE CREATININE: CPT | Performed by: STUDENT IN AN ORGANIZED HEALTH CARE EDUCATION/TRAINING PROGRAM

## 2025-05-14 PROCEDURE — 36415 COLL VENOUS BLD VENIPUNCTURE: CPT | Performed by: NURSE PRACTITIONER

## 2025-05-14 PROCEDURE — 96372 THER/PROPH/DIAG INJ SC/IM: CPT | Performed by: STUDENT IN AN ORGANIZED HEALTH CARE EDUCATION/TRAINING PROGRAM

## 2025-05-14 PROCEDURE — 258N000003 HC RX IP 258 OP 636: Performed by: STUDENT IN AN ORGANIZED HEALTH CARE EDUCATION/TRAINING PROGRAM

## 2025-05-14 PROCEDURE — 99214 OFFICE O/P EST MOD 30 MIN: CPT | Performed by: INTERNAL MEDICINE

## 2025-05-14 PROCEDURE — 250N000013 HC RX MED GY IP 250 OP 250 PS 637: Performed by: STUDENT IN AN ORGANIZED HEALTH CARE EDUCATION/TRAINING PROGRAM

## 2025-05-14 PROCEDURE — 250N000011 HC RX IP 250 OP 636: Performed by: STUDENT IN AN ORGANIZED HEALTH CARE EDUCATION/TRAINING PROGRAM

## 2025-05-14 RX ORDER — ONDANSETRON 4 MG/1
4 TABLET, ORALLY DISINTEGRATING ORAL EVERY 6 HOURS PRN
Qty: 4 TABLET | Refills: 0 | Status: SHIPPED | OUTPATIENT
Start: 2025-05-14

## 2025-05-14 RX ORDER — KETOROLAC TROMETHAMINE 10 MG/1
10 TABLET, FILM COATED ORAL EVERY 6 HOURS PRN
Qty: 8 TABLET | Refills: 0 | Status: SHIPPED | OUTPATIENT
Start: 2025-05-14 | End: 2025-05-14

## 2025-05-14 RX ORDER — OXYCODONE HYDROCHLORIDE 5 MG/1
5 TABLET ORAL EVERY 6 HOURS PRN
Qty: 6 TABLET | Refills: 0 | Status: SHIPPED | OUTPATIENT
Start: 2025-05-14

## 2025-05-14 RX ORDER — KETOROLAC TROMETHAMINE 10 MG/1
10 TABLET, FILM COATED ORAL EVERY 6 HOURS PRN
Qty: 6 TABLET | Refills: 0 | Status: SHIPPED | OUTPATIENT
Start: 2025-05-14

## 2025-05-14 RX ORDER — POLYETHYLENE GLYCOL 3350 17 G/17G
17 POWDER, FOR SOLUTION ORAL DAILY
Qty: 119 G | Refills: 0 | Status: SHIPPED | OUTPATIENT
Start: 2025-05-14 | End: 2025-05-21

## 2025-05-14 RX ADMIN — PANTOPRAZOLE SODIUM 40 MG: 40 TABLET, DELAYED RELEASE ORAL at 09:04

## 2025-05-14 RX ADMIN — OXYCODONE HYDROCHLORIDE 5 MG: 5 TABLET ORAL at 13:57

## 2025-05-14 RX ADMIN — ACETAMINOPHEN 975 MG: 325 TABLET ORAL at 04:38

## 2025-05-14 RX ADMIN — KETOROLAC TROMETHAMINE 15 MG: 30 INJECTION, SOLUTION INTRAMUSCULAR at 11:42

## 2025-05-14 RX ADMIN — POLYETHYLENE GLYCOL 3350 17 G: 17 POWDER, FOR SOLUTION ORAL at 09:05

## 2025-05-14 RX ADMIN — ENOXAPARIN SODIUM 40 MG: 40 INJECTION SUBCUTANEOUS at 13:58

## 2025-05-14 RX ADMIN — ACETAMINOPHEN 975 MG: 325 TABLET ORAL at 13:57

## 2025-05-14 RX ADMIN — AMLODIPINE BESYLATE 5 MG: 5 TABLET ORAL at 09:03

## 2025-05-14 RX ADMIN — OXYCODONE HYDROCHLORIDE 5 MG: 5 TABLET ORAL at 04:50

## 2025-05-14 RX ADMIN — OXYCODONE HYDROCHLORIDE 5 MG: 5 TABLET ORAL at 09:05

## 2025-05-14 RX ADMIN — SENNOSIDES AND DOCUSATE SODIUM 1 TABLET: 50; 8.6 TABLET ORAL at 09:04

## 2025-05-14 RX ADMIN — METOPROLOL TARTRATE 50 MG: 25 TABLET, FILM COATED ORAL at 09:03

## 2025-05-14 RX ADMIN — SODIUM CHLORIDE, SODIUM LACTATE, POTASSIUM CHLORIDE, AND CALCIUM CHLORIDE: .6; .31; .03; .02 INJECTION, SOLUTION INTRAVENOUS at 04:44

## 2025-05-14 ASSESSMENT — ACTIVITIES OF DAILY LIVING (ADL)
ADLS_ACUITY_SCORE: 30
ADLS_ACUITY_SCORE: 28
ADLS_ACUITY_SCORE: 30
ADLS_ACUITY_SCORE: 27
ADLS_ACUITY_SCORE: 28

## 2025-05-14 NOTE — PROGRESS NOTES
"  [facility] - POSTOP PROGRESS NOTE    PLACE OF SERVICE:  [facility]     SURGERY: POD # 1 after left robotic pyelolithotomy and left antegrade ureteral stent placement by Dr. Samano for left staghorn stone     SUBJECTIVE:   Events: no acute events overnight. Pt reports mild SOB since her last surgery 3/28/25. Remains on O2 this AM, O2 sat 94%. Has not yet ambulated or up to chair. Abdominal pain well controlled with medication. Tolerating diet. Not yet passing gas. No problems with JF or mehta overnight.     OBJECTIVE:  PHYSICAL EXAM  Vital signs:  Temp: 97.9  F (36.6  C) Temp src: Oral BP: (!) 177/81 Pulse: 55   Resp: 20 SpO2: 94 % O2 Device: Nasal cannula Oxygen Delivery: 3 LPM   Weight: 89 kg (196 lb 3.4 oz)  Estimated body mass index is 32.15 kg/m  as calculated from the following:    Height as of 5/2/25: 1.664 m (5' 5.5\").    Weight as of this encounter: 89 kg (196 lb 3.4 oz).    General: NAD, alert, cooperative  Neuro: A&O x 3. Moving all extremities equally.   Resp: Reg resp rate, O2 via NC.   Abdomen: appropriately tender, non distended, no rebound or peritoneal signs  Incisions: C/D/I, closed with subcutaneous suture/skin glue, no ecchymosis noted   JF: 50+ ml so far today, 50 ml on 5/13, serosanguinous.   : Mehta draining clear deborah colored urine.   LE: CWMS intact. No bilateral LE edema.     LABS:  INR   Date Value Ref Range Status   10/29/2017 1.21 (H) 0.86 - 1.14 Final      Lab Results   Component Value Date    WBC 10.5 05/14/2025    WBC 7.9 11/10/2017     Lab Results   Component Value Date    HGB 9.8 05/14/2025    HGB 14.9 11/12/2018     Lab Results   Component Value Date     05/14/2025     11/10/2017     Creatinine   Date Value Ref Range Status   05/14/2025 0.56 0.51 - 0.95 mg/dL Final   03/25/2021 0.62 0.52 - 1.04 mg/dL Final     Sodium   Date Value Ref Range Status   05/14/2025 140 135 - 145 mmol/L Final   03/25/2021 139 133 - 144 mmol/L Final     Potassium   Date " Value Ref Range Status   05/14/2025 4.0 3.4 - 5.3 mmol/L Final   01/09/2023 4.0 3.4 - 5.3 mmol/L Final   03/25/2021 4.3 3.4 - 5.3 mmol/L Final     Magnesium   Date Value Ref Range Status   10/24/2023 2.0 1.7 - 2.3 mg/dL Final   05/31/2019 2.1 1.6 - 2.3 mg/dL Final       Lab Results: personally reviewed.     ASSESSMENT/PLAN:  POD # 1 after left robotic pyelolithotomy and left antegrade ureteral stent placement by Dr. Samano for left staghorn stone     - Diet - regular diet, maintain  - Drains -  100 ml output in 24 hours, serosanguinous in appearance   - Catheter - Mehta draining clear deborah colored urine  - Creatinine 0.56.   - Fluid creatinine from JF pending.   - Will remove mehta now, check PVRs and monitor JF output.   - Activity - encourage ambulation and incentive spirometer   - DVT prophylaxis: subcutaneous lovenox, SCDs, ambulation.   - Updated Dr. Morris on patient report of SOB since last surgery, he will evaluate.   - Anticipated discharge: 5/14/25      Discussed patient with Dr. Samano and Dr. Arturo Nieto, APRN, CNP  [facility]   [phone]

## 2025-05-14 NOTE — PLAN OF CARE
Problem: Delirium  Goal: Improved Behavioral Control  Intervention: Minimize Safety Risk  Recent Flowsheet Documentation  Taken 5/13/2025 1938 by Alyce Taylor RN  Enhanced Safety Measures: pain management     Problem: Adult Inpatient Plan of Care  Goal: Absence of Hospital-Acquired Illness or Injury  Intervention: Identify and Manage Fall Risk  Recent Flowsheet Documentation  Taken 5/13/2025 1938 by Alyce Taylor, RN  Safety Promotion/Fall Prevention:   activity supervised   nonskid shoes/slippers when out of bed   room organization consistent   safety round/check completed  Intervention: Prevent Skin Injury  Recent Flowsheet Documentation  Taken 5/13/2025 1938 by Alyce Taylor RN  Body Position: position changed independently  Intervention: Prevent and Manage VTE (Venous Thromboembolism) Risk  Recent Flowsheet Documentation  Taken 5/13/2025 1938 by Alyce Taylor RN  VTE Prevention/Management: SCDs on (sequential compression devices)   Goal Outcome Evaluation:       Alert&O, cap on, IS initial instruction done, on strict I&O, scheduled Tylenol administer, JF intact draining, incision sites CDI, mehta patent/draining, declined offer of ambulating outside the room at this time,  LR infusing at 125 ml/hr.

## 2025-05-14 NOTE — PROGRESS NOTES
"[facility]    Medicine Progress Note - Hospitalist Service    Date of Admission:  5/13/2025    Assessment & Plan     Left Staghorn Stone, S/P Left Robotic pyelolithotomy and Left Antegrade Stent Placement - 5/13  - pain well controlled   - hoping to discharge today  - per Urology    HTN  - mildly elevated, but reasonable in acute care setting  - cont home regimen of norvasc and metoprolol     Hyperlipidemia  - cont home statin     Mild Anemia  - in post op setting, defer to outpt recheck             Diet: Advance Diet as Tolerated: Regular Diet Adult; Regular Diet Adult    DVT Prophylaxis: Enoxaparin (Lovenox) SQ  Mckenna Catheter: PRESENT, indication: Surgical procedure  Lines: None     Cardiac Monitoring: None  Code Status: Full Code      Clinically Significant Risk Factors Present on Admission                   # Hypertension: Noted on problem list      # Anemia: based on hgb <11       # Obesity: Estimated body mass index is 32.15 kg/m  as calculated from the following:    Height as of 5/2/25: 1.664 m (5' 5.5\").    Weight as of this encounter: 89 kg (196 lb 3.4 oz).              Social Drivers of Health    Tobacco Use: High Risk (5/13/2025)    Patient History     Smoking Tobacco Use: Some Days     Smokeless Tobacco Use: Never   Alcohol Use: Unknown (11/21/2019)    AUDIT-C     Average Number of Drinks: 3 or 4          Disposition Plan     Medically Ready for Discharge: Anticipated Today             Josemanuel Morris MD  Hospitalist Service  [facility]  Securely message with [employer] (more info)  Text page via [employer] Paging/Directory   ______________________________________________________________________    Interval History   Pain controlled, no complaints     Physical Exam   Vital Signs: Temp: 98.9  F (37.2  C) Temp src: Oral BP: (!) 153/65 Pulse: 61   Resp: 20 SpO2: 94 % O2 Device: Nasal cannula Oxygen Delivery: 3 LPM  Weight: 196 lbs 3.35 oz    Constitutional: awake, " alert, cooperative, no apparent distress, and appears stated age  Respiratory: No increased work of breathing, good air exchange, clear to auscultation bilaterally, no crackles or wheezing  Cardiovascular: Normal apical impulse, regular rate and rhythm, normal S1 and S2, no S3 or S4, and no murmur noted  GI: No scars, normal bowel sounds, soft, non-distended, non-tender, no masses palpated, no hepatosplenomegally and drain present     Medical Decision Making       25 MINUTES SPENT BY ME on the date of service doing chart review, history, exam, documentation & further activities per the note.      Data     I have personally reviewed the following data over the past 24 hrs:    10.5  \   9.8 (L)   / 198     141 106 19.0 /  111 (H)   4.1 25 0.66 \       Imaging results reviewed over the past 24 hrs:   No results found for this or any previous visit (from the past 24 hours).

## 2025-05-14 NOTE — DISCHARGE SUMMARY
[facility] DISCHARGE SUMMARY    Name: Georgie Patino  : 1955  MRN: [id number]  PCP: Kori Em    Place of service: [facility]    Admission date: 2025   Discharge date: 2025     Surgeon: Dr. Samano    Surgical Procedure: Left robotic pyelolithotomy  Left antegrade stent placement 6x24    Date of surgery: 2025    Principal Diagnosis at Discharge:   Staghorn calculus [N20.0]     Other diagnosis addressed during hospitalization:  HTN  Hyperlipidemia  Mild anemia  Mild SOB (present prior to admission)    Consults:  Hospitalist    Diagnostic Studies :  Fluid creatinine - pending    Complications while in the Hospital:  None    Physical Exam:  Temp: 98  F (36.7  C) Temp src: Oral BP: (!) 168/73 Pulse: 61   Resp: 20 SpO2: 93 % O2 Device: None (Room air) Oxygen Delivery: 3 LPM    See exam per writer's 25 note.     Brief history of hospital course:  This is a 69 year old female admitted for Staghorn calculus [N20.0]. Patient underwent above procedure. Patient tolerated the procedure well. Hospitalist was consulted to assist with medical management. Post operative course was unremarkable. Pt reported mild SOB present since 3/2025 stone surgery. SOB evaluated by Dr. Morris, no further work-up obtained. By the day of discharge, the patient was ambulatory, tolerating diet, pain was controlled with oral analgesics, labs and vitals stable. Mckenna removed  and PVR < 25 ml. Fluid creatinine from JF pending. Discharged with .      Labs:  Hemoglobin   Date Value Ref Range Status   2025 9.8 (L) 11.7 - 15.7 g/dL Final   2018 14.9 11.7 - 15.7 g/dL Final   ]  Platelet Count   Date Value Ref Range Status   2025 198 150 - 450 10e3/uL Final   11/10/2017 309 150 - 450 10e9/L Final     WBC   Date Value Ref Range Status   11/10/2017 7.9 4.0 - 11.0 10e9/L Final     WBC Count   Date Value Ref Range Status   2025 10.5 4.0 - 11.0 10e3/uL Final     Creatinine   Date Value  Ref Range Status   05/14/2025 0.56 0.51 - 0.95 mg/dL Final   03/25/2021 0.62 0.52 - 1.04 mg/dL Final     Potassium   Date Value Ref Range Status   05/14/2025 4.0 3.4 - 5.3 mmol/L Final   01/09/2023 4.0 3.4 - 5.3 mmol/L Final   03/25/2021 4.3 3.4 - 5.3 mmol/L Final     INR   Date Value Ref Range Status   10/29/2017 1.21 (H) 0.86 - 1.14 Final        Pending Labs:  Surgical Pathology     DISPOSITION: Home    DISCHARGE CONDITION: Good/Stable    DISCHARGE MEDICATIONS:      Review of your medicines        START taking        Dose / Directions   ketorolac 10 MG tablet  Commonly known as: TORADOL      Dose: 10 mg  Take 1 tablet (10 mg) by mouth every 6 hours as needed for moderate pain.  Quantity: 6 tablet  Refills: 0     ondansetron 4 MG ODT tab  Commonly known as: ZOFRAN ODT      Dose: 4 mg  Take 1 tablet (4 mg) by mouth every 6 hours as needed for nausea.  Quantity: 4 tablet  Refills: 0     oxyCODONE 5 MG tablet  Commonly known as: ROXICODONE      Dose: 5 mg  Take 1 tablet (5 mg) by mouth every 6 hours as needed for moderate to severe pain.  Quantity: 6 tablet  Refills: 0     polyethylene glycol 17 GM/Dose powder  Commonly known as: MIRALAX      Dose: 17 g  Take 17 g by mouth daily for 7 days. Discontinue if your stools become loose.  Quantity: 119 g  Refills: 0            CONTINUE these medicines which have NOT CHANGED        Dose / Directions   acetaminophen 325 MG tablet  Commonly known as: TYLENOL  Used for: Status post total right knee replacement      Dose: 650 mg  Take 2 tablets (650 mg) by mouth every 4 hours as needed for other (mild pain)  Quantity: 100 tablet  Refills: 0     amLODIPine 5 MG tablet  Commonly known as: NORVASC  Used for: S/P ascending aortic aneurysm repair      Dose: 5 mg  Take 1 tablet (5 mg) by mouth daily.  Quantity: 90 tablet  Refills: 0     atorvastatin 10 MG tablet  Commonly known as: LIPITOR  Used for: Hyperlipidemia LDL goal <70      Dose: 10 mg  Take 1 tablet (10 mg) by mouth  daily.  Quantity: 90 tablet  Refills: 3     Blood Pressure Monitor Kit  Used for: Thoracic aortic aneurysm without rupture, unspecified part, Benign essential hypertension      Dose: 1 kit  1 kit daily  Quantity: 1 kit  Refills: 0     diphenhydrAMINE-acetaminophen  MG tablet  Commonly known as: TYLENOL PM      Dose: 1 tablet  Take 1 tablet by mouth nightly as needed for sleep  Refills: 0     lisinopril 40 MG tablet  Commonly known as: ZESTRIL  Used for: Benign essential hypertension      Dose: 40 mg  Take 1 tablet (40 mg) by mouth daily.  Quantity: 90 tablet  Refills: 0     metoprolol tartrate 50 MG tablet  Commonly known as: LOPRESSOR      Dose: 50 mg  Take 1 tablet (50 mg) by mouth 2 times daily.  Quantity: 180 tablet  Refills: 0     omeprazole 20 MG DR capsule  Commonly known as: PriLOSEC      Dose: 20 mg  Take 1 capsule (20 mg) by mouth daily.  Refills: 0               Where to get your medicines        These medications were sent to [id number] - [address] [address]      Phone: [phone]   ketorolac 10 MG tablet  ondansetron 4 MG ODT tab  oxyCODONE 5 MG tablet  polyethylene glycol 17 GM/Dose powder         DISCHARGE PLAN:   - Follow up with Dr. Samano as scheduled prior to your procedure   - Follow up with Kori Em as directed for chronic symptoms mild SOB.   - Take medication as prescribed, avoid anticoagulants per surgeon and PCP recommendations.   - Wound / drain care: As directed prior to discharge  - Physical activity: As tolerated, no heavy lifting. No driving while taking narcotics.   - Diet: Regular diet.   - Medication: please review discharge Med req/AVS  - Warning signs discussed with patient about when to call the clinic/hospital  - All questions and concerns were answered for the patient prior to discharge  - Patient verbalized understanding.     Discussed patient with Dr. Samano on day of discharge.     Cb Nieto,  TESHA, CNP  [facility]   [phone]     I saw the patient on the date of discharge  Total time spent for discharge on date of discharge: 20 minutes    Physician(s) in addition to primary physician who should receive a copy:  CC1: Kori Em           ?

## 2025-05-14 NOTE — PLAN OF CARE
Goal Outcome Evaluation:      Plan of Care Reviewed With: patient    Overall Patient Progress: no change      Problem: Pain Acute  Goal: Optimal Pain Control and Function  Outcome: Not Progressing     Problem: Nausea and Vomiting  Goal: Nausea and Vomiting Relief  Outcome: Not Progressing     Patient is alert and orientated x 4.  C/O low back pain.  Received PRN PO Oxycodone 5 mg with good relief.  SOB with activity.  Very sleepy this afternoon.  Declined taking afternoon Baclofen because she says it makes her feel sick.  Declined Scheduled Tylenol.  Tele NSR.

## 2025-05-14 NOTE — PLAN OF CARE
"Goal Outcome Evaluation:      Plan of Care Reviewed With: patient, spouse    Overall Patient Progress: improving      Problem: Adult Inpatient Plan of Care  Goal: Plan of Care Review  Description: The Plan of Care Review/Shift note should be completed every shift.  The Outcome Evaluation is a brief statement about your assessment that the patient is improving, declining, or no change.  This information will be displayed automatically on your shiftnote.  5/14/2025 1546 by Zaida Landry RN  Outcome: Progressing  Flowsheets (Taken 5/14/2025 1546)  Plan of Care Reviewed With:   patient   spouse  Overall Patient Progress: improving  5/14/2025 1511 by Zaida Landry RN  Outcome: Not Progressing  Flowsheets (Taken 5/14/2025 1511)  Plan of Care Reviewed With: patient  Overall Patient Progress: no change  Goal: Patient-Specific Goal (Individualized)  Description: You can add care plan individualizations to a care plan. Examples of Individualization might be:  \"Parent requests to be called daily at 9am for status\", \"I have a hard time hearing out of my right ear\", or \"Do not touch me to wake me up as it startlesme\".  5/14/2025 1546 by Zaida Landry RN  Outcome: Progressing  5/14/2025 1511 by Zaida Landry RN  Outcome: Not Progressing  Goal: Absence of Hospital-Acquired Illness or Injury  5/14/2025 1546 by Zaida Landry RN  Outcome: Progressing  5/14/2025 1511 by Zaida Landry RN  Outcome: Not Progressing  Intervention: Identify and Manage Fall Risk  Recent Flowsheet Documentation  Taken 5/14/2025 0900 by Zaida Landry RN  Safety Promotion/Fall Prevention:   activity supervised   assistive device/personal items within reach   clutter free environment maintained   nonskid shoes/slippers when out of bed  Intervention: Prevent Skin Injury  Recent Flowsheet Documentation  Taken 5/14/2025 0900 by Zaida Landry, RN  Body Position: position changed independently  Intervention: Prevent and Manage VTE " (Venous Thromboembolism) Risk  Recent Flowsheet Documentation  Taken 5/14/2025 0900 by Zaida Landry RN  VTE Prevention/Management: SCDs on (sequential compression devices)  Goal: Optimal Comfort and Wellbeing  5/14/2025 1546 by Zaida Landry RN  Outcome: Progressing  5/14/2025 1511 by Zaida Landry RN  Outcome: Not Progressing  Goal: Readiness for Transition of Care  5/14/2025 1546 by Zaida Landry RN  Outcome: Progressing  5/14/2025 1511 by Zaida Landry RN  Outcome: Not Progressing     Problem: Pain Acute  Goal: Optimal Pain Control and Function  5/14/2025 1546 by Zaida Landry RN  Outcome: Progressing  5/14/2025 1511 by Zaida Landry RN  Outcome: Not Progressing  Intervention: Prevent or Manage Pain  Recent Flowsheet Documentation  Taken 5/14/2025 0900 by Zaida Landry RN  Medication Review/Management: medications reviewed     Problem: Nausea and Vomiting  Goal: Nausea and Vomiting Relief  5/14/2025 1546 by Zaida Landry RN  Outcome: Progressing  5/14/2025 1511 by Zaida Landry RN  Outcome: Not Progressing    Patient is alert and orientated x 4.  C/O generalized pain abdomen.  Taking PRN PO Oxycodone 5 mg and scheduled Tylenol with good relief.  On RA.  Mckenna removed at 1323.  Patient has voided x 2 and post residual bladder scans were 2 and 22.  Ambulated in hallway with stand by assist and walker.  JF left abdomen total of 55 ml output this shift.  Tolerating regular diet.

## 2025-05-14 NOTE — PLAN OF CARE
Problem: Delirium  Goal: Improved Sleep  Outcome: Progressing     Problem: Adult Inpatient Plan of Care  Goal: Optimal Comfort and Wellbeing  Outcome: Progressing  Intervention: Monitor Pain and Promote Comfort  Recent Flowsheet Documentation  Taken 5/14/2025 0450 by Cortney Malone RN  Pain Management Interventions: medication (see MAR)  Taken 5/14/2025 0438 by Cortney Malone RN  Pain Management Interventions: medication (see MAR)  Taken 5/14/2025 0006 by Cortney Malone RN  Pain Management Interventions:   quiet environment facilitated   rest  Taken 5/13/2025 2352 by Cortney Malone RN  Pain Management Interventions: medication (see MAR)     Problem: Pain Acute  Goal: Optimal Pain Control and Function  Outcome: Progressing  Intervention: Develop Pain Management Plan  Recent Flowsheet Documentation  Taken 5/14/2025 0450 by Cortney Malone RN  Pain Management Interventions: medication (see MAR)  Taken 5/14/2025 0438 by Cortney Malone RN  Pain Management Interventions: medication (see MAR)  Taken 5/14/2025 0006 by Cortney Malone RN  Pain Management Interventions:   quiet environment facilitated   rest  Taken 5/13/2025 2352 by Cortney Malone RN  Pain Management Interventions: medication (see MAR)  Intervention: Prevent or Manage Pain  Recent Flowsheet Documentation  Taken 5/14/2025 0453 by Cortney Malone RN  Medication Review/Management: medications reviewed  Taken 5/14/2025 0130 by Cortney Malone RN  Medication Review/Management: medications reviewed     Problem: Nausea and Vomiting  Goal: Nausea and Vomiting Relief  Outcome: Progressing     Problem: Adult Inpatient Plan of Care  Goal: Absence of Hospital-Acquired Illness or Injury  Intervention: Prevent Skin Injury  Recent Flowsheet Documentation  Taken 5/14/2025 0453 by Cortney Malone RN  Body Position: position changed independently  Taken 5/14/2025 0130 by Cortney Malone RN  Body Position: position changed independently   Goal  Outcome Evaluation:       Patient had pyelolithotomy and left stent placement yesterday. 3 lap sites on the abdomen; clean; dry and intact. LR infusing @ 125 ml/hr. On 1L 02 throughout the night. On tele with sinus amy @ 55. Clear liquid diet. Scheduled Toradol, Tylenol and PRN oxycodone administered for pain. Zofran given for nausea. 50 ml from JF drain, and 600 ml from the mehta catheter. Assist of 1 with mobility. Slept well.       Refused to go for a walk.  She said she will do it after breakfast.

## 2025-05-14 NOTE — PLAN OF CARE
Patient discharged to home at 1745 via Private Car.  Accompanied by spouse and staff.  Discharge instructions were reviewed with patient and spouse, opportunity offered to ask questions.    Prescriptions faxed to pharmacy.  Access discontinued: Yes  Care plan and education discontinued: Yes  Home meds retrieved from pharmacy: N/A  Belongings were sent home with patient/family:  1 shirt, 1 pair of pants, 1 pair of shoes.

## 2025-05-14 NOTE — PLAN OF CARE
PRIMARY DIAGNOSIS: ACUTE PAIN  OUTPATIENT/OBSERVATION GOALS TO BE MET BEFORE DISCHARGE:  1. Pain Status: Improved-controlled with oral pain medications.    2. Return to near baseline physical activity: Yes    3. Cleared for discharge by consultants (if involved): Yes    Discharge Planner Nurse   Safe discharge environment identified: Yes  Barriers to discharge: Yes       Entered by: Cortney Malone RN 05/14/2025 1:33 AM     Please review provider order for any additional goals.   Nurse to notify provider when observation goals have been met and patient is ready for discharge.Goal Outcome Evaluation:

## 2025-05-14 NOTE — CONSULTS
"[facility]  Consult Note - Hospitalist Service  Date of Admission:  5/13/2025  Consult Requested by: Surgery team  Reason for Consult: medical management    Assessment & Plan         Georgie Patino is a 69 year old female admitted on 5/13/2025.     She has a h/o left staghorn stone.  She underwent elective procedure : Left robotic pyelolithotomy, Left antegrade stent placement 6x24      A/p :         Left Staghorn Stone   S/p Left robotic pyelolithotomy + Left antegrade stent placement 6x24 - May 13    Post op day 0  Surgery team following  Pain control    HTN, Essential : on lisinopril 40 mg daily, norvasc 5 mg daily, metoprolol 50 mg bid  HLD : on statin  GERD : on PPI               Clinically Significant Risk Factors Present on Admission                   # Hypertension: Noted on problem list      # Anemia: based on hgb <11       # Obesity: Estimated body mass index is 32.15 kg/m  as calculated from the following:    Height as of 5/2/25: 1.664 m (5' 5.5\").    Weight as of this encounter: 89 kg (196 lb 3.4 oz).              Freddy Kam MD  Hospitalist Service  Securely message with [facility] (more info)  Text page via [facility] Paging/Directory   ______________________________________________________________________    Chief Complaint   Renal calculi    History is obtained from the patient    History of Present Illness     Georgie Patino is a 69 year old female admitted on 5/13/2025.     She has a h/o left staghorn stone.  She underwent elective procedure : Left robotic pyelolithotomy, Left antegrade stent placement 6x24        Past Medical History    Past Medical History:   Diagnosis Date    Anxiety     Ascending aortic aneurysm 09/21/2017    Blood transfusions age 19    due to menorhagia    Difficulty walking     History of blood transfusion     Hypertension 09/21/2017    Localized osteoarthritis of both knees     Morbid obesity (H)     Thyroid nodule 11/20/2017    Tobacco abuse     " Walking troubles        Past Surgical History   Past Surgical History:   Procedure Laterality Date    ARTHROPLASTY KNEE Right 10/2/2023    Procedure: RIGHT TOTAL KNEE ARTHROPLASTY;  Surgeon: Ross Oro MD;  Location:  OR    COLONOSCOPY WITH CO2 INSUFFLATION N/A 03/15/2017    Procedure: COLONOSCOPY WITH CO2 INSUFFLATION;  Surgeon: Duane, William Charles, MD;  Location: [facility] OR    DILATION AND CURETTAGE  age 20    EXTRACORPOREAL SHOCK WAVE LITHOTRIPSY, CYSTOSCOPY, INSERT STENT URETER(S), COMBINED Bilateral 11/19/2018    Procedure: BILATERAL EXTRACORPOREAL SHOCK WAVE LITHOTRIPSY, CYSTOSCOPY, LEFT STENT PLACEMENT;  Surgeon: Tavo Saavedra MD;  Location: [facility] OR    GI SURGERY  3/15/2017    GYN SURGERY  age 20    REPAIR ANEURYSM ASCENDING AORTA N/A 10/27/2017    Procedure: REPAIR ANEURYSM ASCENDING AORTA;  Median Sternotomy, Cardiopulmonary bypass, Ascending Aorta Aneurysm Repair using Hemashield Platinum Woven Double Velour Graft 34cm X 30cm.;  Surgeon: Rubio Piña MD;  Location: [facility] OR    VASCULAR SURGERY  10/27/2017    [place] teeth removed         Medications   I have reviewed this patient's current medications       Review of Systems         No fever or chills  No cardiorespiratory symptoms  No abdominal symptoms  No urinary symptoms  No neuro symptoms      Physical Exam   Vital Signs: Temp: 98.1  F (36.7  C) Temp src: Oral BP: 137/65 Pulse: 53   Resp: 19 SpO2: 99 % O2 Device: Nasal cannula Oxygen Delivery: 3 LPM  Weight: 196 lbs 3.35 oz       GENERAL: The patient is not in any acute distressed. Awake and alert.  HEENT: Nonicteric sclerae, PERRLA, EOMI. Oropharynx clear. Moist mucous membranes. Conjunctivae appear well perfused.  HEART: Regular rate and rhythm without murmurs.  LUNGS: Clear to auscultation bilaterally. No wheezing or crackles.  ABDOMEN: Soft, positive bowel sounds, nontender.  SKIN: No rash, no excessive bruising, petechiae, or purpura.  EXTREMITIES : no rashes, no swelling  in legs.  NEUROLOGIC: conscious and oriented, follows commands, no obvious focal deficits.  ROS: All other systems negative       Medical Decision Making       60 MINUTES SPENT BY ME on the date of service doing chart review, history, exam, documentation & further activities per the note.  MANAGEMENT DISCUSSED with the following over the past 24 hours: rn, patient       Data     I have personally reviewed the following data over the past 24 hrs:    10.4  \   10.2 (L)   / 196     141 106 19.0 /  162 (H)   4.1 25 0.66 \

## 2025-05-15 LAB
CREAT FLD-MCNC: 0.6 MG/DL
CREATININE BODY FLUID SOURCE: NORMAL

## 2025-05-17 LAB
APPEARANCE STONE: NORMAL
COMPN STONE: NORMAL
SPECIMEN WT: NORMAL MG

## 2025-05-20 DIAGNOSIS — N20.0 KIDNEY STONE: ICD-10-CM

## 2025-05-20 RX ORDER — OXYCODONE HYDROCHLORIDE 5 MG/1
5 TABLET ORAL EVERY 6 HOURS PRN
Qty: 6 TABLET | Refills: 0 | OUTPATIENT
Start: 2025-05-20

## 2025-05-20 RX ORDER — ONDANSETRON 4 MG/1
4 TABLET, ORALLY DISINTEGRATING ORAL EVERY 6 HOURS PRN
Qty: 4 TABLET | Refills: 0 | OUTPATIENT
Start: 2025-05-20

## 2025-05-20 NOTE — TELEPHONE ENCOUNTER
Requested Prescriptions   Pending Prescriptions Disp Refills    oxyCODONE (ROXICODONE) 5 MG tablet 6 tablet 0     Sig: Take 1 tablet (5 mg) by mouth every 6 hours as needed for moderate to severe pain.       There is no refill protocol information for this order       ondansetron (ZOFRAN ODT) 4 MG ODT tab 4 tablet 0     Sig: Take 1 tablet (4 mg) by mouth every 6 hours as needed for nausea.       There is no refill protocol information for this order        Kori Hays   [facility]

## 2025-05-21 ENCOUNTER — OFFICE VISIT (OUTPATIENT)
Dept: INTERNAL MEDICINE | Facility: CLINIC | Age: 70
End: 2025-05-21
Payer: COMMERCIAL

## 2025-05-21 VITALS
HEIGHT: 66 IN | TEMPERATURE: 97.2 F | RESPIRATION RATE: 18 BRPM | BODY MASS INDEX: 31.5 KG/M2 | OXYGEN SATURATION: 96 % | DIASTOLIC BLOOD PRESSURE: 82 MMHG | SYSTOLIC BLOOD PRESSURE: 153 MMHG | WEIGHT: 196 LBS | HEART RATE: 51 BPM

## 2025-05-21 DIAGNOSIS — D64.9 ANEMIA, UNSPECIFIED TYPE: ICD-10-CM

## 2025-05-21 DIAGNOSIS — R79.9 ELEVATED BUN: ICD-10-CM

## 2025-05-21 DIAGNOSIS — R52 MODERATE PAIN: ICD-10-CM

## 2025-05-21 DIAGNOSIS — N12 PYELONEPHRITIS: ICD-10-CM

## 2025-05-21 DIAGNOSIS — N20.0 KIDNEY STONE: ICD-10-CM

## 2025-05-21 DIAGNOSIS — Z09 HOSPITAL DISCHARGE FOLLOW-UP: Primary | ICD-10-CM

## 2025-05-21 DIAGNOSIS — Z87.891 HISTORY OF TOBACCO USE, PRESENTING HAZARDS TO HEALTH: ICD-10-CM

## 2025-05-21 DIAGNOSIS — F41.9 ANXIETY: ICD-10-CM

## 2025-05-21 DIAGNOSIS — F32.9 REACTIVE DEPRESSION: ICD-10-CM

## 2025-05-21 LAB
ALBUMIN SERPL BCG-MCNC: 3.4 G/DL (ref 3.5–5.2)
ALP SERPL-CCNC: 81 U/L (ref 40–150)
ALT SERPL W P-5'-P-CCNC: 14 U/L (ref 0–50)
ANION GAP SERPL CALCULATED.3IONS-SCNC: 10 MMOL/L (ref 7–15)
AST SERPL W P-5'-P-CCNC: 19 U/L (ref 0–45)
BASOPHILS # BLD AUTO: 0 10E3/UL (ref 0–0.2)
BASOPHILS NFR BLD AUTO: 1 %
BILIRUB SERPL-MCNC: 0.6 MG/DL
BUN SERPL-MCNC: 9.9 MG/DL (ref 8–23)
CALCIUM SERPL-MCNC: 9.1 MG/DL (ref 8.8–10.4)
CHLORIDE SERPL-SCNC: 102 MMOL/L (ref 98–107)
CREAT SERPL-MCNC: 0.56 MG/DL (ref 0.51–0.95)
EGFRCR SERPLBLD CKD-EPI 2021: >90 ML/MIN/1.73M2
EOSINOPHIL # BLD AUTO: 0.2 10E3/UL (ref 0–0.7)
EOSINOPHIL NFR BLD AUTO: 2 %
ERYTHROCYTE [DISTWIDTH] IN BLOOD BY AUTOMATED COUNT: 16.7 % (ref 10–15)
GLUCOSE SERPL-MCNC: 106 MG/DL (ref 70–99)
HCO3 SERPL-SCNC: 29 MMOL/L (ref 22–29)
HCT VFR BLD AUTO: 36.9 % (ref 35–47)
HGB BLD-MCNC: 10.5 G/DL (ref 11.7–15.7)
IMM GRANULOCYTES # BLD: 0 10E3/UL
IMM GRANULOCYTES NFR BLD: 0 %
LYMPHOCYTES # BLD AUTO: 1.8 10E3/UL (ref 0.8–5.3)
LYMPHOCYTES NFR BLD AUTO: 21 %
MCH RBC QN AUTO: 22.2 PG (ref 26.5–33)
MCHC RBC AUTO-ENTMCNC: 28.5 G/DL (ref 31.5–36.5)
MCV RBC AUTO: 78 FL (ref 78–100)
MONOCYTES # BLD AUTO: 0.5 10E3/UL (ref 0–1.3)
MONOCYTES NFR BLD AUTO: 6 %
NEUTROPHILS # BLD AUTO: 6 10E3/UL (ref 1.6–8.3)
NEUTROPHILS NFR BLD AUTO: 70 %
PLATELET # BLD AUTO: 279 10E3/UL (ref 150–450)
POTASSIUM SERPL-SCNC: 4.1 MMOL/L (ref 3.4–5.3)
PROT SERPL-MCNC: 6.7 G/DL (ref 6.4–8.3)
RBC # BLD AUTO: 4.74 10E6/UL (ref 3.8–5.2)
SODIUM SERPL-SCNC: 141 MMOL/L (ref 135–145)
WBC # BLD AUTO: 8.5 10E3/UL (ref 4–11)

## 2025-05-21 PROCEDURE — 99214 OFFICE O/P EST MOD 30 MIN: CPT

## 2025-05-21 PROCEDURE — 85025 COMPLETE CBC W/AUTO DIFF WBC: CPT

## 2025-05-21 PROCEDURE — G2211 COMPLEX E/M VISIT ADD ON: HCPCS

## 2025-05-21 PROCEDURE — 3077F SYST BP >= 140 MM HG: CPT

## 2025-05-21 PROCEDURE — 3079F DIAST BP 80-89 MM HG: CPT

## 2025-05-21 PROCEDURE — 80053 COMPREHEN METABOLIC PANEL: CPT

## 2025-05-21 PROCEDURE — 36415 COLL VENOUS BLD VENIPUNCTURE: CPT

## 2025-05-21 RX ORDER — TRAMADOL HYDROCHLORIDE 50 MG/1
50 TABLET ORAL EVERY 6 HOURS PRN
Qty: 15 TABLET | Refills: 0 | Status: SHIPPED | OUTPATIENT
Start: 2025-05-21 | End: 2025-05-24

## 2025-05-21 RX ORDER — HYDROXYZINE HYDROCHLORIDE 25 MG/1
25 TABLET, FILM COATED ORAL 3 TIMES DAILY PRN
Qty: 60 TABLET | Refills: 0 | Status: SHIPPED | OUTPATIENT
Start: 2025-05-21

## 2025-05-21 RX ORDER — BUPROPION HYDROCHLORIDE 150 MG/1
150 TABLET ORAL EVERY MORNING
Qty: 30 TABLET | Refills: 0 | Status: SHIPPED | OUTPATIENT
Start: 2025-05-21

## 2025-05-21 NOTE — PROGRESS NOTES
Assessment & Plan     (Z09) Hospital discharge follow-up  (primary encounter diagnosis)  Comment: Patient seen in clinic today for hospital follow up. Patient had presented to the ED at [facility] 5/15/25 with left- sided abdominal pain 2 days after a percutaneous drain was placed. During hospital stay patient had catheter placed. Discussed that catheter change would be done with urology at upcoming visit. Noted 20 mLs of serosanguineous drainage noted in drain. Approximately 100 mLs of straw colored urine was noted in leg bag. Dressings to abdomen were intact. Patient has been dealing with a lot of pain since she returned home and we discussed pain management. Also discussed smoking cessation and medications that help.   Plan: Will follow up with urology and update with new concerns.     (Z87.825) History of tobacco use, presenting hazards to health  Comment: Chronic. Patient quit smoking 4 days ago after finding out she has a lung lesion. Discussed adding nicotine gum to help with withdrawal from nicotine. Patient acknowledged and agreed to plan of care.   Plan: Plan: nicotine (NICORETTE) 2 MG gum    (N20.0) Kidney stone  Comment: Chronic, unstable. Discussed need for pain medication to patient experiencing moderate pain symptoms most of the day. Patient acknowledged and agreed to plan of care.   Plan: traMADol (ULTRAM) 50 MG tablet        Prescription sent to pharmacy     (R52) Moderate pain  Comment: Chronic, unstable. Discussed need for pain medication to patient experiencing moderate pain symptoms most of the day. Patient acknowledged and agreed to plan of care.   Plan: traMADol (ULTRAM) 50 MG tablet        Prescription sent to pharmacy     (F32.9) Reactive depression  Comment: Acute, unstable. Discussed adding Wellbutrin to help manage depression. Patient acknowledged and agreed to plan of care.   Plan: buPROPion (WELLBUTRIN XL) 150 MG 24 hr tablet        Prescription sent to pharmacy    (F41.9)  "Anxiety  Comment: Acute, unstable. Discussed medications that are none controlled substances that can help manage anxiety. Patient acknowledged and agreed to plan of care.   Plan: hydrOXYzine HCl (ATARAX) 25 MG tablet         Prescription sent to pharmacy    (D64.9) Anemia, unspecified type  Comment: Chronic, unstable. Discussed need for lab this visit,   Plan: CBC with platelets and differential        Improved Hgb now 10.5 Hematocrit 36.9, MCV 78     (R79.9) Elevated BUN  Comment: Had been dealing with elevated BUN discussed rechecking labs this visit,   Plan: Comprehensive metabolic panel (BMP + Alb, Alk         Phos, ALT, AST, Total. Bili, TP)        Albumin noted to be slightly low at 3.4 (3.5) all other values WNL         MED REC REQUIRED    Post Medication Reconciliation Status:     BMI  Estimated body mass index is 32.12 kg/m  as calculated from the following:    Height as of this encounter: 1.664 m (5' 5.5\").    Weight as of this encounter: 88.9 kg (196 lb).       Braulio Levine is a 69 year old, presenting for the following health issues:  Hospital F/U        5/21/2025     9:41 AM   Additional Questions   Roomed by Cheryl MONTALVO          Hospital Follow-up Visit:    Hospital/Nursing Home/IP Rehab Facility: [facility]  Date of Admission: 05/15/25  Date of Discharge: 05/17/25  Reason(s) for Admission: Mass of upper lobe of right lung (Primary Dx);   Pyelonephritis;   Calculus of kidney;   Constipation, unspecified constipation type;   Malfunction of nephrostomy tube   Was the patient in the ICU or did the patient experience delirium during hospitalization?  No  Do you have any other stressors you would like to discuss with your provider? Health Concerns    Problems taking medications regularly:  None  Medication changes since discharge: None  Problems adhering to non-medication therapy:  None    Summary of hospitalization:  CareEverywhere information obtained and reviewed  Diagnostic Tests/Treatments " "reviewed.  Follow up needed: none  Other Healthcare Providers Involved in Patient s Care:         Specialist appointment - Urology  Update since discharge: improved.         Plan of care communicated with patient                   Review of Systems  Constitutional, HEENT, cardiovascular, pulmonary, gi and gu systems are negative, except as otherwise noted.      Objective    BP (!) 153/82 (BP Location: Left arm, Patient Position: Sitting, Cuff Size: Adult Regular)   Pulse 51   Temp 97.2  F (36.2  C) (Temporal)   Resp 18   Ht 1.664 m (5' 5.5\")   Wt 88.9 kg (196 lb)   SpO2 96%   BMI 32.12 kg/m    Body mass index is 32.12 kg/m .  Physical Exam  Constitutional:       Appearance: Normal appearance.   HENT:      Head: Normocephalic.      Nose: Nose normal. No congestion or rhinorrhea.   Eyes:      General:         Right eye: No discharge.         Left eye: No discharge.      Pupils: Pupils are equal, round, and reactive to light.   Cardiovascular:      Rate and Rhythm: Normal rate and regular rhythm.      Pulses: Normal pulses.      Heart sounds: Normal heart sounds. No murmur heard.     No friction rub. No gallop.   Pulmonary:      Effort: Pulmonary effort is normal. No respiratory distress.      Breath sounds: Normal breath sounds. No stridor. No wheezing, rhonchi or rales.   Chest:      Chest wall: No tenderness.   Abdominal:      General: Bowel sounds are normal. There is no distension.      Palpations: There is no mass.      Tenderness: There is abdominal tenderness. There is no right CVA tenderness, left CVA tenderness, guarding or rebound.      Hernia: No hernia is present.   Musculoskeletal:         General: No swelling, tenderness, deformity or signs of injury. Normal range of motion.      Right lower leg: No edema.      Left lower leg: No edema.   Skin:     General: Skin is warm.      Coloration: Skin is not jaundiced or pale.      Findings: No bruising, erythema, lesion or rash.   Neurological:      " General: No focal deficit present.      Mental Status: She is alert and oriented to person, place, and time.   Psychiatric:         Mood and Affect: Mood normal.         Behavior: Behavior normal.         Thought Content: Thought content normal.         Judgment: Judgment normal.            Results for orders placed or performed in visit on 05/21/25   Comprehensive metabolic panel (BMP + Alb, Alk Phos, ALT, AST, Total. Bili, TP)     Status: Abnormal   Result Value Ref Range    Sodium 141 135 - 145 mmol/L    Potassium 4.1 3.4 - 5.3 mmol/L    Carbon Dioxide (CO2) 29 22 - 29 mmol/L    Anion Gap 10 7 - 15 mmol/L    Urea Nitrogen 9.9 8.0 - 23.0 mg/dL    Creatinine 0.56 0.51 - 0.95 mg/dL    GFR Estimate >90 >60 mL/min/1.73m2    Calcium 9.1 8.8 - 10.4 mg/dL    Chloride 102 98 - 107 mmol/L    Glucose 106 (H) 70 - 99 mg/dL    Alkaline Phosphatase 81 40 - 150 U/L    AST 19 0 - 45 U/L    ALT 14 0 - 50 U/L    Protein Total 6.7 6.4 - 8.3 g/dL    Albumin 3.4 (L) 3.5 - 5.2 g/dL    Bilirubin Total 0.6 <=1.2 mg/dL   CBC with platelets and differential     Status: Abnormal   Result Value Ref Range    WBC Count 8.5 4.0 - 11.0 10e3/uL    RBC Count 4.74 3.80 - 5.20 10e6/uL    Hemoglobin 10.5 (L) 11.7 - 15.7 g/dL    Hematocrit 36.9 35.0 - 47.0 %    MCV 78 78 - 100 fL    MCH 22.2 (L) 26.5 - 33.0 pg    MCHC 28.5 (L) 31.5 - 36.5 g/dL    RDW 16.7 (H) 10.0 - 15.0 %    Platelet Count 279 150 - 450 10e3/uL    % Neutrophils 70 %    % Lymphocytes 21 %    % Monocytes 6 %    % Eosinophils 2 %    % Basophils 1 %    % Immature Granulocytes 0 %    Absolute Neutrophils 6.0 1.6 - 8.3 10e3/uL    Absolute Lymphocytes 1.8 0.8 - 5.3 10e3/uL    Absolute Monocytes 0.5 0.0 - 1.3 10e3/uL    Absolute Eosinophils 0.2 0.0 - 0.7 10e3/uL    Absolute Basophils 0.0 0.0 - 0.2 10e3/uL    Absolute Immature Granulocytes 0.0 <=0.4 10e3/uL   CBC with platelets and differential     Status: Abnormal    Narrative    The following orders were created for panel order CBC with  platelets and differential.  Procedure                               Abnormality         Status                     ---------                               -----------         ------                     CBC with platelets and ...[[id number]]  Abnormal            Final result                 Please view results for these tests on the individual orders.           Signed Electronically by: TESHA Medrano CNP

## 2025-05-30 ENCOUNTER — APPOINTMENT (OUTPATIENT)
Dept: CT IMAGING | Facility: HOSPITAL | Age: 70
End: 2025-05-30
Attending: STUDENT IN AN ORGANIZED HEALTH CARE EDUCATION/TRAINING PROGRAM
Payer: COMMERCIAL

## 2025-05-30 ENCOUNTER — HOSPITAL ENCOUNTER (EMERGENCY)
Facility: HOSPITAL | Age: 70
Discharge: HOME | End: 2025-05-30
Attending: STUDENT IN AN ORGANIZED HEALTH CARE EDUCATION/TRAINING PROGRAM | Admitting: STUDENT IN AN ORGANIZED HEALTH CARE EDUCATION/TRAINING PROGRAM
Payer: COMMERCIAL

## 2025-05-30 ENCOUNTER — TRANSFERRED RECORDS (OUTPATIENT)
Dept: HEALTH INFORMATION MANAGEMENT | Facility: CLINIC | Age: 70
End: 2025-05-30

## 2025-05-30 VITALS
RESPIRATION RATE: 16 BRPM | SYSTOLIC BLOOD PRESSURE: 148 MMHG | TEMPERATURE: 97.8 F | HEART RATE: 64 BPM | BODY MASS INDEX: 31.46 KG/M2 | DIASTOLIC BLOOD PRESSURE: 66 MMHG | WEIGHT: 192 LBS | OXYGEN SATURATION: 92 %

## 2025-05-30 DIAGNOSIS — R10.9 LEFT FLANK PAIN: ICD-10-CM

## 2025-05-30 LAB
ALBUMIN SERPL BCG-MCNC: 3.6 G/DL (ref 3.5–5.2)
ALBUMIN UR-MCNC: 100 MG/DL
ALP SERPL-CCNC: 98 U/L (ref 40–150)
ALT SERPL W P-5'-P-CCNC: 9 U/L (ref 0–50)
ANION GAP SERPL CALCULATED.3IONS-SCNC: 10 MMOL/L (ref 7–15)
APPEARANCE UR: ABNORMAL
AST SERPL W P-5'-P-CCNC: 17 U/L (ref 0–45)
BACTERIA #/AREA URNS HPF: ABNORMAL /HPF
BASOPHILS # BLD AUTO: 0.1 10E3/UL (ref 0–0.2)
BASOPHILS NFR BLD AUTO: 1 %
BILIRUB SERPL-MCNC: 0.7 MG/DL
BILIRUB UR QL STRIP: NEGATIVE
BUN SERPL-MCNC: 13.1 MG/DL (ref 8–23)
CALCIUM SERPL-MCNC: 9.5 MG/DL (ref 8.8–10.4)
CHLORIDE SERPL-SCNC: 106 MMOL/L (ref 98–107)
COLOR UR AUTO: YELLOW
CREAT SERPL-MCNC: 0.69 MG/DL (ref 0.51–0.95)
EGFRCR SERPLBLD CKD-EPI 2021: >90 ML/MIN/1.73M2
EOSINOPHIL # BLD AUTO: 0.1 10E3/UL (ref 0–0.7)
EOSINOPHIL NFR BLD AUTO: 1 %
ERYTHROCYTE [DISTWIDTH] IN BLOOD BY AUTOMATED COUNT: 17.3 % (ref 10–15)
GLUCOSE SERPL-MCNC: 171 MG/DL (ref 70–99)
GLUCOSE UR STRIP-MCNC: NEGATIVE MG/DL
HCO3 SERPL-SCNC: 26 MMOL/L (ref 22–29)
HCT VFR BLD AUTO: 36.6 % (ref 35–47)
HGB BLD-MCNC: 10.6 G/DL (ref 11.7–15.7)
HGB UR QL STRIP: ABNORMAL
IMM GRANULOCYTES # BLD: 0 10E3/UL
IMM GRANULOCYTES NFR BLD: 0 %
KETONES UR STRIP-MCNC: NEGATIVE MG/DL
LEUKOCYTE ESTERASE UR QL STRIP: ABNORMAL
LYMPHOCYTES # BLD AUTO: 1.9 10E3/UL (ref 0.8–5.3)
LYMPHOCYTES NFR BLD AUTO: 21 %
MCH RBC QN AUTO: 21.6 PG (ref 26.5–33)
MCHC RBC AUTO-ENTMCNC: 29 G/DL (ref 31.5–36.5)
MCV RBC AUTO: 75 FL (ref 78–100)
MONOCYTES # BLD AUTO: 0.5 10E3/UL (ref 0–1.3)
MONOCYTES NFR BLD AUTO: 6 %
MUCOUS THREADS #/AREA URNS LPF: PRESENT /LPF
NEUTROPHILS # BLD AUTO: 6.3 10E3/UL (ref 1.6–8.3)
NEUTROPHILS NFR BLD AUTO: 71 %
NITRATE UR QL: NEGATIVE
NRBC # BLD AUTO: 0 10E3/UL
NRBC BLD AUTO-RTO: 0 /100
PH UR STRIP: 7 [PH] (ref 5–7)
PLATELET # BLD AUTO: 467 10E3/UL (ref 150–450)
POTASSIUM SERPL-SCNC: 4.1 MMOL/L (ref 3.4–5.3)
PROT SERPL-MCNC: 7.5 G/DL (ref 6.4–8.3)
RBC # BLD AUTO: 4.9 10E6/UL (ref 3.8–5.2)
RBC URINE: >182 /HPF
SODIUM SERPL-SCNC: 142 MMOL/L (ref 135–145)
SP GR UR STRIP: 1.02 (ref 1–1.03)
UROBILINOGEN UR STRIP-MCNC: NORMAL MG/DL
WBC # BLD AUTO: 8.9 10E3/UL (ref 4–11)
WBC CLUMPS #/AREA URNS HPF: PRESENT /HPF
WBC URINE: >182 /HPF

## 2025-05-30 PROCEDURE — 99285 EMERGENCY DEPT VISIT HI MDM: CPT | Mod: 25

## 2025-05-30 PROCEDURE — 36415 COLL VENOUS BLD VENIPUNCTURE: CPT | Performed by: STUDENT IN AN ORGANIZED HEALTH CARE EDUCATION/TRAINING PROGRAM

## 2025-05-30 PROCEDURE — 80053 COMPREHEN METABOLIC PANEL: CPT | Performed by: STUDENT IN AN ORGANIZED HEALTH CARE EDUCATION/TRAINING PROGRAM

## 2025-05-30 PROCEDURE — 85004 AUTOMATED DIFF WBC COUNT: CPT | Performed by: STUDENT IN AN ORGANIZED HEALTH CARE EDUCATION/TRAINING PROGRAM

## 2025-05-30 PROCEDURE — 74177 CT ABD & PELVIS W/CONTRAST: CPT

## 2025-05-30 PROCEDURE — 87186 SC STD MICRODIL/AGAR DIL: CPT | Performed by: STUDENT IN AN ORGANIZED HEALTH CARE EDUCATION/TRAINING PROGRAM

## 2025-05-30 PROCEDURE — 250N000011 HC RX IP 250 OP 636: Performed by: STUDENT IN AN ORGANIZED HEALTH CARE EDUCATION/TRAINING PROGRAM

## 2025-05-30 PROCEDURE — 96375 TX/PRO/DX INJ NEW DRUG ADDON: CPT

## 2025-05-30 PROCEDURE — 250N000011 HC RX IP 250 OP 636: Mod: JZ | Performed by: STUDENT IN AN ORGANIZED HEALTH CARE EDUCATION/TRAINING PROGRAM

## 2025-05-30 PROCEDURE — 81001 URINALYSIS AUTO W/SCOPE: CPT | Performed by: STUDENT IN AN ORGANIZED HEALTH CARE EDUCATION/TRAINING PROGRAM

## 2025-05-30 PROCEDURE — 96374 THER/PROPH/DIAG INJ IV PUSH: CPT | Mod: 59

## 2025-05-30 RX ORDER — AMOXICILLIN 500 MG/1
1000 CAPSULE ORAL 2 TIMES DAILY
Qty: 20 CAPSULE | Refills: 0 | Status: SHIPPED | OUTPATIENT
Start: 2025-05-30 | End: 2025-06-04

## 2025-05-30 RX ORDER — TAMSULOSIN HYDROCHLORIDE 0.4 MG/1
0.4 CAPSULE ORAL DAILY
Qty: 10 CAPSULE | Refills: 0 | Status: SHIPPED | OUTPATIENT
Start: 2025-05-30 | End: 2025-06-09

## 2025-05-30 RX ORDER — IOPAMIDOL 755 MG/ML
90 INJECTION, SOLUTION INTRAVASCULAR ONCE
Status: COMPLETED | OUTPATIENT
Start: 2025-05-30 | End: 2025-05-30

## 2025-05-30 RX ORDER — ONDANSETRON 2 MG/ML
4 INJECTION INTRAMUSCULAR; INTRAVENOUS ONCE
Status: COMPLETED | OUTPATIENT
Start: 2025-05-30 | End: 2025-05-30

## 2025-05-30 RX ORDER — MORPHINE SULFATE 4 MG/ML
4 INJECTION, SOLUTION INTRAMUSCULAR; INTRAVENOUS ONCE
Refills: 0 | Status: COMPLETED | OUTPATIENT
Start: 2025-05-30 | End: 2025-05-30

## 2025-05-30 RX ORDER — OXYBUTYNIN CHLORIDE 5 MG/1
5 TABLET ORAL 3 TIMES DAILY
Qty: 42 TABLET | Refills: 0 | Status: SHIPPED | OUTPATIENT
Start: 2025-05-30 | End: 2025-06-13

## 2025-05-30 RX ORDER — HYDROMORPHONE HYDROCHLORIDE 1 MG/ML
0.5 INJECTION, SOLUTION INTRAMUSCULAR; INTRAVENOUS; SUBCUTANEOUS ONCE
Refills: 0 | Status: COMPLETED | OUTPATIENT
Start: 2025-05-30 | End: 2025-05-30

## 2025-05-30 RX ORDER — OXYCODONE HYDROCHLORIDE 5 MG/1
5 TABLET ORAL EVERY 6 HOURS PRN
Qty: 12 TABLET | Refills: 0 | Status: SHIPPED | OUTPATIENT
Start: 2025-05-30 | End: 2025-06-02

## 2025-05-30 RX ADMIN — HYDROMORPHONE HYDROCHLORIDE 0.5 MG: 1 INJECTION, SOLUTION INTRAMUSCULAR; INTRAVENOUS; SUBCUTANEOUS at 10:53

## 2025-05-30 RX ADMIN — IOPAMIDOL 90 ML: 755 INJECTION, SOLUTION INTRAVENOUS at 12:00

## 2025-05-30 RX ADMIN — ONDANSETRON 4 MG: 2 INJECTION, SOLUTION INTRAMUSCULAR; INTRAVENOUS at 10:51

## 2025-05-30 RX ADMIN — MORPHINE SULFATE 4 MG: 4 INJECTION, SOLUTION INTRAMUSCULAR; INTRAVENOUS at 12:11

## 2025-05-30 ASSESSMENT — ACTIVITIES OF DAILY LIVING (ADL)
ADLS_ACUITY_SCORE: 46

## 2025-05-30 ASSESSMENT — COLUMBIA-SUICIDE SEVERITY RATING SCALE - C-SSRS
1. IN THE PAST MONTH, HAVE YOU WISHED YOU WERE DEAD OR WISHED YOU COULD GO TO SLEEP AND NOT WAKE UP?: NO
6. HAVE YOU EVER DONE ANYTHING, STARTED TO DO ANYTHING, OR PREPARED TO DO ANYTHING TO END YOUR LIFE?: NO
2. HAVE YOU ACTUALLY HAD ANY THOUGHTS OF KILLING YOURSELF IN THE PAST MONTH?: NO

## 2025-05-30 NOTE — DISCHARGE INSTRUCTIONS
Thankfully no significanct findings on your CT scan today  Incidental finding on your liver dome noted without change    [facility] recommends starting oxybutin and flomax and trying a few more days of antibiotics. Please call them to arrange close follow up.    I will also prescribe you oxycodone. Do not drive or drink alcohol with this.  Return to the Emergency Room with fever, significant increased pain or other worsening symptoms or concerns

## 2025-05-30 NOTE — ED NOTES
Expected Patient Referral to ED  9:55 AM    Referring Clinic/Provider:  [facility]    Reason for referral/Clinical facts:  Recent stone removal, also renal mass.  Seen in clinic today, increased pain, decreased urine out.  Recent admit at [facility].  CT A/P, labs.    Recommendations provided:  Send to ED for further evaluation    Caller was informed that this institution does possess the capabilities and/or resources to provide for patient and should be transferred to our facility.    Discussed that if direct admit is sought and any hurdles are encountered, this ED would be happy to see the patient and evaluate.    Informed caller that recommendations provided are recommendations based only on the facts provided and that they responsible to accept or reject the advice, or to seek a formal in person consultation as needed and that this ED will see/treat patient should they arrive.    Gulshan Couch MD  [facility] EMERGENCY DEPARTMENT  [address] [place] [id number]  [phone]     Gulshan Couch MD  05/30/25 0913

## 2025-05-30 NOTE — ED TRIAGE NOTES
Pt was seen at [facility] today for follow up of stone removal and stent placement. They referred her here for further testing due to abdominal pain and decreased urine output. Left flank pain 10/10.   Pt has mehta catheter placed     Triage Assessment (Adult)       Row Name 05/30/25 1014          Triage Assessment    Airway WDL WDL        Respiratory WDL    Respiratory WDL WDL        Skin Circulation/Temperature WDL    Skin Circulation/Temperature WDL WDL        Cardiac WDL    Cardiac WDL WDL        Peripheral/Neurovascular WDL    Peripheral Neurovascular WDL WDL        Cognitive/Neuro/Behavioral WDL    Cognitive/Neuro/Behavioral WDL WDL

## 2025-05-30 NOTE — ED PROVIDER NOTES
NAME: Georgie Patino  AGE: 69 year old female  YOB: 1955  MRN: [id number]  EVALUATION DATE & TIME: No admission date for patient encounter.    PCP: Kori Em  ED PROVIDER: Jeannette Pina MD.    Chief Complaint   Patient presents with    Flank Pain       FINAL IMPRESSION:  1. Left flank pain        MEDICAL DECISION MAKING:    10:27 AM I met with the patient, obtained history, performed an initial exam, and discussed options and plan for diagnostics and treatment here in the ED.   1:04 PM I spoke with WASHINGTON Mcgee, [facility], regarding the patient's plan of care.   1:14 PM I rechecked with the patient, updating them on my discussion with Urology and the future plan of care.     ED Course as of 05/30/25 1341   Fri May 30, 2025   1043 68 y/o F with h/o HTN, s/p ascending aortic aneurysm repair, kidney stones with multiple recent interventions who was admitted 5/15/25 with left pyelonephritis at [facility], treated with ceftriaxone. Incidental lung mass was found. Also had a mehta placed. On 5/13 had a percutaneous drain and left ureteral stent placed.     Patient sent from [facility] clinic visit today for increased left flank pain and decreased urine output.     Here she is reporting quite a bit of left flank pain. Hypertensive, but afebrile and otherwise nontoxic appearing. Dark yellow urine in mehta bag. Plan for labs, pain/nausea meds and CT scan.     1121 CBC and CMP are reassuring. Creat 0.69. Hgb 10.6 (near priors)   1250 CT abd/pelvis:  IMPRESSION:   1.  No acute findings to explain the patient's symptoms.  2.  Slightly decreased size of the left perinephric fluid collection compared to the CT from 5/27/2025. Stable position of the drainage catheter within this collection.  3.  Stable large fragmented left renal staghorn calculus with stable mild dilatation of the left renal pelvis.  4.  Stable satisfactory position of the left ureteral stent. No hydroureter and no ureteral stone  fragment.  5.  Stable indeterminate 1.1 cm enhancing observation in the liver dome. No associated abnormal FDG uptake on comparison PET/CT which favors a benign finding.     1323 I talked to [facility].  They reviewed her test results as well.  They do not see indication that she absolutely needs to stay in the hospital and would be okay to discharge if her pain is controlled.  They felt it was reasonable to do an additional 5 days of amoxicillin (we reviewed prior cultures) and to start her on Flomax and oxybutynin.  I rechecked and discussed with the patient.  She is feeling much better. Recommended close follow up with [facility].  We discussed admission versus discharge and she would prefer to go home at this time. Strict return precautions discussed and patient is in agreement with plan, endorses understanding and her questions were answered.       Medical Decision Making      Discharge. I prescribed additional prescription strength medication(s) as charted. See documentation for any additional details.    MIPS (CTPE, Dental pain, Mckenna, Sinusitis, Asthma/COPD, Head Trauma): Not Applicable    SEPSIS: None    MEDICATIONS GIVEN IN THE EMERGENCY:  Medications   HYDROmorphone (PF) (DILAUDID) injection 0.5 mg (0.5 mg Intravenous $Given 5/30/25 1053)   ondansetron (ZOFRAN) injection 4 mg (4 mg Intravenous $Given 5/30/25 1051)   iopamidol (ISOVUE-370) solution 90 mL (90 mLs Intravenous $Given 5/30/25 1200)   morphine (PF) injection 4 mg (4 mg Intravenous $Given 5/30/25 1211)       NEW PRESCRIPTIONS STARTED AT TODAY'S ER VISIT:  New Prescriptions    No medications on file        =================================================================  HPI    Patient information was obtained from: Patient, patient's   Use of : N/A       Georgie Patino is a 69 year old female with a past medical history of hypertension, anxiety, obesity, S/P ascending aortic aneurysm repair, kidney stone, pyelonephritis, who  presents to this emergency department by walk-in for evaluation of left flank pain.     Patient reports increased left flank and left lower back pain with accompanying nausea as of yesterday. She has been taking Advil at home for pain management, noting that she recently ran out of pain medications; she does not have a history of adverse reactions to any pain medications. She notes decreased urine output per Mckenna catheter; her urine was previously a hitesh color but has been pale yellow more recently. Patient mentions that the nephrostomy tube to her left lower abdomen was placed on 5/13; she has not had any issues with this and denies any change in drainage from the tube, no drainage from insertion site.     Patient denies fever, vomiting, hematuria, abdominal pain, or any other symptoms at this time. She has not noticed decreased output per nephrostomy tube. She is not currently taking any antibiotics, having finished her most recent course following discharge from [facility] (see chart review below).       Per chart review:   - [facility] ED referral note: Patient advised to present to the emergency department from [facility] after a recent stone removal with renal mass. Today, had increased pain and decreased urine output.   - Patient was admitted 5/15/2025-5/17/2025 at [facility] for management of pyelonephritis and nephrolithiasis. She underwent multiple procedures with left PNT in place. Started on IV ceftriaxone upon ED workup. Seen by Urology due to leaking of nephrostomy and Mckenna was placed. Plan to follow up as outpatient with primary Urologist. Upon discharge, she was started on a 6 day course of amoxicillin BID for pyelonephritis and milk of magnesia, polyethylene glycol, and sennosides for constipation.   - Patient was hospitalized 5/13/2025-5/14/2025 at [facility] during which time she underwent left pyelolithotomy with left antegrade stent placement 6x24 by [facility] on 5/13 for management of Staghorn calculus. She tolerated the procedure well; post-op course unremarkable. Mckenna removed 5/14.     PHYSICAL EXAM:    Vitals: BP (!) 150/68   Pulse 58   Temp 97.8  F (36.6  C) (Oral)   Resp 16   Wt 87.1 kg (192 lb)   SpO2 99%   BMI 31.46 kg/m     Constitutional: Well developed, well nourished. Appears uncomfortable  HENT: Normocephalic, atraumatic. Neck-gross ROM intact.   Eyes: Pupils mid-range, sclera white  Respiratory: No respiratory distress  Cardiovascular: Normal heart rate  GI: Soft, not distended, non tender, left CVA tenderness. Abdominal incisions without drainage or erythema. Percutaneous drain with small amount of cloudy fluid. Mckenna bag with dark yellow urine in tubing and bag.  Musculoskeletal: Moving extremities intentionally and without pain. No obvious deformity.  Neurologic: Alert & oriented, speech clear, no focal deficits noted    LAB:  All pertinent labs reviewed and interpreted.  Labs Ordered and Resulted from Time of ED Arrival to Time of ED Departure   COMPREHENSIVE METABOLIC PANEL - Abnormal       Result Value    Sodium 142      Potassium 4.1      Carbon Dioxide (CO2) 26      Anion Gap 10      Urea Nitrogen 13.1      Creatinine 0.69      GFR Estimate >90      Calcium 9.5      Chloride 106      Glucose 171 (*)     Alkaline Phosphatase 98      AST 17      ALT 9      Protein Total 7.5      Albumin 3.6      Bilirubin Total 0.7     ROUTINE UA WITH MICROSCOPIC REFLEX TO CULTURE - Abnormal    Color Urine Yellow      Appearance Urine Cloudy (*)     Glucose Urine Negative      Bilirubin Urine Negative      Ketones Urine Negative      Specific Gravity Urine 1.018      Blood Urine >1.0 mg/dL (*)     pH Urine 7.0      Protein Albumin Urine 100 (*)     Urobilinogen Urine Normal      Nitrite Urine Negative      Leukocyte Esterase Urine 500 Sanjeev/uL (*)     Bacteria Urine Moderate (*)     WBC Clumps Urine Present (*)     Mucus Urine Present (*)     RBC Urine  >182 (*)     WBC Urine >182 (*)    CBC WITH PLATELETS AND DIFFERENTIAL - Abnormal    WBC Count 8.9      RBC Count 4.90      Hemoglobin 10.6 (*)     Hematocrit 36.6      MCV 75 (*)     MCH 21.6 (*)     MCHC 29.0 (*)     RDW 17.3 (*)     Platelet Count 467 (*)     % Neutrophils 71      % Lymphocytes 21      % Monocytes 6      % Eosinophils 1      % Basophils 1      % Immature Granulocytes 0      NRBCs per 100 WBC 0      Absolute Neutrophils 6.3      Absolute Lymphocytes 1.9      Absolute Monocytes 0.5      Absolute Eosinophils 0.1      Absolute Basophils 0.1      Absolute Immature Granulocytes 0.0      Absolute NRBCs 0.0     URINE CULTURE       RADIOLOGY:  CT Abdomen Pelvis w Contrast   Final Result   IMPRESSION:    1.  No acute findings to explain the patient's symptoms.   2.  Slightly decreased size of the left perinephric fluid collection compared to the CT from 5/27/2025. Stable position of the drainage catheter within this collection.   3.  Stable large fragmented left renal staghorn calculus with stable mild dilatation of the left renal pelvis.   4.  Stable satisfactory position of the left ureteral stent. No hydroureter and no ureteral stone fragment.   5.  Stable indeterminate 1.1 cm enhancing observation in the liver dome. No associated abnormal FDG uptake on comparison PET/CT which favors a benign finding.          PROCEDURES:   Procedures   None    I, Pedro Ghotra, am serving as a scribe to document services personally performed by Dr. Jeannette Pina based on my observation and the provider's statements to me. I, Jeannette Pina MD attest that Pedro Ghotra is acting in a scribe capacity, has observed my performance of the services and has documented them in accordance with my direction.    Jeannette Pina M.D.  Emergency Medicine  [facility] EMERGENCY DEPARTMENT  [facility] [facility] [address]  [phone]  Dept: [phone]      Jeannette Pina MD  05/30/25 0555

## 2025-06-01 LAB — BACTERIA UR CULT: ABNORMAL

## 2025-06-02 ENCOUNTER — TELEPHONE (OUTPATIENT)
Dept: NURSING | Facility: CLINIC | Age: 70
End: 2025-06-02
Payer: COMMERCIAL

## 2025-06-02 DIAGNOSIS — N12 PYELONEPHRITIS: Primary | ICD-10-CM

## 2025-06-02 RX ORDER — CEFPODOXIME PROXETIL 200 MG/1
200 TABLET, FILM COATED ORAL 2 TIMES DAILY
Qty: 18 TABLET | Refills: 0 | Status: SHIPPED | OUTPATIENT
Start: 2025-06-02 | End: 2025-06-11

## 2025-06-02 NOTE — TELEPHONE ENCOUNTER
[employer]    Reason for call: Lab Result Notification     Lab Result (including Rx patient on, if applicable).  If culture, copy of lab report at bottom.  Lab Result: urine culture  ED RX = amoxicillin (AMOXIL) 500 MG capsule 2 times daily for 5 days. -   Creatinine Level (mg/dl)   Creatinine   Date Value Ref Range Status   05/30/2025 0.69 0.51 - 0.95 mg/dL Final   03/25/2021 0.62 0.52 - 1.04 mg/dL Final    Creatinine clearance (ml/min), if applicable    Serum creatinine: 0.69 mg/dL 05/30/25 1041  Estimated creatinine clearance: 87.2 mL/min     Patient's current Symptoms:   Julianna reports she is about the same, but she began to cry due to left flank pain, reviewed AVS and return precaution. She state the oxycodone only work for 2 hours and she is in bed most of the day. Recommended she return to the ED for pain control. She ask me to send new RX to the pharmacy and she would talk to her doctor about her pain and if they recommend she return to the ED.  ED symptoms = left flank pain and decreased urinary output.  RN Recommendations/Instructions per [place] ED lab result protocol:   Advise to discontinue current antibiotic.   Instruct to start antibiotic: Vantin 200 mg BID x 9 days    Patient/care giver notified to contact your PCP clinic or return to the Emergency department if your:  Symptoms return.  Symptoms do not improve after 3 days on antibiotic.  Symptoms do not resolve after completing antibiotic.  Symptoms worsen or other concerning symptoms.    Fadia Oliveira RN

## 2025-06-06 ENCOUNTER — TRANSFERRED RECORDS (OUTPATIENT)
Dept: HEALTH INFORMATION MANAGEMENT | Facility: CLINIC | Age: 70
End: 2025-06-06
Payer: COMMERCIAL

## 2025-06-13 ENCOUNTER — TRANSFERRED RECORDS (OUTPATIENT)
Dept: HEALTH INFORMATION MANAGEMENT | Facility: CLINIC | Age: 70
End: 2025-06-13
Payer: COMMERCIAL

## 2025-06-16 ENCOUNTER — MYC REFILL (OUTPATIENT)
Dept: INTERNAL MEDICINE | Facility: CLINIC | Age: 70
End: 2025-06-16
Payer: COMMERCIAL

## 2025-06-16 DIAGNOSIS — F41.9 ANXIETY: ICD-10-CM

## 2025-06-16 RX ORDER — HYDROXYZINE HYDROCHLORIDE 25 MG/1
25 TABLET, FILM COATED ORAL 3 TIMES DAILY PRN
Qty: 60 TABLET | Refills: 0 | Status: SHIPPED | OUTPATIENT
Start: 2025-06-16

## 2025-06-19 ENCOUNTER — PATIENT OUTREACH (OUTPATIENT)
Dept: CARE COORDINATION | Facility: CLINIC | Age: 70
End: 2025-06-19
Payer: COMMERCIAL

## 2025-07-02 ENCOUNTER — OFFICE VISIT (OUTPATIENT)
Dept: CARDIOLOGY | Facility: CLINIC | Age: 70
End: 2025-07-02
Payer: COMMERCIAL

## 2025-07-02 VITALS
BODY MASS INDEX: 30.06 KG/M2 | OXYGEN SATURATION: 96 % | SYSTOLIC BLOOD PRESSURE: 124 MMHG | HEART RATE: 70 BPM | WEIGHT: 183.4 LBS | DIASTOLIC BLOOD PRESSURE: 77 MMHG

## 2025-07-02 DIAGNOSIS — I10 BENIGN ESSENTIAL HYPERTENSION: ICD-10-CM

## 2025-07-02 DIAGNOSIS — Z98.890 S/P ASCENDING AORTIC ANEURYSM REPAIR: Primary | ICD-10-CM

## 2025-07-02 DIAGNOSIS — Z85.118 HISTORY OF LUNG CANCER: ICD-10-CM

## 2025-07-02 DIAGNOSIS — Z86.79 S/P ASCENDING AORTIC ANEURYSM REPAIR: Primary | ICD-10-CM

## 2025-07-02 PROCEDURE — 3074F SYST BP LT 130 MM HG: CPT | Performed by: INTERNAL MEDICINE

## 2025-07-02 PROCEDURE — 99214 OFFICE O/P EST MOD 30 MIN: CPT | Performed by: INTERNAL MEDICINE

## 2025-07-02 PROCEDURE — 3078F DIAST BP <80 MM HG: CPT | Performed by: INTERNAL MEDICINE

## 2025-07-02 ASSESSMENT — PATIENT HEALTH QUESTIONNAIRE - PHQ9: SUM OF ALL RESPONSES TO PHQ QUESTIONS 1-9: 19

## 2025-07-02 NOTE — NURSING NOTE
Depression Screening Follow-up        7/2/2025    10:20 AM   PHQ   PHQ-9 Total Score 19   Q9: Thoughts of better off dead/self-harm past 2 weeks Several days              No data to display                  Follow Up       Follow Up Actions Taken:  Crisis resource information provided in the After Visit Summary  Patient declined referral.    Patient reports that she is going through a lot and is contributing to how she is feeling. Patient denied any plans of suicide. Patient denies any plan in place. Mental health referral offered. Patient declined Mental Health Referral at this time.    Vilma Pacheco RN

## 2025-07-02 NOTE — NURSING NOTE
Depression Response    Patient completed the PHQ-9 assessment for depression and scored >9? Yes  Question 9 on the PHQ-9 was positive for suicidality? Yes  Does patient have current mental health provider? Yes    Is this a virtual visit? No    I personally notified the following: clinic nurse    SINDI Walden

## 2025-07-02 NOTE — PROGRESS NOTES
[facility]      Referring provider:Kori Em PA-C     HPI: Ms. Georgie Patino is a 68 year old  female with PMH significant for    -Hypertension  -Hyperlipidemia  -Status post ascending aortic aneurysm repair in 2017  -Former smoker.    Patient was seen in cardiology clinic in 2017 for palpitations and found to have severely dilated ascending aorta at 6 cm.  Subsequently underwent aneurysm repair.  Last seen in cardiology clinic in 2021.  Since then she has been doing well.  She has been following with PCP since 2021.  In October of last year she started on amlodipine in addition to metoprolol and lisinopril.  She does not monitor her blood pressure at 1.  Overall clinic visits show high blood pressure.  She is physically inactive due to knee pain.  Denies chest pain, shortness of breath, dizziness, palpitations or lower extremity edema.  CTA chest 12/8/2022 showed patent ascending aortic graft with no leak.  Cardiovascular risk factors:  After she quit smoking she started vaping.   Blood pressure is not well-controlled.  Patient is on lisinopril 40, amlodipine 5 mg, aspirin 325, metoprolol 50 mg twice daily.    No diabetes.  Hyperlipidemia not well-controlled.  .    Medications, personal, family, and social history reviewed with patient and revised.    Interval history 7/2/2025  - Blood pressure at home around 138/61, sometimes as low as 130.  - Undergoing workup for lung cancer, treatment with radiation and chemo to start next week.  - CT scan for kidney stone 4 weeks ago revealed lung cancer; scan was done due to low oxygen levels and kidney pain.  - History of kidney stones, underwent 3 surgeries for removal.  - Quit smoking about a week ago, smoked half a pack a day for 50 years.  - Stopped taking statin due to nausea and constipation.    PAST MEDICAL  "HISTORY:  Past Medical History:   Diagnosis Date    Anxiety     Ascending aortic aneurysm 09/21/2017    Blood transfusions age 19    due to menorhagia    Difficulty walking     History of blood transfusion     Hypertension 09/21/2017    Localized osteoarthritis of both knees     Morbid obesity (H)     Thyroid nodule 11/20/2017    Tobacco abuse     Walking troubles        CURRENT MEDICATIONS:  Current Outpatient Medications   Medication Sig Dispense Refill    acetaminophen (TYLENOL) 325 MG tablet Take 2 tablets (650 mg) by mouth every 4 hours as needed for other (mild pain) 100 tablet 0    amLODIPine (NORVASC) 5 MG tablet Take 1 tablet (5 mg) by mouth daily. 90 tablet 0    atorvastatin (LIPITOR) 10 MG tablet Take 1 tablet (10 mg) by mouth daily. 90 tablet 3    Blood Pressure Monitor KIT 1 kit daily 1 kit 0    buPROPion (WELLBUTRIN XL) 150 MG 24 hr tablet Take 1 tablet (150 mg) by mouth every morning. 30 tablet 0    diphenhydrAMINE-acetaminophen (TYLENOL PM)  MG tablet Take 1 tablet by mouth nightly as needed for sleep      hydrOXYzine HCl (ATARAX) 25 MG tablet Take 1 tablet (25 mg) by mouth 3 times daily as needed for anxiety. 60 tablet 0    ketorolac (TORADOL) 10 MG tablet Take 1 tablet (10 mg) by mouth every 6 hours as needed for moderate pain. (Patient not taking: Reported on 5/21/2025) 6 tablet 0    lisinopril (ZESTRIL) 40 MG tablet Take 1 tablet (40 mg) by mouth daily. 90 tablet 0    metoprolol tartrate (LOPRESSOR) 50 MG tablet Take 1 tablet (50 mg) by mouth 2 times daily. 180 tablet 0    nicotine (NICORETTE) 2 MG gum How to take it: When the urge to smoke occurs, chew gum until you feel a tingle, then \"park\" the gum in your cheek until the tingle is gone. Re-chew every few minutes and \"park\" again, chewing one piece for 30 minutes. Do not eat or drink while chewing.  Follow this schedule: Weeks 1 to 6: One piece of gum every 1 to 2 hours. Use at least 9 pieces a day, but no more than 24. Weeks 7 to 9: One " piece of gum every 2 to 4 hours. Weeks 10 to 12: One piece of gum every 4 to 8 hours. 100 each 5    omeprazole (PRILOSEC) 20 MG DR capsule Take 1 capsule (20 mg) by mouth daily.      ondansetron (ZOFRAN ODT) 4 MG ODT tab Take 1 tablet (4 mg) by mouth every 6 hours as needed for nausea. 4 tablet 0    oxyCODONE (ROXICODONE) 5 MG tablet Take 1 tablet (5 mg) by mouth every 6 hours as needed for moderate to severe pain. 6 tablet 0       PAST SURGICAL HISTORY:  Past Surgical History:   Procedure Laterality Date    ARTHROPLASTY KNEE Right 10/2/2023    Procedure: RIGHT TOTAL KNEE ARTHROPLASTY;  Surgeon: Ross Oro MD;  Location:  OR    COLONOSCOPY WITH CO2 INSUFFLATION N/A 03/15/2017    Procedure: COLONOSCOPY WITH CO2 INSUFFLATION;  Surgeon: Duane, William Charles, MD;  Location:  OR    DAVINCI PYELOPLASTY Left 5/13/2025    Procedure: ROBOTIC PYELOLITHOTOMY, LEFT ANTEGRADE STENT PLACEMENT;  Surgeon: Charley Samano MD;  [place] [facility] [place]    DILATION AND CURETTAGE  age 20    EXTRACORPOREAL SHOCK WAVE LITHOTRIPSY, CYSTOSCOPY, INSERT STENT URETER(S), COMBINED Bilateral 11/19/2018    Procedure: BILATERAL EXTRACORPOREAL SHOCK WAVE LITHOTRIPSY, CYSTOSCOPY, LEFT STENT PLACEMENT;  Surgeon: Tavo Saavedra MD;  Location:  OR    GI SURGERY  3/15/2017    GYN SURGERY  age 20    REPAIR ANEURYSM ASCENDING AORTA N/A 10/27/2017    Procedure: REPAIR ANEURYSM ASCENDING AORTA;  Median Sternotomy, Cardiopulmonary bypass, Ascending Aorta Aneurysm Repair using Hemashield Platinum Woven Double Velour Graft 34cm X 30cm.;  Surgeon: Rubio Piña MD;  Location: [facility] OR    VASCULAR SURGERY  10/27/2017    [place] teeth removed         ALLERGIES:     Allergies   Allergen Reactions    Blood Transfusion Related (Informational Only) Other (See Comments)     Patient has a history of a clinically significant antibody against RBC antigens.  A delay in compatible RBCs may occur.       FAMILY HISTORY:  Family History    Problem Relation Age of Onset    Respiratory Mother         copd    Cancer Maternal Grandmother         Leg    Alzheimer Disease Maternal Grandfather     Heart Disease Paternal Grandfather     Heart Disease Brother 54        Heart Attack     Cancer Sister         Lung cancer age 57-smoker d age 57         SOCIAL HISTORY:  Social History     Tobacco Use    Smoking status: Unknown    Smokeless tobacco: Never    Tobacco comments:     E cig   Vaping Use    Vaping status: Some Days    Substances: Nicotine    Devices: Disposable   Substance Use Topics    Alcohol use: Not Currently     Alcohol/week: 0.8 - 1.7 standard drinks of alcohol     Types: 1 - 2 Standard drinks or equivalent per week    Drug use: Yes     Types: Marijuana     Comment: 1/ month       ROS:   Constitutional: No fever, chills, or sweats. Weight stable.   Cardiovascular: As per HPI.     Exam:  /77 (BP Location: Right arm, Patient Position: Sitting, Cuff Size: Adult Regular)   Pulse 70   Wt 83.2 kg (183 lb 6.4 oz)   SpO2 96%   BMI 30.06 kg/m    GENERAL APPEARANCE: alert and no distress  HEENT: no icterus, no central cyanosis  LYMPH/NECK: no adenopathy, no asymmetry, JVP not elevated, no carotid bruits.  RESPIRATORY: lungs clear to auscultation - no rales, rhonchi or wheezes, no use of accessory muscles, no retractions, respirations are unlabored, normal respiratory rate  CARDIOVASCULAR: regular rhythm, normal S1, S2, no S3 or S4 and no murmur, click or rub, precordium quiet with normal PMI.  GI: soft, non tender  EXTREMITIES: no edema  NEURO: alert, normal speech,and affect  SKIN: no ecchymoses, no rashes     I have reviewed the labs and personally reviewed the imaging below and made my comment in the assessment and plan.    Labs:  CBC RESULTS:   Lab Results   Component Value Date    WBC 8.9 05/30/2025    WBC 7.9 11/10/2017    RBC 4.90 05/30/2025    RBC 4.08 11/10/2017    HGB 10.6 (L) 05/30/2025    HGB 14.9 11/12/2018    HCT 36.6 05/30/2025     HCT 36.8 11/10/2017    MCV 75 (L) 05/30/2025    MCV 90 11/10/2017    MCH 21.6 (L) 05/30/2025    MCH 29.4 11/10/2017    MCHC 29.0 (L) 05/30/2025    MCHC 32.6 11/10/2017    RDW 17.3 (H) 05/30/2025    RDW 13.5 11/10/2017     (H) 05/30/2025     11/10/2017       BMP RESULTS:  Lab Results   Component Value Date     05/30/2025     03/25/2021    POTASSIUM 4.1 05/30/2025    POTASSIUM 4.0 01/09/2023    POTASSIUM 4.3 03/25/2021    CHLORIDE 106 05/30/2025    CHLORIDE 110 (H) 01/09/2023    CHLORIDE 106 03/25/2021    CO2 26 05/30/2025    CO2 31 01/09/2023    CO2 31 03/25/2021    ANIONGAP 10 05/30/2025    ANIONGAP 3 01/09/2023    ANIONGAP 2 (L) 03/25/2021     (H) 05/30/2025     (H) 05/14/2025     (H) 01/09/2023     (H) 03/25/2021    BUN 13.1 05/30/2025    BUN 27 01/09/2023    BUN 28 03/25/2021    CR 0.69 05/30/2025    CR 0.62 03/25/2021    GFRESTIMATED >90 05/30/2025    GFRESTIMATED >90 03/25/2021    GFRESTBLACK >90 03/25/2021    QASIM 9.5 05/30/2025    QASIM 9.5 03/25/2021      Echocardiogram 1/12/2024  Status post ascending aorta aneurysm repair using 34 mm Hemashield graft with  PFO closure (2017).     Left ventricular size, wall motion and function are normal. The ejection  fraction is 60-65%.  The right ventricle is normal size. Global right ventricular function is  normal.  Trace to mild aortic insufficiency is present.  Sinuses of Valsalva 4.1 (indexed at 2.05 cm/m2)  No pericardial effusion is present.     CT angiogram of the chest 12/8/2022:  Patent ascending aortic graft. No leak.     Assessment and Plan:     # Status post thoracic aneurysm repair in 2017  -Recent chest 5/15/2025  -Continue to monitor.  CT chest showed stable aorta findings.     # Hypertension well-controlled.  -Continue lisinopril 40 mg daily  -Continue amlodipine 5 mg daily she is not on  -Continue metoprolol 50 mg twice daily    # Hyperlipidemia  - I recommended patient try to restart atorvastatin as  the symptoms that she is reporting is unlikely due to statin like constipation.    # Former smoker for 50 years (half pack a day)  # Lung cancer, recent new diagnosis  - Patient is scheduled to undergo radiation and chemotherapy with oncology.    Return to clinic as needed.    Total time spent today for this visit is 28 minutes including precharting, face-to-face clinic visit, review of labs/imaging and medical documentation.    Vipul RACHEL MD  [facility] Division of Cardiology  Pager [phone]

## 2025-07-02 NOTE — PATIENT INSTRUCTIONS
Thank you for coming to the [facility] at [place]; please note the following instructions:    1. Recheck Blood Pressure Today    2. You may resume your statin    3. Follow up with Dr. Whitlock as needed        If you have any questions regarding your visit, please contact your care team:     CARDIOLOGY  TELEPHONE NUMBER   Jocelin MELTON, Registered Nurse  Vilma CHAMORRO, Registered Nurse  Brooklyn DAVIS, Registered Medical Assistant  Viktoriya PAULINO, Clinic Assistant  Beth CHAMORRO, Visit Facilitator  Doni DAVIS, Visit Facilitator [phone] (select option 1)    *After hours: [phone]   For Scheduling Appts:     [phone] (select option 1)    *After hours: [phone]   For the Device Clinic (Pacemakers and ICD's)  Marisabel CAMPOS, Registered Nurse   During business hours: [phone]    *After business hours:  [phone] (select option 4)      Normal test result notifications will be released via [employer] or mailed within 7 business days.  All other test results, will be communicated via telephone once reviewed by your cardiologist.    If you need a medication refill, please contact your pharmacy.  Please allow 3 business days for your refill to be completed.    As always, thank you for trusting us with your health care needs!

## 2025-07-02 NOTE — LETTER
7/2/2025      RE: Georgie Patino  [address] [place] [phone]       Dear Colleague,    Thank you for the opportunity to participate in the care of your patient, Georgie Patino, at the [facility]. Please see a copy of my visit note below.                                                                                                                           General Cardiology Clinic-[place]      Referring provider:Kori Em PA-C     HPI: Ms. Georgie Patino is a 68 year old  female with PMH significant for    -Hypertension  -Hyperlipidemia  -Status post ascending aortic aneurysm repair in 2017  -Former smoker.    Patient was seen in cardiology clinic in 2017 for palpitations and found to have severely dilated ascending aorta at 6 cm.  Subsequently underwent aneurysm repair.  Last seen in cardiology clinic in 2021.  Since then she has been doing well.  She has been following with PCP since 2021.  In October of last year she started on amlodipine in addition to metoprolol and lisinopril.  She does not monitor her blood pressure at 1.  Overall clinic visits show high blood pressure.  She is physically inactive due to knee pain.  Denies chest pain, shortness of breath, dizziness, palpitations or lower extremity edema.  CTA chest 12/8/2022 showed patent ascending aortic graft with no leak.  Cardiovascular risk factors:  After she quit smoking she started vaping.   Blood pressure is not well-controlled.  Patient is on lisinopril 40, amlodipine 5 mg, aspirin 325, metoprolol 50 mg twice daily.    No diabetes.  Hyperlipidemia not well-controlled.  .    Medications, personal, family, and social history reviewed with patient and revised.    Interval history 7/2/2025  - Blood pressure at home around 138/61, sometimes as low as 130.  - Undergoing workup for lung cancer, treatment with radiation and chemo to start  next week.  - CT scan for kidney stone 4 weeks ago revealed lung cancer; scan was done due to low oxygen levels and kidney pain.  - History of kidney stones, underwent 3 surgeries for removal.  - Quit smoking about a week ago, smoked half a pack a day for 50 years.  - Stopped taking statin due to nausea and constipation.    PAST MEDICAL HISTORY:  Past Medical History:   Diagnosis Date     Anxiety      Ascending aortic aneurysm 09/21/2017     Blood transfusions age 19    due to menorhagia     Difficulty walking      History of blood transfusion      Hypertension 09/21/2017     Localized osteoarthritis of both knees      Morbid obesity (H)      Thyroid nodule 11/20/2017     Tobacco abuse      Walking troubles        CURRENT MEDICATIONS:  Current Outpatient Medications   Medication Sig Dispense Refill     acetaminophen (TYLENOL) 325 MG tablet Take 2 tablets (650 mg) by mouth every 4 hours as needed for other (mild pain) 100 tablet 0     amLODIPine (NORVASC) 5 MG tablet Take 1 tablet (5 mg) by mouth daily. 90 tablet 0     atorvastatin (LIPITOR) 10 MG tablet Take 1 tablet (10 mg) by mouth daily. 90 tablet 3     Blood Pressure Monitor KIT 1 kit daily 1 kit 0     buPROPion (WELLBUTRIN XL) 150 MG 24 hr tablet Take 1 tablet (150 mg) by mouth every morning. 30 tablet 0     diphenhydrAMINE-acetaminophen (TYLENOL PM)  MG tablet Take 1 tablet by mouth nightly as needed for sleep       hydrOXYzine HCl (ATARAX) 25 MG tablet Take 1 tablet (25 mg) by mouth 3 times daily as needed for anxiety. 60 tablet 0     ketorolac (TORADOL) 10 MG tablet Take 1 tablet (10 mg) by mouth every 6 hours as needed for moderate pain. (Patient not taking: Reported on 5/21/2025) 6 tablet 0     lisinopril (ZESTRIL) 40 MG tablet Take 1 tablet (40 mg) by mouth daily. 90 tablet 0     metoprolol tartrate (LOPRESSOR) 50 MG tablet Take 1 tablet (50 mg) by mouth 2 times daily. 180 tablet 0     nicotine (NICORETTE) 2 MG gum How to take it: When the urge to  "smoke occurs, chew gum until you feel a tingle, then \"park\" the gum in your cheek until the tingle is gone. Re-chew every few minutes and \"park\" again, chewing one piece for 30 minutes. Do not eat or drink while chewing.  Follow this schedule: Weeks 1 to 6: One piece of gum every 1 to 2 hours. Use at least 9 pieces a day, but no more than 24. Weeks 7 to 9: One piece of gum every 2 to 4 hours. Weeks 10 to 12: One piece of gum every 4 to 8 hours. 100 each 5     omeprazole (PRILOSEC) 20 MG DR capsule Take 1 capsule (20 mg) by mouth daily.       ondansetron (ZOFRAN ODT) 4 MG ODT tab Take 1 tablet (4 mg) by mouth every 6 hours as needed for nausea. 4 tablet 0     oxyCODONE (ROXICODONE) 5 MG tablet Take 1 tablet (5 mg) by mouth every 6 hours as needed for moderate to severe pain. 6 tablet 0       PAST SURGICAL HISTORY:  Past Surgical History:   Procedure Laterality Date     ARTHROPLASTY KNEE Right 10/2/2023    Procedure: RIGHT TOTAL KNEE ARTHROPLASTY;  Surgeon: Ross Oro MD;  Location:  OR     COLONOSCOPY WITH CO2 INSUFFLATION N/A 03/15/2017    Procedure: COLONOSCOPY WITH CO2 INSUFFLATION;  Surgeon: Duane, William Charles, MD;  Location:  OR     DAVINCI PYELOPLASTY Left 5/13/2025    Procedure: ROBOTIC PYELOLITHOTOMY, LEFT ANTEGRADE STENT PLACEMENT;  Surgeon: Charley Samano MD;  Location: [facility] OR     DILATION AND CURETTAGE  age 20     EXTRACORPOREAL SHOCK WAVE LITHOTRIPSY, CYSTOSCOPY, INSERT STENT URETER(S), COMBINED Bilateral 11/19/2018    Procedure: BILATERAL EXTRACORPOREAL SHOCK WAVE LITHOTRIPSY, CYSTOSCOPY, LEFT STENT PLACEMENT;  Surgeon: Tavo Saavedra MD;  Location:  OR     GI SURGERY  3/15/2017     GYN SURGERY  age 20     REPAIR ANEURYSM ASCENDING AORTA N/A 10/27/2017    Procedure: REPAIR ANEURYSM ASCENDING AORTA;  Median Sternotomy, Cardiopulmonary bypass, Ascending Aorta Aneurysm Repair using Hemashield Platinum Woven Double Velour Graft 34cm X 30cm.;  Surgeon: Rubio Piña " MD [place];  Location: [facility] OR     VASCULAR SURGERY  10/27/2017     [place] teeth removed         ALLERGIES:     Allergies   Allergen Reactions     Blood Transfusion Related (Informational Only) Other (See Comments)     Patient has a history of a clinically significant antibody against RBC antigens.  A delay in compatible RBCs may occur.       FAMILY HISTORY:  Family History   Problem Relation Age of Onset     Respiratory Mother         copd     Cancer Maternal Grandmother         Leg     Alzheimer Disease Maternal Grandfather      Heart Disease Paternal Grandfather      Heart Disease Brother 54        Heart Attack      Cancer Sister         Lung cancer age 57-smoker d age 57         SOCIAL HISTORY:  Social History     Tobacco Use     Smoking status: Unknown     Smokeless tobacco: Never     Tobacco comments:     E cig   Vaping Use     Vaping status: Some Days     Substances: Nicotine     Devices: Disposable   Substance Use Topics     Alcohol use: Not Currently     Alcohol/week: 0.8 - 1.7 standard drinks of alcohol     Types: 1 - 2 Standard drinks or equivalent per week     Drug use: Yes     Types: Marijuana     Comment: 1/ month       ROS:   Constitutional: No fever, chills, or sweats. Weight stable.   Cardiovascular: As per HPI.     Exam:  /77 (BP Location: Right arm, Patient Position: Sitting, Cuff Size: Adult Regular)   Pulse 70   Wt 83.2 kg (183 lb 6.4 oz)   SpO2 96%   BMI 30.06 kg/m    GENERAL APPEARANCE: alert and no distress  HEENT: no icterus, no central cyanosis  LYMPH/NECK: no adenopathy, no asymmetry, JVP not elevated, no carotid bruits.  RESPIRATORY: lungs clear to auscultation - no rales, rhonchi or wheezes, no use of accessory muscles, no retractions, respirations are unlabored, normal respiratory rate  CARDIOVASCULAR: regular rhythm, normal S1, S2, no S3 or S4 and no murmur, click or rub, precordium quiet with normal PMI.  GI: soft, non tender  EXTREMITIES: no edema  NEURO: alert, normal  speech,and affect  SKIN: no ecchymoses, no rashes     I have reviewed the labs and personally reviewed the imaging below and made my comment in the assessment and plan.    Labs:  CBC RESULTS:   Lab Results   Component Value Date    WBC 8.9 05/30/2025    WBC 7.9 11/10/2017    RBC 4.90 05/30/2025    RBC 4.08 11/10/2017    HGB 10.6 (L) 05/30/2025    HGB 14.9 11/12/2018    HCT 36.6 05/30/2025    HCT 36.8 11/10/2017    MCV 75 (L) 05/30/2025    MCV 90 11/10/2017    MCH 21.6 (L) 05/30/2025    MCH 29.4 11/10/2017    MCHC 29.0 (L) 05/30/2025    MCHC 32.6 11/10/2017    RDW 17.3 (H) 05/30/2025    RDW 13.5 11/10/2017     (H) 05/30/2025     11/10/2017       BMP RESULTS:  Lab Results   Component Value Date     05/30/2025     03/25/2021    POTASSIUM 4.1 05/30/2025    POTASSIUM 4.0 01/09/2023    POTASSIUM 4.3 03/25/2021    CHLORIDE 106 05/30/2025    CHLORIDE 110 (H) 01/09/2023    CHLORIDE 106 03/25/2021    CO2 26 05/30/2025    CO2 31 01/09/2023    CO2 31 03/25/2021    ANIONGAP 10 05/30/2025    ANIONGAP 3 01/09/2023    ANIONGAP 2 (L) 03/25/2021     (H) 05/30/2025     (H) 05/14/2025     (H) 01/09/2023     (H) 03/25/2021    BUN 13.1 05/30/2025    BUN 27 01/09/2023    BUN 28 03/25/2021    CR 0.69 05/30/2025    CR 0.62 03/25/2021    GFRESTIMATED >90 05/30/2025    GFRESTIMATED >90 03/25/2021    GFRESTBLACK >90 03/25/2021    QASIM 9.5 05/30/2025    QASIM 9.5 03/25/2021      Echocardiogram 1/12/2024  Status post ascending aorta aneurysm repair using 34 mm Hemashield graft with  PFO closure (2017).     Left ventricular size, wall motion and function are normal. The ejection  fraction is 60-65%.  The right ventricle is normal size. Global right ventricular function is  normal.  Trace to mild aortic insufficiency is present.  Sinuses of Valsalva 4.1 (indexed at 2.05 cm/m2)  No pericardial effusion is present.     CT angiogram of the chest 12/8/2022:  Patent ascending aortic graft. No leak.      Assessment and Plan:     # Status post thoracic aneurysm repair in 2017  -Recent chest 5/15/2025  -Continue to monitor.  CT chest showed stable aorta findings.     # Hypertension well-controlled.  -Continue lisinopril 40 mg daily  -Continue amlodipine 5 mg daily she is not on  -Continue metoprolol 50 mg twice daily    # Hyperlipidemia  - I recommended patient try to restart atorvastatin as the symptoms that she is reporting is unlikely due to statin like constipation.    # Former smoker for 50 years (half pack a day)  # Lung cancer, recent new diagnosis  - Patient is scheduled to undergo radiation and chemotherapy with oncology.    Return to clinic as needed.    Total time spent today for this visit is 28 minutes including precharting, face-to-face clinic visit, review of labs/imaging and medical documentation.    Vipul RACHEL MD  [facility] Division of Cardiology  Pager [phone]     Please do not hesitate to contact me if you have any questions/concerns.     Sincerely,     Vipul Rachel MD

## 2025-07-02 NOTE — NURSING NOTE
Return Appointment: Patient given instructions regarding scheduling next clinic visit. Patient demonstrated understanding of this information and agreed to call with further questions or concerns. Patient to follow up as needed with Dr. Whitlock.    Patient stated she understood all health information given and agreed to call with further questions or concerns.     Vilma Pacheco, RN, BSN  Cardiology RN Care Coordinator   [place]   Phone: [phone]  Fax: [phone] ([place]) [phone] ([place])

## 2025-07-02 NOTE — NURSING NOTE
"Chief Complaint   Patient presents with    Follow Up     Reason for visit: Return general cardiology for follow-up       Initial BP (!) 159/72 (BP Location: Right arm, Patient Position: Sitting, Cuff Size: Adult Regular)   Pulse 55   Wt 83.2 kg (183 lb 6.4 oz)   SpO2 96%   BMI 30.06 kg/m   Estimated body mass index is 30.06 kg/m  as calculated from the following:    Height as of 5/21/25: 1.664 m (5' 5.5\").    Weight as of this encounter: 83.2 kg (183 lb 6.4 oz)..  BP completed using cuff size: regular    SINDI Walden    "

## 2025-07-07 ENCOUNTER — MYC REFILL (OUTPATIENT)
Dept: INTERNAL MEDICINE | Facility: CLINIC | Age: 70
End: 2025-07-07
Payer: COMMERCIAL

## 2025-07-07 DIAGNOSIS — F41.9 ANXIETY: ICD-10-CM

## 2025-07-07 RX ORDER — HYDROXYZINE HYDROCHLORIDE 25 MG/1
25 TABLET, FILM COATED ORAL 3 TIMES DAILY PRN
Qty: 90 TABLET | Refills: 0 | Status: SHIPPED | OUTPATIENT
Start: 2025-07-07

## 2025-08-04 ENCOUNTER — MYC REFILL (OUTPATIENT)
Dept: CARDIOLOGY | Facility: CLINIC | Age: 70
End: 2025-08-04
Payer: COMMERCIAL

## 2025-08-04 DIAGNOSIS — I10 BENIGN ESSENTIAL HYPERTENSION: Primary | ICD-10-CM

## 2025-08-04 DIAGNOSIS — Z98.890 S/P ASCENDING AORTIC ANEURYSM REPAIR: ICD-10-CM

## 2025-08-04 DIAGNOSIS — Z86.79 S/P ASCENDING AORTIC ANEURYSM REPAIR: ICD-10-CM

## 2025-08-04 DIAGNOSIS — I10 BENIGN ESSENTIAL HYPERTENSION: ICD-10-CM

## 2025-08-06 ENCOUNTER — MYC REFILL (OUTPATIENT)
Dept: INTERNAL MEDICINE | Facility: CLINIC | Age: 70
End: 2025-08-06
Payer: COMMERCIAL

## 2025-08-06 DIAGNOSIS — F41.9 ANXIETY: ICD-10-CM

## 2025-08-06 RX ORDER — METOPROLOL TARTRATE 50 MG
50 TABLET ORAL 2 TIMES DAILY
Qty: 180 TABLET | Refills: 3 | Status: SHIPPED | OUTPATIENT
Start: 2025-08-06

## 2025-08-06 RX ORDER — HYDROXYZINE HYDROCHLORIDE 25 MG/1
25 TABLET, FILM COATED ORAL 3 TIMES DAILY PRN
Qty: 90 TABLET | Refills: 0 | OUTPATIENT
Start: 2025-08-06

## 2025-08-06 RX ORDER — LISINOPRIL 40 MG/1
40 TABLET ORAL DAILY
Qty: 90 TABLET | Refills: 2 | Status: SHIPPED | OUTPATIENT
Start: 2025-08-06

## 2025-09-04 DIAGNOSIS — R69 DIAGNOSIS UNKNOWN: Primary | ICD-10-CM

## (undated) DEVICE — ESU HOLSTER PLASTIC DISP E2400

## (undated) DEVICE — HOOD FLYTE 0408-800-000

## (undated) DEVICE — SU ETHIBOND 0 CT-1 CR 8X18" CX21D

## (undated) DEVICE — BLADE SAW RECIPROCATING LINVATEC 5052-291

## (undated) DEVICE — SU ETHIBOND 2-0 SHDA 30" X563H

## (undated) DEVICE — RX SURGIFLO HEMOSTATIC MATRIX W/THROMBIN 8ML 2994

## (undated) DEVICE — DAVINCI HOT SHEARS TIP COVER  400180

## (undated) DEVICE — ESU GROUND PAD UNIVERSAL W/O CORD

## (undated) DEVICE — SU PLEDGET SOFT TFE 13MMX7MMX1.5MM D7044

## (undated) DEVICE — DRAIN CHEST TUBE 28FR STR 8028

## (undated) DEVICE — SUCTION IRR SYSTEM W/O TIP INTERPULSE HANDPIECE 0210-100-000

## (undated) DEVICE — SU SILK 2-0 FS-1 18" 685G

## (undated) DEVICE — LINEN TOWEL PACK X5 5464

## (undated) DEVICE — DRAPE POUCH INSTRUMENT 3 POCKET 1018L

## (undated) DEVICE — GOWN XLG DISP 9545

## (undated) DEVICE — DAVINCI XI DRAPE COLUMN 470341

## (undated) DEVICE — SU PROLENE 5-0 RB-2DA 30" 8710H

## (undated) DEVICE — TAPE ADH POROUS 3IN CURITY STD 7046C

## (undated) DEVICE — BLADE KNIFE SURG 15 371115

## (undated) DEVICE — SU PROLENE 3-0 SHDA 48" 8534H

## (undated) DEVICE — GUIDEWIRE BENTSON FLEX TIP 0.035"X150CM M0066201250

## (undated) DEVICE — LINEN TOWEL PACK X30 5481

## (undated) DEVICE — PEN MARKING SKIN

## (undated) DEVICE — NEEDLE HYPO 22X1-1/2 SAFETY 305900

## (undated) DEVICE — DRSG DRAIN 4X4" 7086

## (undated) DEVICE — SU ETHIBOND 3-0 BBDA 36" X588H

## (undated) DEVICE — BLADE SAW RECIPROCATING LINVATEC 5052-179

## (undated) DEVICE — SOL NACL 0.9% IRRIG 3000ML BAG 2B7477

## (undated) DEVICE — CLIP HORIZON MED BLUE 002200

## (undated) DEVICE — BNDG COBAN 6"X5YDS STERILE

## (undated) DEVICE — SU MONOCRYL 3-0 PS-2 27" Y427H

## (undated) DEVICE — DRAIN BLAKE 19FR SIL 2231

## (undated) DEVICE — SOL WATER INJ 2000ML BAG 07118-07

## (undated) DEVICE — BLADE SAW STERNAL 20X30MM KM-32

## (undated) DEVICE — DRAPE STERI TOWEL SM 1000

## (undated) DEVICE — SU PROLENE 4-0 RB-1DA 36" 8557H

## (undated) DEVICE — DRSG TEGADERM 8X12" 1629

## (undated) DEVICE — SYR 50ML LL W/O NDL 309653

## (undated) DEVICE — SU STRATAFIX PDS PLUS CT-1 30CM SXPP1A435

## (undated) DEVICE — SU VICRYL 3-0 FS-1 27" J442H

## (undated) DEVICE — DRAPE IOBAN INCISE 23X17" 6650EZ

## (undated) DEVICE — PREP CHLORAPREP 26ML TINTED HI-LITE ORANGE 930815

## (undated) DEVICE — TIES BANDING T50R

## (undated) DEVICE — SUCTION STRYKERFLOW II 250-070-500

## (undated) DEVICE — BLADE SAW SAGITTAL STRK 18X90X1.27MM HD SYS 6 6118-127-090

## (undated) DEVICE — DECANTER BAG 2002S

## (undated) DEVICE — NDL INSUFFLATION 13GA 120MM C2201

## (undated) DEVICE — DRSG AQUACEL AG 3.5X9.75" HYDROFIBER 412011

## (undated) DEVICE — DRSG AQUACEL AG HYDROFIBER  3.5X10" 422605

## (undated) DEVICE — ESU ELEC BLADE 6" COATED E1450-6

## (undated) DEVICE — TOURNIQUET VASCULAR KIT 7 1/2" 79012

## (undated) DEVICE — GLOVE BIOGEL INDICATOR 7.5 LF 41675

## (undated) DEVICE — DRAPE SLUSH/WARMER 66X44" ORS-320

## (undated) DEVICE — SU STEEL 6 CCS 4X18" M654G

## (undated) DEVICE — GLOVE UNDER INDICATOR PI SZ 6.5 LF 41665

## (undated) DEVICE — PROTECTOR ARM ONE-STEP TRENDELENBURG 40418

## (undated) DEVICE — ADAPTOR CHECK FLO 050805-TWSL

## (undated) DEVICE — SYR 10ML FINGER CONTROL W/O NDL 309695

## (undated) DEVICE — PACK ADULT HEART UMMC PV15CG92D

## (undated) DEVICE — SUTURE MONOCRYL+ 4-0 PS-2 27IN MCP426H

## (undated) DEVICE — DRSG TELFA 3X8" 1238

## (undated) DEVICE — DRAIN ROUND W/RESERV KIT JACKSON PRATT 10FR 400ML SU130-402D

## (undated) DEVICE — TOURNIQUET VASCULAR KIT ARGYLE 8888-585000

## (undated) DEVICE — BONE CLEANING TIP INTERPULSE  0210-010-000

## (undated) DEVICE — CLIP ENDO HEMO-LOC PURPLE LG 544240

## (undated) DEVICE — CATH URETERAL OPEN END 05FR 70CM 020015

## (undated) DEVICE — SU VICRYL 0 CTX 36" J370H

## (undated) DEVICE — SYR 50ML SLIP TIP W/O NDL 309654

## (undated) DEVICE — BLADE SAW RECIP STRK 70X12.5X0.80MM 0277-096-277

## (undated) DEVICE — WIPES FOLEY CARE SURESTEP PROVON DFC100

## (undated) DEVICE — ENDO SHEARS RENEW LAP ENDOCUT SCISSOR TIP 16.5MM 3142

## (undated) DEVICE — ENDO APPLICATOR SURGIFLO PLASMA COBLATION MS1995

## (undated) DEVICE — ESU GROUND PAD ADULT W/CORD E7507

## (undated) DEVICE — LUBRICANT INST ELECTROLUBE EL101

## (undated) DEVICE — GLOVE BIOGEL PI ULTRATOUCH G SZ 7.0 42170

## (undated) DEVICE — GLOVE BIOGEL PI ULTRATOUCH G SZ 6.5 42165

## (undated) DEVICE — SOL WATER IRRIG 1000ML BOTTLE 07139-09

## (undated) DEVICE — ENDO OBTURATOR ACCESS PORT BLADELESS 8X100MM IAS8-100LP

## (undated) DEVICE — SU ETHIBOND 0 TIE 6X30" X306H

## (undated) DEVICE — DAVINCI XI DRAPE ARM 470015

## (undated) DEVICE — Device

## (undated) DEVICE — BONE WAX 2.5GM W31G

## (undated) DEVICE — SYRINGE IRRIGATION BULB TIP 60ML STER LF DYND20125

## (undated) DEVICE — PREP CHLORAPREP 26ML TINTED ORANGE  260815

## (undated) DEVICE — SU VICRYL+ 0 27 UR6 VLT VCP603H

## (undated) DEVICE — TUBING IV EXTENSION SET ANESTHESIA 34" MLL 2C6227

## (undated) DEVICE — SOL NACL 0.9% IRRIG 3000ML BAG 07972-08

## (undated) DEVICE — SUCTION DRY CHEST DRAIN OASIS 3600-100

## (undated) DEVICE — SU ETHIBOND 2 V-37 4X30" MX69G

## (undated) DEVICE — CATH TRAY FOLEY SURESTEP 16FR DRAIN BAG STATOCK A899916

## (undated) DEVICE — ENDO POUCH UNIV RETRIEVAL SYSTEM INZII 10MM CD001

## (undated) DEVICE — CLIP ENDO HEMO-LOC GREEN MED/LG 544230

## (undated) DEVICE — SU PROLENE 3-0 SHDA 36" 8522H

## (undated) DEVICE — BINDER MAMMARY LINED LG 36-40" EXPAND-A-BAND

## (undated) DEVICE — SOLUTION WOUND CLEANSING 3/4OZ 10% PVP EA-L3011FB-50

## (undated) DEVICE — TAPE MEDIPORE 4"X2YD 2864

## (undated) DEVICE — SU VICRYL 2-0 CT-1 27" UND J259H

## (undated) DEVICE — BLADE CLIPPER SGL USE 9680

## (undated) DEVICE — PACK TOTAL KNEE SOP15TKFSD

## (undated) DEVICE — SU ETHIBOND 1 CT-1 30" X425H

## (undated) DEVICE — SU VICRYL+ 0 27IN CT-1 UND VCP260H

## (undated) DEVICE — DRSG STERI STRIP 1/2X4" B1557

## (undated) DEVICE — NDL ANGIOCATH 14GA 2" 4088

## (undated) DEVICE — GLOVE BIOGEL PI SZ 8.5 40885

## (undated) DEVICE — CUSTOM PACK DA VINCI SBA5BDVHEA

## (undated) DEVICE — LEAD ELEC MYOCARDIO PACING TEMPORARY MEDTRONIC

## (undated) DEVICE — BONE CEMENT MIXEVAC III HI VAC KIT  0206-015-000

## (undated) DEVICE — DRSG ABDOMINAL 07 1/2X8" 7197D

## (undated) DEVICE — SU PROLENE 6-0 C-1DA 30" 8706H

## (undated) DEVICE — LINEN GOWN XLG 5407

## (undated) DEVICE — SU PROLENE 4-0 SHDA 36" 8521H

## (undated) DEVICE — DRAIN RESERVOIR 100ML JP 0070740

## (undated) DEVICE — GLOVE BIOGEL PI MICRO INDICATOR UNDERGLOVE SZ 8.0 48980

## (undated) DEVICE — SU VICRYL 1 CT-1 27" J341H

## (undated) DEVICE — GOWN IMPERVIOUS SPECIALTY XL/XLONG 39049

## (undated) DEVICE — GLOVE PROTEXIS W/NEU-THERA 7.0  2D73TE70

## (undated) DEVICE — SUCTION CATH AIRLIFE TRI-FLO W/CONTROL PORT 14FR  T60C

## (undated) DEVICE — SUCTION MANIFOLD NEPTUNE 2 SYS 1 PORT 702-025-000

## (undated) DEVICE — TUBING INSUFFLATION W/FILTER CPC TO LUER 620-030-301

## (undated) DEVICE — LINEN TOWEL PACK X6 WHITE 5487

## (undated) DEVICE — MITT PRE-OP 7 L X 5 1/2 W 5177M1

## (undated) DEVICE — TUBING FILTER TRI-LUMEN AIRSEAL ASC-EVAC1

## (undated) DEVICE — NDL SPINAL 18GA 3.5" 405184

## (undated) DEVICE — SU DERMABOND ADVANCED .7ML DNX12

## (undated) DEVICE — DAVINCI XI SEAL UNIVERSAL 5-12MM 470500

## (undated) DEVICE — GLOVE NEOLON 2G NEOPRENE PF 7.5 LATEX FREE MSG6075

## (undated) DEVICE — DEFIB PRO-PADZ LVP LQD GEL ADULT 8900-2105-01

## (undated) DEVICE — WECK EFX CONES AND SUTURE PASSER EFXCT1

## (undated) DEVICE — SUCTION MANIFOLD DORNOCH ULTRA CART UL-CL500

## (undated) DEVICE — SU STEEL MYO/WIRE II STERNOTOMY 8 BE-1 3X14" 048-217

## (undated) DEVICE — NDL COUNTER 20CT 31142493

## (undated) DEVICE — GLOVE LINER HALF FINGER NYLON KP5015/50

## (undated) DEVICE — ESU GROUND PAD ADULT REM W/15' CORD E7507DB

## (undated) DEVICE — SU VICRYL 0 CT-1 36" J346H

## (undated) DEVICE — SU VICRYL 4-0 RB-1 27" J304

## (undated) DEVICE — SOL WATER IRRIG 1000ML BOTTLE 2F7114

## (undated) DEVICE — DRSG STERI STRIP 1/2X4" R1547

## (undated) DEVICE — DAVINCI XI OBTURATOR BLADELESS 8MM 470359

## (undated) DEVICE — ANTIFOG SOLUTION SEE SHARP 150M TROCAR SWABS 30978 (COI)

## (undated) DEVICE — SOL NACL 0.9% IRRIG 1000ML BOTTLE 2F7124

## (undated) RX ORDER — PROPOFOL 10 MG/ML
INJECTION, EMULSION INTRAVENOUS
Status: DISPENSED
Start: 2017-10-27

## (undated) RX ORDER — PROPOFOL 10 MG/ML
INJECTION, EMULSION INTRAVENOUS
Status: DISPENSED
Start: 2023-10-02

## (undated) RX ORDER — VANCOMYCIN HYDROCHLORIDE 1 G/20ML
INJECTION, POWDER, LYOPHILIZED, FOR SOLUTION INTRAVENOUS
Status: DISPENSED
Start: 2023-10-02

## (undated) RX ORDER — VANCOMYCIN HYDROCHLORIDE 500 MG/10ML
INJECTION, POWDER, LYOPHILIZED, FOR SOLUTION INTRAVENOUS
Status: DISPENSED
Start: 2023-10-02

## (undated) RX ORDER — MUPIROCIN 20 MG/G
OINTMENT TOPICAL
Status: DISPENSED
Start: 2017-10-27

## (undated) RX ORDER — CEFAZOLIN SODIUM 2 G/100ML
INJECTION, SOLUTION INTRAVENOUS
Status: DISPENSED
Start: 2018-11-19

## (undated) RX ORDER — FENTANYL CITRATE 50 UG/ML
INJECTION, SOLUTION INTRAMUSCULAR; INTRAVENOUS
Status: DISPENSED
Start: 2018-11-19

## (undated) RX ORDER — PROPOFOL 10 MG/ML
INJECTION, EMULSION INTRAVENOUS
Status: DISPENSED
Start: 2018-11-19

## (undated) RX ORDER — CEFAZOLIN SODIUM 2 G/100ML
INJECTION, SOLUTION INTRAVENOUS
Status: DISPENSED
Start: 2017-10-27

## (undated) RX ORDER — ALBUTEROL SULFATE 90 UG/1
AEROSOL, METERED RESPIRATORY (INHALATION)
Status: DISPENSED
Start: 2017-10-27

## (undated) RX ORDER — OXYCODONE HYDROCHLORIDE 5 MG/1
TABLET ORAL
Status: DISPENSED
Start: 2018-11-19

## (undated) RX ORDER — DEXAMETHASONE SODIUM PHOSPHATE 4 MG/ML
INJECTION, SOLUTION INTRA-ARTICULAR; INTRALESIONAL; INTRAMUSCULAR; INTRAVENOUS; SOFT TISSUE
Status: DISPENSED
Start: 2018-11-19

## (undated) RX ORDER — HEPARIN SODIUM 1000 [USP'U]/ML
INJECTION, SOLUTION INTRAVENOUS; SUBCUTANEOUS
Status: DISPENSED
Start: 2017-10-27

## (undated) RX ORDER — FENTANYL CITRATE 50 UG/ML
INJECTION, SOLUTION INTRAMUSCULAR; INTRAVENOUS
Status: DISPENSED
Start: 2017-10-27

## (undated) RX ORDER — CEFAZOLIN SODIUM/WATER 2 G/20 ML
SYRINGE (ML) INTRAVENOUS
Status: DISPENSED
Start: 2023-10-02

## (undated) RX ORDER — DEXAMETHASONE SODIUM PHOSPHATE 4 MG/ML
INJECTION, SOLUTION INTRA-ARTICULAR; INTRALESIONAL; INTRAMUSCULAR; INTRAVENOUS; SOFT TISSUE
Status: DISPENSED
Start: 2023-10-02

## (undated) RX ORDER — ACETAMINOPHEN 325 MG/1
TABLET ORAL
Status: DISPENSED
Start: 2023-10-02

## (undated) RX ORDER — FENTANYL CITRATE 50 UG/ML
INJECTION, SOLUTION INTRAMUSCULAR; INTRAVENOUS
Status: DISPENSED
Start: 2025-05-13

## (undated) RX ORDER — LIDOCAINE HYDROCHLORIDE 10 MG/ML
INJECTION, SOLUTION EPIDURAL; INFILTRATION; INTRACAUDAL; PERINEURAL
Status: DISPENSED
Start: 2025-05-13

## (undated) RX ORDER — FENTANYL CITRATE 50 UG/ML
INJECTION, SOLUTION INTRAMUSCULAR; INTRAVENOUS
Status: DISPENSED
Start: 2017-09-25

## (undated) RX ORDER — LIDOCAINE HYDROCHLORIDE 20 MG/ML
INJECTION, SOLUTION EPIDURAL; INFILTRATION; INTRACAUDAL; PERINEURAL
Status: DISPENSED
Start: 2018-11-19

## (undated) RX ORDER — NITROGLYCERIN 5 MG/ML
VIAL (ML) INTRAVENOUS
Status: DISPENSED
Start: 2017-09-25

## (undated) RX ORDER — EPHEDRINE SULFATE 50 MG/ML
INJECTION, SOLUTION INTRAVENOUS
Status: DISPENSED
Start: 2018-11-19

## (undated) RX ORDER — CELECOXIB 200 MG/1
CAPSULE ORAL
Status: DISPENSED
Start: 2023-10-02

## (undated) RX ORDER — PROTAMINE SULFATE 10 MG/ML
INJECTION, SOLUTION INTRAVENOUS
Status: DISPENSED
Start: 2017-10-27

## (undated) RX ORDER — BUPIVACAINE HYDROCHLORIDE 5 MG/ML
INJECTION, SOLUTION EPIDURAL; INTRACAUDAL; PERINEURAL
Status: DISPENSED
Start: 2025-05-13

## (undated) RX ORDER — TRANEXAMIC ACID 650 MG/1
TABLET ORAL
Status: DISPENSED
Start: 2023-10-02

## (undated) RX ORDER — ONDANSETRON 2 MG/ML
INJECTION INTRAMUSCULAR; INTRAVENOUS
Status: DISPENSED
Start: 2023-10-02

## (undated) RX ORDER — FENTANYL CITRATE 50 UG/ML
INJECTION, SOLUTION INTRAMUSCULAR; INTRAVENOUS
Status: DISPENSED
Start: 2017-09-29

## (undated) RX ORDER — PHENYLEPHRINE HCL IN 0.9% NACL 1 MG/10 ML
SYRINGE (ML) INTRAVENOUS
Status: DISPENSED
Start: 2017-10-27

## (undated) RX ORDER — HYDROMORPHONE HCL IN WATER/PF 6 MG/30 ML
PATIENT CONTROLLED ANALGESIA SYRINGE INTRAVENOUS
Status: DISPENSED
Start: 2023-10-02

## (undated) RX ORDER — FENTANYL CITRATE 0.05 MG/ML
INJECTION, SOLUTION INTRAMUSCULAR; INTRAVENOUS
Status: DISPENSED
Start: 2023-10-02

## (undated) RX ORDER — DEXAMETHASONE SODIUM PHOSPHATE 10 MG/ML
INJECTION, SOLUTION INTRAMUSCULAR; INTRAVENOUS
Status: DISPENSED
Start: 2025-05-13

## (undated) RX ORDER — GLYCOPYRROLATE 0.2 MG/ML
INJECTION, SOLUTION INTRAMUSCULAR; INTRAVENOUS
Status: DISPENSED
Start: 2023-10-02

## (undated) RX ORDER — EPINEPHRINE 1 MG/ML
INJECTION, SOLUTION INTRAMUSCULAR; SUBCUTANEOUS
Status: DISPENSED
Start: 2023-10-02

## (undated) RX ORDER — PROPOFOL 10 MG/ML
INJECTION, EMULSION INTRAVENOUS
Status: DISPENSED
Start: 2025-05-13

## (undated) RX ORDER — ACETAMINOPHEN 325 MG/1
TABLET ORAL
Status: DISPENSED
Start: 2018-11-19

## (undated) RX ORDER — HEPARIN SODIUM 1000 [USP'U]/ML
INJECTION, SOLUTION INTRAVENOUS; SUBCUTANEOUS
Status: DISPENSED
Start: 2017-09-25

## (undated) RX ORDER — CEFAZOLIN SODIUM 1 G/3ML
INJECTION, POWDER, FOR SOLUTION INTRAMUSCULAR; INTRAVENOUS
Status: DISPENSED
Start: 2025-05-13

## (undated) RX ORDER — GABAPENTIN 300 MG/1
CAPSULE ORAL
Status: DISPENSED
Start: 2018-11-19

## (undated) RX ORDER — SODIUM CHLORIDE 9 MG/ML
INJECTION, SOLUTION INTRAVENOUS
Status: DISPENSED
Start: 2017-09-25

## (undated) RX ORDER — ONDANSETRON 2 MG/ML
INJECTION INTRAMUSCULAR; INTRAVENOUS
Status: DISPENSED
Start: 2018-11-19

## (undated) RX ORDER — EPHEDRINE SULFATE 50 MG/ML
INJECTION, SOLUTION INTRAMUSCULAR; INTRAVENOUS; SUBCUTANEOUS
Status: DISPENSED
Start: 2017-10-27

## (undated) RX ORDER — CEFAZOLIN SODIUM 1 G/3ML
INJECTION, POWDER, FOR SOLUTION INTRAMUSCULAR; INTRAVENOUS
Status: DISPENSED
Start: 2017-10-27